# Patient Record
Sex: MALE | Race: WHITE
[De-identification: names, ages, dates, MRNs, and addresses within clinical notes are randomized per-mention and may not be internally consistent; named-entity substitution may affect disease eponyms.]

---

## 2018-02-09 ENCOUNTER — HOSPITAL ENCOUNTER (EMERGENCY)
Age: 48
Discharge: HOME | End: 2018-02-09
Payer: MEDICAID

## 2018-02-09 VITALS
RESPIRATION RATE: 16 BRPM | SYSTOLIC BLOOD PRESSURE: 158 MMHG | OXYGEN SATURATION: 98 % | DIASTOLIC BLOOD PRESSURE: 90 MMHG | HEART RATE: 56 BPM

## 2018-02-09 VITALS
SYSTOLIC BLOOD PRESSURE: 193 MMHG | HEART RATE: 64 BPM | OXYGEN SATURATION: 98 % | BODY MASS INDEX: 34.9 KG/M2 | RESPIRATION RATE: 12 BRPM | DIASTOLIC BLOOD PRESSURE: 103 MMHG | TEMPERATURE: 98.4 F

## 2018-02-09 VITALS — OXYGEN SATURATION: 96 %

## 2018-02-09 DIAGNOSIS — I25.10: ICD-10-CM

## 2018-02-09 DIAGNOSIS — E11.9: ICD-10-CM

## 2018-02-09 DIAGNOSIS — I25.2: ICD-10-CM

## 2018-02-09 DIAGNOSIS — Z79.82: ICD-10-CM

## 2018-02-09 DIAGNOSIS — M79.7: ICD-10-CM

## 2018-02-09 DIAGNOSIS — Z95.5: ICD-10-CM

## 2018-02-09 DIAGNOSIS — Z79.899: ICD-10-CM

## 2018-02-09 DIAGNOSIS — I10: Primary | ICD-10-CM

## 2018-02-09 DIAGNOSIS — R07.89: ICD-10-CM

## 2018-02-09 LAB
ANION GAP: 8 (ref 5–15)
BUN SERPL-MCNC: 16 MG/DL (ref 7–18)
BUN/CREAT RATIO: 16.5 RATIO (ref 10–20)
CALCIUM SERPL-MCNC: 9.2 MG/DL (ref 8.5–10.1)
CARBON DIOXIDE: 26 MMOL/L (ref 21–32)
CHLORIDE: 106 MMOL/L (ref 98–107)
DEPRECATED RDW RBC: 41.3 FL (ref 35.1–43.9)
DIFFERENTIAL INDICATED: (no result)
ERYTHROCYTE [DISTWIDTH] IN BLOOD: 12.9 % (ref 11.6–14.6)
EST GLOM FILT RATE - AFR AMER: 106 ML/MIN (ref 60–?)
ESTIMATED CREATININE CLEARANCE: 119.09 ML/MIN
GLUCOSE: 103 MG/DL (ref 74–106)
HCT VFR BLD AUTO: 41.4 % (ref 40–54)
HEMOGLOBIN: 14.5 G/DL (ref 13–16.5)
HGB BLD-MCNC: 14.5 G/DL (ref 13–16.5)
IMMATURE GRANULOCYTES COUNT: 0 X10^3/UL (ref 0–0)
MCV RBC: 88.8 FL (ref 80–94)
MEAN CORP HGB CONC: 35 G/GL (ref 32–36)
MEAN PLATELET VOL.: 9.4 FL (ref 6.2–12)
PLATELET # BLD: 138 K/MM3 (ref 150–450)
PLATELET COUNT: 138 K/MM3 (ref 150–450)
POSITIVE COUNT: NO
POSITIVE DIFFERENTIAL: NO
POSITIVE MORPHOLOGY: NO
POTASSIUM: 4.3 MMOL/L (ref 3.5–5.1)
RBC # BLD AUTO: 4.66 M/MM3 (ref 4.6–6.2)
RBC DISTRIBUTION WIDTH CV: 12.9 % (ref 11.6–14.6)
RBC DISTRIBUTION WIDTH SD: 41.3 FL (ref 35.1–43.9)
WBC # BLD AUTO: 4.6 K/MM3 (ref 4.4–11)
WHITE BLOOD COUNT: 4.6 K/MM3 (ref 4.4–11)

## 2018-02-09 PROCEDURE — 96374 THER/PROPH/DIAG INJ IV PUSH: CPT

## 2018-02-09 PROCEDURE — 84484 ASSAY OF TROPONIN QUANT: CPT

## 2018-02-09 PROCEDURE — 80048 BASIC METABOLIC PNL TOTAL CA: CPT

## 2018-02-09 PROCEDURE — 71045 X-RAY EXAM CHEST 1 VIEW: CPT

## 2018-02-09 PROCEDURE — 93005 ELECTROCARDIOGRAM TRACING: CPT

## 2018-02-09 PROCEDURE — 99285 EMERGENCY DEPT VISIT HI MDM: CPT

## 2018-02-09 PROCEDURE — A4216 STERILE WATER/SALINE, 10 ML: HCPCS

## 2018-02-09 PROCEDURE — 85025 COMPLETE CBC W/AUTO DIFF WBC: CPT

## 2019-07-18 ENCOUNTER — HOSPITAL ENCOUNTER (EMERGENCY)
Age: 49
Discharge: HOME | End: 2019-07-18
Payer: MEDICAID

## 2019-07-18 VITALS
HEART RATE: 59 BPM | RESPIRATION RATE: 18 BRPM | OXYGEN SATURATION: 98 % | SYSTOLIC BLOOD PRESSURE: 156 MMHG | DIASTOLIC BLOOD PRESSURE: 96 MMHG

## 2019-07-18 VITALS
OXYGEN SATURATION: 98 % | HEART RATE: 61 BPM | RESPIRATION RATE: 18 BRPM | TEMPERATURE: 98.6 F | DIASTOLIC BLOOD PRESSURE: 91 MMHG | SYSTOLIC BLOOD PRESSURE: 170 MMHG

## 2019-07-18 VITALS — BODY MASS INDEX: 34.9 KG/M2 | BODY MASS INDEX: 35.6 KG/M2

## 2019-07-18 DIAGNOSIS — R11.2: ICD-10-CM

## 2019-07-18 DIAGNOSIS — Z79.82: ICD-10-CM

## 2019-07-18 DIAGNOSIS — R10.13: Primary | ICD-10-CM

## 2019-07-18 DIAGNOSIS — Z95.5: ICD-10-CM

## 2019-07-18 DIAGNOSIS — I51.9: ICD-10-CM

## 2019-07-18 DIAGNOSIS — R07.89: ICD-10-CM

## 2019-07-18 DIAGNOSIS — Z79.899: ICD-10-CM

## 2019-07-18 LAB
ABSOLUTE NUCLEATED RBC COUNT: 0 10^3/UL (ref 0–5)
ALANINE AMINOTRANSFER ALT/SGPT: 27 U/L (ref 16–61)
ALBUMIN SERPL-MCNC: 3.7 G/DL (ref 3.2–5)
ALKALINE PHOSPHATASE: 56 U/L (ref 45–117)
ANION GAP: 3 (ref 5–15)
AST(SGOT): 18 U/L (ref 15–37)
BUN SERPL-MCNC: 16 MG/DL (ref 7–18)
BUN/CREAT RATIO: 15.2 RATIO (ref 10–20)
CALCIUM SERPL-MCNC: 9.1 MG/DL (ref 8.5–10.1)
CARBON DIOXIDE: 30 MMOL/L (ref 21–32)
CHLORIDE: 106 MMOL/L (ref 98–107)
DEPRECATED RDW RBC: 39.3 FL (ref 35.1–43.9)
ERYTHROCYTE [DISTWIDTH] IN BLOOD: 12.1 % (ref 11.6–14.6)
EST GLOM FILT RATE - AFR AMER: 96 ML/MIN (ref 60–?)
ESTIMATED CREATININE CLEARANCE: 108.81 ML/MIN
GLOBULIN: 2.9 G/DL (ref 2.2–4.2)
GLUCOSE: 135 MG/DL (ref 74–106)
HCT VFR BLD AUTO: 35.8 % (ref 40–54)
HEMOGLOBIN: 12.9 G/DL (ref 13–16.5)
HGB BLD-MCNC: 12.9 G/DL (ref 13–16.5)
IMMATURE GRANULOCYTES COUNT: 0.01 X10^3/UL (ref 0–0)
LIPASE: 111 U/L (ref 73–393)
MCV RBC: 88.4 FL (ref 80–94)
MEAN CORP HGB CONC: 36 G/DL (ref 32–36)
MEAN PLATELET VOL.: 9.2 FL (ref 6.2–12)
NRBC FLAGGED BY ANALYZER: 0 % (ref 0–5)
PLATELET # BLD: 129 K/MM3 (ref 150–450)
PLATELET COUNT: 129 K/MM3 (ref 150–450)
POTASSIUM: 4 MMOL/L (ref 3.5–5.1)
RBC # BLD AUTO: 4.05 M/MM3 (ref 4.6–6.2)
RBC DISTRIBUTION WIDTH CV: 12.1 % (ref 11.6–14.6)
RBC DISTRIBUTION WIDTH SD: 39.3 FL (ref 35.1–43.9)
WBC # BLD AUTO: 4 K/MM3 (ref 4.4–11)
WHITE BLOOD COUNT: 4 K/MM3 (ref 4.4–11)

## 2019-07-18 PROCEDURE — 80053 COMPREHEN METABOLIC PANEL: CPT

## 2019-07-18 PROCEDURE — 99283 EMERGENCY DEPT VISIT LOW MDM: CPT

## 2019-07-18 PROCEDURE — 84484 ASSAY OF TROPONIN QUANT: CPT

## 2019-07-18 PROCEDURE — 74022 RADEX COMPL AQT ABD SERIES: CPT

## 2019-07-18 PROCEDURE — 93005 ELECTROCARDIOGRAM TRACING: CPT

## 2019-07-18 PROCEDURE — 85025 COMPLETE CBC W/AUTO DIFF WBC: CPT

## 2019-07-18 PROCEDURE — 83690 ASSAY OF LIPASE: CPT

## 2020-01-04 ENCOUNTER — HOSPITAL ENCOUNTER (EMERGENCY)
Age: 50
Discharge: HOME | End: 2020-01-04
Payer: MEDICAID

## 2020-01-04 VITALS
OXYGEN SATURATION: 97 % | RESPIRATION RATE: 14 BRPM | SYSTOLIC BLOOD PRESSURE: 151 MMHG | DIASTOLIC BLOOD PRESSURE: 90 MMHG | HEART RATE: 65 BPM

## 2020-01-04 VITALS
SYSTOLIC BLOOD PRESSURE: 193 MMHG | TEMPERATURE: 98.78 F | DIASTOLIC BLOOD PRESSURE: 111 MMHG | OXYGEN SATURATION: 98 % | RESPIRATION RATE: 16 BRPM | HEART RATE: 67 BPM

## 2020-01-04 VITALS — BODY MASS INDEX: 35.6 KG/M2 | BODY MASS INDEX: 34.4 KG/M2
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
## 2020-01-04 DIAGNOSIS — S90.811A: Primary | ICD-10-CM

## 2020-01-04 DIAGNOSIS — E11.40: ICD-10-CM

## 2020-01-04 DIAGNOSIS — I25.10: ICD-10-CM

## 2020-01-04 DIAGNOSIS — K21.9: ICD-10-CM

## 2020-01-04 DIAGNOSIS — Z79.899: ICD-10-CM

## 2020-01-04 DIAGNOSIS — Z79.82: ICD-10-CM

## 2020-01-04 DIAGNOSIS — Z95.5: ICD-10-CM
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
## 2020-01-04 PROCEDURE — 73630 X-RAY EXAM OF FOOT: CPT

## 2020-01-04 PROCEDURE — 99282 EMERGENCY DEPT VISIT SF MDM: CPT

## 2020-04-19 ENCOUNTER — HOSPITAL ENCOUNTER (EMERGENCY)
Age: 50
Discharge: HOME | End: 2020-04-19
Payer: MEDICAID

## 2020-04-19 VITALS
BODY MASS INDEX: 34.4 KG/M2 | BODY MASS INDEX: 34.7 KG/M2 | OXYGEN SATURATION: 96 % | SYSTOLIC BLOOD PRESSURE: 203 MMHG | HEART RATE: 80 BPM | TEMPERATURE: 97.34 F | RESPIRATION RATE: 18 BRPM | DIASTOLIC BLOOD PRESSURE: 103 MMHG

## 2020-04-19 VITALS
SYSTOLIC BLOOD PRESSURE: 192 MMHG | HEART RATE: 68 BPM | OXYGEN SATURATION: 95 % | RESPIRATION RATE: 13 BRPM | DIASTOLIC BLOOD PRESSURE: 94 MMHG

## 2020-04-19 VITALS
HEART RATE: 61 BPM | RESPIRATION RATE: 11 BRPM | OXYGEN SATURATION: 97 % | DIASTOLIC BLOOD PRESSURE: 79 MMHG | SYSTOLIC BLOOD PRESSURE: 155 MMHG

## 2020-04-19 VITALS
SYSTOLIC BLOOD PRESSURE: 168 MMHG | HEART RATE: 65 BPM | DIASTOLIC BLOOD PRESSURE: 86 MMHG | OXYGEN SATURATION: 98 % | RESPIRATION RATE: 18 BRPM

## 2020-04-19 VITALS — SYSTOLIC BLOOD PRESSURE: 227 MMHG | HEART RATE: 75 BPM | DIASTOLIC BLOOD PRESSURE: 102 MMHG

## 2020-04-19 VITALS — SYSTOLIC BLOOD PRESSURE: 205 MMHG | DIASTOLIC BLOOD PRESSURE: 100 MMHG | HEART RATE: 78 BPM

## 2020-04-19 DIAGNOSIS — F32.9: ICD-10-CM

## 2020-04-19 DIAGNOSIS — R06.00: ICD-10-CM

## 2020-04-19 DIAGNOSIS — K21.9: ICD-10-CM

## 2020-04-19 DIAGNOSIS — R00.2: ICD-10-CM

## 2020-04-19 DIAGNOSIS — R07.89: Primary | ICD-10-CM

## 2020-04-19 DIAGNOSIS — Z79.82: ICD-10-CM

## 2020-04-19 DIAGNOSIS — F41.9: ICD-10-CM

## 2020-04-19 DIAGNOSIS — Z79.899: ICD-10-CM

## 2020-04-19 DIAGNOSIS — I25.10: ICD-10-CM

## 2020-04-19 DIAGNOSIS — M79.7: ICD-10-CM

## 2020-04-19 DIAGNOSIS — J34.89: ICD-10-CM

## 2020-04-19 DIAGNOSIS — I10: ICD-10-CM

## 2020-04-19 DIAGNOSIS — R05: ICD-10-CM

## 2020-04-19 DIAGNOSIS — R42: ICD-10-CM

## 2020-04-19 DIAGNOSIS — R61: ICD-10-CM

## 2020-04-19 DIAGNOSIS — Z95.5: ICD-10-CM

## 2020-04-19 DIAGNOSIS — R51: ICD-10-CM

## 2020-04-19 DIAGNOSIS — J02.9: ICD-10-CM

## 2020-04-19 LAB
ALANINE AMINOTRANSFER ALT/SGPT: 30 U/L (ref 16–61)
ALBUMIN SERPL-MCNC: 4.3 G/DL (ref 3.2–5)
ALKALINE PHOSPHATASE: 75 U/L (ref 45–117)
ANION GAP: 5 (ref 5–15)
AST(SGOT): 20 U/L (ref 15–37)
BUN SERPL-MCNC: 13 MG/DL (ref 7–18)
BUN/CREAT RATIO: 12.4 RATIO (ref 10–20)
CALCIUM SERPL-MCNC: 9.5 MG/DL (ref 8.5–10.1)
CARBON DIOXIDE: 30 MMOL/L (ref 21–32)
CHLORIDE: 105 MMOL/L (ref 98–107)
DEPRECATED RDW RBC: 39.7 FL (ref 35.1–43.9)
ERYTHROCYTE [DISTWIDTH] IN BLOOD: 12.7 % (ref 11.6–14.6)
EST GLOM FILT RATE - AFR AMER: 96 ML/MIN (ref 60–?)
ESTIMATED CREATININE CLEARANCE: 107.6 ML/MIN
GLOBULIN: 3 G/DL (ref 2.2–4.2)
GLUCOSE: 208 MG/DL (ref 74–106)
HCT VFR BLD AUTO: 43.6 % (ref 40–54)
HEMOGLOBIN: 15.8 G/DL (ref 13–16.5)
HGB BLD-MCNC: 15.8 G/DL (ref 13–16.5)
IMMATURE GRANULOCYTES COUNT: 0.02 X10^3/UL (ref 0–0)
LIPASE: 109 U/L (ref 73–393)
MCV RBC: 88.1 FL (ref 80–94)
MEAN CORP HGB CONC: 36.2 G/DL (ref 32–36)
MEAN PLATELET VOL.: 9.4 FL (ref 6.2–12)
NRBC FLAGGED BY ANALYZER: 0 % (ref 0–5)
PLATELET # BLD: 170 K/MM3 (ref 150–450)
PLATELET COUNT: 170 K/MM3 (ref 150–450)
POTASSIUM: 4.1 MMOL/L (ref 3.5–5.1)
RBC # BLD AUTO: 4.95 M/MM3 (ref 4.6–6.2)
RBC DISTRIBUTION WIDTH CV: 12.7 % (ref 11.6–14.6)
RBC DISTRIBUTION WIDTH SD: 39.7 FL (ref 35.1–43.9)
WBC # BLD AUTO: 5.5 K/MM3 (ref 4.4–11)
WHITE BLOOD COUNT: 5.5 K/MM3 (ref 4.4–11)

## 2020-04-19 PROCEDURE — 83690 ASSAY OF LIPASE: CPT

## 2020-04-19 PROCEDURE — 71045 X-RAY EXAM CHEST 1 VIEW: CPT

## 2020-04-19 PROCEDURE — 84484 ASSAY OF TROPONIN QUANT: CPT

## 2020-04-19 PROCEDURE — 99285 EMERGENCY DEPT VISIT HI MDM: CPT

## 2020-04-19 PROCEDURE — A4216 STERILE WATER/SALINE, 10 ML: HCPCS

## 2020-04-19 PROCEDURE — 85025 COMPLETE CBC W/AUTO DIFF WBC: CPT

## 2020-04-19 PROCEDURE — 80053 COMPREHEN METABOLIC PANEL: CPT

## 2020-04-19 PROCEDURE — 93005 ELECTROCARDIOGRAM TRACING: CPT

## 2020-08-29 ENCOUNTER — HOSPITAL ENCOUNTER (INPATIENT)
Dept: HOSPITAL 100 - ED | Age: 50
LOS: 2 days | Discharge: HOME | DRG: 199 | End: 2020-08-31
Payer: MEDICAID

## 2020-08-29 VITALS
HEART RATE: 81 BPM | DIASTOLIC BLOOD PRESSURE: 124 MMHG | SYSTOLIC BLOOD PRESSURE: 251 MMHG | OXYGEN SATURATION: 98 % | RESPIRATION RATE: 14 BRPM | TEMPERATURE: 98.96 F

## 2020-08-29 VITALS — BODY MASS INDEX: 34.4 KG/M2 | BODY MASS INDEX: 34.7 KG/M2 | BODY MASS INDEX: 35.7 KG/M2

## 2020-08-29 DIAGNOSIS — E11.9: ICD-10-CM

## 2020-08-29 DIAGNOSIS — Z79.82: ICD-10-CM

## 2020-08-29 DIAGNOSIS — M79.7: ICD-10-CM

## 2020-08-29 DIAGNOSIS — Z95.5: ICD-10-CM

## 2020-08-29 DIAGNOSIS — I10: ICD-10-CM

## 2020-08-29 DIAGNOSIS — I16.1: Primary | ICD-10-CM

## 2020-08-29 DIAGNOSIS — F32.9: ICD-10-CM

## 2020-08-29 DIAGNOSIS — E78.5: ICD-10-CM

## 2020-08-29 DIAGNOSIS — I25.2: ICD-10-CM

## 2020-08-29 DIAGNOSIS — Z79.4: ICD-10-CM

## 2020-08-29 DIAGNOSIS — Z79.899: ICD-10-CM

## 2020-08-29 DIAGNOSIS — I25.110: ICD-10-CM

## 2020-08-29 DIAGNOSIS — F41.9: ICD-10-CM

## 2020-08-29 PROCEDURE — 70450 CT HEAD/BRAIN W/O DYE: CPT

## 2020-08-29 PROCEDURE — 80061 LIPID PANEL: CPT

## 2020-08-29 PROCEDURE — 71045 X-RAY EXAM CHEST 1 VIEW: CPT

## 2020-08-29 PROCEDURE — 84484 ASSAY OF TROPONIN QUANT: CPT

## 2020-08-29 PROCEDURE — 83735 ASSAY OF MAGNESIUM: CPT

## 2020-08-29 PROCEDURE — 99285 EMERGENCY DEPT VISIT HI MDM: CPT

## 2020-08-29 PROCEDURE — 81001 URINALYSIS AUTO W/SCOPE: CPT

## 2020-08-29 PROCEDURE — 80048 BASIC METABOLIC PNL TOTAL CA: CPT

## 2020-08-29 PROCEDURE — 36415 COLL VENOUS BLD VENIPUNCTURE: CPT

## 2020-08-29 PROCEDURE — A4216 STERILE WATER/SALINE, 10 ML: HCPCS

## 2020-08-29 PROCEDURE — 93005 ELECTROCARDIOGRAM TRACING: CPT

## 2020-08-29 PROCEDURE — 80076 HEPATIC FUNCTION PANEL: CPT

## 2020-08-29 PROCEDURE — 93306 TTE W/DOPPLER COMPLETE: CPT

## 2020-08-29 PROCEDURE — 84443 ASSAY THYROID STIM HORMONE: CPT

## 2020-08-29 PROCEDURE — 83036 HEMOGLOBIN GLYCOSYLATED A1C: CPT

## 2020-08-29 PROCEDURE — 87086 URINE CULTURE/COLONY COUNT: CPT

## 2020-08-29 PROCEDURE — 82962 GLUCOSE BLOOD TEST: CPT

## 2020-08-29 PROCEDURE — 85025 COMPLETE CBC W/AUTO DIFF WBC: CPT

## 2020-08-30 VITALS
DIASTOLIC BLOOD PRESSURE: 61 MMHG | OXYGEN SATURATION: 96 % | HEART RATE: 58 BPM | TEMPERATURE: 98.6 F | SYSTOLIC BLOOD PRESSURE: 127 MMHG | RESPIRATION RATE: 16 BRPM

## 2020-08-30 VITALS
RESPIRATION RATE: 18 BRPM | OXYGEN SATURATION: 97 % | TEMPERATURE: 98.2 F | DIASTOLIC BLOOD PRESSURE: 74 MMHG | SYSTOLIC BLOOD PRESSURE: 145 MMHG | HEART RATE: 61 BPM

## 2020-08-30 VITALS
HEART RATE: 59 BPM | DIASTOLIC BLOOD PRESSURE: 77 MMHG | OXYGEN SATURATION: 94 % | RESPIRATION RATE: 15 BRPM | SYSTOLIC BLOOD PRESSURE: 132 MMHG

## 2020-08-30 VITALS — SYSTOLIC BLOOD PRESSURE: 155 MMHG | DIASTOLIC BLOOD PRESSURE: 87 MMHG

## 2020-08-30 VITALS
OXYGEN SATURATION: 98 % | DIASTOLIC BLOOD PRESSURE: 78 MMHG | SYSTOLIC BLOOD PRESSURE: 131 MMHG | HEART RATE: 65 BPM | RESPIRATION RATE: 19 BRPM

## 2020-08-30 VITALS — DIASTOLIC BLOOD PRESSURE: 109 MMHG | SYSTOLIC BLOOD PRESSURE: 185 MMHG

## 2020-08-30 VITALS — SYSTOLIC BLOOD PRESSURE: 222 MMHG | DIASTOLIC BLOOD PRESSURE: 113 MMHG

## 2020-08-30 VITALS — HEART RATE: 70 BPM | DIASTOLIC BLOOD PRESSURE: 110 MMHG | SYSTOLIC BLOOD PRESSURE: 230 MMHG

## 2020-08-30 VITALS — DIASTOLIC BLOOD PRESSURE: 77 MMHG | SYSTOLIC BLOOD PRESSURE: 136 MMHG

## 2020-08-30 VITALS
RESPIRATION RATE: 17 BRPM | DIASTOLIC BLOOD PRESSURE: 81 MMHG | HEART RATE: 66 BPM | OXYGEN SATURATION: 97 % | SYSTOLIC BLOOD PRESSURE: 126 MMHG

## 2020-08-30 VITALS — DIASTOLIC BLOOD PRESSURE: 81 MMHG | SYSTOLIC BLOOD PRESSURE: 150 MMHG

## 2020-08-30 VITALS
SYSTOLIC BLOOD PRESSURE: 141 MMHG | RESPIRATION RATE: 19 BRPM | OXYGEN SATURATION: 97 % | HEART RATE: 79 BPM | DIASTOLIC BLOOD PRESSURE: 63 MMHG

## 2020-08-30 VITALS — SYSTOLIC BLOOD PRESSURE: 214 MMHG | DIASTOLIC BLOOD PRESSURE: 107 MMHG | HEART RATE: 70 BPM

## 2020-08-30 VITALS — HEART RATE: 77 BPM | SYSTOLIC BLOOD PRESSURE: 209 MMHG | DIASTOLIC BLOOD PRESSURE: 101 MMHG

## 2020-08-30 VITALS — DIASTOLIC BLOOD PRESSURE: 60 MMHG | SYSTOLIC BLOOD PRESSURE: 136 MMHG

## 2020-08-30 VITALS
HEART RATE: 66 BPM | DIASTOLIC BLOOD PRESSURE: 80 MMHG | SYSTOLIC BLOOD PRESSURE: 145 MMHG | OXYGEN SATURATION: 99 % | RESPIRATION RATE: 12 BRPM

## 2020-08-30 VITALS
SYSTOLIC BLOOD PRESSURE: 228 MMHG | HEART RATE: 69 BPM | RESPIRATION RATE: 19 BRPM | OXYGEN SATURATION: 93 % | DIASTOLIC BLOOD PRESSURE: 110 MMHG

## 2020-08-30 VITALS — DIASTOLIC BLOOD PRESSURE: 76 MMHG | SYSTOLIC BLOOD PRESSURE: 149 MMHG

## 2020-08-30 VITALS
DIASTOLIC BLOOD PRESSURE: 71 MMHG | HEART RATE: 66 BPM | SYSTOLIC BLOOD PRESSURE: 132 MMHG | RESPIRATION RATE: 16 BRPM | TEMPERATURE: 98.42 F | OXYGEN SATURATION: 97 %

## 2020-08-30 VITALS — SYSTOLIC BLOOD PRESSURE: 152 MMHG | DIASTOLIC BLOOD PRESSURE: 82 MMHG

## 2020-08-30 VITALS — SYSTOLIC BLOOD PRESSURE: 134 MMHG | DIASTOLIC BLOOD PRESSURE: 75 MMHG

## 2020-08-30 VITALS
SYSTOLIC BLOOD PRESSURE: 193 MMHG | OXYGEN SATURATION: 99 % | HEART RATE: 81 BPM | RESPIRATION RATE: 14 BRPM | TEMPERATURE: 98.1 F | DIASTOLIC BLOOD PRESSURE: 91 MMHG

## 2020-08-30 VITALS — SYSTOLIC BLOOD PRESSURE: 129 MMHG | DIASTOLIC BLOOD PRESSURE: 59 MMHG

## 2020-08-30 VITALS — DIASTOLIC BLOOD PRESSURE: 74 MMHG | SYSTOLIC BLOOD PRESSURE: 145 MMHG | HEART RATE: 61 BPM

## 2020-08-30 VITALS — SYSTOLIC BLOOD PRESSURE: 136 MMHG | DIASTOLIC BLOOD PRESSURE: 76 MMHG

## 2020-08-30 VITALS — DIASTOLIC BLOOD PRESSURE: 63 MMHG | SYSTOLIC BLOOD PRESSURE: 135 MMHG

## 2020-08-30 VITALS — OXYGEN SATURATION: 97 %

## 2020-08-30 VITALS
RESPIRATION RATE: 21 BRPM | OXYGEN SATURATION: 96 % | TEMPERATURE: 98.2 F | DIASTOLIC BLOOD PRESSURE: 77 MMHG | HEART RATE: 71 BPM | SYSTOLIC BLOOD PRESSURE: 140 MMHG

## 2020-08-30 VITALS — DIASTOLIC BLOOD PRESSURE: 79 MMHG | SYSTOLIC BLOOD PRESSURE: 160 MMHG

## 2020-08-30 VITALS — SYSTOLIC BLOOD PRESSURE: 156 MMHG | DIASTOLIC BLOOD PRESSURE: 80 MMHG

## 2020-08-30 VITALS — DIASTOLIC BLOOD PRESSURE: 85 MMHG | SYSTOLIC BLOOD PRESSURE: 179 MMHG

## 2020-08-30 VITALS
HEART RATE: 70 BPM | OXYGEN SATURATION: 94 % | TEMPERATURE: 98.96 F | SYSTOLIC BLOOD PRESSURE: 215 MMHG | DIASTOLIC BLOOD PRESSURE: 107 MMHG | RESPIRATION RATE: 16 BRPM

## 2020-08-30 VITALS
HEART RATE: 82 BPM | RESPIRATION RATE: 29 BRPM | OXYGEN SATURATION: 96 % | SYSTOLIC BLOOD PRESSURE: 135 MMHG | DIASTOLIC BLOOD PRESSURE: 62 MMHG

## 2020-08-30 VITALS
HEART RATE: 79 BPM | RESPIRATION RATE: 14 BRPM | SYSTOLIC BLOOD PRESSURE: 179 MMHG | OXYGEN SATURATION: 98 % | DIASTOLIC BLOOD PRESSURE: 86 MMHG

## 2020-08-30 VITALS — DIASTOLIC BLOOD PRESSURE: 71 MMHG | SYSTOLIC BLOOD PRESSURE: 141 MMHG

## 2020-08-30 VITALS — DIASTOLIC BLOOD PRESSURE: 64 MMHG | SYSTOLIC BLOOD PRESSURE: 138 MMHG

## 2020-08-30 VITALS — HEART RATE: 69 BPM

## 2020-08-30 VITALS — HEART RATE: 80 BPM

## 2020-08-30 VITALS — HEART RATE: 59 BPM

## 2020-08-30 VITALS — HEART RATE: 85 BPM

## 2020-08-30 LAB
ALANINE AMINOTRANSFER ALT/SGPT: 33 U/L (ref 16–61)
ALBUMIN SERPL-MCNC: 4.4 G/DL (ref 3.2–5)
ALKALINE PHOSPHATASE: 52 U/L (ref 45–117)
ANION GAP: 6 (ref 5–15)
AST(SGOT): 20 U/L (ref 15–37)
BILIRUB DIRECT SERPL-MCNC: 0.2 MG/DL (ref 0–0.3)
BUN SERPL-MCNC: 17 MG/DL (ref 7–18)
BUN/CREAT RATIO: 15.6 RATIO (ref 10–20)
CALCIUM SERPL-MCNC: 9.2 MG/DL (ref 8.5–10.1)
CARBON DIOXIDE: 28 MMOL/L (ref 21–32)
CHLORIDE: 103 MMOL/L (ref 98–107)
CHOLEST SERPL-MCNC: 183 MG/DL
DEPRECATED RDW RBC: 38.5 FL (ref 35.1–43.9)
ERYTHROCYTE [DISTWIDTH] IN BLOOD: 12.3 % (ref 11.6–14.6)
EST GLOM FILT RATE - AFR AMER: 91 ML/MIN (ref 60–?)
ESTIMATED CREATININE CLEARANCE: 103.65 ML/MIN
GLOBULIN: 3.3 G/DL (ref 2.2–4.2)
GLUCOSE, DIPSTICK: 100 MG/DL
GLUCOSE: 207 MG/DL (ref 74–106)
HCT VFR BLD AUTO: 42.2 % (ref 40–54)
HEMOGLOBIN: 15.1 G/DL (ref 13–16.5)
HGB BLD-MCNC: 15.1 G/DL (ref 13–16.5)
IMMATURE GRANULOCYTES COUNT: 0.05 X10^3/UL (ref 0–0)
MAGNESIUM: 2 MG/DL (ref 1.6–2.6)
MCV RBC: 86.5 FL (ref 80–94)
MEAN CORP HGB CONC: 35.8 G/DL (ref 32–36)
MEAN PLATELET VOL.: 9.4 FL (ref 6.2–12)
MUCOUS THREADS URNS QL MICRO: (no result) /HPF
NRBC FLAGGED BY ANALYZER: 0 % (ref 0–5)
PLATELET # BLD: 183 K/MM3 (ref 150–450)
PLATELET COUNT: 183 K/MM3 (ref 150–450)
POTASSIUM: 4.2 MMOL/L (ref 3.5–5.1)
PROT UR QL STRIP.AUTO: 30 MG/DL
RBC # BLD AUTO: 4.88 M/MM3 (ref 4.6–6.2)
RBC DISTRIBUTION WIDTH CV: 12.3 % (ref 11.6–14.6)
RBC DISTRIBUTION WIDTH SD: 38.5 FL (ref 35.1–43.9)
RBC UR QL: (no result) /HPF (ref 0–5)
SP GR UR: 1 (ref 1–1.03)
SQUAMOUS URNS QL MICRO: (no result) /HPF (ref 0–5)
TRIGLYCERIDES: 89 MG/DL
URINE PRESERVATIVE: (no result)
VLDLC SERPL-MCNC: 18 MG/DL (ref 5–40)
WBC # BLD AUTO: 8.2 K/MM3 (ref 4.4–11)
WHITE BLOOD COUNT: 8.2 K/MM3 (ref 4.4–11)

## 2020-08-30 RX ADMIN — NITROGLYCERIN 1 INCH: 20 OINTMENT TOPICAL at 03:00

## 2020-08-30 RX ADMIN — NITROGLYCERIN 0.5 INCH: 20 OINTMENT TOPICAL at 22:56

## 2020-08-30 RX ADMIN — LABETALOL HYDROCHLORIDE 20 MG: 5 INJECTION, SOLUTION INTRAVENOUS at 01:02

## 2020-08-30 RX ADMIN — LABETALOL HYDROCHLORIDE 20 MG: 5 INJECTION, SOLUTION INTRAVENOUS at 00:16

## 2020-08-30 RX ADMIN — NITROGLYCERIN 0.5 INCH: 20 OINTMENT TOPICAL at 16:10

## 2020-08-30 RX ADMIN — SODIUM CHLORIDE 30 ML: 9 INJECTION, SOLUTION INTRAVENOUS at 03:18

## 2020-08-30 RX ADMIN — NITROGLYCERIN 0.5 INCH: 20 OINTMENT TOPICAL at 12:57

## 2020-08-30 RX ADMIN — ASPIRIN 81 MG: 81 TABLET, COATED ORAL at 22:55

## 2020-08-31 VITALS — HEART RATE: 59 BPM | SYSTOLIC BLOOD PRESSURE: 128 MMHG | DIASTOLIC BLOOD PRESSURE: 61 MMHG

## 2020-08-31 VITALS — HEART RATE: 66 BPM

## 2020-08-31 VITALS
TEMPERATURE: 98.3 F | HEART RATE: 53 BPM | DIASTOLIC BLOOD PRESSURE: 71 MMHG | SYSTOLIC BLOOD PRESSURE: 158 MMHG | OXYGEN SATURATION: 97 % | RESPIRATION RATE: 16 BRPM

## 2020-08-31 VITALS
HEART RATE: 60 BPM | RESPIRATION RATE: 16 BRPM | SYSTOLIC BLOOD PRESSURE: 156 MMHG | TEMPERATURE: 98.4 F | DIASTOLIC BLOOD PRESSURE: 86 MMHG | OXYGEN SATURATION: 96 %

## 2020-08-31 VITALS
HEART RATE: 55 BPM | TEMPERATURE: 98.4 F | RESPIRATION RATE: 18 BRPM | DIASTOLIC BLOOD PRESSURE: 70 MMHG | OXYGEN SATURATION: 98 % | SYSTOLIC BLOOD PRESSURE: 147 MMHG

## 2020-08-31 VITALS — OXYGEN SATURATION: 96 %

## 2020-08-31 VITALS — DIASTOLIC BLOOD PRESSURE: 86 MMHG | SYSTOLIC BLOOD PRESSURE: 156 MMHG | HEART RATE: 60 BPM

## 2020-08-31 VITALS — HEART RATE: 58 BPM

## 2020-08-31 VITALS — HEART RATE: 51 BPM

## 2020-08-31 LAB
ANION GAP: 3 (ref 5–15)
BUN SERPL-MCNC: 19 MG/DL (ref 7–18)
BUN/CREAT RATIO: 18.1 RATIO (ref 10–20)
CALCIUM SERPL-MCNC: 8.9 MG/DL (ref 8.5–10.1)
CARBON DIOXIDE: 32 MMOL/L (ref 21–32)
CHLORIDE: 105 MMOL/L (ref 98–107)
DEPRECATED RDW RBC: 40.2 FL (ref 35.1–43.9)
ERYTHROCYTE [DISTWIDTH] IN BLOOD: 12.6 % (ref 11.6–14.6)
EST GLOM FILT RATE - AFR AMER: 95 ML/MIN (ref 60–?)
ESTIMATED CREATININE CLEARANCE: 107.6 ML/MIN
GLUCOSE: 113 MG/DL (ref 74–106)
HCT VFR BLD AUTO: 38.4 % (ref 40–54)
HEMOGLOBIN: 13.6 G/DL (ref 13–16.5)
HGB BLD-MCNC: 13.6 G/DL (ref 13–16.5)
IMMATURE GRANULOCYTES COUNT: 0.01 X10^3/UL (ref 0–0)
MCV RBC: 89.3 FL (ref 80–94)
MEAN CORP HGB CONC: 35.4 G/DL (ref 32–36)
MEAN PLATELET VOL.: 9 FL (ref 6.2–12)
NRBC FLAGGED BY ANALYZER: 0 % (ref 0–5)
PLATELET # BLD: 126 K/MM3 (ref 150–450)
PLATELET COUNT: 126 K/MM3 (ref 150–450)
POTASSIUM: 3.8 MMOL/L (ref 3.5–5.1)
RBC # BLD AUTO: 4.3 M/MM3 (ref 4.6–6.2)
RBC DISTRIBUTION WIDTH CV: 12.6 % (ref 11.6–14.6)
RBC DISTRIBUTION WIDTH SD: 40.2 FL (ref 35.1–43.9)
WBC # BLD AUTO: 6.7 K/MM3 (ref 4.4–11)
WHITE BLOOD COUNT: 6.7 K/MM3 (ref 4.4–11)

## 2020-08-31 RX ADMIN — NITROGLYCERIN 0.5 INCH: 20 OINTMENT TOPICAL at 04:00

## 2020-08-31 RX ADMIN — NITROGLYCERIN 0.5 INCH: 20 OINTMENT TOPICAL at 10:32

## 2020-10-14 ENCOUNTER — HOSPITAL ENCOUNTER (EMERGENCY)
Age: 50
Discharge: HOME | End: 2020-10-14

## 2020-10-14 VITALS
OXYGEN SATURATION: 99 % | SYSTOLIC BLOOD PRESSURE: 137 MMHG | RESPIRATION RATE: 18 BRPM | DIASTOLIC BLOOD PRESSURE: 79 MMHG | HEART RATE: 78 BPM

## 2020-10-14 DIAGNOSIS — F32.9: ICD-10-CM

## 2020-10-14 DIAGNOSIS — Z79.899: ICD-10-CM

## 2020-10-14 DIAGNOSIS — I25.2: ICD-10-CM

## 2020-10-14 DIAGNOSIS — Z95.5: ICD-10-CM

## 2020-10-14 DIAGNOSIS — I10: ICD-10-CM

## 2020-10-14 DIAGNOSIS — E11.9: ICD-10-CM

## 2020-10-14 DIAGNOSIS — M79.7: ICD-10-CM

## 2020-10-14 DIAGNOSIS — H81.399: Primary | ICD-10-CM

## 2020-10-14 DIAGNOSIS — Z79.82: ICD-10-CM

## 2020-10-14 DIAGNOSIS — E78.5: ICD-10-CM

## 2020-10-14 DIAGNOSIS — I25.10: ICD-10-CM

## 2020-10-14 LAB
ANION GAP: 7 (ref 5–15)
BUN SERPL-MCNC: 25 MG/DL (ref 7–18)
BUN/CREAT RATIO: 22.1 RATIO (ref 10–20)
CALCIUM SERPL-MCNC: 9.5 MG/DL (ref 8.5–10.1)
CARBON DIOXIDE: 29 MMOL/L (ref 21–32)
CHLORIDE: 106 MMOL/L (ref 98–107)
DEPRECATED RDW RBC: 41.1 FL (ref 35.1–43.9)
ERYTHROCYTE [DISTWIDTH] IN BLOOD: 12.7 % (ref 11.6–14.6)
EST GLOM FILT RATE - AFR AMER: 88 ML/MIN (ref 60–?)
ESTIMATED CREATININE CLEARANCE: 98.86 ML/MIN
GLUCOSE: 147 MG/DL (ref 74–106)
HCT VFR BLD AUTO: 39.3 % (ref 40–54)
HEMOGLOBIN: 14 G/DL (ref 13–16.5)
HGB BLD-MCNC: 14 G/DL (ref 13–16.5)
IMMATURE GRANULOCYTES COUNT: 0.01 X10^3/UL (ref 0–0)
MCV RBC: 88.5 FL (ref 80–94)
MEAN CORP HGB CONC: 35.6 G/DL (ref 32–36)
MEAN PLATELET VOL.: 9.5 FL (ref 6.2–12)
NRBC FLAGGED BY ANALYZER: 0 % (ref 0–5)
PLATELET # BLD: 167 K/MM3 (ref 150–450)
PLATELET COUNT: 167 K/MM3 (ref 150–450)
POTASSIUM: 3.5 MMOL/L (ref 3.5–5.1)
RBC # BLD AUTO: 4.44 M/MM3 (ref 4.6–6.2)
RBC DISTRIBUTION WIDTH CV: 12.7 % (ref 11.6–14.6)
RBC DISTRIBUTION WIDTH SD: 41.1 FL (ref 35.1–43.9)
WBC # BLD AUTO: 4.3 K/MM3 (ref 4.4–11)
WHITE BLOOD COUNT: 4.3 K/MM3 (ref 4.4–11)

## 2020-10-14 PROCEDURE — A4216 STERILE WATER/SALINE, 10 ML: HCPCS

## 2020-10-14 PROCEDURE — 85025 COMPLETE CBC W/AUTO DIFF WBC: CPT

## 2020-10-14 PROCEDURE — 80048 BASIC METABOLIC PNL TOTAL CA: CPT

## 2020-10-14 PROCEDURE — 99285 EMERGENCY DEPT VISIT HI MDM: CPT

## 2020-10-14 PROCEDURE — 70450 CT HEAD/BRAIN W/O DYE: CPT

## 2020-10-14 PROCEDURE — 96360 HYDRATION IV INFUSION INIT: CPT

## 2020-10-14 PROCEDURE — 99281 EMR DPT VST MAYX REQ PHY/QHP: CPT

## 2020-10-14 PROCEDURE — 93005 ELECTROCARDIOGRAM TRACING: CPT

## 2021-04-23 ENCOUNTER — HOSPITAL ENCOUNTER (EMERGENCY)
Dept: HOSPITAL 100 - ED | Age: 51
Discharge: HOME | End: 2021-04-23
Payer: MEDICAID

## 2021-04-23 VITALS
DIASTOLIC BLOOD PRESSURE: 96 MMHG | OXYGEN SATURATION: 96 % | TEMPERATURE: 97.8 F | RESPIRATION RATE: 17 BRPM | HEART RATE: 64 BPM | SYSTOLIC BLOOD PRESSURE: 198 MMHG

## 2021-04-23 VITALS — BODY MASS INDEX: 33.5 KG/M2 | BODY MASS INDEX: 34.9 KG/M2

## 2021-04-23 DIAGNOSIS — E11.9: ICD-10-CM
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
## 2021-04-23 DIAGNOSIS — S60.410A: Primary | ICD-10-CM

## 2021-04-23 DIAGNOSIS — Z79.82: ICD-10-CM
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
## 2021-04-23 DIAGNOSIS — Z23: ICD-10-CM
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
## 2021-04-23 DIAGNOSIS — Z95.5: ICD-10-CM

## 2021-04-23 DIAGNOSIS — E78.5: ICD-10-CM

## 2021-04-23 DIAGNOSIS — M79.7: ICD-10-CM

## 2021-04-23 DIAGNOSIS — Z79.899: ICD-10-CM

## 2021-04-23 DIAGNOSIS — I10: ICD-10-CM

## 2021-04-23 DIAGNOSIS — F32.9: ICD-10-CM

## 2021-04-23 DIAGNOSIS — I25.10: ICD-10-CM

## 2021-04-23 PROCEDURE — 90375 RABIES IG IM/SC: CPT

## 2021-04-23 PROCEDURE — 96372 THER/PROPH/DIAG INJ SC/IM: CPT

## 2021-04-23 PROCEDURE — 90675 RABIES VACCINE IM: CPT

## 2021-04-23 PROCEDURE — 99282 EMERGENCY DEPT VISIT SF MDM: CPT

## 2021-04-23 RX ADMIN — RABIES IMMUNE GLOBULIN (HUMAN) 2740 UNIT: 300 INJECTION, SOLUTION INFILTRATION; INTRAMUSCULAR at 17:56

## 2021-04-26 ENCOUNTER — HOSPITAL ENCOUNTER (OUTPATIENT)
Dept: HOSPITAL 100 - ED | Age: 51
Discharge: HOME | End: 2021-04-26
Payer: MEDICAID

## 2021-04-26 VITALS
TEMPERATURE: 98.24 F | HEART RATE: 68 BPM | OXYGEN SATURATION: 96 % | RESPIRATION RATE: 14 BRPM | SYSTOLIC BLOOD PRESSURE: 139 MMHG | DIASTOLIC BLOOD PRESSURE: 74 MMHG

## 2021-04-26 VITALS — BODY MASS INDEX: 34.9 KG/M2 | BODY MASS INDEX: 35.2 KG/M2

## 2021-04-26 DIAGNOSIS — Z23: Primary | ICD-10-CM

## 2021-04-26 PROCEDURE — 90675 RABIES VACCINE IM: CPT

## 2021-04-26 PROCEDURE — 96372 THER/PROPH/DIAG INJ SC/IM: CPT

## 2021-04-30 ENCOUNTER — HOSPITAL ENCOUNTER (OUTPATIENT)
Age: 51
End: 2021-04-30
Payer: MEDICAID

## 2021-04-30 VITALS — BODY MASS INDEX: 35.2 KG/M2 | BODY MASS INDEX: 33.5 KG/M2

## 2021-04-30 VITALS
OXYGEN SATURATION: 94 % | TEMPERATURE: 98.3 F | HEART RATE: 64 BPM | DIASTOLIC BLOOD PRESSURE: 75 MMHG | RESPIRATION RATE: 18 BRPM | SYSTOLIC BLOOD PRESSURE: 129 MMHG

## 2021-04-30 DIAGNOSIS — Z23: Primary | ICD-10-CM

## 2021-04-30 PROCEDURE — 90675 RABIES VACCINE IM: CPT

## 2021-04-30 PROCEDURE — 96372 THER/PROPH/DIAG INJ SC/IM: CPT

## 2021-05-07 ENCOUNTER — HOSPITAL ENCOUNTER (OUTPATIENT)
Age: 51
Discharge: HOME | End: 2021-05-07
Payer: MEDICAID

## 2021-05-07 VITALS
OXYGEN SATURATION: 95 % | SYSTOLIC BLOOD PRESSURE: 117 MMHG | TEMPERATURE: 96.62 F | RESPIRATION RATE: 16 BRPM | HEART RATE: 61 BPM | DIASTOLIC BLOOD PRESSURE: 69 MMHG

## 2021-05-07 VITALS
RESPIRATION RATE: 16 BRPM | OXYGEN SATURATION: 95 % | DIASTOLIC BLOOD PRESSURE: 69 MMHG | TEMPERATURE: 96.7 F | SYSTOLIC BLOOD PRESSURE: 117 MMHG | HEART RATE: 61 BPM

## 2021-05-07 VITALS — BODY MASS INDEX: 34 KG/M2 | BODY MASS INDEX: 33.5 KG/M2

## 2021-05-07 DIAGNOSIS — Z23: Primary | ICD-10-CM

## 2021-05-07 PROCEDURE — 90675 RABIES VACCINE IM: CPT

## 2021-05-07 PROCEDURE — 96372 THER/PROPH/DIAG INJ SC/IM: CPT

## 2021-05-13 ENCOUNTER — HOSPITAL ENCOUNTER (EMERGENCY)
Age: 51
Discharge: HOME | End: 2021-05-13
Payer: MEDICAID

## 2021-05-13 VITALS
HEART RATE: 51 BPM | OXYGEN SATURATION: 98 % | SYSTOLIC BLOOD PRESSURE: 160 MMHG | RESPIRATION RATE: 16 BRPM | DIASTOLIC BLOOD PRESSURE: 81 MMHG

## 2021-05-13 VITALS — BODY MASS INDEX: 34 KG/M2

## 2021-05-13 VITALS
TEMPERATURE: 97.16 F | RESPIRATION RATE: 16 BRPM | OXYGEN SATURATION: 96 % | SYSTOLIC BLOOD PRESSURE: 142 MMHG | DIASTOLIC BLOOD PRESSURE: 74 MMHG | HEART RATE: 55 BPM

## 2021-05-13 VITALS
RESPIRATION RATE: 15 BRPM | DIASTOLIC BLOOD PRESSURE: 85 MMHG | OXYGEN SATURATION: 97 % | HEART RATE: 71 BPM | SYSTOLIC BLOOD PRESSURE: 138 MMHG

## 2021-05-13 DIAGNOSIS — E11.9: ICD-10-CM

## 2021-05-13 DIAGNOSIS — F32.9: ICD-10-CM

## 2021-05-13 DIAGNOSIS — E66.9: ICD-10-CM

## 2021-05-13 DIAGNOSIS — G47.30: ICD-10-CM

## 2021-05-13 DIAGNOSIS — G89.29: ICD-10-CM

## 2021-05-13 DIAGNOSIS — Z79.82: ICD-10-CM

## 2021-05-13 DIAGNOSIS — R04.2: ICD-10-CM

## 2021-05-13 DIAGNOSIS — I25.10: ICD-10-CM

## 2021-05-13 DIAGNOSIS — I10: ICD-10-CM

## 2021-05-13 DIAGNOSIS — D70.3: Primary | ICD-10-CM

## 2021-05-13 DIAGNOSIS — F41.9: ICD-10-CM

## 2021-05-13 DIAGNOSIS — Z20.822: ICD-10-CM

## 2021-05-13 DIAGNOSIS — Z95.5: ICD-10-CM

## 2021-05-13 DIAGNOSIS — Z79.899: ICD-10-CM

## 2021-05-13 LAB
ANION GAP: 3 (ref 5–15)
BUN SERPL-MCNC: 17 MG/DL (ref 7–18)
BUN/CREAT RATIO: 17.9 RATIO (ref 10–20)
CALCIUM SERPL-MCNC: 9.2 MG/DL (ref 8.5–10.1)
CARBON DIOXIDE: 29 MMOL/L (ref 21–32)
CHLORIDE: 108 MMOL/L (ref 98–107)
DEPRECATED RDW RBC: 41.5 FL (ref 35.1–43.9)
ERYTHROCYTE [DISTWIDTH] IN BLOOD: 12.8 % (ref 11.6–14.6)
EST GLOM FILT RATE - AFR AMER: 107 ML/MIN (ref 60–?)
ESTIMATED CREATININE CLEARANCE: 117.59 ML/MIN
GLUCOSE: 141 MG/DL (ref 74–106)
HCT VFR BLD AUTO: 38 % (ref 40–54)
HEMOGLOBIN: 13.3 G/DL (ref 13–16.5)
HGB BLD-MCNC: 13.3 G/DL (ref 13–16.5)
IMMATURE GRANULOCYTES COUNT: 0.01 X10^3/UL (ref 0–0)
MCV RBC: 89.2 FL (ref 80–94)
MEAN CORP HGB CONC: 35 G/DL (ref 32–36)
MEAN PLATELET VOL.: 9.4 FL (ref 6.2–12)
NRBC FLAGGED BY ANALYZER: 0 % (ref 0–5)
PLATELET # BLD: 141 K/MM3 (ref 150–450)
PLATELET COUNT: 141 K/MM3 (ref 150–450)
POTASSIUM: 3.6 MMOL/L (ref 3.5–5.1)
RBC # BLD AUTO: 4.26 M/MM3 (ref 4.6–6.2)
RBC DISTRIBUTION WIDTH CV: 12.8 % (ref 11.6–14.6)
RBC DISTRIBUTION WIDTH SD: 41.5 FL (ref 35.1–43.9)
WBC # BLD AUTO: 4 K/MM3 (ref 4.4–11)
WHITE BLOOD COUNT: 4 K/MM3 (ref 4.4–11)

## 2021-05-13 PROCEDURE — 99284 EMERGENCY DEPT VISIT MOD MDM: CPT

## 2021-05-13 PROCEDURE — 80048 BASIC METABOLIC PNL TOTAL CA: CPT

## 2021-05-13 PROCEDURE — 87635 SARS-COV-2 COVID-19 AMP PRB: CPT

## 2021-05-13 PROCEDURE — U0002 COVID-19 LAB TEST NON-CDC: HCPCS

## 2021-05-13 PROCEDURE — 71046 X-RAY EXAM CHEST 2 VIEWS: CPT

## 2021-05-13 PROCEDURE — 85025 COMPLETE CBC W/AUTO DIFF WBC: CPT

## 2021-05-13 PROCEDURE — 85652 RBC SED RATE AUTOMATED: CPT

## 2021-07-17 ENCOUNTER — HOSPITAL ENCOUNTER (EMERGENCY)
Age: 51
Discharge: HOME | End: 2021-07-17
Payer: MEDICAID

## 2021-07-17 VITALS
RESPIRATION RATE: 16 BRPM | OXYGEN SATURATION: 98 % | SYSTOLIC BLOOD PRESSURE: 149 MMHG | DIASTOLIC BLOOD PRESSURE: 79 MMHG | HEART RATE: 52 BPM

## 2021-07-17 VITALS
RESPIRATION RATE: 16 BRPM | DIASTOLIC BLOOD PRESSURE: 82 MMHG | OXYGEN SATURATION: 97 % | HEART RATE: 67 BPM | TEMPERATURE: 98.06 F | SYSTOLIC BLOOD PRESSURE: 162 MMHG

## 2021-07-17 VITALS — BODY MASS INDEX: 34 KG/M2 | BODY MASS INDEX: 33.5 KG/M2

## 2021-07-17 DIAGNOSIS — J06.9: Primary | ICD-10-CM

## 2021-07-17 DIAGNOSIS — I25.10: ICD-10-CM

## 2021-07-17 DIAGNOSIS — Z95.5: ICD-10-CM

## 2021-07-17 DIAGNOSIS — F32.9: ICD-10-CM

## 2021-07-17 DIAGNOSIS — I10: ICD-10-CM

## 2021-07-17 DIAGNOSIS — Z79.82: ICD-10-CM

## 2021-07-17 DIAGNOSIS — Z79.899: ICD-10-CM

## 2021-07-17 DIAGNOSIS — K29.70: ICD-10-CM

## 2021-07-17 DIAGNOSIS — G89.29: ICD-10-CM

## 2021-07-17 DIAGNOSIS — I25.2: ICD-10-CM

## 2021-07-17 DIAGNOSIS — E11.9: ICD-10-CM

## 2021-07-17 DIAGNOSIS — G47.30: ICD-10-CM

## 2021-07-17 DIAGNOSIS — F41.9: ICD-10-CM

## 2021-07-17 LAB
ALANINE AMINOTRANSFER ALT/SGPT: 30 U/L (ref 16–61)
ALBUMIN SERPL-MCNC: 3.8 G/DL (ref 3.2–5)
ALKALINE PHOSPHATASE: 44 U/L (ref 45–117)
ANION GAP: 6 (ref 5–15)
AST(SGOT): 37 U/L (ref 15–37)
BILIRUB DIRECT SERPL-MCNC: 0.13 MG/DL (ref 0–0.3)
BUN SERPL-MCNC: 17 MG/DL (ref 7–18)
BUN/CREAT RATIO: 15.7 RATIO (ref 10–20)
CALCIUM SERPL-MCNC: 9 MG/DL (ref 8.5–10.1)
CARBON DIOXIDE: 28 MMOL/L (ref 21–32)
CHLORIDE: 104 MMOL/L (ref 98–107)
DEPRECATED RDW RBC: 40.7 FL (ref 35.1–43.9)
DIFFERENTIAL COMMENT: (no result)
DIFFERENTIAL INDICATED: (no result)
ERYTHROCYTE [DISTWIDTH] IN BLOOD: 12.4 % (ref 11.6–14.6)
EST GLOM FILT RATE - AFR AMER: 92 ML/MIN (ref 60–?)
ESTIMATED CREATININE CLEARANCE: 103.44 ML/MIN
GLOBULIN: 2.7 G/DL (ref 2.2–4.2)
GLUCOSE: 143 MG/DL (ref 74–106)
HCT VFR BLD AUTO: 39.2 % (ref 40–54)
HEMOGLOBIN: 13.7 G/DL (ref 13–16.5)
HGB BLD-MCNC: 13.7 G/DL (ref 13–16.5)
IMMATURE GRANULOCYTES COUNT: 0.01 X10^3/UL (ref 0–0)
LIPASE: 29 U/L (ref 73–393)
MANUAL DIF COMMENT BLD-IMP: (no result)
MCV RBC: 89.1 FL (ref 80–94)
MEAN CORP HGB CONC: 34.9 G/DL (ref 32–36)
MEAN PLATELET VOL.: 10.3 FL (ref 6.2–12)
NRBC FLAGGED BY ANALYZER: 0 % (ref 0–5)
PLATELET # BLD: 133 K/MM3 (ref 150–450)
PLATELET COUNT: 133 K/MM3 (ref 150–450)
POSITIVE COUNT: YES
POTASSIUM: 4.1 MMOL/L (ref 3.5–5.1)
RBC # BLD AUTO: 4.4 M/MM3 (ref 4.6–6.2)
RBC DISTRIBUTION WIDTH CV: 12.4 % (ref 11.6–14.6)
RBC DISTRIBUTION WIDTH SD: 40.7 FL (ref 35.1–43.9)
SCAN SMEAR PER REVIEW CRITERIA: (no result)
TROPONIN-I HS: 11 PG/ML (ref 3–78.5)
WBC # BLD AUTO: 4.8 K/MM3 (ref 4.4–11)
WHITE BLOOD COUNT: 4.8 K/MM3 (ref 4.4–11)

## 2021-07-17 PROCEDURE — 80076 HEPATIC FUNCTION PANEL: CPT

## 2021-07-17 PROCEDURE — 99285 EMERGENCY DEPT VISIT HI MDM: CPT

## 2021-07-17 PROCEDURE — 85025 COMPLETE CBC W/AUTO DIFF WBC: CPT

## 2021-07-17 PROCEDURE — 80053 COMPREHEN METABOLIC PANEL: CPT

## 2021-07-17 PROCEDURE — 74176 CT ABD & PELVIS W/O CONTRAST: CPT

## 2021-07-17 PROCEDURE — 71045 X-RAY EXAM CHEST 1 VIEW: CPT

## 2021-07-17 PROCEDURE — 84484 ASSAY OF TROPONIN QUANT: CPT

## 2021-07-17 PROCEDURE — A4216 STERILE WATER/SALINE, 10 ML: HCPCS

## 2021-07-17 PROCEDURE — 83690 ASSAY OF LIPASE: CPT

## 2021-07-17 PROCEDURE — 87426 SARSCOV CORONAVIRUS AG IA: CPT

## 2021-07-17 PROCEDURE — 93005 ELECTROCARDIOGRAM TRACING: CPT

## 2021-07-17 PROCEDURE — 82248 BILIRUBIN DIRECT: CPT

## 2023-02-03 ENCOUNTER — HOSPITAL ENCOUNTER (OUTPATIENT)
Dept: HOSPITAL 100 - MTLAB | Age: 53
Discharge: HOME | End: 2023-02-03
Payer: MEDICAID

## 2023-02-03 DIAGNOSIS — G31.84: Primary | ICD-10-CM

## 2023-02-03 DIAGNOSIS — G62.9: ICD-10-CM

## 2023-02-03 LAB
ALANINE AMINOTRANSFER ALT/SGPT: 26 U/L (ref 16–61)
ALBUMIN SERPL-MCNC: 4.4 G/DL (ref 3.2–5)
ALKALINE PHOSPHATASE: 49 U/L (ref 45–117)
ANION GAP: 7 (ref 5–15)
AST(SGOT): 19 U/L (ref 15–37)
BUN SERPL-MCNC: 15 MG/DL (ref 7–18)
BUN/CREAT RATIO: 15.8 RATIO (ref 10–20)
CALCIUM SERPL-MCNC: 9.7 MG/DL (ref 8.5–10.1)
CARBON DIOXIDE: 30 MMOL/L (ref 21–32)
CHLORIDE: 106 MMOL/L (ref 98–107)
DEPRECATED RDW RBC: 42.7 FL (ref 35.1–43.9)
ERYTHROCYTE [DISTWIDTH] IN BLOOD: 13.2 % (ref 11.6–14.6)
EST GLOM FILT RATE - AFR AMER: 106 ML/MIN (ref 60–?)
FOLATES, (FOLIC ACID): 18.6 NG/ML (ref 3.1–55.4)
GLOBULIN: 2.8 G/DL (ref 2.2–4.2)
GLUCOSE: 117 MG/DL (ref 74–106)
HCT VFR BLD AUTO: 38.4 % (ref 40–54)
HEMOGLOBIN: 13.9 G/DL (ref 13–16.5)
HGB BLD-MCNC: 13.9 G/DL (ref 13–16.5)
MCV RBC: 89.7 FL (ref 80–94)
MEAN CORP HGB CONC: 36.2 G/DL (ref 32–36)
MEAN PLATELET VOL.: 9.7 FL (ref 6.2–12)
PLATELET # BLD: 145 K/MM3 (ref 150–450)
PLATELET COUNT: 145 K/MM3 (ref 150–450)
POTASSIUM: 4.3 MMOL/L (ref 3.5–5.1)
RBC # BLD AUTO: 4.28 M/MM3 (ref 4.6–6.2)
RBC DISTRIBUTION WIDTH CV: 13.2 % (ref 11.6–14.6)
RBC DISTRIBUTION WIDTH SD: 42.7 FL (ref 35.1–43.9)
VITAMIN B12: 716 PG/ML (ref 211–911)
WBC # BLD AUTO: 4.2 K/MM3 (ref 4.4–11)
WHITE BLOOD COUNT: 4.2 K/MM3 (ref 4.4–11)

## 2023-02-03 PROCEDURE — 36415 COLL VENOUS BLD VENIPUNCTURE: CPT

## 2023-02-03 PROCEDURE — 82746 ASSAY OF FOLIC ACID SERUM: CPT

## 2023-02-03 PROCEDURE — 82607 VITAMIN B-12: CPT

## 2023-02-03 PROCEDURE — 84425 ASSAY OF VITAMIN B-1: CPT

## 2023-02-03 PROCEDURE — 80053 COMPREHEN METABOLIC PANEL: CPT

## 2023-02-03 PROCEDURE — 84443 ASSAY THYROID STIM HORMONE: CPT

## 2023-02-03 PROCEDURE — 85027 COMPLETE CBC AUTOMATED: CPT

## 2023-02-03 PROCEDURE — 83883 ASSAY NEPHELOMETRY NOT SPEC: CPT

## 2023-02-08 LAB — VITAMIN B1, THIAMINE: 127.7 NMOL/L (ref 66.5–200)

## 2023-02-13 ENCOUNTER — HOSPITAL ENCOUNTER (OUTPATIENT)
Age: 53
Discharge: HOME | End: 2023-02-13
Payer: MEDICAID

## 2023-02-13 DIAGNOSIS — M79.601: ICD-10-CM

## 2023-02-13 DIAGNOSIS — M79.602: ICD-10-CM

## 2023-02-13 DIAGNOSIS — M54.2: Primary | ICD-10-CM

## 2023-02-13 PROCEDURE — 95886 MUSC TEST DONE W/N TEST COMP: CPT

## 2023-02-13 PROCEDURE — 95913 NRV CNDJ TEST 13/> STUDIES: CPT

## 2023-02-13 PROCEDURE — 95885 MUSC TST DONE W/NERV TST LIM: CPT

## 2023-03-03 ENCOUNTER — HOSPITAL ENCOUNTER (OUTPATIENT)
Age: 53
Discharge: HOME | End: 2023-03-03
Payer: MEDICAID

## 2023-03-03 DIAGNOSIS — M54.2: ICD-10-CM

## 2023-03-03 DIAGNOSIS — M54.12: ICD-10-CM

## 2023-03-03 DIAGNOSIS — G31.84: Primary | ICD-10-CM

## 2023-03-03 PROCEDURE — A9575 INJ GADOTERATE MEGLUMI 0.1ML: HCPCS

## 2023-03-03 PROCEDURE — 70553 MRI BRAIN STEM W/O & W/DYE: CPT

## 2023-03-29 ENCOUNTER — HOSPITAL ENCOUNTER (OUTPATIENT)
Dept: HOSPITAL 100 - MRI | Age: 53
Discharge: HOME | End: 2023-03-29
Payer: MEDICAID

## 2023-03-29 DIAGNOSIS — M54.12: Primary | ICD-10-CM

## 2023-03-29 DIAGNOSIS — M54.2: ICD-10-CM

## 2023-03-29 PROCEDURE — 72141 MRI NECK SPINE W/O DYE: CPT

## 2023-04-20 ENCOUNTER — OFFICE VISIT (OUTPATIENT)
Dept: PRIMARY CARE | Facility: CLINIC | Age: 53
End: 2023-04-20
Payer: COMMERCIAL

## 2023-04-20 VITALS
SYSTOLIC BLOOD PRESSURE: 139 MMHG | BODY MASS INDEX: 31.82 KG/M2 | DIASTOLIC BLOOD PRESSURE: 80 MMHG | HEIGHT: 78 IN | WEIGHT: 275 LBS | HEART RATE: 69 BPM

## 2023-04-20 DIAGNOSIS — Z09 HOSPITAL DISCHARGE FOLLOW-UP: Primary | ICD-10-CM

## 2023-04-20 DIAGNOSIS — I25.119 CORONARY ARTERY DISEASE INVOLVING NATIVE CORONARY ARTERY OF NATIVE HEART WITH ANGINA PECTORIS (CMS-HCC): ICD-10-CM

## 2023-04-20 DIAGNOSIS — J01.01 ACUTE RECURRENT MAXILLARY SINUSITIS: ICD-10-CM

## 2023-04-20 DIAGNOSIS — E78.2 MIXED HYPERLIPIDEMIA: ICD-10-CM

## 2023-04-20 DIAGNOSIS — I10 PRIMARY HYPERTENSION: ICD-10-CM

## 2023-04-20 DIAGNOSIS — E11.65 TYPE 2 DIABETES MELLITUS WITH HYPERGLYCEMIA, WITHOUT LONG-TERM CURRENT USE OF INSULIN (MULTI): ICD-10-CM

## 2023-04-20 DIAGNOSIS — D69.6 THROMBOCYTOPENIA (CMS-HCC): ICD-10-CM

## 2023-04-20 PROBLEM — K21.9 GASTROESOPHAGEAL REFLUX DISEASE WITHOUT ESOPHAGITIS: Status: ACTIVE | Noted: 2023-04-20

## 2023-04-20 PROCEDURE — 3044F HG A1C LEVEL LT 7.0%: CPT | Performed by: INTERNAL MEDICINE

## 2023-04-20 PROCEDURE — 3075F SYST BP GE 130 - 139MM HG: CPT | Performed by: INTERNAL MEDICINE

## 2023-04-20 PROCEDURE — 3079F DIAST BP 80-89 MM HG: CPT | Performed by: INTERNAL MEDICINE

## 2023-04-20 PROCEDURE — 4010F ACE/ARB THERAPY RXD/TAKEN: CPT | Performed by: INTERNAL MEDICINE

## 2023-04-20 PROCEDURE — 1036F TOBACCO NON-USER: CPT | Performed by: INTERNAL MEDICINE

## 2023-04-20 PROCEDURE — 99214 OFFICE O/P EST MOD 30 MIN: CPT | Performed by: INTERNAL MEDICINE

## 2023-04-20 RX ORDER — ROSUVASTATIN CALCIUM 5 MG/1
1 TABLET, COATED ORAL DAILY
COMMUNITY
Start: 2022-08-18 | End: 2024-05-23 | Stop reason: SDUPTHER

## 2023-04-20 RX ORDER — LOSARTAN POTASSIUM 50 MG/1
1 TABLET ORAL DAILY
COMMUNITY
End: 2023-12-21 | Stop reason: SDUPTHER

## 2023-04-20 RX ORDER — CYCLOBENZAPRINE HCL 10 MG
1 TABLET ORAL 3 TIMES DAILY PRN
COMMUNITY
Start: 2022-11-22 | End: 2023-12-19 | Stop reason: ALTCHOICE

## 2023-04-20 RX ORDER — HYDROCHLOROTHIAZIDE 12.5 MG/1
2 CAPSULE ORAL DAILY
COMMUNITY
Start: 2021-09-10 | End: 2023-12-19

## 2023-04-20 RX ORDER — ISOSORBIDE MONONITRATE 30 MG/1
TABLET, EXTENDED RELEASE ORAL EVERY 12 HOURS
COMMUNITY
Start: 2022-03-08 | End: 2024-01-26 | Stop reason: SDUPTHER

## 2023-04-20 RX ORDER — ASPIRIN 81 MG/1
1 TABLET ORAL DAILY
COMMUNITY
Start: 2018-02-27

## 2023-04-20 RX ORDER — UBIDECARENONE 75 MG
1 CAPSULE ORAL EVERY OTHER DAY
COMMUNITY

## 2023-04-20 RX ORDER — BACLOFEN 10 MG/1
TABLET ORAL
COMMUNITY
Start: 2023-01-27 | End: 2023-04-20 | Stop reason: ALTCHOICE

## 2023-04-20 RX ORDER — CARVEDILOL 25 MG/1
1 TABLET ORAL
COMMUNITY
Start: 2022-03-08 | End: 2023-12-21 | Stop reason: SDUPTHER

## 2023-04-20 RX ORDER — OMEPRAZOLE 40 MG/1
40 CAPSULE, DELAYED RELEASE ORAL
COMMUNITY
Start: 2021-07-02 | End: 2023-11-15 | Stop reason: WASHOUT

## 2023-04-20 RX ORDER — BLOOD SUGAR DIAGNOSTIC
STRIP MISCELLANEOUS
COMMUNITY
Start: 2023-03-25 | End: 2024-03-20 | Stop reason: ALTCHOICE

## 2023-04-20 RX ORDER — POTASSIUM CHLORIDE 1500 MG/1
1 TABLET, EXTENDED RELEASE ORAL DAILY
COMMUNITY
Start: 2021-04-01 | End: 2024-01-26 | Stop reason: SDUPTHER

## 2023-04-20 RX ORDER — ALBUTEROL SULFATE 90 UG/1
AEROSOL, METERED RESPIRATORY (INHALATION) EVERY 6 HOURS PRN
COMMUNITY
Start: 2022-11-08 | End: 2023-04-20 | Stop reason: ALTCHOICE

## 2023-04-20 RX ORDER — MINOXIDIL 2.5 MG/1
5 TABLET ORAL 2 TIMES DAILY
COMMUNITY
End: 2023-04-20 | Stop reason: ALTCHOICE

## 2023-04-20 RX ORDER — DOXYCYCLINE 100 MG/1
100 CAPSULE ORAL 2 TIMES DAILY
Qty: 14 CAPSULE | Refills: 0 | Status: SHIPPED | OUTPATIENT
Start: 2023-04-20 | End: 2023-04-27

## 2023-04-20 RX ORDER — FLUTICASONE PROPIONATE 50 MCG
SPRAY, SUSPENSION (ML) NASAL
COMMUNITY
End: 2023-06-23 | Stop reason: ALTCHOICE

## 2023-04-20 RX ORDER — MULTIVIT WITH MINERALS/HERBS
1 TABLET ORAL DAILY
COMMUNITY
End: 2023-06-23 | Stop reason: ALTCHOICE

## 2023-04-20 RX ORDER — NIFEDIPINE 30 MG/1
1 TABLET, EXTENDED RELEASE ORAL DAILY
COMMUNITY
Start: 2022-04-25 | End: 2023-04-20 | Stop reason: ALTCHOICE

## 2023-04-20 RX ORDER — TICAGRELOR 90 MG/1
1 TABLET ORAL 2 TIMES DAILY
COMMUNITY
End: 2023-12-21 | Stop reason: SDUPTHER

## 2023-04-20 RX ORDER — FLASH GLUCOSE SENSOR
KIT MISCELLANEOUS
Qty: 1 EACH | Refills: 0 | Status: SHIPPED | OUTPATIENT
Start: 2023-04-20 | End: 2023-04-27 | Stop reason: SDUPTHER

## 2023-04-20 ASSESSMENT — ENCOUNTER SYMPTOMS
CHILLS: 0
COUGH: 0
ACTIVITY CHANGE: 0
SINUS PRESSURE: 0
APPETITE CHANGE: 0
WHEEZING: 0
VOMITING: 0
DIARRHEA: 0
SHORTNESS OF BREATH: 0
FATIGUE: 0
SORE THROAT: 0
WEAKNESS: 0
NUMBNESS: 0
BACK PAIN: 0
ABDOMINAL DISTENTION: 0
NAUSEA: 0
SINUS PAIN: 0

## 2023-04-20 ASSESSMENT — PATIENT HEALTH QUESTIONNAIRE - PHQ9
2. FEELING DOWN, DEPRESSED OR HOPELESS: SEVERAL DAYS
SUM OF ALL RESPONSES TO PHQ9 QUESTIONS 1 AND 2: 2
1. LITTLE INTEREST OR PLEASURE IN DOING THINGS: SEVERAL DAYS

## 2023-04-20 NOTE — PROGRESS NOTES
"Subjective   Patient ID: Jhony Myles is a 52 y.o. male who presents for Hospital Follow-up (Pt states the hospital thinks he had a TIA/Pt was admitted to [facility] /Pt states he lost vision in his LT eye which prompted him to get to the hospital/His father  from TIA).  HPI  Patient is here today for hospital follow up   Patient reports that he had lost complete vision inhis left eye for about a min then it returned and it was blurry, like he was looking through vaseline, then within 6 hours it was returned to normal. Dad passed away from a stroke so he went to the hospital right.     Patient was kept overnight due to not having an MRI available until Monday.     He had a CT scan of his brain and eventually an MRI/MRA.   HE did follow up with Dr Maddox who ordered a carotid duplex.   Pt was told to discontinued the minoxidil.     Pt did see opthomology and was told everything was ok.     Pts a1c was 5.5% he is doing more of a whole food diet, down from 7. IS having spikes up intothe 300s at times, and runningoutof his test strips, With extreme flucuantions will order Freestyle Nancy.     Review of Systems   Constitutional:  Negative for activity change, appetite change, chills and fatigue.   HENT:  Positive for congestion. Negative for postnasal drip, sinus pressure, sinus pain and sore throat.    Respiratory:  Negative for cough, shortness of breath and wheezing.    Cardiovascular:  Negative for chest pain and leg swelling.   Gastrointestinal:  Negative for abdominal distention, diarrhea, nausea and vomiting.   Musculoskeletal:  Negative for back pain.   Neurological:  Negative for weakness and numbness.       Objective   /80 (BP Location: Right arm, Patient Position: Sitting, BP Cuff Size: Adult)   Pulse 69   Ht 1.981 m (6' 6\")   Wt 125 kg (275 lb)   BMI 31.78 kg/m²     Physical Exam  Constitutional:       General: He is not in acute distress.     Appearance: Normal appearance. He is " not toxic-appearing.   HENT:      Head: Normocephalic and atraumatic.      Nose:      Comments: Right nasal passage erythematous and swollen with some purulent drainage      Mouth/Throat:      Mouth: Mucous membranes are moist.      Pharynx: Oropharynx is clear.   Eyes:      Extraocular Movements: Extraocular movements intact.      Conjunctiva/sclera: Conjunctivae normal.      Pupils: Pupils are equal, round, and reactive to light.   Cardiovascular:      Rate and Rhythm: Normal rate and regular rhythm.      Heart sounds: No murmur heard.     No friction rub. No gallop.   Pulmonary:      Effort: Pulmonary effort is normal.      Breath sounds: Normal breath sounds.   Abdominal:      General: Bowel sounds are normal. There is no distension.      Palpations: Abdomen is soft. There is no mass.      Tenderness: There is no abdominal tenderness. There is no guarding.   Musculoskeletal:         General: No swelling. Normal range of motion.      Cervical back: Normal range of motion.   Skin:     General: Skin is warm and dry.   Neurological:      General: No focal deficit present.      Mental Status: He is alert and oriented to person, place, and time.   Psychiatric:         Mood and Affect: Mood normal.         Thought Content: Thought content normal.         Judgment: [place] [address]  Phone [phone], Fax [phone]        Vascular Lab Report     Carotid Artery Duplex Ultrasound     Patient Name:     KAYE Vanegas Physician:   [id number] Itzel Connelly MD  Study Date:       4/17/2023        Referring Physician: WILEY JOSE  MRN/PID:          [id number]         PCP:  Accession/Order#: [id number]        CC Report to:  YOB: 1970       Technologist:        Blanca Becerra RVT  Gender:           M                Technologist 2:  Admission Status: Outpatient       Location Performed:  [facility]         Diagnosis/ICD: G45.9-Transient cerebral ischemic attack, unspecified  Procedure/CPT: 76725 Cerebrovascular Carotid Duplex scan complete-[id number]        CONCLUSIONS:  Right Carotid: Findings are consistent with less than 50% stenosis of the right proximal ICA. Laminar flow seen by color Doppler. Right external carotid artery appears patent with no evidence of stenosis. The right vertebral artery is patent with antegrade flow. No evidence of hemodynamically significant stenosis in the right subclavian artery.  Left Carotid: Findings are consistent with less than 50% stenosis of the left proximal ICA. Laminar flow seen by color Doppler. Left external carotid artery appears patent with no evidence of stenosis. The left vertebral artery is patent with antegrade flow. No evidence of hemodynamically significant stenosis in the left subclavian artery.     Imaging & Doppler Findings:  Right Plaque Morph: The right carotid bulb demonstrates heterogenous plaque.  Left Plaque Morph: The distal left common carotid artery demonstrates intimal thickening plaque. The left carotid bulb demonstrates heterogenous plaque.     Right                       Left  PSV      EDV               PSV      EDV  114 cm/s 20 cm/s   CCA P   102 cm/s 24 cm/s  106 cm/s 25 cm/s   CCA D   98 cm/s  26 cm/s  99 cm/s  24 cm/s   ICA P   86 cm/s  24 cm/s  82 cm/s  25 cm/s   ICA D   95 cm/s  34 cm/s  117 cm/s 14 cm/s    ECA    106 cm/s 15 cm/s  61 cm/s  11 cm/s Vertebral 65 cm/s  23 cm/s     Right                                   Left  PSV    Waveform                        PSV    Waveform  128 cm/s Triphasic Subclavian Proximal 126 cm/s Triphasic     Right Left  ICA/CCA Ratio  0.9  0.9           [id number] Itzel Connelly MD  Electronically signed by [id number] Itzel Connelly MD on 4/18/2023 at 9:59:01 PM         Interpreted By:  KELLEY SARGENT MD  MRN: [id number]  Patient Name: KAYE THOMAS     STUDY:  MRI BRAIN WO; MRA NECK WO.C; MRA HEAD W/O  C;  4/3/2023 8:15 am     INDICATION:  vision changes .     COMPARISON:  CT of the head dated 04/01/2023     ACCESSION NUMBER(S):  [id number]; [id number]; [id number]     ORDERING CLINICIAN:  PAM DEWITT     TECHNIQUE:  Axial diffusion, axial T2, axial gradient echo T2, axial FLAIR,  coronal T1, and sagittal T1 weighted MRI images of the brain were  obtained without intravenous contrast administration. In addition,  time-of-flight MRA images of the neck and intracranial vessels were  obtained.  The source MRA images were reformatted in multiple planes.     FINDINGS:  The diffusion weighted images fail to demonstrate evidence of  abnormal diffusion restriction to suggest acute infarction.     There is mild-to-moderate brain parenchymal volume loss.     There are small scattered nonspecific white matter changes within the  cerebral hemispheres bilaterally which while nonspecific, given the  patient's age, likely represent sequelae of more remote small vessel  ischemic change.     There is mild mucosal thickening within the paranasal sinuses.     The mastoid air cells are clear.     The MRA of the neck demonstrates a short segmental area of diminished  MRA signal suggestive of slab artifact along the distal right common  carotid artery and origin of the left internal carotid artery. There  is mild non hemodynamically significant narrowing noted along the  proximal internal carotid arteries bilaterally. There is diminished  MRA signal noted along the horizontal segments of the distal cervical  vertebral arteries bilaterally felt to likely be  technical/artifactual in origin. Otherwise, no significant focal  stenosis noted along the cervical segments of the vertebral arteries.  There is a left dominant vertebral artery.     The intracranial MRA demonstrates a small caliber distal right  vertebral artery which terminates in a right posterior inferior  cerebellar artery. No significant stenosis noted along the distal  left  dominant vertebral artery, basilar artery, or distal internal  carotid arteries. Otherwise, no other significant intracranial  stenosis or intracranial aneurysm is noted.     IMPRESSION:  There is no MRI evidence of acute infarction on the diffusion  weighted images.     There is mild-to-moderate brain parenchymal volume loss.     There are small scattered nonspecific white matter changes within the  cerebral hemispheres bilaterally which while nonspecific, given the  patient's age, likely represent sequelae of more remote small vessel  ischemic change.     The MRA of the neck demonstrates a short segmental area of diminished  MRA signal suggestive of slab artifact along the distal right common  carotid artery and origin of the left internal carotid artery. There  is mild non hemodynamically significant narrowing noted along the  proximal internal carotid arteries bilaterally. There is diminished  MRA signal noted along the horizontal segments of the distal cervical  vertebral arteries bilaterally felt to likely be  technical/artifactual in origin. Otherwise, no significant focal  stenosis noted along the cervical segments of the vertebral arteries.  There is a left dominant vertebral artery.     The intracranial MRA demonstrates a small caliber distal right  vertebral artery which terminates in a right posterior inferior  cerebellar artery. No significant stenosis noted along the distal  left dominant vertebral artery, basilar artery, or distal internal  carotid arteries. Otherwise, no other significant intracranial  stenosis or intracranial aneurysm is noted.      Interpreted By:  KELLEY SARGENT MD  MRN: [id number]  Patient Name: KAYE THOMAS     STUDY:  MRI BRAIN WO; MRA NECK WO.C; MRA HEAD W/O C;  4/3/2023 8:15 am     INDICATION:  vision changes .     COMPARISON:  CT of the head dated 04/01/2023     ACCESSION NUMBER(S):  [id number]; [id number]; [id number]     ORDERING CLINICIAN:  PAM DEWITT      TECHNIQUE:  Axial diffusion, axial T2, axial gradient echo T2, axial FLAIR,  coronal T1, and sagittal T1 weighted MRI images of the brain were  obtained without intravenous contrast administration. In addition,  time-of-flight MRA images of the neck and intracranial vessels were  obtained.  The source MRA images were reformatted in multiple planes.     FINDINGS:  The diffusion weighted images fail to demonstrate evidence of  abnormal diffusion restriction to suggest acute infarction.     There is mild-to-moderate brain parenchymal volume loss.     There are small scattered nonspecific white matter changes within the  cerebral hemispheres bilaterally which while nonspecific, given the  patient's age, likely represent sequelae of more remote small vessel  ischemic change.     There is mild mucosal thickening within the paranasal sinuses.     The mastoid air cells are clear.     The MRA of the neck demonstrates a short segmental area of diminished  MRA signal suggestive of slab artifact along the distal right common  carotid artery and origin of the left internal carotid artery. There  is mild non hemodynamically significant narrowing noted along the  proximal internal carotid arteries bilaterally. There is diminished  MRA signal noted along the horizontal segments of the distal cervical  vertebral arteries bilaterally felt to likely be  technical/artifactual in origin. Otherwise, no significant focal  stenosis noted along the cervical segments of the vertebral arteries.  There is a left dominant vertebral artery.     The intracranial MRA demonstrates a small caliber distal right  vertebral artery which terminates in a right posterior inferior  cerebellar artery. No significant stenosis noted along the distal  left dominant vertebral artery, basilar artery, or distal internal  carotid arteries. Otherwise, no other significant intracranial  stenosis or intracranial aneurysm is noted.     IMPRESSION:  There is no MRI  evidence of acute infarction on the diffusion  weighted images.     There is mild-to-moderate brain parenchymal volume loss.     There are small scattered nonspecific white matter changes within the  cerebral hemispheres bilaterally which while nonspecific, given the  patient's age, likely represent sequelae of more remote small vessel  ischemic change.     The MRA of the neck demonstrates a short segmental area of diminished  MRA signal suggestive of slab artifact along the distal right common  carotid artery and origin of the left internal carotid artery. There  is mild non hemodynamically significant narrowing noted along the  proximal internal carotid arteries bilaterally. There is diminished  MRA signal noted along the horizontal segments of the distal cervical  vertebral arteries bilaterally felt to likely be  technical/artifactual in origin. Otherwise, no significant focal  stenosis noted along the cervical segments of the vertebral arteries.  There is a left dominant vertebral artery.     The intracranial MRA demonstrates a small caliber distal right  vertebral artery which terminates in a right posterior inferior  cerebellar artery. No significant stenosis noted along the distal  left dominant vertebral artery, basilar artery, or distal internal  carotid arteries. Otherwise, no other significant intracranial  stenosis or intracranial aneurysm is noted.     Assessment/Plan   Problem List Items Addressed This Visit          Circulatory    Primary hypertension    Coronary artery disease involving native coronary artery of native heart with angina pectoris (CMS/HCC)    Relevant Medications    Brilinta 90 mg tablet    carvedilol (Coreg) 25 mg tablet    isosorbide mononitrate ER (Imdur) 30 mg 24 hr tablet       Endocrine/Metabolic    Type 2 diabetes mellitus with hyperglycemia, without long-term current use of insulin (CMS/[facility]) - Primary    Relevant Orders    Referral to Nutrition Services       Hematologic     Thrombocytopenia (CMS/HCC)    Relevant Medications    Brilinta 90 mg tablet    aspirin 81 mg EC tablet       Other    Hospital discharge follow-up     Hospital follow up for TIA   - pt had transient vision loss and vision change for 6 hours   - reviewed hospital records including labwork and imaging, CT of the brain, MRI/MRA of the brain  - pt has followed up with cardiology   - has echo ordered   - saw Opthomology, everything in eye ok     CAD, HTN, HLD   - did see cardiology in follow up   - has echo scheduled   - stop minoxidil  - carotid duplex pending   - continue crestor 5mg po daily (has been on for 6 mo now so can repeat lipid studies)   - continue cozaar 50mg po daily   - continue imdur 30mg po dialy   - continue hydrochlorothiazide 12.5mg po daily   - continue coreg 25mg po daily     Thrombocytopenia  - platelets have been chronically low for several years, pt reports that people started mentioning if after he was on Brilinta  - has CT abd and pelvis scheduled to look at Spleen as he does complaint of LUQ pain   - was referrred to hem by cardiology     DMII   - a1c improved to 5.5%  - not currently on meds   - pt is having hyperglycemia episodes into the 300s, would benefit from more consistent and frequent monitoring will order freestyle charlene     GERD   - continue ppi    Sinusitis   - sent 7 days of doxy, if still does not improve recommend calling ENT that he is established with     Final diagnoses:   [E11.65] Type 2 diabetes mellitus with hyperglycemia, without long-term current use of insulin (CMS/HCC)   [I25.119] Coronary artery disease involving native coronary artery of native heart with angina pectoris (CMS/HCC)   [I10] Primary hypertension   [D69.6] Thrombocytopenia (CMS/HCC)   [Z09] Hospital discharge follow-up

## 2023-04-27 DIAGNOSIS — E11.65 TYPE 2 DIABETES MELLITUS WITH HYPERGLYCEMIA, WITHOUT LONG-TERM CURRENT USE OF INSULIN (MULTI): ICD-10-CM

## 2023-04-27 RX ORDER — FLASH GLUCOSE SENSOR
KIT MISCELLANEOUS
Qty: 2 EACH | Refills: 11 | Status: SHIPPED | OUTPATIENT
Start: 2023-04-27 | End: 2024-04-24

## 2023-04-27 RX ORDER — FLASH GLUCOSE SCANNING READER
EACH MISCELLANEOUS
Qty: 1 EACH | Refills: 11 | Status: SHIPPED | OUTPATIENT
Start: 2023-04-27

## 2023-05-19 LAB
ANION GAP IN SER/PLAS: 12 MMOL/L (ref 10–20)
CALCIUM (MG/DL) IN SER/PLAS: 9.8 MG/DL (ref 8.6–10.3)
CARBON DIOXIDE, TOTAL (MMOL/L) IN SER/PLAS: 28 MMOL/L (ref 21–32)
CHLORIDE (MMOL/L) IN SER/PLAS: 106 MMOL/L (ref 98–107)
CREATININE (MG/DL) IN SER/PLAS: 0.97 MG/DL (ref 0.5–1.3)
ERYTHROCYTE DISTRIBUTION WIDTH (RATIO) BY AUTOMATED COUNT: 12.9 % (ref 11.5–14.5)
ERYTHROCYTE MEAN CORPUSCULAR HEMOGLOBIN CONCENTRATION (G/DL) BY AUTOMATED: 35.4 G/DL (ref 32–36)
ERYTHROCYTE MEAN CORPUSCULAR VOLUME (FL) BY AUTOMATED COUNT: 90 FL (ref 80–100)
ERYTHROCYTES (10*6/UL) IN BLOOD BY AUTOMATED COUNT: 4.28 X10E12/L (ref 4.5–5.9)
GFR MALE: >90 ML/MIN/1.73M2
GLUCOSE (MG/DL) IN SER/PLAS: 158 MG/DL (ref 74–99)
HEMATOCRIT (%) IN BLOOD BY AUTOMATED COUNT: 38.4 % (ref 41–52)
HEMOGLOBIN (G/DL) IN BLOOD: 13.6 G/DL (ref 13.5–17.5)
LEUKOCYTES (10*3/UL) IN BLOOD BY AUTOMATED COUNT: 4.6 X10E9/L (ref 4.4–11.3)
PLATELETS (10*3/UL) IN BLOOD AUTOMATED COUNT: 146 X10E9/L (ref 150–450)
POTASSIUM (MMOL/L) IN SER/PLAS: 3.8 MMOL/L (ref 3.5–5.3)
SODIUM (MMOL/L) IN SER/PLAS: 142 MMOL/L (ref 136–145)
UREA NITROGEN (MG/DL) IN SER/PLAS: 19 MG/DL (ref 6–23)

## 2023-05-23 ENCOUNTER — HOSPITAL ENCOUNTER (OUTPATIENT)
Dept: DATA CONVERSION | Facility: HOSPITAL | Age: 53
End: 2023-05-23
Attending: INTERNAL MEDICINE | Admitting: INTERNAL MEDICINE
Payer: COMMERCIAL

## 2023-05-23 DIAGNOSIS — Z88.0 ALLERGY STATUS TO PENICILLIN: ICD-10-CM

## 2023-05-23 DIAGNOSIS — Z86.73 PERSONAL HISTORY OF TRANSIENT ISCHEMIC ATTACK (TIA), AND CEREBRAL INFARCTION WITHOUT RESIDUAL DEFICITS: ICD-10-CM

## 2023-05-23 DIAGNOSIS — Z88.5 ALLERGY STATUS TO NARCOTIC AGENT: ICD-10-CM

## 2023-05-23 DIAGNOSIS — K21.9 GASTRO-ESOPHAGEAL REFLUX DISEASE WITHOUT ESOPHAGITIS: ICD-10-CM

## 2023-05-23 DIAGNOSIS — Z79.82 LONG TERM (CURRENT) USE OF ASPIRIN: ICD-10-CM

## 2023-05-23 DIAGNOSIS — I25.110 ATHEROSCLEROTIC HEART DISEASE OF NATIVE CORONARY ARTERY WITH UNSTABLE ANGINA PECTORIS (MULTI): ICD-10-CM

## 2023-05-23 DIAGNOSIS — E78.5 HYPERLIPIDEMIA, UNSPECIFIED: ICD-10-CM

## 2023-05-23 DIAGNOSIS — Z95.5 PRESENCE OF CORONARY ANGIOPLASTY IMPLANT AND GRAFT: ICD-10-CM

## 2023-05-23 DIAGNOSIS — Z91.041 RADIOGRAPHIC DYE ALLERGY STATUS: ICD-10-CM

## 2023-05-23 DIAGNOSIS — E11.9 TYPE 2 DIABETES MELLITUS WITHOUT COMPLICATIONS (MULTI): ICD-10-CM

## 2023-05-23 DIAGNOSIS — Z82.49 FAMILY HISTORY OF ISCHEMIC HEART DISEASE AND OTHER DISEASES OF THE CIRCULATORY SYSTEM: ICD-10-CM

## 2023-05-23 DIAGNOSIS — R07.9 CHEST PAIN, UNSPECIFIED: ICD-10-CM

## 2023-05-23 DIAGNOSIS — I10 ESSENTIAL (PRIMARY) HYPERTENSION: ICD-10-CM

## 2023-06-21 RX ORDER — MINOXIDIL 2.5 MG/1
TABLET ORAL 2 TIMES DAILY
COMMUNITY

## 2023-06-23 ENCOUNTER — OFFICE VISIT (OUTPATIENT)
Dept: PRIMARY CARE | Facility: CLINIC | Age: 53
End: 2023-06-23
Payer: COMMERCIAL

## 2023-06-23 VITALS
WEIGHT: 280 LBS | HEART RATE: 68 BPM | DIASTOLIC BLOOD PRESSURE: 69 MMHG | SYSTOLIC BLOOD PRESSURE: 130 MMHG | BODY MASS INDEX: 32.4 KG/M2 | HEIGHT: 78 IN

## 2023-06-23 DIAGNOSIS — K21.9 GASTROESOPHAGEAL REFLUX DISEASE WITHOUT ESOPHAGITIS: ICD-10-CM

## 2023-06-23 DIAGNOSIS — E11.65 TYPE 2 DIABETES MELLITUS WITH HYPERGLYCEMIA, WITHOUT LONG-TERM CURRENT USE OF INSULIN (MULTI): ICD-10-CM

## 2023-06-23 DIAGNOSIS — D69.6 THROMBOCYTOPENIA (CMS-HCC): ICD-10-CM

## 2023-06-23 DIAGNOSIS — I10 PRIMARY HYPERTENSION: ICD-10-CM

## 2023-06-23 DIAGNOSIS — R10.12 LUQ ABDOMINAL PAIN: Primary | ICD-10-CM

## 2023-06-23 DIAGNOSIS — I25.119 CORONARY ARTERY DISEASE INVOLVING NATIVE CORONARY ARTERY OF NATIVE HEART WITH ANGINA PECTORIS (CMS-HCC): ICD-10-CM

## 2023-06-23 PROCEDURE — 3044F HG A1C LEVEL LT 7.0%: CPT | Performed by: INTERNAL MEDICINE

## 2023-06-23 PROCEDURE — 99214 OFFICE O/P EST MOD 30 MIN: CPT | Performed by: INTERNAL MEDICINE

## 2023-06-23 PROCEDURE — 4010F ACE/ARB THERAPY RXD/TAKEN: CPT | Performed by: INTERNAL MEDICINE

## 2023-06-23 PROCEDURE — 1036F TOBACCO NON-USER: CPT | Performed by: INTERNAL MEDICINE

## 2023-06-23 PROCEDURE — 3078F DIAST BP <80 MM HG: CPT | Performed by: INTERNAL MEDICINE

## 2023-06-23 PROCEDURE — 3075F SYST BP GE 130 - 139MM HG: CPT | Performed by: INTERNAL MEDICINE

## 2023-06-23 RX ORDER — NITROGLYCERIN 0.4 MG/1
0.4 TABLET SUBLINGUAL
COMMUNITY
Start: 2019-12-03 | End: 2024-04-25 | Stop reason: ALTCHOICE

## 2023-06-23 ASSESSMENT — ENCOUNTER SYMPTOMS
CHILLS: 0
FLANK PAIN: 1
DIAPHORESIS: 0
ACTIVITY CHANGE: 0
DIARRHEA: 0
ABDOMINAL PAIN: 1
VOMITING: 0
NAUSEA: 1
SHORTNESS OF BREATH: 0
COUGH: 0

## 2023-06-23 NOTE — PROGRESS NOTES
"Subjective   Patient ID: Jhony Myles is a 52 y.o. male who presents for Follow-up (6 month) and Flank Pain (LT side/Present for quite awhile/Cardiologist tried getting him in for a CT but [employer] kept denying /They are worried about his pancreas ).  Flank Pain  Associated symptoms include abdominal pain. Pertinent negatives include no chest pain.     Patient is here today for 6 mo follow up   Patient reports that he has continued to have left side abd pain.   Pt states that cardiologist tried to get him to get a CT scan but the insruance would not approve.   Pt has seen Gi, last colonoscopy in 2021 and egd.  Patient has been having LUQ abd pain, no flank, normal blodowork. No hx of kidney stones, no hematuria in recent Ua.   Has not noticed anything that triggers it.     Pt also states that his cardiologist referred him to Hematology for thrombocytopenia and TIAS, but he has never heard anything back about scheduling an appt. Will place new referral.     Dmii - A1C 5.2% IN 4.23  Review of Systems   Constitutional:  Negative for activity change, chills and diaphoresis.   HENT:  Negative for congestion and ear discharge.    Respiratory:  Negative for cough and shortness of breath.    Cardiovascular:  Negative for chest pain and leg swelling.   Gastrointestinal:  Positive for abdominal pain and nausea. Negative for diarrhea and vomiting.   Genitourinary:  Positive for flank pain.       Objective   /69 (BP Location: Right arm, Patient Position: Sitting, BP Cuff Size: Adult)   Pulse 68   Ht 1.981 m (6' 6\")   Wt 127 kg (280 lb)   BMI 32.36 kg/m²     Physical Exam  Constitutional:       General: He is not in acute distress.     Appearance: Normal appearance.   HENT:      Head: Normocephalic.      Nose: Nose normal.      Mouth/Throat:      Mouth: Mucous membranes are dry.      Pharynx: No oropharyngeal exudate.   Eyes:      General:         Right eye: No discharge.         Left eye: No discharge.      Extraocular " Movements: Extraocular movements intact.      Pupils: Pupils are equal, round, and reactive to light.   Cardiovascular:      Rate and Rhythm: Normal rate and regular rhythm.      Heart sounds: No murmur heard.     No gallop.   Pulmonary:      Effort: Pulmonary effort is normal. No respiratory distress.      Breath sounds: Normal breath sounds. No wheezing.   Abdominal:      General: Bowel sounds are normal. There is no distension.      Palpations: Abdomen is soft. There is no mass.      Tenderness: There is abdominal tenderness (+tenderness over LUQ, not able to palpate any splenomegally but difficult due to body habitus).   Musculoskeletal:         General: No swelling. Normal range of motion.   Skin:     General: Skin is warm and dry.      Coloration: Skin is not jaundiced.   Neurological:      General: No focal deficit present.      Mental Status: He is alert and oriented to person, place, and time.      Cranial Nerves: No cranial nerve deficit.   Psychiatric:         Mood and Affect: Mood normal.         Behavior: Behavior normal.           Assessment/Plan   Problem List Items Addressed This Visit       Type 2 diabetes mellitus with hyperglycemia, without long-term current use of insulin (CMS/[facility])    Relevant Orders    Hemoglobin A1C    Primary hypertension    Coronary artery disease involving native coronary artery of native heart with angina pectoris (CMS/[facility])    Relevant Medications    nitroglycerin (Nitrostat) 0.4 mg SL tablet    Thrombocytopenia (CMS/[facility])    Relevant Orders    Referral to Hematology    Gastroesophageal reflux disease without esophagitis     Other Visit Diagnoses       LUQ abdominal pain    -  Primary    Relevant Orders    CT abdomen pelvis w IV contrast          LUQ abd pain in the setting of thrombocytopenia, previous evals with EGD and colonoscopy ok, normal bloodwork other than low platelets   - will order ct scan of abd and pelvis w contrast (will need pre-medicated as has contrast  allergy) to look at spleen    2. Thrombocytopenia   - was referred to hem/onc by cardiology but pt never heard back, will place new referral     3. GERD   - continue prilosec     4. CAD with  RCA s/p multiple PCI with most recent being balloon angioplasty of several ostial stenosis in Dg1 07/2022  - following with Cardiology   - on crestor 5mg po daily   - continue losartan 50mg po daily   - continue imdur 30mg po daily   - continue hctzs 12.5mg 2 tabs daily   - on brilinta     5. DMII, controlled   -A1c 5.2% in 4.23,will order updated A1c prior to next appt   - continue current meds     Final diagnoses:   [R10.12] LUQ abdominal pain   [E11.65] Type 2 diabetes mellitus with hyperglycemia, without long-term current use of insulin (CMS/[facility])   [D69.6] Thrombocytopenia (CMS/HCC)   [I10] Primary hypertension   [I25.119] Coronary artery disease involving native coronary artery of native heart with angina pectoris (CMS/HCC)   [K21.9] Gastroesophageal reflux disease without esophagitis

## 2023-06-26 ENCOUNTER — TELEPHONE (OUTPATIENT)
Dept: PRIMARY CARE | Facility: CLINIC | Age: 53
End: 2023-06-26
Payer: COMMERCIAL

## 2023-06-26 DIAGNOSIS — R60.0 LEG EDEMA: Primary | ICD-10-CM

## 2023-06-26 RX ORDER — FUROSEMIDE 20 MG/1
20 TABLET ORAL DAILY
Qty: 30 TABLET | Refills: 0 | Status: SHIPPED | OUTPATIENT
Start: 2023-06-26 | End: 2023-11-20 | Stop reason: WASHOUT

## 2023-06-26 NOTE — TELEPHONE ENCOUNTER
B/L leg swelling since Friday night around 6 PM  Reports they are really tight. They were not swelling at his appointment on Friday  Pt currently taking hydrochlorothiazide 25 and its not helping  Still tight and swollen

## 2023-09-20 ENCOUNTER — OFFICE VISIT (OUTPATIENT)
Dept: PRIMARY CARE | Facility: CLINIC | Age: 53
End: 2023-09-20
Payer: COMMERCIAL

## 2023-09-20 VITALS
SYSTOLIC BLOOD PRESSURE: 142 MMHG | WEIGHT: 280 LBS | BODY MASS INDEX: 32.4 KG/M2 | DIASTOLIC BLOOD PRESSURE: 74 MMHG | HEIGHT: 78 IN | HEART RATE: 68 BPM

## 2023-09-20 DIAGNOSIS — R10.12 LUQ ABDOMINAL PAIN: ICD-10-CM

## 2023-09-20 DIAGNOSIS — R10.9 FLANK PAIN: ICD-10-CM

## 2023-09-20 DIAGNOSIS — R16.1 SPLENOMEGALY: Primary | ICD-10-CM

## 2023-09-20 DIAGNOSIS — R00.2 PALPITATIONS: ICD-10-CM

## 2023-09-20 DIAGNOSIS — N20.0 KIDNEY STONE: ICD-10-CM

## 2023-09-20 PROCEDURE — 3078F DIAST BP <80 MM HG: CPT | Performed by: INTERNAL MEDICINE

## 2023-09-20 PROCEDURE — 3077F SYST BP >= 140 MM HG: CPT | Performed by: INTERNAL MEDICINE

## 2023-09-20 PROCEDURE — 99214 OFFICE O/P EST MOD 30 MIN: CPT | Performed by: INTERNAL MEDICINE

## 2023-09-20 PROCEDURE — 3044F HG A1C LEVEL LT 7.0%: CPT | Performed by: INTERNAL MEDICINE

## 2023-09-20 PROCEDURE — 1036F TOBACCO NON-USER: CPT | Performed by: INTERNAL MEDICINE

## 2023-09-20 PROCEDURE — 4010F ACE/ARB THERAPY RXD/TAKEN: CPT | Performed by: INTERNAL MEDICINE

## 2023-09-20 ASSESSMENT — ENCOUNTER SYMPTOMS
BACK PAIN: 0
WHEEZING: 0
WEAKNESS: 0
FATIGUE: 0
SINUS PAIN: 0
CHILLS: 0
ACTIVITY CHANGE: 0
SHORTNESS OF BREATH: 0
ABDOMINAL DISTENTION: 0
PALPITATIONS: 1
SINUS PRESSURE: 0
NUMBNESS: 0
APPETITE CHANGE: 0
SORE THROAT: 0
NAUSEA: 0
VOMITING: 0
DIARRHEA: 0
COUGH: 0

## 2023-09-20 NOTE — PROGRESS NOTES
"Subjective   Patient ID: Chester Myles is a 52 y.o. male who presents for ER Follow-up (LT sided chest/rib/flank pain) and Palpitations (At the ER for a second time/BP was elevated ).  ER Follow-up  Pertinent negatives include no chest pain, chills, congestion, coughing, fatigue, nausea, numbness, sore throat, vomiting or weakness.   Palpitations   Pertinent negatives include no chest pain, coughing, nausea, numbness, shortness of breath, vomiting or weakness.     Patient is here today for ED follow up   Patient reports that last week he had started a 72 hr water fast, made it through fine, Friday was getting mom ready to go to the Dr, felt a pinch in his arm then it started radiating into his chest.   Went to the ED, bloodwork, EKG was ok, told him that he likely had a muscle spasm.  Yesterday was working on a mower deck, went up and showered and felt ok but then he felt like he stratred feeling poorly. Felt like he did in the past when he had PVCs. Went to the ED again and his blood pressure was significnatly elevated.     Review of Systems   Constitutional:  Negative for activity change, appetite change, chills and fatigue.   HENT:  Negative for congestion, postnasal drip, sinus pressure, sinus pain and sore throat.    Respiratory:  Negative for cough, shortness of breath and wheezing.    Cardiovascular:  Positive for palpitations. Negative for chest pain and leg swelling.   Gastrointestinal:  Negative for abdominal distention, diarrhea, nausea and vomiting.   Musculoskeletal:  Negative for back pain.   Neurological:  Negative for weakness and numbness.       Objective   /74 (BP Location: Right arm, Patient Position: Sitting, BP Cuff Size: Adult)   Pulse 68   Ht 1.981 m (6' 6\")   Wt 127 kg (280 lb)   BMI 32.36 kg/m²     Physical Exam  Constitutional:       General: He is not in acute distress.     Appearance: Normal appearance.   HENT:      Head: Normocephalic.      Nose: Nose normal.      " Mouth/Throat:      Pharynx: No oropharyngeal exudate.   Eyes:      General:         Right eye: No discharge.         Left eye: No discharge.      Extraocular Movements: Extraocular movements intact.      Pupils: Pupils are equal, round, and reactive to light.   Cardiovascular:      Rate and Rhythm: Normal rate and regular rhythm.      Heart sounds: No murmur heard.     No gallop.   Pulmonary:      Effort: Pulmonary effort is normal. No respiratory distress.      Breath sounds: Normal breath sounds. No wheezing.   Abdominal:      General: There is no distension.      Palpations: Abdomen is soft. There is no mass.      Tenderness: There is abdominal tenderness (tenderness over LLQ).   Musculoskeletal:         General: No swelling. Normal range of motion.      Comments: Tendernes over left flank    Skin:     General: Skin is warm and dry.      Coloration: Skin is not jaundiced.   Neurological:      General: No focal deficit present.      Mental Status: He is alert and oriented to person, place, and time.      Cranial Nerves: No cranial nerve deficit.   Psychiatric:         Mood and Affect: Mood normal.         Behavior: Behavior normal.           Assessment/Plan   Problem List Items Addressed This Visit       LUQ abdominal pain - Primary    Relevant Orders    CT abdomen pelvis w IV contrast     Other Visit Diagnoses       Flank pain        Relevant Orders    CT abdomen pelvis w IV contrast    Palpitations        Relevant Orders    Holter or Event Cardiac Monitor        LUQ abd pain in the setting of thrombocytopenia, splenomegally,  previous evals with EGD and colonoscopy ok, normal bloodwork other than low platelets   - will reorder CT scan abd and pelvis w VI contast with noted splenomegally on US and continued LLQ pain with normal egd/colonoscopy, new left side flank pain     2. Thrombocytopenia   - did see hematology      3. GERD   - continue prilosec      4. CAD with  RCA s/p multiple PCI with most recent being  balloon angioplasty of several ostial stenosis in Dg1 07/2022  - following with Cardiology   - on crestor 5mg po daily   - continue losartan 50mg po daily   - continue imdur 30mg po daily   - continue hctzs 12.5mg 2 tabs daily   - on brilinta    - ordered holter monitor x 7 days     5. DMII, controlled   -A1c 5.2% in 4.23,will order updated A1c prior to next appt   - continue current meds     Final diagnoses:   [R10.12] LUQ abdominal pain   [R10.9] Flank pain   [R00.2] Palpitations

## 2023-09-30 NOTE — H&P
History & Physical Reviewed:   I have reviewed the History and Physical dated:  08-May-2023   History and Physical reviewed and relevant findings noted. Patient examined to review pertinent physical  findings.: No significant changes   Home Medications Reviewed: no changes noted   Allergies Reviewed: no changes noted       Airway/Sedation Assessment:  ·  Emotional Status calm   ·  Neurologic alert & oriented x 3   ·  Respiratory clear to auscultation   ·  Cardiovascular rhythm & rate regular   ·  GI/ soft, nontender     · Pulses present: Pedal Left, Pedal Right, Radial Left, Radial Right     ·  Mouth Opening OK yes   ·  Neck Flexibility OK yes   ·  Loose Teeth no   ·  Oropharyngeal Classification Class II   ·  Sedation Plan no sedation       ERAS (Enhanced Recovery After Surgery):  ·  ERAS Patient: no     Consent:   COVID-19 Consent:  ·  COVID-19 Risk Consent Surgeon has reviewed key risks related to the risk of leatha COVID-19 and if they contract COVID-19 what the risks are.     Coronavirus Attestation of Need for Surgery:  ·  COVID-19 Surgery / Procedure Attestation: Select all criteria that apply Presence of severe symptoms causing an inability to perform activities of daily living       Electronic Signatures:  Juan Ortiz)  (Signed 23-May-2023 07:47)   Authored: History & Physical Reviewed, Airway/Sedation,  ERAS, Consent, Note Completion      Last Updated: 23-May-2023 07:47 by Juan Ortiz)

## 2023-10-09 ENCOUNTER — HOSPITAL ENCOUNTER (OUTPATIENT)
Dept: RADIOLOGY | Facility: HOSPITAL | Age: 53
Discharge: HOME | End: 2023-10-09
Payer: COMMERCIAL

## 2023-10-09 DIAGNOSIS — R16.1 SPLENOMEGALY: ICD-10-CM

## 2023-10-09 PROCEDURE — 76705 ECHO EXAM OF ABDOMEN: CPT

## 2023-10-09 PROCEDURE — 76705 ECHO EXAM OF ABDOMEN: CPT | Performed by: RADIOLOGY

## 2023-10-10 ENCOUNTER — HOSPITAL ENCOUNTER (OUTPATIENT)
Dept: RADIOLOGY | Facility: HOSPITAL | Age: 53
Discharge: HOME | End: 2023-10-10
Payer: COMMERCIAL

## 2023-10-10 DIAGNOSIS — R10.9 FLANK PAIN: ICD-10-CM

## 2023-10-10 PROCEDURE — 76770 US EXAM ABDO BACK WALL COMP: CPT

## 2023-10-10 PROCEDURE — 76770 US EXAM ABDO BACK WALL COMP: CPT | Performed by: RADIOLOGY

## 2023-10-23 ENCOUNTER — HOSPITAL ENCOUNTER (OUTPATIENT)
Dept: RADIOLOGY | Facility: HOSPITAL | Age: 53
Discharge: HOME | End: 2023-10-23
Payer: COMMERCIAL

## 2023-10-23 DIAGNOSIS — N20.0 KIDNEY STONE: ICD-10-CM

## 2023-10-23 PROCEDURE — 74176 CT ABD & PELVIS W/O CONTRAST: CPT | Performed by: STUDENT IN AN ORGANIZED HEALTH CARE EDUCATION/TRAINING PROGRAM

## 2023-10-23 PROCEDURE — 74176 CT ABD & PELVIS W/O CONTRAST: CPT

## 2023-11-15 RX ORDER — OLOPATADINE HYDROCHLORIDE 1 MG/ML
SOLUTION/ DROPS OPHTHALMIC
COMMUNITY
Start: 2023-10-21 | End: 2024-04-25 | Stop reason: ALTCHOICE

## 2023-11-15 RX ORDER — ALPRAZOLAM 1 MG/1
1 TABLET ORAL 2 TIMES DAILY PRN
COMMUNITY
Start: 2023-09-29

## 2023-11-15 RX ORDER — OMEPRAZOLE 20 MG/1
CAPSULE, DELAYED RELEASE ORAL
COMMUNITY
Start: 2023-10-21

## 2023-11-15 RX ORDER — CETIRIZINE HYDROCHLORIDE 10 MG/1
10 TABLET ORAL
COMMUNITY
Start: 2023-10-21

## 2023-11-19 NOTE — PROGRESS NOTES
"Counseling:  The patient was counseled regarding diagnostic results, instructions for management, risk factor reductions, prognosis, patient and family education, impressions, risks and benefits of treatment options and importance of compliance with treatment.      Chief Complaint:   The patient presents today for 4-month followup of CAD and HTN.      History Of Present Illness:    Jhony Myles is a 53 year old male patient who presents today for 4-month followup of CAD and HTN. His PMH is significant for CAD with  RCA s/p multiple PCI with most recent being balloon angioplasty of several ostial stenosis in Dg1 07/2022, HTN, anxiety, agoraphobia, PVC's, fibromyalgia, GERD and hyperlipidemia. Since last being seen, the patient states that he has done well from a cardiac standpoint. He denies any CP, chest discomfort or SOB. The patient states that he will be undergoing a hydrocele surgery where his Brilinta will need to be held, and inquired if this is safe to do. He is compliant with his prescribed medications.     Last Recorded Vitals:  Vitals:    11/20/23 1217   BP: (!) 152/92   BP Location: Left arm   Pulse: 59   Weight: 127 kg (279 lb)   Height: 1.981 m (6' 6\")       Past Surgical History:  He has a past surgical history that includes Coronary angioplasty (02/27/2018); CT angio neck (12/2/2022); CT angio head w and wo IV contrast (12/2/2022); MR angio head wo IV contrast (4/3/2023); and MR angio neck wo IV contrast (4/3/2023).      Social History:  He reports that he has never smoked. He has never used smokeless tobacco. He reports that he does not currently use alcohol. He reports that he does not use drugs.    Family History:  No family history on file.     Allergies:  Amlodipine, Calcium channel blocking agent diltiazem analogues, Iodinated contrast media, Lisinopril, Nitroglycerin, Other, Penicillins, Statins-hmg-coa reductase inhibitors, Tramadol, Meperidine, Propoxyphene-acetaminophen, and " Temazepam    Outpatient Medications:  Current Outpatient Medications   Medication Instructions    ALPRAZolam (XANAX) 1 mg, oral, 2 times daily PRN    aspirin 81 mg EC tablet 1 tablet, oral, Daily    Brilinta 90 mg tablet 1 tablet, oral, 2 times daily    carvedilol (Coreg) 25 mg tablet 1 tablet, oral, 2 times daily with meals    cetirizine (ZYRTEC) 10 mg    cyanocobalamin (Vitamin B-12) 500 mcg tablet 1 tablet, oral, Every other day    cyclobenzaprine (Flexeril) 10 mg tablet 1 tablet, oral, 3 times daily PRN    FreeStyle Nancy reader (FreeStyle Nancy 2 [place]) misc Use as instructed    FreeStyle Nancy sensor system (FreeStyle Nancy 2 Sensor) kit Use as instructed    furosemide (LASIX) 20 mg, oral, Daily    hydroCHLOROthiazide (Microzide) 12.5 mg capsule 2 capsules, oral, Daily    isosorbide mononitrate ER (Imdur) 30 mg 24 hr tablet oral, Every 12 hours    Klor-Con M20 20 mEq ER tablet 1 tablet, oral, Daily    losartan (Cozaar) 50 mg tablet 1 tablet, oral, Daily    minoxidil (Loniten) 2.5 mg tablet oral, 2 times daily    nitroglycerin (NITROSTAT) 0.4 mg, sublingual    olopatadine (Patanol) 0.1 % ophthalmic solution     omeprazole (PriLOSEC) 20 mg DR capsule     OneTouch Ultra Test strip use to test ONCE DAILY AS DIRECTED    rosuvastatin (Crestor) 5 mg tablet 1 tablet, oral, Daily     Review of Systems   All other systems reviewed and are negative.     Physical Exam:  Constitutional:       Appearance: Healthy appearance. Not in distress.   Neck:      Vascular: No JVR. JVD normal.   Pulmonary:      Effort: Pulmonary effort is normal.      Breath sounds: Normal breath sounds. No wheezing. No rhonchi. No rales.   Chest:      Chest wall: Not tender to palpatation.   Cardiovascular:      PMI at left midclavicular line. Normal rate. Regular rhythm. Normal S1. Normal S2.       Murmurs: There is no murmur.      No gallop.  No click. No rub.   Pulses:     Intact distal pulses.   Edema:     Peripheral edema absent.   Abdominal:       General: Bowel sounds are normal.      Palpations: Abdomen is soft.      Tenderness: There is no abdominal tenderness.   Musculoskeletal: Normal range of motion.         General: No tenderness. Skin:     General: Skin is warm and dry.   Neurological:      General: No focal deficit present.      Mental Status: Alert and oriented to person, place and time.          Last Labs:  CBC -  Lab Results   Component Value Date    WBC 4.8 09/19/2023    HGB 13.8 09/19/2023    HCT 38.5 (L) 09/19/2023    MCV 88 09/19/2023     (L) 09/19/2023       CMP -  Lab Results   Component Value Date    CALCIUM 9.7 09/19/2023    PROT 6.6 09/15/2023    ALBUMIN 4.5 09/15/2023    AST 18 09/15/2023    ALT 20 09/15/2023    ALKPHOS 35 09/15/2023    BILITOT 1.0 09/15/2023       LIPID PANEL -   Lab Results   Component Value Date    CHOL 110 04/01/2023    TRIG 86 04/01/2023    HDL 22.0 (A) 04/01/2023    CHHDL 5.0 04/01/2023    LDLF 71 04/01/2023    VLDL 17 04/01/2023       RENAL FUNCTION PANEL -   Lab Results   Component Value Date    GLUCOSE 130 (H) 09/19/2023     09/19/2023    K 3.7 09/19/2023     09/19/2023    CO2 28 09/19/2023    ANIONGAP 12 09/19/2023    BUN 30 (H) 09/19/2023    CREATININE 1.18 09/19/2023    GFRMALE 73 09/19/2023    CALCIUM 9.7 09/19/2023    ALBUMIN 4.5 09/15/2023        Lab Results   Component Value Date    BNP 26 09/19/2023    HGBA1C 5.2 04/01/2023    HGBA1C 6.1 (H) 06/16/2022       Last Cardiology Tests:  05/23/2023 - Cardiac Catheterization (LH)  1. Single vessel coronary artery disease without proximal left anterior descending involvement.  2. Left Ventricular end-diastolic pressure = 9.  3. Normal LV filling pressures.     04/24/2023 - TTE  Left ventricular systolic function is normal with a 60-65% estimated ejection fraction.     04/17/2023 - Vascular Lab Carotid Artery Duplex U/S   1. Right Carotid: Findings are consistent with less than 50% stenosis of the right proximal ICA. Laminar flow seen by  color Doppler. Right external carotid artery appears patent with no evidence of stenosis. The right vertebral artery is patent with antegrade flow. No evidence of hemodynamically significant stenosis in the right subclavian artery.  2. Left Carotid: Findings are consistent with less than 50% stenosis of the left proximal ICA. Laminar flow seen by color Doppler. Left external carotid artery appears patent with no evidence of stenosis. The left vertebral artery is patent with antegrade flow. No evidence of hemodynamically significant stenosis in the left subclavian artery.     04/03/2023 - MRI Brain/Head/Neck  1. There is no MRI evidence of acute infarction on the diffusion weighted images.  2. There is mild-to-moderate brain parenchymal volume loss.  3. There are small scattered nonspecific white matter changes within the cerebral hemispheres bilaterally which while nonspecific, given the patient's age, likely represent sequelae of more remote small vessel ischemic change.  4. The MRA of the neck demonstrates a short segmental area of diminished MRA signal suggestive of slab artifact along the distal right common carotid artery and origin of the left internal carotid artery. There is mild non hemodynamically significant narrowing noted along the proximal internal carotid arteries bilaterally. There is diminished MRA signal noted along the horizontal segments of the distal cervical vertebral arteries bilaterally felt to likely be technical/artifactual in origin. Otherwise, no significant focal stenosis noted along the cervical segments of the vertebral arteries. There is a left dominant vertebral artery.  5. The intracranial MRA demonstrates a small caliber distal right vertebral artery which terminates in a right posterior inferior cerebellar artery. No significant stenosis noted along the distal left dominant vertebral artery, basilar artery, or distal internal carotid arteries. Otherwise, no other significant  intracranial stenosis or intracranial aneurysm is noted.     07/29/2022 - Cardiac Catheterization (LH)  1. Severe ostial stenosis in the Diagonal-1 vessel, status post balloon angioplasty.  2. Patent stent in the LAD and OM system.  3. LVEDP of 20 mmHg.     06/24/2022 - TTE  The left ventricular systolic function is normal with a 70-75% estimated ejection fraction.     06/23/2022 - Cardiac Catheterization (LH)  1. Double vessel coronary artery disease without proximal left anterior descending involvement.  2. Culprit vessel(s): left anterior descending.  3. Successful PCI of LAD.     11/18/2021 - Cardiac Catheterization (LH)  Single vessel coronary artery disease without proximal left anterior descending involvement.     10/11/2021 - NM Cardiac Stress Test  1. Normal myocardial perfusion study without evidence of ischemia or prior infarction. The left ventricle is normal in size. Normal LV wall motion with an LV EF estimated at approximately 55%.  2. Baseline EKG shows normal sinus rhythm with no significant ST-T segment changes. With regadenoson infusion no ST-T segment changes of ischemia, or sustained ventricular arrhythmias are seen. Regadenoson stress EKG is negative for ischemia.      09/17/2021 - Cardiac Catheterization   1. The 1st obtuse marginal branch showed mild tortuosity, two previous stents and mild in-stent restenosis.  2. 2-vessel severe coronary artery disease.  3. Successful GILDA PCI to proximal D2 with 3.0 x 18 mm Resolut Daniel post-dilated with 3.0 x 15 mm NC balloon.  4. Previous LAD stent has mild malapposition in a short segment within the mid stent and in the ostial stent edge due to an aneurysmal segment.     09/08/2021- NM Cardiac Stress Test  1. There is a small to moderate sized fixed perfusion defect in the anteroseptal to apical wall consistent with prior infarct. There is a low probability of ischemia. Calculated ejection fraction of 52% with mild hypokinesis of the septal wall.  2. No  electrocardiographic evidence for ischemia at maximal infusion. Normal Stress Test.      07/23/2021 - Cardiac Catheterization (LH)  1. Double vessel coronary artery disease without proximal left anterior descending involvement.  2. Successful PCI of LAD.     03/19/2021 - TTE  The left ventricular systolic function is normal with a 55-60% estimated ejection fraction.     03/18/2021 - Cardiac Catheterization (LH)  1. Single vessel coronary artery disease with proximal left anterior descending involvement.  2. Successful PCI of LAD.     02/13/2019 - Cardiac Catheterization ()  1. Patent stents in the circumflex territory.  2. Chronic total occlusion of the right coronary artery with collaterals from left system.  3. Mild LV dysfunction, EF 50%.  4. Medical therapy advised.     04/10/2018 - Vascular Lab Renal Artery U/S  1. Renal Artery Duplex: Bilateral renal arteries demonstrate no evidence of hemodynamically significant stenosis. The bilateral renal veins are widely patent.  2. Right Renal Artery: Cystlike structure noted in the kidney measuring approximately 3cm 2.7cm. Right kidney size not imaged due to tech error.     03/15/2018 - Vascular Lab Arterial Duplex U/S  Right Upper Arterial: History of cardiac catheterization via RUE approach, c/o pain near axilla. No evidence of pseudoaneurysm, stenosis or occlusion of the subclavian, axillary , radial and ulnar arteries.     02/15/2018 - NM Cardiac Stress Test  1. SPECT perfusion study: Normal.   2. Fair functional capacity for age and gender.  3. There is no scintigraphic evidence for inducible ischemia.   4. No evidence of scarred myocardium.  5. Left ventricle is mildly dilated. The left ventricle systolic function is normal.   6. Right ventricle is normal in size. THe right ventricle systolic function is normal.  7. This is a low risk scan.   8. EKG Portion: The test was terminated due to general fatigue. Adequate heart rate response of 89% predicted maximal  heart rate, normal heart rate recovery @ 1 minute post exercise, normal chronotropic response index. Normal blood pressure response to stress, resting hypertension. Normal ST segment response to stress, T wave changes due to stress. Abnormal Duke treadmill score (<5 but >/= -10), intermediate risk. Typical anginal discomfort during stress, nausea during stress. Multifocal PVCs during the test, PACs during the test. Nondiagnostic due to abnormal resting ECG.      02/11/2018 - Echocardiogram   1. Technically difficult exam due to suboptimal positioning.  2. Exam indication: Chest pain.  3. The left ventricle is normal in size. There is mild concentric left ventricular hypertrophy. Left ventricular systolic function is normal. EF = 60 +/- 5% (2D biplane).  4. The right ventricle is normal in size. Right ventricular systolic function is normal.   5. There are no significant valvular abnormalities.     Lab review: I have personally reviewed the laboratory result(s).    Assessment/Plan   1) Blurred Vision/?TIA  Admitted to hospital 03/31/2023 to 04/03/2023 with blurred vision left eye   CTA negative for acute cortical infarct or intracranial hemorrhage  MRI of brain/neck/head with mild narrowing of the carotid arteries  Scheduled to see opthalmology next week  TTE 04/24/2023 with LVEF 60-65%      2) Intermittent Sharp Abdominal Pain   Check CT abdomen/pelvis - declined by insurance      3) Scattered Petechiae/Thrombocytopenia   CBC drawn prior to discharge with low platelet count of 114  Referred to hematology     4) HTN  Three weeks prior to hospitalization 03/31/2023, patient reported low BP readings at 70s/30s, especially with changes in position.   No RENETTA per renal artery duplex in 2018  Intolerant of amlodipine in the past d/t BLE edema  On carvedilol 25 mg twice daily, HCTZ 25 mg daily, Imdur 30 mg BID (60 mg daily caused headaches), minoxidil 2.5 mg BID and losartan 50 mg daily  Trialed d/c minoxidil with increase  in BP above 150 systolic   Patient restarted minoxidil with stabilization of BP in the 130-140/70-80 range      5) CAD s/p multiple PCI - repeat PCI of LAD July 2021 and June 2022 and 2nd diagonal Sept 2021, PTA D1 July 2022  Known  RCA with collaterals  [facility] Nov 2021 with patent stent LAD, known  RCA  DAPT with aspirin 81 mg daily and Brilinta 90 mg BID  Tolerating rosuvastatin 5 mg daily - admits to some reflux afterwards, but is tolerable  Continue beta blocker - carvedilol 25 mg BID  On Imdur 30 mg BID - 60 mg daily dose caused headaches  Lipid panel 04/01/2023 with LDL of 71  TTE 04/24/2023 with LVEF 60-65%   [facility] 05/23/2023 for evaluation of chest tightness when bending over showed single vessel CAD  Doing well - denies CP, chest discomfort or SOB  Can hold Brilinta for upcoming hydrocele surgery so long as he can remain on ASA     6) Carotid bruit  MRI of brain/neck/head 04/03/2033 with mild narrowing of the carotid arteries  Carotid duplex 04/17/2023 with <50% stenosis of bilateral proximal ICAs     7) GERD  On omeprazole 40 mg BID.   Management per PCP. Seen by GI in the past          Scribe Attestation  By signing my name below, I, Enrico Carr   attest that this documentation has been prepared under the direction and in the presence of Juan Ortiz MD.

## 2023-11-20 ENCOUNTER — OFFICE VISIT (OUTPATIENT)
Dept: CARDIOLOGY | Facility: CLINIC | Age: 53
End: 2023-11-20
Payer: COMMERCIAL

## 2023-11-20 VITALS
HEIGHT: 78 IN | BODY MASS INDEX: 32.28 KG/M2 | DIASTOLIC BLOOD PRESSURE: 92 MMHG | HEART RATE: 59 BPM | WEIGHT: 279 LBS | SYSTOLIC BLOOD PRESSURE: 152 MMHG

## 2023-11-20 DIAGNOSIS — I25.119 CORONARY ARTERY DISEASE INVOLVING NATIVE CORONARY ARTERY OF NATIVE HEART WITH ANGINA PECTORIS (CMS-HCC): Primary | ICD-10-CM

## 2023-11-20 PROCEDURE — 3080F DIAST BP >= 90 MM HG: CPT | Performed by: INTERNAL MEDICINE

## 2023-11-20 PROCEDURE — 3077F SYST BP >= 140 MM HG: CPT | Performed by: INTERNAL MEDICINE

## 2023-11-20 PROCEDURE — 4010F ACE/ARB THERAPY RXD/TAKEN: CPT | Performed by: INTERNAL MEDICINE

## 2023-11-20 PROCEDURE — 99212 OFFICE O/P EST SF 10 MIN: CPT | Performed by: INTERNAL MEDICINE

## 2023-11-20 PROCEDURE — 1036F TOBACCO NON-USER: CPT | Performed by: INTERNAL MEDICINE

## 2023-11-20 PROCEDURE — 3044F HG A1C LEVEL LT 7.0%: CPT | Performed by: INTERNAL MEDICINE

## 2023-11-20 ASSESSMENT — ENCOUNTER SYMPTOMS
LOSS OF SENSATION IN FEET: 1
OCCASIONAL FEELINGS OF UNSTEADINESS: 1
DEPRESSION: 1

## 2023-11-20 NOTE — PATIENT INSTRUCTIONS
Continue all current medications as prescribed. If you can remain on your aspirin at the time of your surgery, it is okay to hold the Brilinta.  Followup with Dr. Ortiz in 6 months.    If you have any questions or cardiac concerns, please call our office at [phone].

## 2023-11-20 NOTE — LETTER
"November 20, 2023     Amanda Maki DO  [address] [facility] Physician [facility] [address]    Patient: Chester Myles   YOB: 1970   Date of Visit: 11/20/2023       Dear Dr. Amanda Maki DO:    Thank you for referring Chester Myles to me for evaluation. Below are my notes for this consultation.  If you have questions, please do not hesitate to call me. I look forward to following your patient along with you.       Sincerely,     Juan Ortiz MD      CC: No Recipients  ______________________________________________________________________________________    Counseling:  The patient was counseled regarding diagnostic results, instructions for management, risk factor reductions, prognosis, patient and family education, impressions, risks and benefits of treatment options and importance of compliance with treatment.      Chief Complaint:   The patient presents today for 4-month followup of CAD and HTN.      History Of Present Illness:    Jhony Myles is a 53 year old male patient who presents today for 4-month followup of CAD and HTN. His PMH is significant for CAD with  RCA s/p multiple PCI with most recent being balloon angioplasty of several ostial stenosis in Dg1 07/2022, HTN, anxiety, agoraphobia, PVC's, fibromyalgia, GERD and hyperlipidemia. Since last being seen, the patient states that he has done well from a cardiac standpoint. He denies any CP, chest discomfort or SOB. The patient states that he will be undergoing a hydrocele surgery where his Brilinta will need to be held, and inquired if this is safe to do. He is compliant with his prescribed medications.     Last Recorded Vitals:  Vitals:    11/20/23 1217   BP: (!) 152/92   BP Location: Left arm   Pulse: 59   Weight: 127 kg (279 lb)   Height: 1.981 m (6' 6\")       Past Surgical History:  He has a past surgical history that includes Coronary angioplasty (02/27/2018); CT angio neck (12/2/2022); CT angio head w and " wo IV contrast (12/2/2022); MR angio head wo IV contrast (4/3/2023); and MR angio neck wo IV contrast (4/3/2023).      Social History:  He reports that he has never smoked. He has never used smokeless tobacco. He reports that he does not currently use alcohol. He reports that he does not use drugs.    Family History:  No family history on file.     Allergies:  Amlodipine, Calcium channel blocking agent diltiazem analogues, Iodinated contrast media, Lisinopril, Nitroglycerin, Other, Penicillins, Statins-hmg-coa reductase inhibitors, Tramadol, Meperidine, Propoxyphene-acetaminophen, and Temazepam    Outpatient Medications:  Current Outpatient Medications   Medication Instructions   • ALPRAZolam (XANAX) 1 mg, oral, 2 times daily PRN   • aspirin 81 mg EC tablet 1 tablet, oral, Daily   • Brilinta 90 mg tablet 1 tablet, oral, 2 times daily   • carvedilol (Coreg) 25 mg tablet 1 tablet, oral, 2 times daily with meals   • cetirizine (ZYRTEC) 10 mg   • cyanocobalamin (Vitamin B-12) 500 mcg tablet 1 tablet, oral, Every other day   • cyclobenzaprine (Flexeril) 10 mg tablet 1 tablet, oral, 3 times daily PRN   • FreeStyle Nancy reader (FreeStyle Nancy 2 [place]ax) misc Use as instructed   • FreeStyle Nancy sensor system (FreeStyle Nancy 2 Sensor) kit Use as instructed   • furosemide (LASIX) 20 mg, oral, Daily   • hydroCHLOROthiazide (Microzide) 12.5 mg capsule 2 capsules, oral, Daily   • isosorbide mononitrate ER (Imdur) 30 mg 24 hr tablet oral, Every 12 hours   • Klor-Con M20 20 mEq ER tablet 1 tablet, oral, Daily   • losartan (Cozaar) 50 mg tablet 1 tablet, oral, Daily   • minoxidil (Loniten) 2.5 mg tablet oral, 2 times daily   • nitroglycerin (NITROSTAT) 0.4 mg, sublingual   • olopatadine (Patanol) 0.1 % ophthalmic solution    • omeprazole (PriLOSEC) 20 mg DR capsule    • OneTouch Ultra Test strip use to test ONCE DAILY AS DIRECTED   • rosuvastatin (Crestor) 5 mg tablet 1 tablet, oral, Daily     Review of Systems   All other  systems reviewed and are negative.     Physical Exam:  Constitutional:       Appearance: Healthy appearance. Not in distress.   Neck:      Vascular: No JVR. JVD normal.   Pulmonary:      Effort: Pulmonary effort is normal.      Breath sounds: Normal breath sounds. No wheezing. No rhonchi. No rales.   Chest:      Chest wall: Not tender to palpatation.   Cardiovascular:      PMI at left midclavicular line. Normal rate. Regular rhythm. Normal S1. Normal S2.       Murmurs: There is no murmur.      No gallop.  No click. No rub.   Pulses:     Intact distal pulses.   Edema:     Peripheral edema absent.   Abdominal:      General: Bowel sounds are normal.      Palpations: Abdomen is soft.      Tenderness: There is no abdominal tenderness.   Musculoskeletal: Normal range of motion.         General: No tenderness. Skin:     General: Skin is warm and dry.   Neurological:      General: No focal deficit present.      Mental Status: Alert and oriented to person, place and time.          Last Labs:  CBC -  Lab Results   Component Value Date    WBC 4.8 09/19/2023    HGB 13.8 09/19/2023    HCT 38.5 (L) 09/19/2023    MCV 88 09/19/2023     (L) 09/19/2023       CMP -  Lab Results   Component Value Date    CALCIUM 9.7 09/19/2023    PROT 6.6 09/15/2023    ALBUMIN 4.5 09/15/2023    AST 18 09/15/2023    ALT 20 09/15/2023    ALKPHOS 35 09/15/2023    BILITOT 1.0 09/15/2023       LIPID PANEL -   Lab Results   Component Value Date    CHOL 110 04/01/2023    TRIG 86 04/01/2023    HDL 22.0 (A) 04/01/2023    CHHDL 5.0 04/01/2023    LDLF 71 04/01/2023    VLDL 17 04/01/2023       RENAL FUNCTION PANEL -   Lab Results   Component Value Date    GLUCOSE 130 (H) 09/19/2023     09/19/2023    K 3.7 09/19/2023     09/19/2023    CO2 28 09/19/2023    ANIONGAP 12 09/19/2023    BUN 30 (H) 09/19/2023    CREATININE 1.18 09/19/2023    GFRMALE 73 09/19/2023    CALCIUM 9.7 09/19/2023    ALBUMIN 4.5 09/15/2023        Lab Results   Component Value  Date    BNP 26 09/19/2023    HGBA1C 5.2 04/01/2023    HGBA1C 6.1 (H) 06/16/2022       Last Cardiology Tests:  05/23/2023 - Cardiac Catheterization (LH)  1. Single vessel coronary artery disease without proximal left anterior descending involvement.  2. Left Ventricular end-diastolic pressure = 9.  3. Normal LV filling pressures.     04/24/2023 - TTE  Left ventricular systolic function is normal with a 60-65% estimated ejection fraction.     04/17/2023 - Vascular Lab Carotid Artery Duplex U/S   1. Right Carotid: Findings are consistent with less than 50% stenosis of the right proximal ICA. Laminar flow seen by color Doppler. Right external carotid artery appears patent with no evidence of stenosis. The right vertebral artery is patent with antegrade flow. No evidence of hemodynamically significant stenosis in the right subclavian artery.  2. Left Carotid: Findings are consistent with less than 50% stenosis of the left proximal ICA. Laminar flow seen by color Doppler. Left external carotid artery appears patent with no evidence of stenosis. The left vertebral artery is patent with antegrade flow. No evidence of hemodynamically significant stenosis in the left subclavian artery.     04/03/2023 - MRI Brain/Head/Neck  1. There is no MRI evidence of acute infarction on the diffusion weighted images.  2. There is mild-to-moderate brain parenchymal volume loss.  3. There are small scattered nonspecific white matter changes within the cerebral hemispheres bilaterally which while nonspecific, given the patient's age, likely represent sequelae of more remote small vessel ischemic change.  4. The MRA of the neck demonstrates a short segmental area of diminished MRA signal suggestive of slab artifact along the distal right common carotid artery and origin of the left internal carotid artery. There is mild non hemodynamically significant narrowing noted along the proximal internal carotid arteries bilaterally. There is diminished  MRA signal noted along the horizontal segments of the distal cervical vertebral arteries bilaterally felt to likely be technical/artifactual in origin. Otherwise, no significant focal stenosis noted along the cervical segments of the vertebral arteries. There is a left dominant vertebral artery.  5. The intracranial MRA demonstrates a small caliber distal right vertebral artery which terminates in a right posterior inferior cerebellar artery. No significant stenosis noted along the distal left dominant vertebral artery, basilar artery, or distal internal carotid arteries. Otherwise, no other significant intracranial stenosis or intracranial aneurysm is noted.     07/29/2022 - Cardiac Catheterization (LH)  1. Severe ostial stenosis in the Diagonal-1 vessel, status post balloon angioplasty.  2. Patent stent in the LAD and OM system.  3. LVEDP of 20 mmHg.     06/24/2022 - TTE  The left ventricular systolic function is normal with a 70-75% estimated ejection fraction.     06/23/2022 - Cardiac Catheterization (LH)  1. Double vessel coronary artery disease without proximal left anterior descending involvement.  2. Culprit vessel(s): left anterior descending.  3. Successful PCI of LAD.     11/18/2021 - Cardiac Catheterization (LH)  Single vessel coronary artery disease without proximal left anterior descending involvement.     10/11/2021 - NM Cardiac Stress Test  1. Normal myocardial perfusion study without evidence of ischemia or prior infarction. The left ventricle is normal in size. Normal LV wall motion with an LV EF estimated at approximately 55%.  2. Baseline EKG shows normal sinus rhythm with no significant ST-T segment changes. With regadenoson infusion no ST-T segment changes of ischemia, or sustained ventricular arrhythmias are seen. Regadenoson stress EKG is negative for ischemia.      09/17/2021 - Cardiac Catheterization   1. The 1st obtuse marginal branch showed mild tortuosity, two previous stents and mild  in-stent restenosis.  2. 2-vessel severe coronary artery disease.  3. Successful GILDA PCI to proximal D2 with 3.0 x 18 mm Resolut [place] post-dilated with 3.0 x 15 mm NC balloon.  4. Previous LAD stent has mild malapposition in a short segment within the mid stent and in the ostial stent edge due to an aneurysmal segment.     09/08/2021- NM Cardiac Stress Test  1. There is a small to moderate sized fixed perfusion defect in the anteroseptal to apical wall consistent with prior infarct. There is a low probability of ischemia. Calculated ejection fraction of 52% with mild hypokinesis of the septal wall.  2. No electrocardiographic evidence for ischemia at maximal infusion. Normal Stress Test.      07/23/2021 - Cardiac Catheterization (LH)  1. Double vessel coronary artery disease without proximal left anterior descending involvement.  2. Successful PCI of LAD.     03/19/2021 - TTE  The left ventricular systolic function is normal with a 55-60% estimated ejection fraction.     03/18/2021 - Cardiac Catheterization (LH)  1. Single vessel coronary artery disease with proximal left anterior descending involvement.  2. Successful PCI of LAD.     02/13/2019 - Cardiac Catheterization (LH)  1. Patent stents in the circumflex territory.  2. Chronic total occlusion of the right coronary artery with collaterals from left system.  3. Mild LV dysfunction, EF 50%.  4. Medical therapy advised.     04/10/2018 - Vascular Lab Renal Artery U/S  1. Renal Artery Duplex: Bilateral renal arteries demonstrate no evidence of hemodynamically significant stenosis. The bilateral renal veins are widely patent.  2. Right Renal Artery: Cystlike structure noted in the kidney measuring approximately 3cm 2.7cm. Right kidney size not imaged due to tech error.     03/15/2018 - Vascular Lab Arterial Duplex U/S  Right Upper Arterial: History of cardiac catheterization via RUE approach, c/o pain near axilla. No evidence of pseudoaneurysm, stenosis or occlusion  of the subclavian, axillary , radial and ulnar arteries.     02/15/2018 - NM Cardiac Stress Test  1. SPECT perfusion study: Normal.   2. Fair functional capacity for age and gender.  3. There is no scintigraphic evidence for inducible ischemia.   4. No evidence of scarred myocardium.  5. Left ventricle is mildly dilated. The left ventricle systolic function is normal.   6. Right ventricle is normal in size. THe right ventricle systolic function is normal.  7. This is a low risk scan.   8. EKG Portion: The test was terminated due to general fatigue. Adequate heart rate response of 89% predicted maximal heart rate, normal heart rate recovery @ 1 minute post exercise, normal chronotropic response index. Normal blood pressure response to stress, resting hypertension. Normal ST segment response to stress, T wave changes due to stress. Abnormal Duke treadmill score (<5 but >/= -10), intermediate risk. Typical anginal discomfort during stress, nausea during stress. Multifocal PVCs during the test, PACs during the test. Nondiagnostic due to abnormal resting ECG.      02/11/2018 - Echocardiogram   1. Technically difficult exam due to suboptimal positioning.  2. Exam indication: Chest pain.  3. The left ventricle is normal in size. There is mild concentric left ventricular hypertrophy. Left ventricular systolic function is normal. EF = 60 +/- 5% (2D biplane).  4. The right ventricle is normal in size. Right ventricular systolic function is normal.   5. There are no significant valvular abnormalities.     Lab review: I have personally reviewed the laboratory result(s).    Assessment/Plan  1) Blurred Vision/?TIA  Admitted to hospital 03/31/2023 to 04/03/2023 with blurred vision left eye   CTA negative for acute cortical infarct or intracranial hemorrhage  MRI of brain/neck/head with mild narrowing of the carotid arteries  Scheduled to see opthalmology next week  TTE 04/24/2023 with LVEF 60-65%      2) Intermittent Sharp  Abdominal Pain   Check CT abdomen/pelvis - declined by insurance      3) Scattered Petechiae/Thrombocytopenia   CBC drawn prior to discharge with low platelet count of 114  Referred to hematology     4) HTN  Three weeks prior to hospitalization 03/31/2023, patient reported low BP readings at 70s/30s, especially with changes in position.   No RENETTA per renal artery duplex in 2018  Intolerant of amlodipine in the past d/t BLE edema  On carvedilol 25 mg twice daily, HCTZ 25 mg daily, Imdur 30 mg BID (60 mg daily caused headaches), minoxidil 2.5 mg BID and losartan 50 mg daily  Trialed d/c minoxidil with increase in BP above 150 systolic   Patient restarted minoxidil with stabilization of BP in the 130-140/70-80 range      5) CAD s/p multiple PCI - repeat PCI of LAD July 2021 and June 2022 and 2nd diagonal Sept 2021, PTA D1 July 2022  Known  RCA with collaterals  [facility] Nov 2021 with patent stent LAD, known  RCA  DAPT with aspirin 81 mg daily and Brilinta 90 mg BID  Tolerating rosuvastatin 5 mg daily - admits to some reflux afterwards, but is tolerable  Continue beta blocker - carvedilol 25 mg BID  On Imdur 30 mg BID - 60 mg daily dose caused headaches  Lipid panel 04/01/2023 with LDL of 71  TTE 04/24/2023 with LVEF 60-65%   [facility] 05/23/2023 for evaluation of chest tightness when bending over showed single vessel CAD  Doing well - denies CP, chest discomfort or SOB  Can hold Brilinta for upcoming hydrocele surgery so long as he can remain on ASA     6) Carotid bruit  MRI of brain/neck/head 04/03/2033 with mild narrowing of the carotid arteries  Carotid duplex 04/17/2023 with <50% stenosis of bilateral proximal ICAs     7) GERD  On omeprazole 40 mg BID.   Management per PCP. Seen by GI in the past          Scribe Attestation  By signing my name below, I, Enrico Carr   attest that this documentation has been prepared under the direction and in the presence of Juan Ortiz MD.

## 2023-11-21 ENCOUNTER — OFFICE VISIT (OUTPATIENT)
Dept: PRIMARY CARE | Facility: CLINIC | Age: 53
End: 2023-11-21
Payer: COMMERCIAL

## 2023-11-21 ENCOUNTER — HOSPITAL ENCOUNTER (OUTPATIENT)
Dept: RADIOLOGY | Facility: HOSPITAL | Age: 53
Discharge: HOME | End: 2023-11-21
Payer: COMMERCIAL

## 2023-11-21 VITALS
WEIGHT: 267 LBS | HEART RATE: 62 BPM | HEIGHT: 78 IN | DIASTOLIC BLOOD PRESSURE: 75 MMHG | SYSTOLIC BLOOD PRESSURE: 137 MMHG | BODY MASS INDEX: 30.89 KG/M2

## 2023-11-21 DIAGNOSIS — R06.81 APNEA: ICD-10-CM

## 2023-11-21 DIAGNOSIS — D69.6 THROMBOCYTOPENIA (CMS-HCC): ICD-10-CM

## 2023-11-21 DIAGNOSIS — M79.644 PAIN OF RIGHT THUMB: ICD-10-CM

## 2023-11-21 DIAGNOSIS — I25.119 CORONARY ARTERY DISEASE INVOLVING NATIVE CORONARY ARTERY OF NATIVE HEART WITH ANGINA PECTORIS (CMS-HCC): ICD-10-CM

## 2023-11-21 DIAGNOSIS — I10 PRIMARY HYPERTENSION: ICD-10-CM

## 2023-11-21 DIAGNOSIS — M25.561 ACUTE PAIN OF RIGHT KNEE: ICD-10-CM

## 2023-11-21 DIAGNOSIS — M25.561 ACUTE PAIN OF RIGHT KNEE: Primary | ICD-10-CM

## 2023-11-21 DIAGNOSIS — E11.65 TYPE 2 DIABETES MELLITUS WITH HYPERGLYCEMIA, WITHOUT LONG-TERM CURRENT USE OF INSULIN (MULTI): ICD-10-CM

## 2023-11-21 DIAGNOSIS — K21.9 GASTROESOPHAGEAL REFLUX DISEASE WITHOUT ESOPHAGITIS: ICD-10-CM

## 2023-11-21 PROCEDURE — 73562 X-RAY EXAM OF KNEE 3: CPT | Mod: RIGHT SIDE | Performed by: RADIOLOGY

## 2023-11-21 PROCEDURE — 73120 X-RAY EXAM OF HAND: CPT | Mod: RT

## 2023-11-21 PROCEDURE — 1036F TOBACCO NON-USER: CPT | Performed by: INTERNAL MEDICINE

## 2023-11-21 PROCEDURE — 73562 X-RAY EXAM OF KNEE 3: CPT | Mod: RT

## 2023-11-21 PROCEDURE — 99214 OFFICE O/P EST MOD 30 MIN: CPT | Performed by: INTERNAL MEDICINE

## 2023-11-21 PROCEDURE — 3078F DIAST BP <80 MM HG: CPT | Performed by: INTERNAL MEDICINE

## 2023-11-21 PROCEDURE — 73502 X-RAY EXAM HIP UNI 2-3 VIEWS: CPT | Mod: RT

## 2023-11-21 PROCEDURE — 4010F ACE/ARB THERAPY RXD/TAKEN: CPT | Performed by: INTERNAL MEDICINE

## 2023-11-21 PROCEDURE — 3044F HG A1C LEVEL LT 7.0%: CPT | Performed by: INTERNAL MEDICINE

## 2023-11-21 PROCEDURE — 73502 X-RAY EXAM HIP UNI 2-3 VIEWS: CPT | Mod: RIGHT SIDE | Performed by: RADIOLOGY

## 2023-11-21 PROCEDURE — 73120 X-RAY EXAM OF HAND: CPT | Mod: RIGHT SIDE | Performed by: RADIOLOGY

## 2023-11-21 PROCEDURE — 3075F SYST BP GE 130 - 139MM HG: CPT | Performed by: INTERNAL MEDICINE

## 2023-11-21 ASSESSMENT — ENCOUNTER SYMPTOMS
APPETITE CHANGE: 0
CHILLS: 0
SINUS PAIN: 0
NAUSEA: 0
ABDOMINAL DISTENTION: 0
FATIGUE: 0
DIARRHEA: 0
SINUS PRESSURE: 0
NUMBNESS: 0
SHORTNESS OF BREATH: 0
LEG PAIN: 1
BACK PAIN: 0
ARTHRALGIAS: 1
VOMITING: 0
ACTIVITY CHANGE: 0
WEAKNESS: 0
SORE THROAT: 0
COUGH: 0
WHEEZING: 0

## 2023-11-21 NOTE — PROGRESS NOTES
"Subjective   Patient ID: Jhony Myles \"Chester\" is a 53 y.o. male who presents for Follow-up (2 month), Leg Pain (RT leg pain x2.5 weeks/Denies injury ), and Hand Pain (RT thumb pain x2.5 weeks).  Leg Pain   Pertinent negatives include no numbness.   Hand Pain   Pertinent negatives include no chest pain or numbness.     Patient is here today for 2 mo follow up .  Pt states that his right leg and thumb have been bothering him for the last several weeks, Denies any specific injury.   Patient had torn ligaments in knee many years ago.   Thumb joint is real stiff and feels sharp stabbing pain.   Patient denies numbness or tingling.  Had RA and esr, crp, chek in 7/23 that was negative.     Review of Systems   Constitutional:  Negative for activity change, appetite change, chills and fatigue.   HENT:  Negative for congestion, postnasal drip, sinus pressure, sinus pain and sore throat.    Respiratory:  Negative for cough, shortness of breath and wheezing.    Cardiovascular:  Negative for chest pain and leg swelling.   Gastrointestinal:  Negative for abdominal distention, diarrhea, nausea and vomiting.   Musculoskeletal:  Positive for arthralgias. Negative for back pain.   Neurological:  Negative for weakness and numbness.       Objective   /75   Pulse 62   Ht 1.981 m (6' 6\")   Wt 121 kg (267 lb)   BMI 30.85 kg/m²     Physical Exam  Constitutional:       General: He is not in acute distress.     Appearance: Normal appearance.   HENT:      Head: Normocephalic.      Nose: Nose normal.      Mouth/Throat:      Mouth: Mucous membranes are dry.      Pharynx: No oropharyngeal exudate.   Eyes:      General:         Right eye: No discharge.         Left eye: No discharge.      Extraocular Movements: Extraocular movements intact.      Pupils: Pupils are equal, round, and reactive to light.   Cardiovascular:      Rate and Rhythm: Normal rate and regular rhythm.      Heart sounds: No murmur heard.     No gallop.   Pulmonary: "      Effort: Pulmonary effort is normal. No respiratory distress.      Breath sounds: Normal breath sounds. No wheezing.   Musculoskeletal:         General: Tenderness present. No swelling. Normal range of motion.      Comments: Tenderness at base of the thumb, pain with extension and flexion of right knee    Skin:     General: Skin is warm and dry.      Coloration: Skin is not jaundiced.   Neurological:      General: No focal deficit present.      Mental Status: He is alert and oriented to person, place, and time.      Cranial Nerves: No cranial nerve deficit.   Psychiatric:         Mood and Affect: Mood normal.         Behavior: Behavior normal.           Assessment/Plan   Problem List Items Addressed This Visit       Type 2 diabetes mellitus with hyperglycemia, without long-term current use of insulin (CMS/[facility])    Primary hypertension    Coronary artery disease involving native coronary artery of native heart with angina pectoris (CMS/[facility])    Thrombocytopenia (CMS/[facility])    Gastroesophageal reflux disease without esophagitis     Other Visit Diagnoses       Acute pain of right knee    -  Primary    Relevant Orders    XR hip right 2 or 3 views    XR knee right 3 views    Pain of right thumb        Relevant Orders    XR hand right 1-2 views    Apnea        Relevant Orders    In-Center Sleep Study (Non-Sleep Provider Only)          LUQ abd pain in the setting of thrombocytopenia, splenomegally,  previous evals with EGD and colonoscopy ok, normal bloodwork other than low platelets   - cT SCAN WAS finally approved, only showed nonobstructing left upper pole calculus, and known hydrocele, since CT ok Dr Dow wanted to pursue colonoscopy     2. Thrombocytopenia   - following with hematology     3. GERD   - continue prilosec      4. CAD with  RCA s/p multiple PCI with most recent being balloon angioplasty of several ostial stenosis in Dg1 07/2022  - following with Cardiology   - on crestor 5mg po daily   - continue  losartan 50mg po daily   - continue imdur 30mg po daily   - continue hctzs 12.5mg 2 tabs daily   - on brilinta    - ordered holter monitor x 7 days      5. DMII, controlled   -A1c 5.2% in 4.23,will order updated A1c prior to next appt       6. Right knee hip and thumb pain  - ordered xrays   - recommend topical volaren   - call ortho to schedule     7. Hx of SHRAVAN, no machine currently and has been years, has apnea and snoring, fatigue   - will repeat in lab sleep study with titration   Final diagnoses:   [M25.561] Acute pain of right knee   [M79.644] Pain of right thumb   [R06.81] Apnea   [I10] Primary hypertension   [I25.119] Coronary artery disease involving native coronary artery of native heart with angina pectoris (CMS/[facility])   [D69.6] Thrombocytopenia (CMS/[facility])   [E11.65] Type 2 diabetes mellitus with hyperglycemia, without long-term current use of insulin (CMS/[facility])   [K21.9] Gastroesophageal reflux disease without esophagitis

## 2023-11-27 ENCOUNTER — TELEPHONE (OUTPATIENT)
Dept: ORTHOPEDIC SURGERY | Facility: CLINIC | Age: 53
End: 2023-11-27
Payer: COMMERCIAL

## 2023-11-27 NOTE — TELEPHONE ENCOUNTER
CALLED PATIENT TO SCHEDULE APPOINTMENT. LM AT 1:50PM ON 11/27/23 FOR PATIENT TO CALL BACK TO GET THIS SCHEDULED.

## 2023-11-29 ENCOUNTER — OFFICE VISIT (OUTPATIENT)
Dept: ORTHOPEDIC SURGERY | Facility: CLINIC | Age: 53
End: 2023-11-29
Payer: COMMERCIAL

## 2023-11-29 DIAGNOSIS — M70.61 GREATER TROCHANTERIC BURSITIS OF RIGHT HIP: ICD-10-CM

## 2023-11-29 DIAGNOSIS — M17.11 PRIMARY OSTEOARTHRITIS OF RIGHT KNEE: ICD-10-CM

## 2023-11-29 DIAGNOSIS — M76.31 ILIOTIBIAL BAND SYNDROME OF RIGHT SIDE: Primary | ICD-10-CM

## 2023-11-29 PROCEDURE — 4010F ACE/ARB THERAPY RXD/TAKEN: CPT | Performed by: NURSE PRACTITIONER

## 2023-11-29 PROCEDURE — 99214 OFFICE O/P EST MOD 30 MIN: CPT | Performed by: NURSE PRACTITIONER

## 2023-11-29 PROCEDURE — 3044F HG A1C LEVEL LT 7.0%: CPT | Performed by: NURSE PRACTITIONER

## 2023-11-29 PROCEDURE — 1036F TOBACCO NON-USER: CPT | Performed by: NURSE PRACTITIONER

## 2023-11-29 ASSESSMENT — PAIN - FUNCTIONAL ASSESSMENT: PAIN_FUNCTIONAL_ASSESSMENT: 0-10

## 2023-11-29 ASSESSMENT — PAIN DESCRIPTION - DESCRIPTORS: DESCRIPTORS: BURNING

## 2023-11-29 ASSESSMENT — PAIN SCALES - GENERAL: PAINLEVEL_OUTOF10: 6

## 2023-11-29 ASSESSMENT — ENCOUNTER SYMPTOMS
BACK PAIN: 1
ARTHRALGIAS: 1

## 2023-11-29 NOTE — PROGRESS NOTES
"Subjective    Patient ID: Chester Myles is a 53 y.o. male.    Chief Complaint: Pain of the Right Hip (Patient states he has had pain in his hip for about 3 weeks.  ) and Pain of the Right Knee (Patient states he has had pain in his knee for about 3 weeks. Pt. States he felt a \"pop\" in his knee on 11/21/223 and ever since then the knee has been less painful.)     Had R lateral meniscus injury 30 years ago. No surgery.     HPI:   Chester Myles is a 53 year old male who presents for evaluation of R knee and hip pain. He has a past medical history of neuropathy from diabetes mellitus type 2 and chronic back pain from osteoarthritis. He says that he has been dealing with this knee and hip pain for the last three weeks. Says that this pain is on the lateral aspect of his R leg from his hip to knee and on the anterior aspect of his hip. He denies any injury or trauma to the area. Felt like when the pain initially started he felt that there was a fluid filled sac in his hip that burst. In this time he has tried topical voltaren, Biofreeze, and other joint pain relief. He has not tried anything orally as he has a history of GI issues and cannot tolerate NSAIDs or acetaminophen. He has tried a massage gun on the right lateral leg with some relief.     Review of Systems   Musculoskeletal:  Positive for arthralgias, back pain and gait problem.   All other systems reviewed and are negative.       Objective   Right Hip Exam     Tests   RUBIA: negative        Hip Musculoskeletal Exam  Gait    Antalgic: right    Limp: right    Assistive device: cane (used for years because of neuopathy in the lower extremities)    Inspection    Right      Erythema: none        Ecchymosis: none        Edema: mild        Deformity: none      Palpation    Right      Tenderness: none (no tenderness to palpation)    Range of Motion    Right      Active ROM: pain.      Range of motion additional comments: Pain with hip abduction, external rotation, and " flexion.    Neurovascular    Right        Right hip neurovascular exam is normal.    Neurovascular additional comments: No color or temperature changes. Denies numbness or tingling.     Special Tests    Right      RUBIA test (right): negative     Knee Musculoskeletal Exam  Gait    Antalgic: right    Limp: right    Assistive device: cane (used for years because of neuopathy in the lower extremities)    Inspection    Right      Erythema: none        Effusion: none        Edema: mild        Ecchymosis: none        Alignment: normal        Previous incision: no previous incision    Palpation    Right      Tenderness: none      Palpation additional comments: Patients IT band appears to be mildly inflamed on the right side as compared to the left.     Range of Motion    Right      Active extension: 0      Active flexion: 80     Instability    Right      Instability signs: none - stable    Instability additional comments: Valgus: negative, Varus: negative     Neurovascular    Right      Right knee neurovascular exam is normal.      Neurovascular additional comments: No color or temperature changes. Denies numbness or tingling. Patient with chronic numbness and tingling through bilateral lower extremities for several years from diabetic neuropathy. Denies any new or changing issues.       Image Results:  XR knee right 3 views  Narrative: Interpreted By:  Alejandro Phan,   STUDY:  XR KNEE RIGHT 3 VIEWS; ;  11/21/2023 11:31 am      INDICATION:  Signs/Symptoms:knee pain.      COMPARISON:  None.      ACCESSION NUMBER(S):  [id number]      ORDERING CLINICIAN:  DIANNA PLUMMER      FINDINGS:  Right knee, three views      There is a small quadriceps enthesophyte. There is mild osteophytosis  and sclerosis in all 3 compartments. There is no joint space  narrowing. There is moderate chondrocalcinosis. A small effusions  present.      Impression: Mild degenerative changes.  Moderate chondrocalcinosis which can be seen with CPPD  arthropathy.          MACRO:  None      Signed by: Alejandro Phan 11/22/2023 6:15 PM  Dictation workstation:   [id number]  XR hip right 2 or 3 views  Narrative: Interpreted By:  Alejandro Phan,   STUDY:  XR HIP RIGHT 2 OR 3 VIEWS; ;  11/21/2023 11:32 am      INDICATION:  Signs/Symptoms:pain.      COMPARISON:  None.      ACCESSION NUMBER(S):  [id number]      ORDERING CLINICIAN:  DIANNA PLUMMER      FINDINGS:  Right hip, four views      There is osteophytosis and sclerosis with joint space narrowing in  the right hip. Similar findings in the left hip. There is no fracture  pr there is no dislocation.      Impression: Mild-moderate right hip osteoarthritis          MACRO:  None      Signed by: Alejandro Phan 11/22/2023 6:15 PM  Dictation workstation:   [id number]  XR hand right 1-2 views  Narrative: Interpreted By:  Alejandro Phan,   STUDY:  XR HAND RIGHT 1-2 VIEWS; ;  11/21/2023 11:32 am      INDICATION:  Signs/Symptoms:thumb pain.      COMPARISON:  None.      ACCESSION NUMBER(S):  [id number]      ORDERING CLINICIAN:  DIANNA PLUMMER      FINDINGS:  Right hand, two views      There is no acute fracture. Tiny ossific fragment at the dorsal  aspect of the 2nd middle phalangeal base suggestive of a small  avulsion injury. There is no dislocation. There is no erosions or  chondrocalcinosis. There is mild osteophytosis in the 1st MCP and 1st  IP joint      Impression: Mild degenerative change the 1st ray. No acute abnormality seen.  Remote appearing avulsion injury at the dorsal aspect of the 2nd  middle phalangeal base.      MACRO:  None      Signed by: Alejandro Phan 11/22/2023 6:10 PM  Dictation workstation:   [id number]    Agree with interpretations of R knee and R hip findings, consistent with exam.   R hand not assessed at today's visit.     Assessment/Plan     Iliotibial band syndrome   -Continue to use a massage gun to help decrease tightness.   -Referral to physical therapy to improve ROM and  strength.   -BioMed topical is indicated in this patient d/t his failed attempt at Voltaren gel and his inability to take oral NSAIDs or acetaminophen which causes GI upset. He is not the ideal candidate for corticosteroid injections as he has diabetes mellitis with his sugars spiking after past corticosteroid injections and he has tried corticosteroid injections in the past in his back from arthritis with no benefit. Apply this topical agent over the affected areas (knee and hip). This is a topical medication meaning that very little if any should be absorbed systemically.     2. Greater trochanteric bursitis of right hip   -Recommendations same as above.     3. Osteoarthritis of right knee.   -Recommendations same as above.     Follow up in 4-6 weeks.     Encounter Diagnoses:  Iliotibial band syndrome of right side    Greater trochanteric bursitis of right hip    Primary osteoarthritis of right knee    Orders Placed This Encounter    Referral to Physical Therapy    Follow Up In Orthopaedic Surgery     Problem List Items Addressed This Visit             ICD-10-CM       Musculoskeletal and Injuries    Iliotibial band syndrome of right side - Primary M76.31    Relevant Orders    Follow Up In Orthopaedic Surgery    Referral to Physical Therapy    Greater trochanteric bursitis of right hip M70.61    Relevant Orders    Follow Up In Orthopaedic Surgery    Referral to Physical Therapy    Primary osteoarthritis of right knee M17.11    Relevant Orders    Follow Up In Orthopaedic Surgery    Referral to Physical Therapy      Yin Santos   PA-S2 [facility]     11/30/2023 F/U: [facility] Rx accidentally sent with pt yesterday at check out. Second rx completed and to be faxed per staff and pt aware. Formula #3D ordered, personally reviewed every medication in formula, pt has taken all orally with no adverse effects with exception of Lidocaine/Prilocaine. No adverse reactions to Novocaine or cortisone inj in past.

## 2023-11-29 NOTE — PROGRESS NOTES
"Subjective    Patient ID: Chester Myles is a 53 y.o. male.    Chief Complaint: Pain of the Right Hip (Patient states he has had pain in his hip for about 3 weeks.  ) and Pain of the Right Knee (Patient states he has had pain in his knee for about 3 weeks. Pt. States he felt a \"pop\" in his knee on 11/21/223 and ever since then the knee has been less painful.)       Hx lat meniscus tear 30 yrs     HPI  Lateral knee pop, pain   OTC topicasl - several attempted without relief  No OTC meds due to GI discomfort with use.   Objective   Ortho Exam    Image Results:  XR knee right 3 views  Narrative: Interpreted By:  Alejandro Phan,   STUDY:  XR KNEE RIGHT 3 VIEWS; ;  11/21/2023 11:31 am      INDICATION:  Signs/Symptoms:knee pain.      COMPARISON:  None.      ACCESSION NUMBER(S):  [id number]      ORDERING CLINICIAN:  DIANNA PLUMMER      FINDINGS:  Right knee, three views      There is a small quadriceps enthesophyte. There is mild osteophytosis  and sclerosis in all 3 compartments. There is no joint space  narrowing. There is moderate chondrocalcinosis. A small effusions  present.      Impression: Mild degenerative changes.  Moderate chondrocalcinosis which can be seen with CPPD arthropathy.          MACRO:  None      Signed by: Alejandro Phan 11/22/2023 6:15 PM  Dictation workstation:   [id number]  XR hip right 2 or 3 views  Narrative: Interpreted By:  Alejandro Phan,   STUDY:  XR HIP RIGHT 2 OR 3 VIEWS; ;  11/21/2023 11:32 am      INDICATION:  Signs/Symptoms:pain.      COMPARISON:  None.      ACCESSION NUMBER(S):  [id number]      ORDERING CLINICIAN:  DIANNA PLUMMER      FINDINGS:  Right hip, four views      There is osteophytosis and sclerosis with joint space narrowing in  the right hip. Similar findings in the left hip. There is no fracture  pr there is no dislocation.      Impression: Mild-moderate right hip osteoarthritis          MACRO:  None      Signed by: Alejandro Phan 11/22/2023 6:15 " PM  Dictation workstation:   [id number]  XR hand right 1-2 views  Narrative: Interpreted By:  Alejandro Phan,   STUDY:  XR HAND RIGHT 1-2 VIEWS; ;  11/21/2023 11:32 am      INDICATION:  Signs/Symptoms:thumb pain.      COMPARISON:  None.      ACCESSION NUMBER(S):  [id number]      ORDERING CLINICIAN:  DIANNA PLUMMER      FINDINGS:  Right hand, two views      There is no acute fracture. Tiny ossific fragment at the dorsal  aspect of the 2nd middle phalangeal base suggestive of a small  avulsion injury. There is no dislocation. There is no erosions or  chondrocalcinosis. There is mild osteophytosis in the 1st MCP and 1st  IP joint      Impression: Mild degenerative change the 1st ray. No acute abnormality seen.  Remote appearing avulsion injury at the dorsal aspect of the 2nd  middle phalangeal base.      MACRO:  None      Signed by: Alejandro Phan 11/22/2023 6:10 PM  Dictation workstation:   [id number]      Assessment/Plan   Encounter Diagnoses:  No diagnosis found.    No orders of the defined types were placed in this encounter.    No follow-ups on file.

## 2023-12-08 ENCOUNTER — CLINICAL SUPPORT (OUTPATIENT)
Dept: SLEEP MEDICINE | Facility: CLINIC | Age: 53
End: 2023-12-08
Payer: COMMERCIAL

## 2023-12-08 DIAGNOSIS — G47.9 SLEEP DISORDER, UNSPECIFIED: ICD-10-CM

## 2023-12-08 DIAGNOSIS — G47.61 PERIODIC LIMB MOVEMENT DISORDER: ICD-10-CM

## 2023-12-08 DIAGNOSIS — F51.9 SLEEP DISORDER NOT DUE TO A SUBSTANCE OR KNOWN PHYSIOLOGICAL CONDITION, UNSPECIFIED: ICD-10-CM

## 2023-12-08 DIAGNOSIS — R06.81 APNEA: ICD-10-CM

## 2023-12-08 PROCEDURE — 95810 POLYSOM 6/> YRS 4/> PARAM: CPT | Performed by: INTERNAL MEDICINE

## 2023-12-08 ASSESSMENT — SLEEP AND FATIGUE QUESTIONNAIRES
HOW LIKELY ARE YOU TO NOD OFF OR FALL ASLEEP WHILE SITTING AND TALKING TO SOMEONE: WOULD NEVER DOZE
HOW LIKELY ARE YOU TO NOD OFF OR FALL ASLEEP WHILE WATCHING TV: SLIGHT CHANCE OF DOZING
HOW LIKELY ARE YOU TO NOD OFF OR FALL ASLEEP WHEN YOU ARE A PASSENGER IN A CAR FOR AN HOUR WITHOUT A BREAK: WOULD NEVER DOZE
HOW LIKELY ARE YOU TO NOD OFF OR FALL ASLEEP WHILE SITTING QUIETLY AFTER LUNCH WITHOUT ALCOHOL: WOULD NEVER DOZE
HOW LIKELY ARE YOU TO NOD OFF OR FALL ASLEEP IN A CAR, WHILE STOPPED FOR A FEW MINUTES IN TRAFFIC: WOULD NEVER DOZE
HOW LIKELY ARE YOU TO NOD OFF OR FALL ASLEEP WHILE SITTING AND READING: SLIGHT CHANCE OF DOZING
HOW LIKELY ARE YOU TO NOD OFF OR FALL ASLEEP WHILE LYING DOWN TO REST IN THE AFTERNOON WHEN CIRCUMSTANCES PERMIT: WOULD NEVER DOZE
ESS-CHAD TOTAL SCORE: 2
SITING INACTIVE IN A PUBLIC PLACE LIKE A CLASS ROOM OR A MOVIE THEATER: WOULD NEVER DOZE

## 2023-12-09 VITALS
DIASTOLIC BLOOD PRESSURE: 98 MMHG | TEMPERATURE: 98.2 F | HEIGHT: 78 IN | SYSTOLIC BLOOD PRESSURE: 178 MMHG | WEIGHT: 266.76 LBS | BODY MASS INDEX: 30.86 KG/M2

## 2023-12-09 ASSESSMENT — SLEEP AND FATIGUE QUESTIONNAIRES: ESS TOTAL SCORE: 3

## 2023-12-09 NOTE — PROGRESS NOTES
[facility] TECH NOTE:     Patient: Jhony Myles   MRN//AGE: [id number]  1970  53 y.o.   Technologist: SAUD Ferris   Room: 4   Service Date: 2023        Sleep Testing Location: [place] [address]     [place]: 3    TECHNOLOGIST SLEEP STUDY PROCEDURE NOTE:   This sleep study is being conducted according to the policies and procedures outlined by the AAS accreditation standards.  The sleep study procedure and processes involved during this appointment was explained to the patient and all questions were answered. The patient  verbalized understanding.      The patient is a 53 y.o. year old male scheduled for a Diagnostic PSG Split night with montage of:  PSG MASTER/ PAP MASTER.    The study that was ultimately completed was a Diagnostic PSG with montage of:  PSG MASTER.    The full study Was completed.  Patient questionnaires completed?: yes     Consents signed? yes    Initial Fall Risk Screening:     Jhony has fallen in the last 6 months. his did result in injury. Jhony does have a fear of falling. He does not need assistance with sitting, standing, or walking. he does not need assistance walking in his home. he does not need assistance in an unfamiliar setting. The patient is notusing an assistive device.     Brief Study observations: Mr. Myles arrived as scheduled for his SPLIT study.  He was escorted to room 4. All questions were answered and consent was signed. Mr. Myles experienced a very long sleep onset and very frequent, prologned awakenings. The SPLIT study criteria was not met and this study was completed as a diagnostic PSG.  Very little sleep was documented. REM sleep was not observed. Respiratory events were not observed. Leg movements were noted but many were less than .5 second in length.     Deviation to order/protocol and reason: Split study was not completed. Criteria was not met.        After the procedure, the patient was informed to ensure followup with ordering clinician for  testing results and to contact his primary care provider to discuss his elevated blood pressure readings.      Technologist: Mary Lai, LVGT

## 2023-12-12 ENCOUNTER — APPOINTMENT (OUTPATIENT)
Dept: RADIOLOGY | Facility: HOSPITAL | Age: 53
End: 2023-12-12
Payer: COMMERCIAL

## 2023-12-12 ENCOUNTER — HOSPITAL ENCOUNTER (EMERGENCY)
Facility: HOSPITAL | Age: 53
Discharge: HOME | End: 2023-12-12
Attending: EMERGENCY MEDICINE
Payer: COMMERCIAL

## 2023-12-12 VITALS
BODY MASS INDEX: 31.82 KG/M2 | SYSTOLIC BLOOD PRESSURE: 146 MMHG | HEIGHT: 78 IN | HEART RATE: 63 BPM | DIASTOLIC BLOOD PRESSURE: 70 MMHG | OXYGEN SATURATION: 96 % | WEIGHT: 275 LBS | TEMPERATURE: 98.4 F | RESPIRATION RATE: 18 BRPM

## 2023-12-12 DIAGNOSIS — R07.89 ATYPICAL CHEST PAIN: Primary | ICD-10-CM

## 2023-12-12 LAB
ALBUMIN SERPL BCP-MCNC: 4.9 G/DL (ref 3.4–5)
ALP SERPL-CCNC: 40 U/L (ref 33–120)
ALT SERPL W P-5'-P-CCNC: 19 U/L (ref 10–52)
ANION GAP SERPL CALC-SCNC: 11 MMOL/L (ref 10–20)
APTT PPP: 32 SECONDS (ref 27–38)
AST SERPL W P-5'-P-CCNC: 18 U/L (ref 9–39)
BASOPHILS # BLD AUTO: 0.03 X10*3/UL (ref 0–0.1)
BASOPHILS NFR BLD AUTO: 0.6 %
BILIRUB SERPL-MCNC: 1 MG/DL (ref 0–1.2)
BUN SERPL-MCNC: 26 MG/DL (ref 6–23)
CALCIUM SERPL-MCNC: 10.1 MG/DL (ref 8.6–10.3)
CARDIAC TROPONIN I PNL SERPL HS: 8 NG/L (ref 0–20)
CARDIAC TROPONIN I PNL SERPL HS: 8 NG/L (ref 0–20)
CHLORIDE SERPL-SCNC: 103 MMOL/L (ref 98–107)
CO2 SERPL-SCNC: 30 MMOL/L (ref 21–32)
CREAT SERPL-MCNC: 1.12 MG/DL (ref 0.5–1.3)
D DIMER PPP FEU-MCNC: <215 NG/ML FEU
EOSINOPHIL # BLD AUTO: 0.16 X10*3/UL (ref 0–0.7)
EOSINOPHIL NFR BLD AUTO: 3.3 %
ERYTHROCYTE [DISTWIDTH] IN BLOOD BY AUTOMATED COUNT: 12.4 % (ref 11.5–14.5)
FLUAV RNA RESP QL NAA+PROBE: NOT DETECTED
FLUBV RNA RESP QL NAA+PROBE: NOT DETECTED
GFR SERPL CREATININE-BSD FRML MDRD: 78 ML/MIN/1.73M*2
GLUCOSE SERPL-MCNC: 139 MG/DL (ref 74–99)
HCT VFR BLD AUTO: 41.1 % (ref 41–52)
HGB BLD-MCNC: 15 G/DL (ref 13.5–17.5)
IMM GRANULOCYTES # BLD AUTO: 0.01 X10*3/UL (ref 0–0.7)
IMM GRANULOCYTES NFR BLD AUTO: 0.2 % (ref 0–0.9)
INR PPP: 1.2 (ref 0.9–1.1)
LYMPHOCYTES # BLD AUTO: 1.02 X10*3/UL (ref 1.2–4.8)
LYMPHOCYTES NFR BLD AUTO: 21.3 %
MAGNESIUM SERPL-MCNC: 1.89 MG/DL (ref 1.6–2.4)
MCH RBC QN AUTO: 31.8 PG (ref 26–34)
MCHC RBC AUTO-ENTMCNC: 36.5 G/DL (ref 32–36)
MCV RBC AUTO: 87 FL (ref 80–100)
MONOCYTES # BLD AUTO: 0.48 X10*3/UL (ref 0.1–1)
MONOCYTES NFR BLD AUTO: 10 %
NEUTROPHILS # BLD AUTO: 3.09 X10*3/UL (ref 1.2–7.7)
NEUTROPHILS NFR BLD AUTO: 64.6 %
NRBC BLD-RTO: 0 /100 WBCS (ref 0–0)
PLATELET # BLD AUTO: 153 X10*3/UL (ref 150–450)
POTASSIUM SERPL-SCNC: 3.8 MMOL/L (ref 3.5–5.3)
PROT SERPL-MCNC: 7.2 G/DL (ref 6.4–8.2)
PROTHROMBIN TIME: 13.4 SECONDS (ref 9.8–12.8)
RBC # BLD AUTO: 4.71 X10*6/UL (ref 4.5–5.9)
SARS-COV-2 RNA RESP QL NAA+PROBE: NOT DETECTED
SODIUM SERPL-SCNC: 140 MMOL/L (ref 136–145)
WBC # BLD AUTO: 4.8 X10*3/UL (ref 4.4–11.3)

## 2023-12-12 PROCEDURE — 36415 COLL VENOUS BLD VENIPUNCTURE: CPT | Performed by: PHYSICIAN ASSISTANT

## 2023-12-12 PROCEDURE — 2500000001 HC RX 250 WO HCPCS SELF ADMINISTERED DRUGS (ALT 637 FOR MEDICARE OP): Performed by: PHYSICIAN ASSISTANT

## 2023-12-12 PROCEDURE — 84484 ASSAY OF TROPONIN QUANT: CPT | Performed by: PHYSICIAN ASSISTANT

## 2023-12-12 PROCEDURE — 85610 PROTHROMBIN TIME: CPT | Performed by: PHYSICIAN ASSISTANT

## 2023-12-12 PROCEDURE — 71045 X-RAY EXAM CHEST 1 VIEW: CPT

## 2023-12-12 PROCEDURE — 99283 EMERGENCY DEPT VISIT LOW MDM: CPT | Mod: 25

## 2023-12-12 PROCEDURE — 83735 ASSAY OF MAGNESIUM: CPT | Performed by: PHYSICIAN ASSISTANT

## 2023-12-12 PROCEDURE — 93005 ELECTROCARDIOGRAM TRACING: CPT

## 2023-12-12 PROCEDURE — 85730 THROMBOPLASTIN TIME PARTIAL: CPT | Performed by: PHYSICIAN ASSISTANT

## 2023-12-12 PROCEDURE — 85025 COMPLETE CBC W/AUTO DIFF WBC: CPT | Performed by: PHYSICIAN ASSISTANT

## 2023-12-12 PROCEDURE — 85379 FIBRIN DEGRADATION QUANT: CPT | Performed by: PHYSICIAN ASSISTANT

## 2023-12-12 PROCEDURE — 80053 COMPREHEN METABOLIC PANEL: CPT | Performed by: PHYSICIAN ASSISTANT

## 2023-12-12 PROCEDURE — 99284 EMERGENCY DEPT VISIT MOD MDM: CPT | Performed by: EMERGENCY MEDICINE

## 2023-12-12 PROCEDURE — 87636 SARSCOV2 & INF A&B AMP PRB: CPT | Performed by: PHYSICIAN ASSISTANT

## 2023-12-12 PROCEDURE — 71045 X-RAY EXAM CHEST 1 VIEW: CPT | Mod: FOREIGN READ | Performed by: RADIOLOGY

## 2023-12-12 RX ORDER — ASPIRIN 325 MG
325 TABLET ORAL ONCE
Status: COMPLETED | OUTPATIENT
Start: 2023-12-12 | End: 2023-12-12

## 2023-12-12 RX ADMIN — ASPIRIN 325 MG: 325 TABLET ORAL at 19:07

## 2023-12-12 ASSESSMENT — ENCOUNTER SYMPTOMS
PALPITATIONS: 0
SEIZURES: 0
BACK PAIN: 0
COLOR CHANGE: 0
FEVER: 0
ABDOMINAL PAIN: 0
DYSURIA: 0
HEMATURIA: 0
ARTHRALGIAS: 0
EYE PAIN: 0
COUGH: 0
CHILLS: 0
SORE THROAT: 0
VOMITING: 0
SHORTNESS OF BREATH: 0

## 2023-12-12 ASSESSMENT — PAIN - FUNCTIONAL ASSESSMENT: PAIN_FUNCTIONAL_ASSESSMENT: 0-10

## 2023-12-12 ASSESSMENT — PAIN DESCRIPTION - LOCATION: LOCATION: CHEST

## 2023-12-12 ASSESSMENT — COLUMBIA-SUICIDE SEVERITY RATING SCALE - C-SSRS
1. IN THE PAST MONTH, HAVE YOU WISHED YOU WERE DEAD OR WISHED YOU COULD GO TO SLEEP AND NOT WAKE UP?: NO
2. HAVE YOU ACTUALLY HAD ANY THOUGHTS OF KILLING YOURSELF?: NO
6. HAVE YOU EVER DONE ANYTHING, STARTED TO DO ANYTHING, OR PREPARED TO DO ANYTHING TO END YOUR LIFE?: NO

## 2023-12-12 ASSESSMENT — PAIN SCALES - GENERAL
PAINLEVEL_OUTOF10: 3
PAINLEVEL_OUTOF10: 3

## 2023-12-12 NOTE — ED PROVIDER NOTES
Patient is a 53-year-old male who presents to the emergency department with a chief complaint of midsternal chest pain with onset 3 days ago.  He states that the pain is intermittent.  No alleviating or exacerbating factors.  He states that the pain at times does radiate to his back.  He states that he has had similar symptoms with acid reflux in the past but also has a significant cardiac history in which she has had 6 cardiac stents.  He recently had a heart cath in May in which there was no blockages identified.  He states that he has associated mild shortness of breath, nausea, runny nose and also productive cough.  Denies any fever or chills.  He has a history of hypertension and diabetes.           Review of Systems   Constitutional:  Negative for chills and fever.   HENT:  Negative for ear pain and sore throat.    Eyes:  Negative for pain and visual disturbance.   Respiratory:  Negative for cough and shortness of breath.    Cardiovascular:  Positive for chest pain. Negative for palpitations.   Gastrointestinal:  Negative for abdominal pain and vomiting.   Genitourinary:  Negative for dysuria and hematuria.   Musculoskeletal:  Negative for arthralgias and back pain.   Skin:  Negative for color change and rash.   Neurological:  Negative for seizures and syncope.   All other systems reviewed and are negative.       Physical Exam  Vitals and nursing note reviewed.   Constitutional:       General: He is not in acute distress.     Appearance: He is well-developed.   HENT:      Head: Normocephalic and atraumatic.   Eyes:      Extraocular Movements: Extraocular movements intact.      Conjunctiva/sclera: Conjunctivae normal.   Cardiovascular:      Rate and Rhythm: Normal rate and regular rhythm.      Heart sounds: No murmur heard.  Pulmonary:      Effort: Pulmonary effort is normal. No respiratory distress.      Breath sounds: Normal breath sounds.   Abdominal:      Palpations: Abdomen is soft.      Tenderness: There  is no abdominal tenderness.   Musculoskeletal:         General: No swelling. Normal range of motion.      Cervical back: Normal range of motion and neck supple.   Skin:     General: Skin is warm and dry.      Capillary Refill: Capillary refill takes less than 2 seconds.   Neurological:      General: No focal deficit present.      Mental Status: He is alert and oriented to person, place, and time.   Psychiatric:         Mood and Affect: Mood normal.          Labs Reviewed   CBC WITH AUTO DIFFERENTIAL - Abnormal       Result Value    WBC 4.8      nRBC 0.0      RBC 4.71      Hemoglobin 15.0      Hematocrit 41.1      MCV 87      MCH 31.8      MCHC 36.5 (*)     RDW 12.4      Platelets 153      Neutrophils % 64.6      Immature Granulocytes %, Automated 0.2      Lymphocytes % 21.3      Monocytes % 10.0      Eosinophils % 3.3      Basophils % 0.6      Neutrophils Absolute 3.09      Immature Granulocytes Absolute, Automated 0.01      Lymphocytes Absolute 1.02 (*)     Monocytes Absolute 0.48      Eosinophils Absolute 0.16      Basophils Absolute 0.03     COMPREHENSIVE METABOLIC PANEL - Abnormal    Glucose 139 (*)     Sodium 140      Potassium 3.8      Chloride 103      Bicarbonate 30      Anion Gap 11      Urea Nitrogen 26 (*)     Creatinine 1.12      eGFR 78      Calcium 10.1      Albumin 4.9      Alkaline Phosphatase 40      Total Protein 7.2      AST 18      Bilirubin, Total 1.0      ALT 19     PROTIME-INR - Abnormal    Protime 13.4 (*)     INR 1.2 (*)    MAGNESIUM - Normal    Magnesium 1.89     APTT - Normal    aPTT 32      Narrative:     The APTT is no longer used for monitoring Unfractionated Heparin Therapy. For monitoring Heparin Therapy, use the Heparin Assay.   SARS-COV-2 AND INFLUENZA A/B PCR - Normal    Flu A Result Not Detected      Flu B Result Not Detected      Coronavirus 2019, PCR Not Detected      Narrative:     This assay has received FDA Emergency Use Authorization (EUA) and  is only authorized for the  duration of time that circumstances exist to justify the authorization of the emergency use of in vitro diagnostic tests for the detection of SARS-CoV-2 virus and/or diagnosis of COVID-19 infection under section 564(b)(1) of the Act, 21 U.S.C. 360bbb-3(b)(1). Testing for SARS-CoV-2 is only recommended for patients who meet current clinical and/or epidemiological criteria as defined by federal, state, or local public health directives. This assay is an in vitro diagnostic nucleic acid amplification test for the qualitative detection of SARS-CoV-2, Influenza A, and Influenza B from nasopharyngeal specimens and has been validated for use at [facility]. Negative results do not preclude COVID-19 infections or Influenza A/B infections, and should not be used as the sole basis for diagnosis, treatment, or other management decisions. If Influenza A/B and RSV PCR results are negative, testing for Parainfluenza virus, Adenovirus and Metapneumovirus is routinely performed for [facility] pediatric oncology and intensive care inpatients, and is available on other patients by placing an add-on request.    D-DIMER, VTE EXCLUSION - Normal    D-Dimer, Quantitative VTE Exclusion <215      Narrative:     The VTE Exclusion D-Dimer assay is reported in ng/mL Fibrinogen Equivalent Units (FEU).    Per 's instructions for use, a value of less than 500 ng/mL (FEU) may help to exclude DVT or PE in outpatients when the assay is used with a clinical pretest probability assessment.([facility] must utilize and document eCalc 'Wells Score Deep Vein Thrombosis Risk' for DVT exclusion only. Emergency Department should utilize  Guidelines for Emergency Department Use of the VTE Exclusion D-Dimer and Clinical Pretest probability assessment model for DVT or PE exclusion.)   SERIAL TROPONIN-INITIAL - Normal    Troponin I, High Sensitivity 8      Narrative:     Less than 99th percentile of normal range cutoff-  Female and  children under 18 years old <14 ng/L; Male <21 ng/L: Negative  Repeat testing should be performed if clinically indicated.     Female and children under 18 years old 14-50 ng/L; Male 21-50 ng/L:  Consistent with possible cardiac damage and possible increased clinical   risk. Serial measurements may help to assess extent of myocardial damage.     >50 ng/L: Consistent with cardiac damage, increased clinical risk and  myocardial infarction. Serial measurements may help assess extent of   myocardial damage.      NOTE: Children less than 1 year old may have higher baseline troponin   levels and results should be interpreted in conjunction with the overall   clinical context.     NOTE: Troponin I testing is performed using a different   testing methodology at [facility] than at other   [facility]. Direct result comparisons should only   be made within the same method.   SERIAL TROPONIN, 1 HOUR - Normal    Troponin I, High Sensitivity 8      Narrative:     Less than 99th percentile of normal range cutoff-  Female and children under 18 years old <14 ng/L; Male <21 ng/L: Negative  Repeat testing should be performed if clinically indicated.     Female and children under 18 years old 14-50 ng/L; Male 21-50 ng/L:  Consistent with possible cardiac damage and possible increased clinical   risk. Serial measurements may help to assess extent of myocardial damage.     >50 ng/L: Consistent with cardiac damage, increased clinical risk and  myocardial infarction. Serial measurements may help assess extent of   myocardial damage.      NOTE: Children less than 1 year old may have higher baseline troponin   levels and results should be interpreted in conjunction with the overall   clinical context.     NOTE: Troponin I testing is performed using a different   testing methodology at [facility] than at other   [facility]. Direct result comparisons should only   be made within the same method.    TROPONIN SERIES- (INITIAL, 1 HR)    Narrative:     The following orders were created for panel order Troponin I Series, High Sensitivity (0, 1 HR).  Procedure                               Abnormality         Status                     ---------                               -----------         ------                     Troponin I, High Sensiti...[[id number]]  Normal              Final result               Troponin, High Sensitivi...[[id number]]  Normal              Final result                 Please view results for these tests on the individual orders.        XR chest 1 view   Final Result   No acute cardiopulmonary disease.   Signed by Jhony Núñez,            Procedures     Medical Decision Making  Patient is a 53-year-old male who presents to the emergency department with a chief complaint of midsternal chest pain that been present for approximately 3 days.  He does report that he has some associated belching with this.  Patient does have a history of coronary artery disease with prior 6 stents.  He did receive a heart cath recently, in May and no appreciated blockages.  Patient's EKG and workup are unremarkable.  Patient will be discharged home with recommended follow-up with PCP and cardiologist and return for any new or worsening symptoms.  Patient was seen and evaluated by myself along with attending physician, Dr. Lovelace.  The patient was seen by the PA-C.  I have personally performed a substantive portion of the encounter.  I have seen and examined the patient; agree with the workup, evaluation, MDM, management and diagnosis.  The care plan has been discussed with the PA-C.  I have reviewed the PA-C's note and agree with the documented findings.  Jacques Lovelace,        Amount and/or Complexity of Data Reviewed  Labs: ordered. Decision-making details documented in ED Course.  Radiology: ordered and independent interpretation performed. Decision-making details documented in ED Course.      Details: chest x-ray shows NAD.  ECG/medicine tests: ordered and independent interpretation performed. Decision-making details documented in ED Course.     Details: EKG shows normal sinus rhythm with a rate of 62 bpm.  No ST elevation.  MT interval is 186 ms and QRS duration is 98 ms.  EKG interpretation per myself, Paige Cadena    Risk  OTC drugs.         Diagnoses as of 12/13/23 0339   Atypical chest pain                    Paige Cadena PA-C  12/12/23 2055       Jacques Lovelace DO  12/13/23 0339

## 2023-12-19 ENCOUNTER — OFFICE VISIT (OUTPATIENT)
Dept: PRIMARY CARE | Facility: CLINIC | Age: 53
End: 2023-12-19
Payer: COMMERCIAL

## 2023-12-19 VITALS
SYSTOLIC BLOOD PRESSURE: 143 MMHG | HEART RATE: 74 BPM | DIASTOLIC BLOOD PRESSURE: 80 MMHG | HEIGHT: 78 IN | BODY MASS INDEX: 32.97 KG/M2 | WEIGHT: 285 LBS

## 2023-12-19 DIAGNOSIS — E11.65 TYPE 2 DIABETES MELLITUS WITH HYPERGLYCEMIA, WITHOUT LONG-TERM CURRENT USE OF INSULIN (MULTI): ICD-10-CM

## 2023-12-19 DIAGNOSIS — J01.10 ACUTE NON-RECURRENT FRONTAL SINUSITIS: Primary | ICD-10-CM

## 2023-12-19 DIAGNOSIS — I25.119 CORONARY ARTERY DISEASE INVOLVING NATIVE CORONARY ARTERY OF NATIVE HEART WITH ANGINA PECTORIS (CMS-HCC): ICD-10-CM

## 2023-12-19 DIAGNOSIS — I10 PRIMARY HYPERTENSION: Primary | ICD-10-CM

## 2023-12-19 DIAGNOSIS — Z09 HOSPITAL DISCHARGE FOLLOW-UP: ICD-10-CM

## 2023-12-19 PROCEDURE — 3077F SYST BP >= 140 MM HG: CPT | Performed by: INTERNAL MEDICINE

## 2023-12-19 PROCEDURE — 99214 OFFICE O/P EST MOD 30 MIN: CPT | Performed by: INTERNAL MEDICINE

## 2023-12-19 PROCEDURE — 3079F DIAST BP 80-89 MM HG: CPT | Performed by: INTERNAL MEDICINE

## 2023-12-19 PROCEDURE — 3044F HG A1C LEVEL LT 7.0%: CPT | Performed by: INTERNAL MEDICINE

## 2023-12-19 PROCEDURE — 1036F TOBACCO NON-USER: CPT | Performed by: INTERNAL MEDICINE

## 2023-12-19 PROCEDURE — 4010F ACE/ARB THERAPY RXD/TAKEN: CPT | Performed by: INTERNAL MEDICINE

## 2023-12-19 RX ORDER — AZITHROMYCIN 500 MG/1
500 TABLET, FILM COATED ORAL DAILY
Qty: 5 TABLET | Refills: 0 | Status: SHIPPED | OUTPATIENT
Start: 2023-12-19 | End: 2023-12-24

## 2023-12-19 RX ORDER — HYDROCHLOROTHIAZIDE 12.5 MG/1
25 CAPSULE ORAL DAILY
Qty: 180 CAPSULE | Refills: 2 | Status: CANCELLED | OUTPATIENT
Start: 2023-12-19 | End: 2024-09-14

## 2023-12-19 RX ORDER — HYDROCHLOROTHIAZIDE 25 MG/1
25 TABLET ORAL DAILY
Qty: 90 TABLET | Refills: 2 | Status: SHIPPED | OUTPATIENT
Start: 2023-12-19 | End: 2024-05-23 | Stop reason: SDUPTHER

## 2023-12-19 ASSESSMENT — ENCOUNTER SYMPTOMS
SINUS PAIN: 0
COUGH: 0
WHEEZING: 0
WEAKNESS: 0
NUMBNESS: 0
SHORTNESS OF BREATH: 0
SORE THROAT: 0
APPETITE CHANGE: 0
VOMITING: 0
FATIGUE: 0
BACK PAIN: 0
CHILLS: 0
DIARRHEA: 0
ACTIVITY CHANGE: 0
SINUS PRESSURE: 0
NAUSEA: 0
ABDOMINAL DISTENTION: 0

## 2023-12-19 NOTE — PROGRESS NOTES
"Subjective   Patient ID: Jhony Myles \"Chester\" is a 53 y.o. male who presents for ER Follow-up (Chest pain 12/12/23).  ER Follow-up  Associated symptoms include chest pain. Pertinent negatives include no chills, congestion, coughing, fatigue, nausea, numbness, sore throat, vomiting or weakness.     Patient is here today for Ed follow up for chest pain.   Pt reports that he had been having chest pain for about 3 days prior to the ED visit but it was progressively getting worse, did not seem to be worse with activity. +nausea. His EKG was unremarkable and serial troponins was negative.   His cardiologist is Dr Ortiz with , last appt was end of November.   No changes in Medication.   Pt reports that the chest pain will still and go but it is not as severe as it was.   He feels like it is a muscle tightness.     Review of Systems   Constitutional:  Negative for activity change, appetite change, chills and fatigue.   HENT:  Negative for congestion, postnasal drip, sinus pressure, sinus pain and sore throat.    Respiratory:  Negative for cough, shortness of breath and wheezing.    Cardiovascular:  Positive for chest pain. Negative for leg swelling.   Gastrointestinal:  Negative for abdominal distention, diarrhea, nausea and vomiting.   Musculoskeletal:  Negative for back pain.   Neurological:  Negative for weakness and numbness.       Objective   /80   Pulse 74   Ht 1.981 m (6' 6\")   Wt 129 kg (285 lb)   BMI 32.94 kg/m²     Physical Exam  Constitutional:       General: He is not in acute distress.     Appearance: Normal appearance.   HENT:      Head: Normocephalic.      Nose: Nose normal.      Comments: Left nasal passage appears mild erythematous and dry, no purulent drainage      Mouth/Throat:      Pharynx: No oropharyngeal exudate.   Eyes:      General:         Right eye: No discharge.         Left eye: No discharge.      Extraocular Movements: Extraocular movements intact.      Pupils: Pupils are equal, " round, and reactive to light.   Cardiovascular:      Rate and Rhythm: Normal rate and regular rhythm.      Heart sounds: No murmur heard.     No gallop.   Pulmonary:      Effort: Pulmonary effort is normal. No respiratory distress.      Breath sounds: Normal breath sounds. No wheezing.   Musculoskeletal:         General: No swelling. Normal range of motion.      Comments: Tenderness over left side of chest   Skin:     General: Skin is warm and dry.      Coloration: Skin is not jaundiced.   Neurological:      General: No focal deficit present.      Mental Status: He is alert and oriented to person, place, and time.      Cranial Nerves: No cranial nerve deficit.   Psychiatric:         Mood and Affect: Mood normal.         Behavior: Behavior normal.           Assessment/Plan   Problem List Items Addressed This Visit       Type 2 diabetes mellitus with hyperglycemia, without long-term current use of insulin (CMS/[facility])    Relevant Orders    Referral to Nutrition Services    Coronary artery disease involving native coronary artery of native heart with angina pectoris (CMS/[facility])    Hospital discharge follow-up     Other Visit Diagnoses       Acute non-recurrent frontal sinusitis    -  Primary    Relevant Medications    azithromycin (Zithromax) 500 mg tablet          ED follow up for chest pain   - pt has an extensive cardiac history of CAD s.p 6 stents, serial troponins and EKG were unremarkable   - has appt with cardiology scheduled in 5/24, had appt at the end of Nov 23   - not triggered with activity   - denies heartburn symptom, takes Prilosec 20mg po daily   - he has tried flexeril and gabapentin in the past as well as lyrica  - could try lidocaine topical and see If that helps since I do think it is musculoskeletal pain   - follow up as scheduled in Jan with A1c     2. Left sinus pressure, dry , recommend nasal saline, sent antibiotic in case feels like developing sinus infection over the holidays   Final diagnoses:    [J01.10] Acute non-recurrent frontal sinusitis   [E11.65] Type 2 diabetes mellitus with hyperglycemia, without long-term current use of insulin (CMS/[facility])   [I25.119] Coronary artery disease involving native coronary artery of native heart with angina pectoris (CMS/[facility])   [Z09] Hospital discharge follow-up

## 2023-12-19 NOTE — TELEPHONE ENCOUNTER
Left voicemail for patient letting him know his refill of Hydrochlorothiazide will be a 25mg tablet so he will just need to take 1 tablet daily rather than (2) 12.5 mg tabs daily.

## 2023-12-19 NOTE — TELEPHONE ENCOUNTER
Received refill request via fax from [facility] [place] for Hydrochorothiazide 12.5 mg 2 capsules daily    Last appointment: 11/20/23 with Dr. Ortiz  Next appointment: 5/23/24 with Dr. Ortiz  Labs labs: 12/12/23 CMP  12/7/22 last refilled 90 days 3 refills  1/2023 saw Mady Tarango

## 2023-12-20 ENCOUNTER — APPOINTMENT (OUTPATIENT)
Dept: SLEEP MEDICINE | Facility: CLINIC | Age: 53
End: 2023-12-20
Payer: COMMERCIAL

## 2023-12-21 DIAGNOSIS — I25.119 CORONARY ARTERY DISEASE INVOLVING NATIVE CORONARY ARTERY OF NATIVE HEART WITH ANGINA PECTORIS (CMS-HCC): ICD-10-CM

## 2023-12-21 DIAGNOSIS — I10 PRIMARY HYPERTENSION: Primary | ICD-10-CM

## 2023-12-21 RX ORDER — LOSARTAN POTASSIUM 50 MG/1
50 TABLET ORAL DAILY
Qty: 90 TABLET | Refills: 3 | Status: SHIPPED | OUTPATIENT
Start: 2023-12-21 | End: 2024-12-20

## 2023-12-21 RX ORDER — CARVEDILOL 25 MG/1
25 TABLET ORAL
Qty: 180 TABLET | Refills: 3 | Status: SHIPPED | OUTPATIENT
Start: 2023-12-21 | End: 2024-12-20

## 2023-12-21 NOTE — TELEPHONE ENCOUNTER
Received refill request via fax from [facility] for Brilinta,Carvedilol 25 mg, and Losartan 50 mg     Last appointment: 11/20/23 with Dr. Ortiz  Next appointment: 5/23/24 with Dr. Ortiz  Labs labs: 12/12/23 CMP/CBC  1/2023 saw Mady Tarango

## 2023-12-22 ENCOUNTER — EVALUATION (OUTPATIENT)
Dept: PHYSICAL THERAPY | Facility: CLINIC | Age: 53
End: 2023-12-22
Payer: COMMERCIAL

## 2023-12-22 DIAGNOSIS — M17.11 PRIMARY OSTEOARTHRITIS OF RIGHT KNEE: ICD-10-CM

## 2023-12-22 DIAGNOSIS — M76.31 ILIOTIBIAL BAND SYNDROME OF RIGHT SIDE: ICD-10-CM

## 2023-12-22 DIAGNOSIS — M70.61 GREATER TROCHANTERIC BURSITIS OF RIGHT HIP: ICD-10-CM

## 2023-12-22 PROCEDURE — 97161 PT EVAL LOW COMPLEX 20 MIN: CPT | Mod: GP

## 2023-12-22 ASSESSMENT — PATIENT HEALTH QUESTIONNAIRE - PHQ9
10. IF YOU CHECKED OFF ANY PROBLEMS, HOW DIFFICULT HAVE THESE PROBLEMS MADE IT FOR YOU TO DO YOUR WORK, TAKE CARE OF THINGS AT HOME, OR GET ALONG WITH OTHER PEOPLE: SOMEWHAT DIFFICULT
2. FEELING DOWN, DEPRESSED OR HOPELESS: MORE THAN HALF THE DAYS
SUM OF ALL RESPONSES TO PHQ9 QUESTIONS 1 AND 2: 4
8. MOVING OR SPEAKING SO SLOWLY THAT OTHER PEOPLE COULD HAVE NOTICED. OR THE OPPOSITE, BEING SO FIGETY OR RESTLESS THAT YOU HAVE BEEN MOVING AROUND A LOT MORE THAN USUAL: NOT AT ALL
1. LITTLE INTEREST OR PLEASURE IN DOING THINGS: MORE THAN HALF THE DAYS
4. FEELING TIRED OR HAVING LITTLE ENERGY: NEARLY EVERY DAY
5. POOR APPETITE OR OVEREATING: NOT AT ALL
SUM OF ALL RESPONSES TO PHQ QUESTIONS 1-9: 15
7. TROUBLE CONCENTRATING ON THINGS, SUCH AS READING THE NEWSPAPER OR WATCHING TELEVISION: MORE THAN HALF THE DAYS
9. THOUGHTS THAT YOU WOULD BE BETTER OFF DEAD, OR OF HURTING YOURSELF: SEVERAL DAYS
6. FEELING BAD ABOUT YOURSELF - OR THAT YOU ARE A FAILURE OR HAVE LET YOURSELF OR YOUR FAMILY DOWN: MORE THAN HALF THE DAYS
3. TROUBLE FALLING OR STAYING ASLEEP OR SLEEPING TOO MUCH: NEARLY EVERY DAY

## 2023-12-22 ASSESSMENT — PAIN - FUNCTIONAL ASSESSMENT: PAIN_FUNCTIONAL_ASSESSMENT: 0-10

## 2023-12-22 ASSESSMENT — ACTIVITIES OF DAILY LIVING (ADL): EFFECT OF PAIN ON DAILY ACTIVITIES: SEE GOALS

## 2023-12-22 ASSESSMENT — PAIN SCALES - GENERAL: PAINLEVEL_OUTOF10: 7

## 2023-12-22 ASSESSMENT — ENCOUNTER SYMPTOMS
OCCASIONAL FEELINGS OF UNSTEADINESS: 0
DEPRESSION: 0
LOSS OF SENSATION IN FEET: 1

## 2023-12-22 NOTE — PROGRESS NOTES
"Physical Therapy Evaluation and Treatment      Patient Name: Jhony Myles \"Chester\"  MRN: [id number]  Today's Date: 12/22/2023  Time Calculation  Start Time: 1354  Stop Time: 1435  Time Calculation (min): 41 min      Assessment:  Rehab Prognosis: Good  6wk HX of R buttock through the lateral thigh to knee sx.  Films show mild to moderate R hip OA and mild R knee degen changes.  Physical fidings include limited R hip AROM and strength.   Continue with open to closed chain RLE ROM and PRE.  Also trial R iliopsoas/buttock STW/release and gait/balance work as betsy.  Plan:  Treatment/Interventions: Aquatic therapy, Cryotherapy, Education/ Instruction, Hot pack, Manual therapy, Self care/ home management, Therapeutic exercises (IASTM/cupping)  PT Plan: Skilled PT  PT Frequency: 2 times per week  Duration: 4wks  Onset Date: 11/15/23  Certification Period Start Date: 12/22/23  Certification Period End Date: 03/21/24  Rehab Potential: Good  Plan of Care Agreement: Patient    Current Problem:   1. Iliotibial band syndrome of right side  Referral to Physical Therapy    Follow Up In Physical Therapy      2. Greater trochanteric bursitis of right hip  Referral to Physical Therapy    Follow Up In Physical Therapy      3. Primary osteoarthritis of right knee  Referral to Physical Therapy    Follow Up In Physical Therapy          Subjective    General:  General  Reason for Referral: R ITB syndrome, troch bursitis, OA knee  Referred By: Gurpreet  Precautions:  Precautions  STEADI Fall Risk Score (The score of 4 or more indicates an increased risk of falling): 3 (mod fall risk (x8 falls within the last yr))  Precautions Comment: DM; CAD (MI 2005); OA; multiple TIA HX; mod fall risk    Pain:  Pain Assessment  Pain Assessment: 0-10  Pain Score: 7  Pain Location: Back  Pain Orientation: Right  Pain Radiating Towards: R buttock, lateral thigh, knee  Effect of Pain on Daily Activities: see goals    Prior Level of Function:  Prior Function Per " "Pt/Caregiver Report  Vocational: On disability    Objective     Outcome Measures:  Other Measures  Other Outcome Measures: 19/80 LEFS     Treatments:eval only-time   Continue with open to closed chain RLE ROM and PRE.  Also trial R iliopsoas release and gait/balance work as betsy.  OP EDUCATION:  Outpatient Education  Individual(s) Educated: Patient  Patient/Caregiver Demonstrated Understanding: yes  Plan of Care Discussed and Agreed Upon: yes    Goals:  Active       PT Problem       PT Goal 1       Start:  12/22/23    Expected End:  03/21/24       1. Independent HEP to allow for 50% reduction in max ADL C/C sx ( 7/10) 2-3wks  2. 0/10 night time sx to allow for uninterrupted sleep x 1wk ( 7/7) 2-3wks  3. Survey score improvement from 19/80 to 40/80 (LEFS) 3-4wks  4. ROM increase to allow for ADL car transfers (from 70 to 90 active hip flexion in stdg) 3-4wks  5. Strength increase to allow for improved ADL STS (from 4/5 to 5/5 R hip strength) 3-4wks         PT Goal 2       Start:  12/22/23    Expected End:  03/21/24       1. \"I want to figure out some stretches\"             HIP         Lower Extremity Strength: WFL unless documented  MMT 5/5 max  RIGHT LEFT   Hip Flexion 4/5 5/5   Hip Extension 4/5 5/5   Hip Abduction 4/5 5/5   Hip Adduction     Knee Extension 5/5 5/5   Knee Flexion 5/5 5/5   Ankle DF     Ankle PF     Ankle INV     Ankle EV     Hip AROM WFL unless documented below  R hip flexion: (125°): 70  L hip flexion: (125°): 90   Knee AROM-grossly B WFL and symmetrical  Neg BLE myotome findings  "

## 2023-12-27 ENCOUNTER — APPOINTMENT (OUTPATIENT)
Dept: UROLOGY | Facility: CLINIC | Age: 53
End: 2023-12-27
Payer: COMMERCIAL

## 2024-01-02 ENCOUNTER — OFFICE VISIT (OUTPATIENT)
Dept: PRIMARY CARE | Facility: CLINIC | Age: 54
End: 2024-01-02
Payer: COMMERCIAL

## 2024-01-02 VITALS
SYSTOLIC BLOOD PRESSURE: 144 MMHG | HEART RATE: 72 BPM | WEIGHT: 277 LBS | HEIGHT: 78 IN | BODY MASS INDEX: 32.05 KG/M2 | DIASTOLIC BLOOD PRESSURE: 79 MMHG

## 2024-01-02 DIAGNOSIS — M05.9 RHEUMATOID ARTHRITIS WITH POSITIVE RHEUMATOID FACTOR, INVOLVING UNSPECIFIED SITE (MULTI): ICD-10-CM

## 2024-01-02 DIAGNOSIS — I25.119 CORONARY ARTERY DISEASE INVOLVING NATIVE CORONARY ARTERY OF NATIVE HEART WITH ANGINA PECTORIS (CMS-HCC): ICD-10-CM

## 2024-01-02 DIAGNOSIS — D69.6 THROMBOCYTOPENIA (CMS-HCC): ICD-10-CM

## 2024-01-02 DIAGNOSIS — I10 PRIMARY HYPERTENSION: ICD-10-CM

## 2024-01-02 DIAGNOSIS — R59.9 SWOLLEN LYMPH NODES: ICD-10-CM

## 2024-01-02 DIAGNOSIS — R35.0 URINARY FREQUENCY: ICD-10-CM

## 2024-01-02 DIAGNOSIS — E11.40 TYPE 2 DIABETES MELLITUS WITH DIABETIC NEUROPATHY, WITHOUT LONG-TERM CURRENT USE OF INSULIN (MULTI): ICD-10-CM

## 2024-01-02 DIAGNOSIS — E11.9 TYPE 2 DIABETES MELLITUS WITHOUT COMPLICATION, WITHOUT LONG-TERM CURRENT USE OF INSULIN (MULTI): Primary | ICD-10-CM

## 2024-01-02 DIAGNOSIS — K21.9 GASTROESOPHAGEAL REFLUX DISEASE WITHOUT ESOPHAGITIS: ICD-10-CM

## 2024-01-02 PROCEDURE — 4010F ACE/ARB THERAPY RXD/TAKEN: CPT | Performed by: INTERNAL MEDICINE

## 2024-01-02 PROCEDURE — 1036F TOBACCO NON-USER: CPT | Performed by: INTERNAL MEDICINE

## 2024-01-02 PROCEDURE — 3077F SYST BP >= 140 MM HG: CPT | Performed by: INTERNAL MEDICINE

## 2024-01-02 PROCEDURE — 3078F DIAST BP <80 MM HG: CPT | Performed by: INTERNAL MEDICINE

## 2024-01-02 PROCEDURE — 99214 OFFICE O/P EST MOD 30 MIN: CPT | Performed by: INTERNAL MEDICINE

## 2024-01-02 ASSESSMENT — ENCOUNTER SYMPTOMS
CHILLS: 0
BACK PAIN: 0
ABDOMINAL DISTENTION: 0
SINUS PRESSURE: 0
DIARRHEA: 0
WHEEZING: 0
COUGH: 0
SHORTNESS OF BREATH: 0
WEAKNESS: 0
APPETITE CHANGE: 0
FATIGUE: 0
ACTIVITY CHANGE: 0
VOMITING: 0
NAUSEA: 0
SINUS PAIN: 0
NUMBNESS: 0
SORE THROAT: 1

## 2024-01-02 NOTE — PROGRESS NOTES
"Subjective   Patient ID: Jhony Myles \"Migel" is a 53 y.o. male who presents for Follow-up (6 month).  HPI  Patient is here today for 6 mo follow up   Pt reports that he feels like there is something poking into the left side of his throat, feels like sometimes tongue hurts but no issues with swallowing.   HE mostly feels like it happens when he lays down.     Review of Systems   Constitutional:  Negative for activity change, appetite change, chills and fatigue.   HENT:  Positive for sore throat. Negative for congestion, postnasal drip, sinus pressure and sinus pain.    Respiratory:  Negative for cough, shortness of breath and wheezing.    Cardiovascular:  Negative for chest pain and leg swelling.   Gastrointestinal:  Negative for abdominal distention, diarrhea, nausea and vomiting.   Musculoskeletal:  Negative for back pain.   Neurological:  Negative for weakness and numbness.       Objective   /79   Pulse 72   Ht 1.981 m (6' 6\")   Wt 126 kg (277 lb)   BMI 32.01 kg/m²     Physical Exam  Constitutional:       General: He is not in acute distress.     Appearance: Normal appearance.   HENT:      Head: Normocephalic.      Right Ear: Tympanic membrane, ear canal and external ear normal.      Left Ear: Tympanic membrane, ear canal and external ear normal.      Nose: Nose normal.      Mouth/Throat:      Mouth: Mucous membranes are dry.      Pharynx: No oropharyngeal exudate.   Eyes:      General:         Right eye: No discharge.         Left eye: No discharge.      Extraocular Movements: Extraocular movements intact.      Pupils: Pupils are equal, round, and reactive to light.   Cardiovascular:      Rate and Rhythm: Normal rate and regular rhythm.      Heart sounds: No murmur heard.     No gallop.   Pulmonary:      Effort: Pulmonary effort is normal. No respiratory distress.      Breath sounds: Normal breath sounds. No wheezing.   Musculoskeletal:         General: No swelling. Normal range of motion. "   Skin:     General: Skin is warm and dry.      Coloration: Skin is not jaundiced.   Neurological:      General: No focal deficit present.      Mental Status: He is alert and oriented to person, place, and time.      Cranial Nerves: No cranial nerve deficit.   Psychiatric:         Mood and Affect: Mood normal.         Behavior: Behavior normal.           Assessment/Plan   Problem List Items Addressed This Visit       Type 2 diabetes mellitus with diabetic neuropathy, without long-term current use of insulin (CMS/[facility])    Primary hypertension    Coronary artery disease involving native coronary artery of native heart with angina pectoris (CMS/[facility])    Thrombocytopenia (CMS/[facility])    Gastroesophageal reflux disease without esophagitis    Rheumatoid arthritis with positive rheumatoid factor, involving unspecified site (CMS/[facility])     Other Visit Diagnoses       Type 2 diabetes mellitus without complication, without long-term current use of insulin (CMS/[facility])    -  Primary    Relevant Orders    Hemoglobin A1C    Comprehensive Metabolic Panel    Urinary frequency        Relevant Orders    Urinalysis with Reflex Microscopic    Swollen lymph nodes        Relevant Orders    TSH with reflex to Free T4 if abnormal    US thyroid          LUQ abd pain in the setting of thrombocytopenia, splenomegally,  previous evals with EGD and colonoscopy ok, normal bloodwork other than low platelets   - cT SCAN only showed nonobstructing left upper pole calculus, and known hydrocele, he has an appt with Dr Dow in 2 weeks to discuss colonoscopy      2. Thrombocytopenia   - following with hematology     3. GERD   - continue prilosec      4. CAD with  RCA s/p multiple PCI with most recent being balloon angioplasty of several ostial stenosis in Dg1 07/2022  - following with Cardiology, appt in March 24 next   - on crestor 5mg po daily   - continue losartan 50mg po daily   - continue imdur 30mg po daily   - continue hctzs 12.5mg 2 tabs daily   -  on brilinta       5. DMII, controlled   -A1c 5.2% in 4.23,will order updated A1c      6. Right knee hip and thumb pain  - following with ortho      7. Hx of SHRAVAN, no machine currently and has been years, has apnea and snoring, fatigue   - in lab study showed mild shravan     8. Kidney stone   - discuss with Dr stoddard   Final diagnoses:   [E11.9] Type 2 diabetes mellitus without complication, without long-term current use of insulin (CMS/[facility])   [R35.0] Urinary frequency   [R59.9] Swollen lymph nodes   [M05.9] Rheumatoid arthritis with positive rheumatoid factor, involving unspecified site (CMS/[facility])   [E11.40] Type 2 diabetes mellitus with diabetic neuropathy, without long-term current use of insulin (CMS/[facility])   [I10] Primary hypertension   [I25.119] Coronary artery disease involving native coronary artery of native heart with angina pectoris (CMS/[facility])   [D69.6] Thrombocytopenia (CMS/[facility])   [K21.9] Gastroesophageal reflux disease without esophagitis

## 2024-01-03 ENCOUNTER — HOSPITAL ENCOUNTER (OUTPATIENT)
Dept: CARDIOLOGY | Facility: HOSPITAL | Age: 54
Discharge: HOME | End: 2024-01-03
Payer: COMMERCIAL

## 2024-01-03 ENCOUNTER — HOSPITAL ENCOUNTER (EMERGENCY)
Facility: HOSPITAL | Age: 54
Discharge: HOME | End: 2024-01-03
Attending: EMERGENCY MEDICINE
Payer: COMMERCIAL

## 2024-01-03 ENCOUNTER — APPOINTMENT (OUTPATIENT)
Dept: RADIOLOGY | Facility: HOSPITAL | Age: 54
End: 2024-01-03
Payer: COMMERCIAL

## 2024-01-03 ENCOUNTER — HOSPITAL ENCOUNTER (OUTPATIENT)
Dept: RADIOLOGY | Facility: HOSPITAL | Age: 54
Discharge: HOME | End: 2024-01-03
Payer: COMMERCIAL

## 2024-01-03 ENCOUNTER — LAB (OUTPATIENT)
Dept: LAB | Facility: LAB | Age: 54
End: 2024-01-03
Payer: COMMERCIAL

## 2024-01-03 VITALS
RESPIRATION RATE: 21 BRPM | TEMPERATURE: 97 F | SYSTOLIC BLOOD PRESSURE: 185 MMHG | DIASTOLIC BLOOD PRESSURE: 95 MMHG | HEART RATE: 65 BPM | WEIGHT: 277 LBS | OXYGEN SATURATION: 96 % | HEIGHT: 78 IN | BODY MASS INDEX: 32.05 KG/M2

## 2024-01-03 DIAGNOSIS — R59.9 SWOLLEN LYMPH NODES: ICD-10-CM

## 2024-01-03 DIAGNOSIS — E78.2 MIXED HYPERLIPIDEMIA: ICD-10-CM

## 2024-01-03 DIAGNOSIS — R00.2 PALPITATIONS: Primary | ICD-10-CM

## 2024-01-03 DIAGNOSIS — R35.0 URINARY FREQUENCY: ICD-10-CM

## 2024-01-03 DIAGNOSIS — E11.9 TYPE 2 DIABETES MELLITUS WITHOUT COMPLICATION, WITHOUT LONG-TERM CURRENT USE OF INSULIN (MULTI): ICD-10-CM

## 2024-01-03 DIAGNOSIS — E11.65 TYPE 2 DIABETES MELLITUS WITH HYPERGLYCEMIA, WITHOUT LONG-TERM CURRENT USE OF INSULIN (MULTI): ICD-10-CM

## 2024-01-03 LAB
ALBUMIN SERPL BCP-MCNC: 4.8 G/DL (ref 3.4–5)
ALP SERPL-CCNC: 39 U/L (ref 33–120)
ALT SERPL W P-5'-P-CCNC: 21 U/L (ref 10–52)
ANION GAP SERPL CALC-SCNC: 11 MMOL/L (ref 10–20)
ANION GAP SERPL CALC-SCNC: 14 MMOL/L (ref 10–20)
APPEARANCE UR: CLEAR
AST SERPL W P-5'-P-CCNC: 20 U/L (ref 9–39)
ATRIAL RATE: 72 BPM
BASOPHILS # BLD AUTO: 0.05 X10*3/UL (ref 0–0.1)
BASOPHILS NFR BLD AUTO: 1.1 %
BILIRUB SERPL-MCNC: 1 MG/DL (ref 0–1.2)
BILIRUB UR STRIP.AUTO-MCNC: NEGATIVE MG/DL
BUN SERPL-MCNC: 21 MG/DL (ref 6–23)
BUN SERPL-MCNC: 23 MG/DL (ref 6–23)
CALCIUM SERPL-MCNC: 10.2 MG/DL (ref 8.6–10.3)
CALCIUM SERPL-MCNC: 9.7 MG/DL (ref 8.6–10.3)
CARDIAC TROPONIN I PNL SERPL HS: 10 NG/L (ref 0–20)
CARDIAC TROPONIN I PNL SERPL HS: 11 NG/L (ref 0–20)
CHLORIDE SERPL-SCNC: 103 MMOL/L (ref 98–107)
CHLORIDE SERPL-SCNC: 105 MMOL/L (ref 98–107)
CO2 SERPL-SCNC: 26 MMOL/L (ref 21–32)
CO2 SERPL-SCNC: 29 MMOL/L (ref 21–32)
COLOR UR: YELLOW
CREAT SERPL-MCNC: 1.13 MG/DL (ref 0.5–1.3)
CREAT SERPL-MCNC: 1.23 MG/DL (ref 0.5–1.3)
EOSINOPHIL # BLD AUTO: 0.24 X10*3/UL (ref 0–0.7)
EOSINOPHIL NFR BLD AUTO: 5.1 %
ERYTHROCYTE [DISTWIDTH] IN BLOOD BY AUTOMATED COUNT: 12.3 % (ref 11.5–14.5)
EST. AVERAGE GLUCOSE BLD GHB EST-MCNC: 114 MG/DL
GFR SERPL CREATININE-BSD FRML MDRD: 69 ML/MIN/1.73M*2
GFR SERPL CREATININE-BSD FRML MDRD: 77 ML/MIN/1.73M*2
GLUCOSE SERPL-MCNC: 136 MG/DL (ref 74–99)
GLUCOSE SERPL-MCNC: 144 MG/DL (ref 74–99)
GLUCOSE UR STRIP.AUTO-MCNC: NEGATIVE MG/DL
HBA1C MFR BLD: 5.6 %
HCT VFR BLD AUTO: 37 % (ref 41–52)
HGB BLD-MCNC: 13.7 G/DL (ref 13.5–17.5)
IMM GRANULOCYTES # BLD AUTO: 0.01 X10*3/UL (ref 0–0.7)
IMM GRANULOCYTES NFR BLD AUTO: 0.2 % (ref 0–0.9)
KETONES UR STRIP.AUTO-MCNC: NEGATIVE MG/DL
LEUKOCYTE ESTERASE UR QL STRIP.AUTO: NEGATIVE
LYMPHOCYTES # BLD AUTO: 1.31 X10*3/UL (ref 1.2–4.8)
LYMPHOCYTES NFR BLD AUTO: 27.9 %
MCH RBC QN AUTO: 32.4 PG (ref 26–34)
MCHC RBC AUTO-ENTMCNC: 37 G/DL (ref 32–36)
MCV RBC AUTO: 88 FL (ref 80–100)
MONOCYTES # BLD AUTO: 0.66 X10*3/UL (ref 0.1–1)
MONOCYTES NFR BLD AUTO: 14 %
MUCOUS THREADS #/AREA URNS AUTO: NORMAL /LPF
NEUTROPHILS # BLD AUTO: 2.43 X10*3/UL (ref 1.2–7.7)
NEUTROPHILS NFR BLD AUTO: 51.7 %
NITRITE UR QL STRIP.AUTO: NEGATIVE
NRBC BLD-RTO: 0 /100 WBCS (ref 0–0)
P AXIS: 55 DEGREES
P OFFSET: 176 MS
P ONSET: 114 MS
PH UR STRIP.AUTO: 7 [PH]
PLATELET # BLD AUTO: 129 X10*3/UL (ref 150–450)
POTASSIUM SERPL-SCNC: 3.8 MMOL/L (ref 3.5–5.3)
POTASSIUM SERPL-SCNC: 4.3 MMOL/L (ref 3.5–5.3)
PR INTERVAL: 200 MS
PROT SERPL-MCNC: 6.8 G/DL (ref 6.4–8.2)
PROT UR STRIP.AUTO-MCNC: ABNORMAL MG/DL
Q ONSET: 214 MS
QRS COUNT: 12 BEATS
QRS DURATION: 96 MS
QT INTERVAL: 402 MS
QTC CALCULATION(BAZETT): 440 MS
QTC FREDERICIA: 427 MS
R AXIS: 56 DEGREES
RBC # BLD AUTO: 4.23 X10*6/UL (ref 4.5–5.9)
RBC # UR STRIP.AUTO: NEGATIVE /UL
RBC #/AREA URNS AUTO: NORMAL /HPF
SODIUM SERPL-SCNC: 139 MMOL/L (ref 136–145)
SODIUM SERPL-SCNC: 141 MMOL/L (ref 136–145)
SP GR UR STRIP.AUTO: 1.02
T AXIS: 33 DEGREES
T OFFSET: 415 MS
TSH SERPL-ACNC: 1.91 MIU/L (ref 0.44–3.98)
UROBILINOGEN UR STRIP.AUTO-MCNC: <2 MG/DL
VENTRICULAR RATE: 72 BPM
WBC # BLD AUTO: 4.7 X10*3/UL (ref 4.4–11.3)
WBC #/AREA URNS AUTO: NORMAL /HPF

## 2024-01-03 PROCEDURE — 85025 COMPLETE CBC W/AUTO DIFF WBC: CPT | Performed by: EMERGENCY MEDICINE

## 2024-01-03 PROCEDURE — 71045 X-RAY EXAM CHEST 1 VIEW: CPT

## 2024-01-03 PROCEDURE — 99283 EMERGENCY DEPT VISIT LOW MDM: CPT | Mod: 25

## 2024-01-03 PROCEDURE — 71045 X-RAY EXAM CHEST 1 VIEW: CPT | Mod: FOREIGN READ | Performed by: RADIOLOGY

## 2024-01-03 PROCEDURE — 84484 ASSAY OF TROPONIN QUANT: CPT | Performed by: EMERGENCY MEDICINE

## 2024-01-03 PROCEDURE — 76536 US EXAM OF HEAD AND NECK: CPT | Performed by: RADIOLOGY

## 2024-01-03 PROCEDURE — 36415 COLL VENOUS BLD VENIPUNCTURE: CPT | Performed by: EMERGENCY MEDICINE

## 2024-01-03 PROCEDURE — 84443 ASSAY THYROID STIM HORMONE: CPT

## 2024-01-03 PROCEDURE — 83704 LIPOPROTEIN BLD QUAN PART: CPT

## 2024-01-03 PROCEDURE — 99284 EMERGENCY DEPT VISIT MOD MDM: CPT | Performed by: EMERGENCY MEDICINE

## 2024-01-03 PROCEDURE — 81001 URINALYSIS AUTO W/SCOPE: CPT

## 2024-01-03 PROCEDURE — 80048 BASIC METABOLIC PNL TOTAL CA: CPT

## 2024-01-03 PROCEDURE — 93005 ELECTROCARDIOGRAM TRACING: CPT

## 2024-01-03 PROCEDURE — 83036 HEMOGLOBIN GLYCOSYLATED A1C: CPT

## 2024-01-03 PROCEDURE — 76536 US EXAM OF HEAD AND NECK: CPT

## 2024-01-03 PROCEDURE — 80048 BASIC METABOLIC PNL TOTAL CA: CPT | Performed by: EMERGENCY MEDICINE

## 2024-01-03 ASSESSMENT — PAIN SCALES - GENERAL
PAINLEVEL_OUTOF10: 2
PAINLEVEL_OUTOF10: 5 - MODERATE PAIN

## 2024-01-03 ASSESSMENT — PAIN DESCRIPTION - DESCRIPTORS: DESCRIPTORS: SORE

## 2024-01-03 ASSESSMENT — PAIN - FUNCTIONAL ASSESSMENT: PAIN_FUNCTIONAL_ASSESSMENT: 0-10

## 2024-01-03 ASSESSMENT — COLUMBIA-SUICIDE SEVERITY RATING SCALE - C-SSRS
2. HAVE YOU ACTUALLY HAD ANY THOUGHTS OF KILLING YOURSELF?: NO
1. IN THE PAST MONTH, HAVE YOU WISHED YOU WERE DEAD OR WISHED YOU COULD GO TO SLEEP AND NOT WAKE UP?: NO
6. HAVE YOU EVER DONE ANYTHING, STARTED TO DO ANYTHING, OR PREPARED TO DO ANYTHING TO END YOUR LIFE?: NO

## 2024-01-03 NOTE — ED PROVIDER NOTES
The patient is a 53-year-old male with a significant heart history including multiple stents presents with a chief complaint of heart palpitations/pounding.  Stated it started about 630 which is about 6 hours ago now.  Denies any difficulty breathing.  No radiating symptoms to the extremities neck or jaw.  No nausea or vomiting.         Review of Systems     Physical Exam  Vitals and nursing note reviewed.   Constitutional:       General: He is not in acute distress.     Appearance: He is well-developed.   HENT:      Head: Normocephalic and atraumatic.   Eyes:      Conjunctiva/sclera: Conjunctivae normal.   Cardiovascular:      Rate and Rhythm: Normal rate and regular rhythm.      Heart sounds: No murmur heard.  Pulmonary:      Effort: Pulmonary effort is normal. No respiratory distress.      Breath sounds: Normal breath sounds.   Abdominal:      Palpations: Abdomen is soft.      Tenderness: There is no abdominal tenderness.   Musculoskeletal:         General: No swelling.      Cervical back: Neck supple.   Skin:     General: Skin is warm and dry.      Capillary Refill: Capillary refill takes less than 2 seconds.   Neurological:      Mental Status: He is alert.   Psychiatric:         Mood and Affect: Mood normal.          Labs Reviewed   CBC WITH AUTO DIFFERENTIAL - Abnormal       Result Value    WBC 4.7      nRBC 0.0      RBC 4.23 (*)     Hemoglobin 13.7      Hematocrit 37.0 (*)     MCV 88      MCH 32.4      MCHC 37.0 (*)     RDW 12.3      Platelets 129 (*)     Neutrophils % 51.7      Immature Granulocytes %, Automated 0.2      Lymphocytes % 27.9      Monocytes % 14.0      Eosinophils % 5.1      Basophils % 1.1      Neutrophils Absolute 2.43      Immature Granulocytes Absolute, Automated 0.01      Lymphocytes Absolute 1.31      Monocytes Absolute 0.66      Eosinophils Absolute 0.24      Basophils Absolute 0.05     BASIC METABOLIC PANEL - Abnormal    Glucose 144 (*)     Sodium 139      Potassium 3.8      Chloride  103      Bicarbonate 29      Anion Gap 11      Urea Nitrogen 23      Creatinine 1.23      eGFR 69      Calcium 9.7     TROPONIN I, HIGH SENSITIVITY - Normal    Troponin I, High Sensitivity 10      Narrative:     Less than 99th percentile of normal range cutoff-  Female and children under 18 years old <14 ng/L; Male <21 ng/L: Negative  Repeat testing should be performed if clinically indicated.     Female and children under 18 years old 14-50 ng/L; Male 21-50 ng/L:  Consistent with possible cardiac damage and possible increased clinical   risk. Serial measurements may help to assess extent of myocardial damage.     >50 ng/L: Consistent with cardiac damage, increased clinical risk and  myocardial infarction. Serial measurements may help assess extent of   myocardial damage.      NOTE: Children less than 1 year old may have higher baseline troponin   levels and results should be interpreted in conjunction with the overall   clinical context.     NOTE: Troponin I testing is performed using a different   testing methodology at [facility] than at other   [facility]. Direct result comparisons should only   be made within the same method.   TROPONIN I, HIGH SENSITIVITY - Normal    Troponin I, High Sensitivity 11      Narrative:     Less than 99th percentile of normal range cutoff-  Female and children under 18 years old <14 ng/L; Male <21 ng/L: Negative  Repeat testing should be performed if clinically indicated.     Female and children under 18 years old 14-50 ng/L; Male 21-50 ng/L:  Consistent with possible cardiac damage and possible increased clinical   risk. Serial measurements may help to assess extent of myocardial damage.     >50 ng/L: Consistent with cardiac damage, increased clinical risk and  myocardial infarction. Serial measurements may help assess extent of   myocardial damage.      NOTE: Children less than 1 year old may have higher baseline troponin   levels and results should be  interpreted in conjunction with the overall   clinical context.     NOTE: Troponin I testing is performed using a different   testing methodology at [facility] than at other   [facility] hospitals. Direct result comparisons should only   be made within the same method.        XR chest 1 view   Final Result   Normal AP chest.   Signed by Bharath Yo MD           Procedures     Medical Decision Making  Patient presents with chief complaint of palpitations.  Initial and repeat troponin were negative for ACS.  EKG was negative for dysrhythmia or ST elevation.  Chest x-ray was negative for pneumonia or pneumothorax.  Patient can be discharged home.    Amount and/or Complexity of Data Reviewed  ECG/medicine tests: independent interpretation performed.     Details: Sinus rhythm rate of 72, narrow complex, normal axis, no ST elevation or depression, no ectopy.         Diagnoses as of 01/03/24 0244   Palpitations                    Prateek Givens MD  01/03/24 0245

## 2024-01-05 LAB
CHOLEST SERPL-MCNC: 145 MG/DL (ref 100–199)
HDL SERPL-SCNC: 17.5 UMOL/L
HDLC SERPL-MCNC: 22 MG/DL
LDL SERPL QN: 19.8 NM
LDL SERPL-SCNC: 1382 NMOL/L
LDL SMALL SERPL-SCNC: 1026 NMOL/L
LDLC SERPL CALC-MCNC: 107 MG/DL (ref 0–99)
LP-IR SCORE SERPL: 61
TRIGL SERPL-MCNC: 79 MG/DL (ref 0–149)

## 2024-01-08 ENCOUNTER — TELEMEDICINE (OUTPATIENT)
Dept: PRIMARY CARE | Facility: CLINIC | Age: 54
End: 2024-01-08
Payer: COMMERCIAL

## 2024-01-08 DIAGNOSIS — I25.119 CORONARY ARTERY DISEASE INVOLVING NATIVE CORONARY ARTERY OF NATIVE HEART WITH ANGINA PECTORIS (CMS-HCC): ICD-10-CM

## 2024-01-08 DIAGNOSIS — E11.40 TYPE 2 DIABETES MELLITUS WITH DIABETIC NEUROPATHY, WITHOUT LONG-TERM CURRENT USE OF INSULIN (MULTI): ICD-10-CM

## 2024-01-08 DIAGNOSIS — I10 PRIMARY HYPERTENSION: Primary | ICD-10-CM

## 2024-01-08 DIAGNOSIS — Z09 HOSPITAL DISCHARGE FOLLOW-UP: ICD-10-CM

## 2024-01-08 PROCEDURE — 99213 OFFICE O/P EST LOW 20 MIN: CPT | Performed by: INTERNAL MEDICINE

## 2024-01-08 ASSESSMENT — ENCOUNTER SYMPTOMS
ALLERGIC/IMMUNOLOGIC NEGATIVE: 1
COUGH: 0
CHILLS: 0
EYES NEGATIVE: 1
SHORTNESS OF BREATH: 0
ENDOCRINE NEGATIVE: 1
COUGH: 0
DIFFICULTY URINATING: 0
FEVER: 0
NAUSEA: 0
ARTHRALGIAS: 1
SHORTNESS OF BREATH: 0
PSYCHIATRIC NEGATIVE: 1

## 2024-01-08 NOTE — PROGRESS NOTES
Subjective   Patient ID: Chester Myles is a 53 y.o. male.    HPI  Patient is here to establish for hydrocele and kidney stones. . He has seen Dr. Umaña for the hydrocele in the past. He was supposed to be surgery to repair this but cardiologist would not allow at that time.  Scrotal US (10/03/2022) Results showed bilateral normal testes.Large right hydrocele. Small left hydrocele.  Never had Aspiration of Hydrocele.  Recent CT on 10/23 showed nononbstructing punctate left renal stones without hydro. Chronic LUTS sx are minimal. No urgency or frequency. weak stream at times. some hesitancy. No dysuria. Denies hematuria. Nocturia 1x. caffeine does worsen sx. Patient limits caffeine intake. Chronic Hx of Prostatitis. Recent PSA was 1.2 on 10/22.     Review of Systems   Constitutional:  Negative for chills and fever.   HENT: Negative.     Eyes: Negative.    Respiratory:  Negative for cough and shortness of breath.    Cardiovascular:  Negative for chest pain and leg swelling.   Gastrointestinal:  Negative for nausea.   Endocrine: Negative.    Genitourinary:  Negative for difficulty urinating.        Negative except for documented in HPI   Allergic/Immunologic: Negative.    Neurological:         Alert & oriented X 3   Hematological:         Denies blood thinners   Psychiatric/Behavioral: Negative.         Objective   Physical Exam  No PE done given the virtual nature of visit.   Assessment/Plan       Diagnoses and all orders for this visit:  Kidney stones  Benign prostatic hyperplasia without lower urinary tract symptoms  Nocturia  Hydrocele, unspecified hydrocele type      Past U/S reviewed  Pros/cons of Hydrocele repair discussed  All available PSA values reviewed, Options discussed. Questions answered.   Diet changes for prostate health discussed and educational information given. Pros/Cons of prostate health supplements discussed.   Treatment options for LUTS reviewed  Discussed timed voiding. Discussed fluid and  caffeine intake  Treatment options for ED reviewed.  Lifestyle change to help prevent UTIs discussed. Encouraged fluid intake.  CT reviewed Will observe     F/U with PSA and In office for PE to discuss Hydrocele repair

## 2024-01-08 NOTE — PROGRESS NOTES
"Subjective   Patient ID: Jhony Myles \"Chester\" is a 53 y.o. male who presents for Follow-up.  HPI  Pt is here today for telephone follow up   Pt and physician are at two separate locations.  Pt was verbally consented for the encounter.     Patient is here today for ED follow up   Patient reports that last week he had palpitations, elevated blood pressure, slowed down when he got to the hospital.  Everything was ok, normal EKG and troponin's. Has not had any more palpitations.   Has had more nausea, decreased appetite, appt with urology end of the month. He has an appt scheduled with gi to discuss scheduling colonoscopy.     Review of Systems   Respiratory:  Negative for cough and shortness of breath.    Cardiovascular:  Negative for chest pain and leg swelling.   Musculoskeletal:  Positive for arthralgias.       Objective   There were no vitals taken for this visit.    Physical Exam  No physical exam was completed due to being telephone follow up       Assessment/Plan   Problem List Items Addressed This Visit       Type 2 diabetes mellitus with diabetic neuropathy, without long-term current use of insulin (CMS/[facility])    Primary hypertension - Primary    Coronary artery disease involving native coronary artery of native heart with angina pectoris (CMS/[facility])    Hospital discharge follow-up     ED follow up for palpitations   - reviewed ED records including EKG, bloodwork, all wnl   - advised if palpitations persist then will order holter monitor     2. Scheduled for urology for kidney stone follow up tomorrow     3. Scheduled for GI TO DISCUSS COLONOSCOPY   Final diagnoses:   [I10] Primary hypertension   [I25.119] Coronary artery disease involving native coronary artery of native heart with angina pectoris (CMS/[facility])   [E11.40] Type 2 diabetes mellitus with diabetic neuropathy, without long-term current use of insulin (CMS/[facility])   [Z09] Hospital discharge follow-up     "

## 2024-01-09 ENCOUNTER — TELEMEDICINE (OUTPATIENT)
Dept: UROLOGY | Facility: CLINIC | Age: 54
End: 2024-01-09
Payer: COMMERCIAL

## 2024-01-09 DIAGNOSIS — R35.1 NOCTURIA: ICD-10-CM

## 2024-01-09 DIAGNOSIS — N40.0 BENIGN PROSTATIC HYPERPLASIA WITHOUT LOWER URINARY TRACT SYMPTOMS: ICD-10-CM

## 2024-01-09 DIAGNOSIS — N20.0 KIDNEY STONES: ICD-10-CM

## 2024-01-09 DIAGNOSIS — N43.3 HYDROCELE, UNSPECIFIED HYDROCELE TYPE: ICD-10-CM

## 2024-01-09 PROCEDURE — 99214 OFFICE O/P EST MOD 30 MIN: CPT | Performed by: UROLOGY

## 2024-01-10 ENCOUNTER — OFFICE VISIT (OUTPATIENT)
Dept: ORTHOPEDIC SURGERY | Facility: CLINIC | Age: 54
End: 2024-01-10
Payer: COMMERCIAL

## 2024-01-10 DIAGNOSIS — M17.11 PRIMARY OSTEOARTHRITIS OF RIGHT KNEE: Primary | ICD-10-CM

## 2024-01-10 DIAGNOSIS — M76.31 ILIOTIBIAL BAND SYNDROME OF RIGHT SIDE: ICD-10-CM

## 2024-01-10 PROCEDURE — 3044F HG A1C LEVEL LT 7.0%: CPT | Performed by: NURSE PRACTITIONER

## 2024-01-10 PROCEDURE — 1036F TOBACCO NON-USER: CPT | Performed by: NURSE PRACTITIONER

## 2024-01-10 PROCEDURE — 4010F ACE/ARB THERAPY RXD/TAKEN: CPT | Performed by: NURSE PRACTITIONER

## 2024-01-10 PROCEDURE — 99214 OFFICE O/P EST MOD 30 MIN: CPT | Performed by: NURSE PRACTITIONER

## 2024-01-10 ASSESSMENT — ENCOUNTER SYMPTOMS
PSYCHIATRIC NEGATIVE: 1
HEMATOLOGIC/LYMPHATIC NEGATIVE: 1
ARTHRALGIAS: 1
ENDOCRINE NEGATIVE: 1
CARDIOVASCULAR NEGATIVE: 1
CONSTITUTIONAL NEGATIVE: 1
RESPIRATORY NEGATIVE: 1
KNEE SWELLING: 1
NEUROLOGICAL NEGATIVE: 1

## 2024-01-10 ASSESSMENT — PAIN SCALES - GENERAL: PAINLEVEL_OUTOF10: 4

## 2024-01-10 ASSESSMENT — PAIN DESCRIPTION - DESCRIPTORS: DESCRIPTORS: ACHING

## 2024-01-10 ASSESSMENT — PAIN - FUNCTIONAL ASSESSMENT: PAIN_FUNCTIONAL_ASSESSMENT: 0-10

## 2024-01-10 NOTE — ASSESSMENT & PLAN NOTE
We reviewed conservative measures for knee OA symptom control.  Patient had slight improvement with topical OTC diclofenac gel.  I am sending diclofenac gel 2% to use 2 pumps twice daily to pharmacy to see if this helps improve some of the symptoms of both the knee pain and IT band pain and tightness.  Patient to keep PT as scheduled.  We did discuss risk and benefits of cortisone injection and offered at today's visit.  Patient declined.  Patient wishes to pursue advanced imaging.  Advised to continue home exercises until initiation of formal therapy.  I will request MRI of the knee with plan to follow-up with Dr. Gonzalez for results review and treatment plan.  Patient in agreement with plan of care  This note was generated using Dragon software.  It may contain errors in wording, punctuation or spelling.

## 2024-01-10 NOTE — PROGRESS NOTES
"Subjective    Patient ID: Chester Myles is a 53 y.o. male.    Chief Complaint: Pain of the Right Knee (FUV RT KNEE PAIN/PAIN RATE 4/X-RAY 11-21-23/USING HEAT //)       Hx lat meniscus tear 30 yrs     Right Knee     Chester is a pleasant 53-year-old gentleman here for follow-up of right knee and hip pain.    OTC topicasl - several attempted without relief  No OTC meds (NSAIDs or acetaminophen) due to GI discomfort with use.   \"KNEE IS TERRIBLE\" not improved from last visit, slightly worse  Right hip symptoms with significant improvement after initiation of IT band exercises, PT starts tomorrow  Symptoms have slightly aggravated over the last couple weeks with increased knee pain  Compounded topical attempted with minimal improvement   No new injuries      Review of Systems   Constitutional: Negative.    HENT: Negative.     Respiratory: Negative.     Cardiovascular: Negative.    Endocrine: Negative.    Musculoskeletal:  Positive for arthralgias.   Skin: Negative.    Neurological: Negative.    Hematological: Negative.    Psychiatric/Behavioral: Negative.         Objective   Right Knee Exam     Tenderness   The patient is experiencing tenderness in the lateral joint line.    Range of Motion   Extension:  10   Flexion:  100     Tests   Leigh:  Medial - negative Lateral - positive  Varus: negative Valgus: negative  Lachman:  Anterior - negative      Drawer:  Anterior - negative    Posterior - negative    Other   Erythema: absent  Sensation: normal  Pulse: present  Swelling: mild    Comments:  Positive crepitus heard and palpated during exam  Full ROM of distal joints with no sx aggravation, distal motor and sensory intact, cap refill at 2 seconds.      Right Hip Exam     Tenderness   The patient is experiencing tenderness in the greater trochanter (IT band proximal, mid and distal).    Tests   RUBIA: negative    Other   Erythema: absent  Sensation: normal  Pulse: present            Image " Results:    11/21/2023  FINDINGS:  Right knee, three views      There is a small quadriceps enthesophyte. There is mild osteophytosis  and sclerosis in all 3 compartments. There is no joint space  narrowing. There is moderate chondrocalcinosis. A small effusions  present.      IMPRESSION:  Mild degenerative changes.  Moderate chondrocalcinosis which can be seen with CPPD arthropathy.    Assessment/Plan   Encounter Diagnoses:  Problem List Items Addressed This Visit             ICD-10-CM    Iliotibial band syndrome of right side M76.31    Primary osteoarthritis of right knee - Primary M17.11     We reviewed conservative measures for knee OA symptom control.  Patient had slight improvement with topical OTC diclofenac gel.  I am sending diclofenac gel 2% to use 2 pumps twice daily to pharmacy to see if this helps improve some of the symptoms of both the knee pain and IT band pain and tightness.  Patient to keep PT as scheduled.  We did discuss risk and benefits of cortisone injection and offered at today's visit.  Patient declined.  Patient wishes to pursue advanced imaging.  Advised to continue home exercises until initiation of formal therapy.  I will request MRI of the knee with plan to follow-up with Dr. Gonzalez for results review and treatment plan.  Patient in agreement with plan of care  This note was generated using Dragon software.  It may contain errors in wording, punctuation or spelling.         Relevant Medications    diclofenac sodium 2 % solution in packet    Other Relevant Orders    MR knee right wo IV contrast    Follow Up In Orthopaedic Surgery

## 2024-01-11 ENCOUNTER — TREATMENT (OUTPATIENT)
Dept: PHYSICAL THERAPY | Facility: CLINIC | Age: 54
End: 2024-01-11
Payer: COMMERCIAL

## 2024-01-11 DIAGNOSIS — M76.31 ILIOTIBIAL BAND SYNDROME OF RIGHT SIDE: ICD-10-CM

## 2024-01-11 DIAGNOSIS — M70.61 GREATER TROCHANTERIC BURSITIS OF RIGHT HIP: ICD-10-CM

## 2024-01-11 DIAGNOSIS — M17.11 PRIMARY OSTEOARTHRITIS OF RIGHT KNEE: ICD-10-CM

## 2024-01-11 PROCEDURE — 97110 THERAPEUTIC EXERCISES: CPT | Mod: GP,CQ

## 2024-01-11 PROCEDURE — 97140 MANUAL THERAPY 1/> REGIONS: CPT | Mod: GP,CQ

## 2024-01-11 ASSESSMENT — PAIN - FUNCTIONAL ASSESSMENT: PAIN_FUNCTIONAL_ASSESSMENT: 0-10

## 2024-01-11 ASSESSMENT — PAIN SCALES - GENERAL: PAINLEVEL_OUTOF10: 5 - MODERATE PAIN

## 2024-01-11 ASSESSMENT — PAIN DESCRIPTION - DESCRIPTORS: DESCRIPTORS: ACHING

## 2024-01-11 NOTE — PROGRESS NOTES
"Physical Therapy  Treatment      Patient Name: Jhony Myles \"Chester\"  MRN: [id number]  Today's Date: 1/11/2024  Time Calculation  Start Time: 1442  Stop Time: 1530  Time Calculation (min): 48 min      Assessment:Patient identified by name and birth date. Noted hypomobility of R patella and restriction in IT band. IASTM/STM  and cupping completed to address. Instructed and given HEP for stretching and LE PREs.  Bands and sheet given. He voiced good understanding .        Plan:Continue with manual therapy to reduce soft tissue restriction / pain and ROM  to allow  increased mobility of R knee  for ease with functional transfers and community ambulation.-CG.      Treatment/Interventions: Aquatic therapy, Cryotherapy, Education/ Instruction, Hot pack, Manual therapy, Self care/ home management, Therapeutic exercises (IASTM/cupping)  PT Plan: Skilled PT  PT Frequency: 2 times per week  Duration: 4wks  Onset Date: 11/15/23  Certification Period Start Date: 12/22/23  Certification Period End Date: 03/21/24  Rehab Potential: Good  Plan of Care Agreement: Patient    Current Problem:   1. Iliotibial band syndrome of right side  Follow Up In Physical Therapy      2. Greater trochanteric bursitis of right hip  Follow Up In Physical Therapy      3. Primary osteoarthritis of right knee  Follow Up In Physical Therapy            Subjective  He reports compliance with stretches and now notes pain in R knee only. Rates his pain 5/10. Pain is constant at rest and with prolonged standing/walking this will increase.        Precautions:  Precautions  Precautions Comment: DM; CAD (MI 2005); OA; multiple TIA HX; mod fall risk    Pain:  Pain Assessment  Pain Assessment: 0-10  Pain Score: 5 - Moderate pain  Pain Location: Knee  Pain Orientation: Right  Pain Descriptors: Aching  Pain Frequency: Constant/continuous    Treatment  Therapeutic Exercises:(N)  Sci Fit L2 5 mins   ITBand stretch with strap 5x5\" holds R  Hams 3 way stretch with strap " "5x5\" holds eacg R  Gastroc Stretch with strap 5x5\" holds R   HSC grn band 10x each   LAQ grn band 10x each       MANUAL:      IASTM/STM to R IT band hams gastroc quads abductors                         HG9 brush J hook frame Bevel Up 0-30* (N)    Cupping for mobilization to R IT bnd hams (N)     EDUCATION:    Access Code: [email or web address]  URL: [email or web address]  Date: 01/11/2024  Prepared by: Melissa Mohan    Exercises  - Supine ITB Stretch with Strap  - 1 x daily - 7 x weekly - 1 sets - 10 reps - 5 hold  - Supine Hamstring Stretch with Strap  - 1 x daily - 7 x weekly - 1 sets - 10 reps - 5 hold  - Seated Gastroc Stretch with Strap  - 1 x daily - 7 x weekly - 1 sets - 10 reps - 5 hold  - Seated Hamstring Curls with Resistance  - 1 x daily - 7 x weekly - 2 sets - 10 reps  - Seated Knee Extension with Resistance  - 1 x daily - 7 x weekly - 2 sets - 10 reps  Goals:  Active       PT Problem       PT Goal 1       Start:  12/22/23    Expected End:  03/21/24       1. Independent HEP to allow for 50% reduction in max ADL C/C sx ( 7/10) 2-3wks  2. 0/10 night time sx to allow for uninterrupted sleep x 1wk ( 7/7) 2-3wks  3. Survey score improvement from 19/80 to 40/80 (LEFS) 3-4wks  4. ROM increase to allow for ADL car transfers (from 70 to 90 active hip flexion in stdg) 3-4wks  5. Strength increase to allow for improved ADL STS (from 4/5 to 5/5 R hip strength) 3-4wks         PT Goal 2       Start:  12/22/23    Expected End:  03/21/24       1. \"I want to figure out some stretches\"               "

## 2024-01-15 ENCOUNTER — OFFICE VISIT (OUTPATIENT)
Dept: GASTROENTEROLOGY | Facility: CLINIC | Age: 54
End: 2024-01-15
Payer: COMMERCIAL

## 2024-01-15 ENCOUNTER — LAB (OUTPATIENT)
Dept: LAB | Facility: LAB | Age: 54
End: 2024-01-15
Payer: COMMERCIAL

## 2024-01-15 VITALS — BODY MASS INDEX: 31.35 KG/M2 | HEIGHT: 78 IN | WEIGHT: 270.95 LBS

## 2024-01-15 DIAGNOSIS — R35.1 NOCTURIA: ICD-10-CM

## 2024-01-15 DIAGNOSIS — K21.9 GASTROESOPHAGEAL REFLUX DISEASE WITHOUT ESOPHAGITIS: ICD-10-CM

## 2024-01-15 DIAGNOSIS — E11.40 TYPE 2 DIABETES MELLITUS WITH DIABETIC NEUROPATHY, WITHOUT LONG-TERM CURRENT USE OF INSULIN (MULTI): Primary | ICD-10-CM

## 2024-01-15 DIAGNOSIS — R14.0 ABDOMINAL BLOATING: ICD-10-CM

## 2024-01-15 LAB — PSA SERPL-MCNC: 1.43 NG/ML

## 2024-01-15 PROCEDURE — 84153 ASSAY OF PSA TOTAL: CPT

## 2024-01-15 PROCEDURE — 4010F ACE/ARB THERAPY RXD/TAKEN: CPT | Performed by: INTERNAL MEDICINE

## 2024-01-15 PROCEDURE — 99214 OFFICE O/P EST MOD 30 MIN: CPT | Performed by: INTERNAL MEDICINE

## 2024-01-15 PROCEDURE — 36415 COLL VENOUS BLD VENIPUNCTURE: CPT

## 2024-01-15 PROCEDURE — 1036F TOBACCO NON-USER: CPT | Performed by: INTERNAL MEDICINE

## 2024-01-15 PROCEDURE — 3044F HG A1C LEVEL LT 7.0%: CPT | Performed by: INTERNAL MEDICINE

## 2024-01-15 ASSESSMENT — ENCOUNTER SYMPTOMS
EYES NEGATIVE: 1
CONSTITUTIONAL NEGATIVE: 1
ENDOCRINE NEGATIVE: 1
CARDIOVASCULAR NEGATIVE: 1
NAUSEA: 1
HEMATOLOGIC/LYMPHATIC NEGATIVE: 1
RESPIRATORY NEGATIVE: 1
ABDOMINAL DISTENTION: 1
PSYCHIATRIC NEGATIVE: 1
NEUROLOGICAL NEGATIVE: 1
MUSCULOSKELETAL NEGATIVE: 1

## 2024-01-15 NOTE — PROGRESS NOTES
"Subjective   Patient ID: Jhony Myles \"Chester\" is a 53 y.o. male who presents for Nausea (X 2 months), LLQ pain (Since last visit), Gas (Belching x 2 months), and Weight Loss.  MORRIS Booker is a pleasant 53-year-old male with known significant coronary artery disease.  He presents with complaints of bloating some increased discomfort in epigastric region which she feels is worse when he bends over after eating denies any waterbrash or reflux.  He is currently on omeprazole.  Also has some left lower quadrant pain which seems to occur with his epigastric discomfort.  Is having no melanic stools denies any constipation.  Does not identify any foods make him feel particular more bloated.    He underwent recent EGD and colonoscopy in 2021 and then again in 23 when he was admitted for upper GI bleeding upper endoscopy revealed gastritis without obvious source of bleeding.  Biopsies were negative for H. pylori.  Colonoscopy formed in 2021 revealed a small hyperplastic polyp in his transverse colon.    Review of Systems   Constitutional: Negative.    HENT: Negative.     Eyes: Negative.    Respiratory: Negative.     Cardiovascular: Negative.    Gastrointestinal:  Positive for abdominal distention and nausea.   Endocrine: Negative.    Genitourinary: Negative.    Musculoskeletal: Negative.    Neurological: Negative.    Hematological: Negative.    Psychiatric/Behavioral: Negative.         Objective   Physical Exam  Vitals and nursing note reviewed.   Constitutional:       Appearance: Normal appearance.   HENT:      Head: Normocephalic.      Mouth/Throat:      Mouth: Mucous membranes are moist.      Pharynx: Oropharynx is clear.   Eyes:      Pupils: Pupils are equal, round, and reactive to light.   Cardiovascular:      Rate and Rhythm: Normal rate and regular rhythm.   Pulmonary:      Effort: Pulmonary effort is normal.      Breath sounds: Normal breath sounds.   Abdominal:      General: Abdomen is flat. Bowel sounds are normal. "      Palpations: Abdomen is soft.   Musculoskeletal:         General: Normal range of motion.      Cervical back: Normal range of motion and neck supple.   Skin:     General: Skin is warm and dry.   Neurological:      General: No focal deficit present.      Mental Status: He is alert and oriented to person, place, and time.   Psychiatric:         Mood and Affect: Mood normal.         Behavior: Behavior normal.         Assessment/Plan   Diagnoses and all orders for this visit:  Type 2 diabetes mellitus with diabetic neuropathy, without long-term current use of insulin (CMS/[facility])  -     NM gastric emptying solid; Future  Gastroesophageal reflux disease without esophagitis  -     NM gastric emptying solid; Future  Abdominal bloating  -     Celiac Panel; Future    I suspect he may have gastroparesis will arrange gastric emptying study and celiac panel follow-up after above continue omeprazole for now and diet therapy.       Umesh Dow DO 01/15/24 8:54 AM    full range of motion in all extremities

## 2024-01-18 ENCOUNTER — TREATMENT (OUTPATIENT)
Dept: PHYSICAL THERAPY | Facility: CLINIC | Age: 54
End: 2024-01-18
Payer: COMMERCIAL

## 2024-01-18 DIAGNOSIS — M76.31 ILIOTIBIAL BAND SYNDROME OF RIGHT SIDE: ICD-10-CM

## 2024-01-18 DIAGNOSIS — M17.11 PRIMARY OSTEOARTHRITIS OF RIGHT KNEE: ICD-10-CM

## 2024-01-18 DIAGNOSIS — M70.61 GREATER TROCHANTERIC BURSITIS OF RIGHT HIP: ICD-10-CM

## 2024-01-18 PROCEDURE — 97140 MANUAL THERAPY 1/> REGIONS: CPT | Mod: GP,CQ

## 2024-01-18 PROCEDURE — 97110 THERAPEUTIC EXERCISES: CPT | Mod: GP,CQ

## 2024-01-18 ASSESSMENT — PAIN DESCRIPTION - DESCRIPTORS: DESCRIPTORS: ACHING

## 2024-01-18 ASSESSMENT — PAIN - FUNCTIONAL ASSESSMENT: PAIN_FUNCTIONAL_ASSESSMENT: 0-10

## 2024-01-18 ASSESSMENT — PAIN SCALES - GENERAL: PAINLEVEL_OUTOF10: 7

## 2024-01-18 NOTE — PROGRESS NOTES
"Physical Therapy  Treatment      Patient Name: Jhony Myles \"Chester\"  MRN: [id number]  Today's Date: 1/18/2024  Time Calculation  Start Time: 1445  Stop Time: 1529  Time Calculation (min): 44 min   Visit 3/9 POC end date 1/26/24       Assessment:Patient identified by name and birth date.  Sxs localized in lateral aspect of R calf. Noted +R anterior  pelvic rotation and this corrected after manual therapy.     Plan:Continue with manual therapy to reduce soft tissue restriction / pain and ROM  to   improve sleeping and allow  increased endurance with standing for ease with ADLs.-CG.    Treatment/Interventions: Aquatic therapy, Cryotherapy, Education/ Instruction, Hot pack, Manual therapy, Self care/ home management, Therapeutic exercises (IASTM/cupping)  PT Plan: Skilled PT  PT Frequency: 2 times per week  Duration: 4wks  Onset Date: 11/15/23  Certification Period Start Date: 12/22/23  Certification Period End Date: 03/21/24  Rehab Potential: Good  Plan of Care Agreement: Patient    Current Problem:   1. Iliotibial band syndrome of right side  Follow Up In Physical Therapy      2. Greater trochanteric bursitis of right hip  Follow Up In Physical Therapy      3. Primary osteoarthritis of right knee  Follow Up In Physical Therapy            Subjective  He reports localized pain at lateral aspect of R calf that radiates into his foot and increased knee pain. Noted Sxs increase at night with sleeping and with prolonged standing.         General  General Comment: 3/9 Visit end date 1/26/24  Precautions:  Precautions  Precautions Comment: DM; CAD (MI 2005); OA; multiple TIA HX; mod fall risk    Pain:  Pain Assessment  Pain Assessment: 0-10  Pain Score: 7  Pain Location: Knee  Pain Orientation: Right  Pain Descriptors: Aching  Pain Frequency: Constant/continuous    Treatment  Therapeutic Exercises:  Sci Fit L2 5 mins   ITBand stretch with strap 5x5\" holds R  Hams 3 way stretch with strap 5x5\" holds eacg R  Gastroc Stretch " "with strap 5x5\" holds R   HSC grn band 10x each   LAQ grn band 10x each       MANUAL:      IASTM/STM to R IT band hams gastroc quads abductors                         HG9 brush J hook frame Bevel Up 0-30*     Cupping for mobilization to R IT bnd hams      EDUCATION:    Access Code: [email or web address]  URL: [email or web address]  Date: 01/11/2024  Prepared by: Melissa oMhan    Exercises  - Supine ITB Stretch with Strap  - 1 x daily - 7 x weekly - 1 sets - 10 reps - 5 hold  - Supine Hamstring Stretch with Strap  - 1 x daily - 7 x weekly - 1 sets - 10 reps - 5 hold  - Seated Gastroc Stretch with Strap  - 1 x daily - 7 x weekly - 1 sets - 10 reps - 5 hold  - Seated Hamstring Curls with Resistance  - 1 x daily - 7 x weekly - 2 sets - 10 reps  - Seated Knee Extension with Resistance  - 1 x daily - 7 x weekly - 2 sets - 10 reps  Goals:  Active       PT Problem       PT Goal 1       Start:  12/22/23    Expected End:  03/21/24       1. Independent HEP to allow for 50% reduction in max ADL C/C sx ( 7/10) 2-3wks  2. 0/10 night time sx to allow for uninterrupted sleep x 1wk ( 7/7) 2-3wks  3. Survey score improvement from 19/80 to 40/80 (LEFS) 3-4wks  4. ROM increase to allow for ADL car transfers (from 70 to 90 active hip flexion in stdg) 3-4wks  5. Strength increase to allow for improved ADL STS (from 4/5 to 5/5 R hip strength) 3-4wks         PT Goal 2       Start:  12/22/23    Expected End:  03/21/24       1. \"I want to figure out some stretches\"             "

## 2024-01-22 ENCOUNTER — OFFICE VISIT (OUTPATIENT)
Dept: UROLOGY | Facility: CLINIC | Age: 54
End: 2024-01-22
Payer: COMMERCIAL

## 2024-01-22 VITALS — RESPIRATION RATE: 16 BRPM | BODY MASS INDEX: 31.82 KG/M2 | WEIGHT: 275 LBS | HEIGHT: 78 IN

## 2024-01-22 DIAGNOSIS — N40.0 BENIGN PROSTATIC HYPERPLASIA WITHOUT LOWER URINARY TRACT SYMPTOMS: ICD-10-CM

## 2024-01-22 DIAGNOSIS — N20.0 KIDNEY STONES: ICD-10-CM

## 2024-01-22 DIAGNOSIS — R35.1 NOCTURIA: ICD-10-CM

## 2024-01-22 DIAGNOSIS — N43.3 HYDROCELE, UNSPECIFIED HYDROCELE TYPE: ICD-10-CM

## 2024-01-22 PROCEDURE — 3044F HG A1C LEVEL LT 7.0%: CPT | Performed by: UROLOGY

## 2024-01-22 PROCEDURE — 99214 OFFICE O/P EST MOD 30 MIN: CPT | Performed by: UROLOGY

## 2024-01-22 PROCEDURE — 4010F ACE/ARB THERAPY RXD/TAKEN: CPT | Performed by: UROLOGY

## 2024-01-22 PROCEDURE — 1036F TOBACCO NON-USER: CPT | Performed by: UROLOGY

## 2024-01-22 ASSESSMENT — ENCOUNTER SYMPTOMS
ALLERGIC/IMMUNOLOGIC NEGATIVE: 1
COUGH: 0
SHORTNESS OF BREATH: 0
PSYCHIATRIC NEGATIVE: 1
DIFFICULTY URINATING: 0
CHILLS: 0
ENDOCRINE NEGATIVE: 1
NAUSEA: 0
EYES NEGATIVE: 1
FEVER: 0

## 2024-01-22 NOTE — PROGRESS NOTES
Subjective   Patient ID: Chester Myles is a 53 y.o. male.    HPI Hx of hydrocele and kidney stones. . He has seen Dr. Umaña for the hydrocele in the past. He was supposed to be surgery to repair this but cardiologist would not allow at that time, he is cleared now.  Scrotal US (10/03/2022) Results showed bilateral normal testes.Large right hydrocele. Small left hydrocele.  Never had Aspiration of Hydrocele.  Recent CT on 10/23 showed nononbstructing punctate left renal stones without hydro. Chronic LUTS sx are minimal. No urgency or frequency. weak stream at times. some hesitancy. No dysuria. Denies hematuria. Nocturia 1x. caffeine does worsen sx. Patient limits caffeine intake. Chronic Hx of Prostatitis. Recent PSA was 1.4 (1/24 ) Previous PSA was 1.2 on 10/22.        Review of Systems   Constitutional:  Negative for chills and fever.   HENT: Negative.     Eyes: Negative.    Respiratory:  Negative for cough and shortness of breath.    Cardiovascular:  Negative for chest pain and leg swelling.   Gastrointestinal:  Negative for nausea.   Endocrine: Negative.    Genitourinary:  Negative for difficulty urinating.        Negative except for documented in HPI   Allergic/Immunologic: Negative.    Neurological:         Alert & oriented X 3   Hematological:         Denies blood thinners   Psychiatric/Behavioral: Negative.         Objective   Physical Exam  Vitals and nursing note reviewed.   Constitutional:       General: He is not in acute distress.     Appearance: Normal appearance.   Pulmonary:      Effort: Pulmonary effort is normal.   Abdominal:      Tenderness: There is no abdominal tenderness.   Genitourinary:     Comments: Kidneys non palpable bilaterally  Bladder non palpable or tender  Scrotum no mass, RIGHT hydrocele  Epididymis- No spermatocele. Non Tender.  Testicles: No mass  Urethra: No discharge  Penis within normal limits... No lesions. Burried  Prostate - deferred  Neurological:      Mental Status: He  is alert.         Assessment/Plan   Diagnoses and all orders for this visit:  Hydrocele, unspecified hydrocele type  Nocturia  Benign prostatic hyperplasia without lower urinary tract symptoms  Kidney stones    All available PSA values reviewed, Options discussed. Questions answered.   Diet changes for prostate health discussed and educational information given. Pros/Cons of prostate health supplements discussed.   Treatment options for LUTS reviewed  Discussed timed voiding. Discussed fluid and caffeine intake  Treatment options for ED reviewed.  Lifestyle change to help prevent UTIs discussed. Encouraged fluid intake.  Past CT and U/S reviewed  Stone prevention discussed. Diet reviewed. Discussed fluid intake  Scrotal U/S reviewed-Large right hydrocele    F/U  RIGHT HYDROCELE Repair    Patient was identified as a fall risk. Risk prevention instructions provided.

## 2024-01-26 ENCOUNTER — TREATMENT (OUTPATIENT)
Dept: PHYSICAL THERAPY | Facility: CLINIC | Age: 54
End: 2024-01-26
Payer: COMMERCIAL

## 2024-01-26 ENCOUNTER — APPOINTMENT (OUTPATIENT)
Dept: RADIOLOGY | Facility: HOSPITAL | Age: 54
End: 2024-01-26
Payer: COMMERCIAL

## 2024-01-26 DIAGNOSIS — E87.6 HYPOKALEMIA: ICD-10-CM

## 2024-01-26 DIAGNOSIS — M70.61 GREATER TROCHANTERIC BURSITIS OF RIGHT HIP: ICD-10-CM

## 2024-01-26 DIAGNOSIS — I25.119 CORONARY ARTERY DISEASE INVOLVING NATIVE CORONARY ARTERY OF NATIVE HEART WITH ANGINA PECTORIS (CMS-HCC): Primary | ICD-10-CM

## 2024-01-26 DIAGNOSIS — M76.31 ILIOTIBIAL BAND SYNDROME OF RIGHT SIDE: ICD-10-CM

## 2024-01-26 DIAGNOSIS — M17.11 PRIMARY OSTEOARTHRITIS OF RIGHT KNEE: ICD-10-CM

## 2024-01-26 PROCEDURE — 97110 THERAPEUTIC EXERCISES: CPT | Mod: GP

## 2024-01-26 PROCEDURE — 97140 MANUAL THERAPY 1/> REGIONS: CPT | Mod: GP

## 2024-01-26 RX ORDER — ISOSORBIDE MONONITRATE 30 MG/1
30 TABLET, EXTENDED RELEASE ORAL EVERY 12 HOURS
Qty: 180 TABLET | Refills: 2 | Status: SHIPPED | OUTPATIENT
Start: 2024-01-26 | End: 2024-10-22

## 2024-01-26 RX ORDER — POTASSIUM CHLORIDE 20 MEQ/1
20 TABLET, EXTENDED RELEASE ORAL DAILY
Qty: 90 TABLET | Refills: 2 | Status: SHIPPED | OUTPATIENT
Start: 2024-01-26 | End: 2024-10-22

## 2024-01-26 ASSESSMENT — PAIN - FUNCTIONAL ASSESSMENT: PAIN_FUNCTIONAL_ASSESSMENT: 0-10

## 2024-01-26 ASSESSMENT — PAIN SCALES - GENERAL: PAINLEVEL_OUTOF10: 2

## 2024-01-26 NOTE — TELEPHONE ENCOUNTER
----- Message from Verenice Brink sent at 1/26/2024 12:47 PM EST -----  Regarding: Med  refill  Need [facility] of potassium and [facility] [place]

## 2024-01-26 NOTE — TELEPHONE ENCOUNTER
Last Appointment: 11/20/23 with   Next Appointment: 5/23/24 with Dr. Ortiz  Last Labs: CMP 1/3/24 k+ 4.3  Last Refilled: Both 1/4/23 90 days 3 refills  Saw Mady Tarango 1/2023

## 2024-01-26 NOTE — PROGRESS NOTES
"Physical Therapy Treatment    Patient Name: Jhony Myles  MRN: [id number]  Today's Date: 2024  Time Calculation  Start Time: 1432  Stop Time: 1515  Time Calculation (min): 43 min  General:  General  General Comment:  end 24    Current Problem  Problem List Items Addressed This Visit             ICD-10-CM    Iliotibial band syndrome of right side M76.31    Greater trochanteric bursitis of right hip M70.61    Primary osteoarthritis of right knee M17.11       Assessment:Patient identity confirmed today with name/.  This is only the 4th visit including the eval on 23;  reviewed local area anatomy related to C/C sx;  added SLS today    Plan:  Treatment/Interventions: Aquatic therapy, Cryotherapy, Education/ Instruction, Hot pack, Manual therapy, Self care/ home management, Therapeutic exercises (IASTM/cupping)  PT Plan: Skilled PT  PT Frequency: 2 times per week  Duration: 4wks  Onset Date: 11/15/23  Certification Period Start Date: 23  Certification Period End Date: 24  Rehab Potential: Good  Plan of Care Agreement: Patient  Continue with open to closed chain RLE ROM/PRE as betsy  Subjective: 2/10 pain right now at my knee.  The knee felt worse yesterday but my knee popped and it felt better.   This was the second time that this happened.  I postponed an MRI so that I get it when my knee is \"out of place\" when it is taken.    Precautions  Precautions  Precautions Comment: DM; CAD (MI ); OA; multiple TIA HX; mod fall risk    Pain  Pain Assessment: 0-10  Pain Score: 2  Pain Location: Knee  Pain Orientation: Right    Treatments:  Sci Fit L2 5 mins   ITBand stretch with strap 5x5\" holds R  Hams 3 way stretch with strap 5x5\" holds ea R  Gastroc Stretch with strap 5x5\" holds R   HSC grn band 2x10 (manual today)  LAQ 2x10 3# weight    SLS 2x20\" N      MANUAL:      IASTM/STM to R IT band hams gastroc quads abductors                         HG9 brush J hook frame Bevel Up 0-30*     Cupping " "for mobilization to R IT bnd hams 8'         Goals:  Active       PT Problem       PT Goal 1       Start:  12/22/23    Expected End:  03/21/24       1. Independent HEP to allow for 50% reduction in max ADL C/C sx ( 7/10) 2-3wks  2. 0/10 night time sx to allow for uninterrupted sleep x 1wk ( 7/7) 2-3wks  3. Survey score improvement from 19/80 to 40/80 (LEFS) 3-4wks  4. ROM increase to allow for ADL car transfers (from 70 to 90 active hip flexion in stdg) 3-4wks  5. Strength increase to allow for improved ADL STS (from 4/5 to 5/5 R hip strength) 3-4wks         PT Goal 2       Start:  12/22/23    Expected End:  03/21/24       1. \"I want to figure out some stretches\"            "

## 2024-01-29 ENCOUNTER — PREP FOR PROCEDURE (OUTPATIENT)
Dept: UROLOGY | Facility: CLINIC | Age: 54
End: 2024-01-29
Payer: COMMERCIAL

## 2024-01-29 DIAGNOSIS — N43.3 HYDROCELE, UNSPECIFIED HYDROCELE TYPE: ICD-10-CM

## 2024-01-31 ENCOUNTER — APPOINTMENT (OUTPATIENT)
Dept: ORTHOPEDIC SURGERY | Facility: CLINIC | Age: 54
End: 2024-01-31
Payer: COMMERCIAL

## 2024-02-14 ENCOUNTER — OFFICE VISIT (OUTPATIENT)
Dept: DERMATOLOGY | Facility: CLINIC | Age: 54
End: 2024-02-14
Payer: COMMERCIAL

## 2024-02-14 DIAGNOSIS — L81.4 LENTIGO: ICD-10-CM

## 2024-02-14 DIAGNOSIS — D18.01 HEMANGIOMA OF SKIN: ICD-10-CM

## 2024-02-14 DIAGNOSIS — Z12.83 ENCOUNTER FOR SCREENING FOR MALIGNANT NEOPLASM OF SKIN: ICD-10-CM

## 2024-02-14 DIAGNOSIS — L91.8 SKIN TAG: ICD-10-CM

## 2024-02-14 DIAGNOSIS — L21.9 SEBORRHEIC DERMATITIS: Primary | ICD-10-CM

## 2024-02-14 DIAGNOSIS — L82.1 SEBORRHEIC KERATOSIS: ICD-10-CM

## 2024-02-14 PROCEDURE — 1036F TOBACCO NON-USER: CPT | Performed by: NURSE PRACTITIONER

## 2024-02-14 PROCEDURE — 3044F HG A1C LEVEL LT 7.0%: CPT | Performed by: NURSE PRACTITIONER

## 2024-02-14 PROCEDURE — 99204 OFFICE O/P NEW MOD 45 MIN: CPT | Performed by: NURSE PRACTITIONER

## 2024-02-14 PROCEDURE — 4010F ACE/ARB THERAPY RXD/TAKEN: CPT | Performed by: NURSE PRACTITIONER

## 2024-02-14 RX ORDER — KETOCONAZOLE 20 MG/G
CREAM TOPICAL
Qty: 30 G | Refills: 11 | Status: SHIPPED | OUTPATIENT
Start: 2024-02-14 | End: 2024-06-07 | Stop reason: ALTCHOICE

## 2024-02-14 NOTE — PROGRESS NOTES
"Subjective     Chester Myles is a 53 y.o. male who presents for the following: Skin Check (Pt presents for waist up skin check. Pt expresses several areas of concern to scalp, face, chest, and back. Pt denies personal history of skin cancer. Pt states his paternal uncle had skin cancer. ).     Review of Systems:  No other skin or systemic complaints other than what is documented elsewhere in the note.    The following portions of the chart were reviewed this encounter and updated as appropriate:         Skin Cancer History  No skin cancer on file.      Specialty Problems    None       Objective   Well appearing patient in no apparent distress; mood and affect are within normal limits.    A full examination was performed including scalp, head, eyes, ears, nose, lips, neck, chest, axillae, abdomen, back, buttocks, bilateral upper extremities, bilateral lower extremities, hands, feet, fingers, toes, fingernails, and toenails. All findings within normal limits unless otherwise noted below.    Assessment/Plan   1. Seborrheic dermatitis  Head - Anterior (Face), Left Eye  Erythema with overlying greasy scale.    -Discussed the nature of the diagnosis  -There is no \"cure\" for this condition. Treatments will need to be continued long term to treat the condition  -Recommend: start ketoconazole cream, use as directed.     2. Encounter for screening for malignant neoplasm of skin  Scattered benign lesions    - Protective measures, such as avoiding skin exposure to sunlight during peak sun hours (10 AM to 3 PM), wearing protective clothing, and applying high-SPF sunscreen, are essential for reducing exposure to harmful ultraviolet (UV) light.  - Monthly self-examination of the skin is helpful to detect new lesions or changes in existing lesions.  - Discussed signs and symptoms of sun-related skin cancers.   - Make sure your moles are not signs of skin cancer (melanoma). Remember the ABCDEs of melanoma lesions:  A - Asymmetry: " One half of the lesion does not mirror the other half.  B - Border: The borders are irregular or vague (indistinct).  C - Color: More than one color may be noted within the mole.  D - Diameter: Size greater than 6 mm (roughly the size of a pencil eraser) may be concerning.  E - Evolving: Notable changes in the lesion over time are suspicious signs for skin cancer.    3. Hemangioma of skin  Violaceous/red papule with maroon lagoons     - A cherry hemangioma is a small macule (small, flat, smooth area) or papule (small, solid bump) formed from an overgrowth of tiny blood vessels in the skin. Cherry hemangiomas are characteristically red or purplish in color. They often first appear in middle adulthood and usually increase in number with age. Cherry hemangiomas are noncancerous (benign) and are common in adults.  - Lesions are benign, reassured patient.     4. Skin tag  Fleshy, skin-colored sessile and pedunculated papules.     Acrochordon (skin tag)  - The benign nature of the lesion was discussed with patient.   - A skin tag (acrochordon) is a common, possibly inherited condition that manifests as small, flesh-colored growths on a thin stalk. Skin tags are benign lesions that can sometimes become irritated or traumatized.  - Skin tags are very common, and their incidence increases with age. Seen more often in people with growth hormone excess (acromegaly).   - Skin tags are most commonly found on the eyelids, neck, armpits, and groin area. They are flesh-colored growths on a thin stalk, ranging in size from small to large.      5. Seborrheic keratosis  Stuck on verrucous, tan-brown papules and plaques.      Although Seborrheic Keratoses can be troublesome and unsightly, they are entirely benign.  Removal of Seborrheic Keratoses is considered a cosmetic procedure. Removal is typically performed using liquid nitrogen cryotherapy.  Treatment of current lesions does not prevent the development of new Seborrheic Keratoses  in the future.    6. Lentigo  Scattered tan macules in sun-exposed areas.    A solar lentigo (plural, solar lentigines), sometimes called an age spot or liver spot, is a brown macule (small, flat, smooth area of skin) caused by chronic sun or artificial ultraviolet (UV) light exposure. There may be just one lentigo or there may be multiple. This type of lentigo is different from lentigo simplex (discussed separately) because it is caused by exposure to UV light. Solar lentigines are benign, but they do indicate excessive sun exposure, a risk factor for the development of skin cancer.  Lesions are benign, no treatment needed.

## 2024-02-19 ENCOUNTER — HOSPITAL ENCOUNTER (OUTPATIENT)
Dept: RADIOLOGY | Facility: HOSPITAL | Age: 54
Discharge: HOME | End: 2024-02-19
Payer: COMMERCIAL

## 2024-02-19 DIAGNOSIS — M17.11 PRIMARY OSTEOARTHRITIS OF RIGHT KNEE: ICD-10-CM

## 2024-02-19 PROCEDURE — 73721 MRI JNT OF LWR EXTRE W/O DYE: CPT | Mod: RT

## 2024-02-19 PROCEDURE — 73721 MRI JNT OF LWR EXTRE W/O DYE: CPT | Mod: RIGHT SIDE | Performed by: RADIOLOGY

## 2024-02-20 ENCOUNTER — HOSPITAL ENCOUNTER (OUTPATIENT)
Facility: HOSPITAL | Age: 54
Setting detail: OUTPATIENT SURGERY
Discharge: HOME | End: 2024-02-20
Attending: UROLOGY | Admitting: UROLOGY
Payer: COMMERCIAL

## 2024-02-20 ENCOUNTER — ANESTHESIA (OUTPATIENT)
Dept: OPERATING ROOM | Facility: HOSPITAL | Age: 54
End: 2024-02-20
Payer: COMMERCIAL

## 2024-02-20 ENCOUNTER — ANESTHESIA EVENT (OUTPATIENT)
Dept: OPERATING ROOM | Facility: HOSPITAL | Age: 54
End: 2024-02-20
Payer: COMMERCIAL

## 2024-02-20 VITALS
OXYGEN SATURATION: 98 % | DIASTOLIC BLOOD PRESSURE: 94 MMHG | WEIGHT: 271.17 LBS | BODY MASS INDEX: 31.37 KG/M2 | RESPIRATION RATE: 18 BRPM | SYSTOLIC BLOOD PRESSURE: 143 MMHG | TEMPERATURE: 97.1 F | HEIGHT: 78 IN | HEART RATE: 61 BPM

## 2024-02-20 DIAGNOSIS — N43.3 HYDROCELE, UNSPECIFIED HYDROCELE TYPE: Primary | ICD-10-CM

## 2024-02-20 LAB — GLUCOSE BLD MANUAL STRIP-MCNC: 140 MG/DL (ref 74–99)

## 2024-02-20 PROCEDURE — 2500000004 HC RX 250 GENERAL PHARMACY W/ HCPCS (ALT 636 FOR OP/ED): Performed by: ANESTHESIOLOGY

## 2024-02-20 PROCEDURE — 2500000005 HC RX 250 GENERAL PHARMACY W/O HCPCS: Performed by: ANESTHESIOLOGY

## 2024-02-20 PROCEDURE — 7100000002 HC RECOVERY ROOM TIME - EACH INCREMENTAL 1 MINUTE: Performed by: UROLOGY

## 2024-02-20 PROCEDURE — 7100000009 HC PHASE TWO TIME - INITIAL BASE CHARGE: Performed by: UROLOGY

## 2024-02-20 PROCEDURE — 3700000001 HC GENERAL ANESTHESIA TIME - INITIAL BASE CHARGE: Performed by: UROLOGY

## 2024-02-20 PROCEDURE — 2500000001 HC RX 250 WO HCPCS SELF ADMINISTERED DRUGS (ALT 637 FOR MEDICARE OP): Performed by: ANESTHESIOLOGY

## 2024-02-20 PROCEDURE — 2720000007 HC OR 272 NO HCPCS: Performed by: UROLOGY

## 2024-02-20 PROCEDURE — 3600000002 HC OR TIME - INITIAL BASE CHARGE - PROCEDURE LEVEL TWO: Performed by: UROLOGY

## 2024-02-20 PROCEDURE — 0751T DGTZ GLS MCRSCP SLD LEVEL II: CPT | Mod: TC,SUR,SAMLAB | Performed by: UROLOGY

## 2024-02-20 PROCEDURE — 2500000005 HC RX 250 GENERAL PHARMACY W/O HCPCS: Performed by: UROLOGY

## 2024-02-20 PROCEDURE — 3700000002 HC GENERAL ANESTHESIA TIME - EACH INCREMENTAL 1 MINUTE: Performed by: UROLOGY

## 2024-02-20 PROCEDURE — 55060 REPAIR OF HYDROCELE: CPT | Performed by: UROLOGY

## 2024-02-20 PROCEDURE — 7100000001 HC RECOVERY ROOM TIME - INITIAL BASE CHARGE: Performed by: UROLOGY

## 2024-02-20 PROCEDURE — 88302 TISSUE EXAM BY PATHOLOGIST: CPT | Performed by: PATHOLOGY

## 2024-02-20 PROCEDURE — 7100000010 HC PHASE TWO TIME - EACH INCREMENTAL 1 MINUTE: Performed by: UROLOGY

## 2024-02-20 PROCEDURE — 3600000007 HC OR TIME - EACH INCREMENTAL 1 MINUTE - PROCEDURE LEVEL TWO: Performed by: UROLOGY

## 2024-02-20 PROCEDURE — 82947 ASSAY GLUCOSE BLOOD QUANT: CPT

## 2024-02-20 RX ORDER — FENTANYL CITRATE 50 UG/ML
INJECTION, SOLUTION INTRAMUSCULAR; INTRAVENOUS AS NEEDED
Status: DISCONTINUED | OUTPATIENT
Start: 2024-02-20 | End: 2024-02-20

## 2024-02-20 RX ORDER — OXYCODONE HYDROCHLORIDE 5 MG/1
5 TABLET ORAL EVERY 4 HOURS PRN
Status: DISCONTINUED | OUTPATIENT
Start: 2024-02-20 | End: 2024-02-20 | Stop reason: HOSPADM

## 2024-02-20 RX ORDER — BUPIVACAINE HYDROCHLORIDE 5 MG/ML
INJECTION, SOLUTION PERINEURAL AS NEEDED
Status: DISCONTINUED | OUTPATIENT
Start: 2024-02-20 | End: 2024-02-20 | Stop reason: HOSPADM

## 2024-02-20 RX ORDER — ATROPINE SULFATE 0.4 MG/ML
0.4 INJECTION, SOLUTION ENDOTRACHEAL; INTRAMEDULLARY; INTRAMUSCULAR; INTRAVENOUS; SUBCUTANEOUS ONCE
Status: COMPLETED | OUTPATIENT
Start: 2024-02-20 | End: 2024-02-20

## 2024-02-20 RX ORDER — LIDOCAINE HYDROCHLORIDE 10 MG/ML
INJECTION, SOLUTION EPIDURAL; INFILTRATION; INTRACAUDAL; PERINEURAL AS NEEDED
Status: DISCONTINUED | OUTPATIENT
Start: 2024-02-20 | End: 2024-02-20

## 2024-02-20 RX ORDER — PROPOFOL 10 MG/ML
INJECTION, EMULSION INTRAVENOUS AS NEEDED
Status: DISCONTINUED | OUTPATIENT
Start: 2024-02-20 | End: 2024-02-20

## 2024-02-20 RX ORDER — ONDANSETRON HYDROCHLORIDE 2 MG/ML
4 INJECTION, SOLUTION INTRAVENOUS ONCE
Status: COMPLETED | OUTPATIENT
Start: 2024-02-20 | End: 2024-02-20

## 2024-02-20 RX ORDER — HYDROCODONE BITARTRATE AND ACETAMINOPHEN 5; 325 MG/1; MG/1
1 TABLET ORAL EVERY 6 HOURS PRN
Qty: 20 TABLET | Refills: 0 | Status: SHIPPED | OUTPATIENT
Start: 2024-02-20

## 2024-02-20 RX ORDER — SODIUM CHLORIDE, SODIUM LACTATE, POTASSIUM CHLORIDE, CALCIUM CHLORIDE 600; 310; 30; 20 MG/100ML; MG/100ML; MG/100ML; MG/100ML
100 INJECTION, SOLUTION INTRAVENOUS CONTINUOUS
Status: DISCONTINUED | OUTPATIENT
Start: 2024-02-20 | End: 2024-02-20 | Stop reason: HOSPADM

## 2024-02-20 RX ORDER — MIDAZOLAM HYDROCHLORIDE 1 MG/ML
2 INJECTION INTRAMUSCULAR; INTRAVENOUS ONCE AS NEEDED
Status: DISCONTINUED | OUTPATIENT
Start: 2024-02-20 | End: 2024-02-20 | Stop reason: HOSPADM

## 2024-02-20 RX ORDER — DEXAMETHASONE SODIUM PHOSPHATE 100 MG/10ML
5 INJECTION INTRAMUSCULAR; INTRAVENOUS ONCE
Status: COMPLETED | OUTPATIENT
Start: 2024-02-20 | End: 2024-02-20

## 2024-02-20 RX ORDER — SULFAMETHOXAZOLE AND TRIMETHOPRIM 800; 160 MG/1; MG/1
1 TABLET ORAL 2 TIMES DAILY
Qty: 10 TABLET | Refills: 0 | Status: SHIPPED | OUTPATIENT
Start: 2024-02-20 | End: 2024-02-25

## 2024-02-20 RX ADMIN — SODIUM CHLORIDE, POTASSIUM CHLORIDE, SODIUM LACTATE AND CALCIUM CHLORIDE 100 ML/HR: 600; 310; 30; 20 INJECTION, SOLUTION INTRAVENOUS at 15:42

## 2024-02-20 RX ADMIN — LIDOCAINE HYDROCHLORIDE 5 ML: 10 INJECTION, SOLUTION EPIDURAL; INFILTRATION; INTRACAUDAL; PERINEURAL at 14:08

## 2024-02-20 RX ADMIN — ONDANSETRON 4 MG: 2 INJECTION INTRAMUSCULAR; INTRAVENOUS at 12:15

## 2024-02-20 RX ADMIN — Medication 10 MG: at 14:08

## 2024-02-20 RX ADMIN — OXYCODONE HYDROCHLORIDE 5 MG: 5 TABLET ORAL at 16:19

## 2024-02-20 RX ADMIN — FENTANYL CITRATE 100 MCG: 50 INJECTION, SOLUTION INTRAMUSCULAR; INTRAVENOUS at 14:08

## 2024-02-20 RX ADMIN — SODIUM CHLORIDE, POTASSIUM CHLORIDE, SODIUM LACTATE AND CALCIUM CHLORIDE 100 ML/HR: 600; 310; 30; 20 INJECTION, SOLUTION INTRAVENOUS at 12:16

## 2024-02-20 RX ADMIN — DEXAMETHASONE SODIUM PHOSPHATE 5 MG: 10 INJECTION INTRAMUSCULAR; INTRAVENOUS at 12:15

## 2024-02-20 RX ADMIN — ATROPINE SULFATE 0.4 MG: 0.4 INJECTION, SOLUTION INTRAVENOUS at 14:58

## 2024-02-20 RX ADMIN — PROPOFOL 120 MG: 10 INJECTION, EMULSION INTRAVENOUS at 14:08

## 2024-02-20 ASSESSMENT — PAIN SCALES - GENERAL
PAINLEVEL_OUTOF10: 0 - NO PAIN
PAIN_LEVEL: 3
PAINLEVEL_OUTOF10: 0 - NO PAIN
PAINLEVEL_OUTOF10: 3
PAINLEVEL_OUTOF10: 5 - MODERATE PAIN
PAINLEVEL_OUTOF10: 0 - NO PAIN

## 2024-02-20 ASSESSMENT — COLUMBIA-SUICIDE SEVERITY RATING SCALE - C-SSRS
1. IN THE PAST MONTH, HAVE YOU WISHED YOU WERE DEAD OR WISHED YOU COULD GO TO SLEEP AND NOT WAKE UP?: NO
6. HAVE YOU EVER DONE ANYTHING, STARTED TO DO ANYTHING, OR PREPARED TO DO ANYTHING TO END YOUR LIFE?: NO
2. HAVE YOU ACTUALLY HAD ANY THOUGHTS OF KILLING YOURSELF?: NO

## 2024-02-20 ASSESSMENT — PAIN - FUNCTIONAL ASSESSMENT
PAIN_FUNCTIONAL_ASSESSMENT: 0-10

## 2024-02-20 ASSESSMENT — PAIN DESCRIPTION - LOCATION: LOCATION: SCROTUM

## 2024-02-20 NOTE — ANESTHESIA POSTPROCEDURE EVALUATION
"Patient: Jhony Myles \"Chester\"    Procedure Summary       Date: 02/20/24 Room / Location: [facility] OR 01 / Virtual [facility] OR    Anesthesia Start: 1401 Anesthesia Stop: 1449    Procedure: Hydrocelectomy (Right: Scrotum) Diagnosis:       Hydrocele, unspecified hydrocele type      (Hydrocele, unspecified hydrocele type [N43.3])    Surgeons: Blaine Edward MD Responsible Provider: Orville Feldman MD    Anesthesia Type: general ASA Status: 3            Anesthesia Type: general    Vitals Value Taken Time   /73 02/20/24 1448   Temp 98.2 02/20/24 1450   Pulse 46 02/20/24 1449   Resp 17 02/20/24 1449   SpO2 96 % 02/20/24 1449   Vitals shown include unvalidated device data.    Anesthesia Post Evaluation    Patient location during evaluation: PACU  Patient participation: complete - patient participated  Level of consciousness: awake and alert  Pain score: 3  Pain management: adequate  Multimodal analgesia pain management approach  Airway patency: patent  Cardiovascular status: acceptable  Respiratory status: acceptable  Hydration status: acceptable  Postoperative Nausea and Vomiting: none        No notable events documented.    "

## 2024-02-20 NOTE — ANESTHESIA PREPROCEDURE EVALUATION
"Patient: Jhony Myles \"Chester\"    Procedure Information       Date/Time: 02/20/24 1215    Procedure: Hydrocelectomy (Right)    Location: [facility] OR 01 / [facility] OR    Surgeons: Blaine Edward MD            Relevant Problems   Anesthesia   (-) PONV (postoperative nausea and vomiting)      Cardiovascular   (+) Coronary artery disease involving native coronary artery of native heart with angina pectoris (CMS/HCC)   (+) Primary hypertension      Endocrine   (+) Type 2 diabetes mellitus with diabetic neuropathy, without long-term current use of insulin (CMS/HCC)      GI   (+) Gastroesophageal reflux disease without esophagitis      /Renal   (+) Kidney stones      Hematology   (+) Thrombocytopenia (CMS/HCC)      Musculoskeletal   (+) Primary osteoarthritis of right knee   (+) Rheumatoid arthritis with positive rheumatoid factor, involving unspecified site (CMS/HCC)      Other   (+) Rheumatoid arthritis with positive rheumatoid factor, involving unspecified site (CMS/[facility])       Clinical information reviewed:   Tobacco  Allergies  Meds   Med Hx  Surg Hx   Fam Hx  Soc Hx        NPO/Void Status  Carbohydrate Drink Given Prior to Surgery? : N  Date of Last Liquid: 02/20/24  Time of Last Liquid: 0800  Date of Last Solid: 02/19/24  Time of Last Solid: 1800  Last Intake Type: Clear fluids  Time of Last Void: 1100         Physical Exam    Airway  Mallampati: III  TM distance: >3 FB     Cardiovascular   Rhythm: regular  Rate: normal     Dental    Pulmonary    Abdominal          Anesthesia Plan    History of general anesthesia?: yes  History of complications of general anesthesia?: no    ASA 3     general     intravenous induction   Anesthetic plan and risks discussed with patient.       Anesthesia Evaluation      Airway   Mallampati: III  TM distance: >3 FB  Dental      Pulmonary - negative ROS   Cardiovascular   (+) hypertension, past MI, CAD, CABG/stent, angina    Rhythm: regular  Rate: normal    Neuro/Psych    (+) TIA, " CVA    GI/Hepatic/Renal    (+) GERD, chronic renal disease    Endo/Other    (+) diabetes mellitus  Abdominal

## 2024-02-20 NOTE — OP NOTE
"Hydrocelectomy (R) Operative Note     Date: 2024  OR Location: [facility] OR    Name: Jhony Myles \"Migel", : 1970, Age: 53 y.o., MRN: [id number], Sex: male    Diagnosis  Pre-op Diagnosis     * Hydrocele, unspecified hydrocele type [N43.3] Post-op Diagnosis     * Hydrocele, unspecified hydrocele type [N43.3]     Procedures  Hydrocelectomy  55533 - DC RPR TUNICA VAGINALIS HYDROCELE BOTTLE TYPE      Surgeons      * Blaine Edward - Primary    Resident/Fellow/Other Assistant:  Surgeon(s) and Role:    Procedure Summary  Anesthesia: General  ASA: III  Anesthesia Staff: Anesthesiologist: Orville Feldman MD  Estimated Blood Loss: 0mL  Intra-op Medications:   Administrations occurring from 1215 to 1245 on 24:   Medication Name Total Dose   lactated Ringer's infusion 48.33 mL              Anesthesia Record               Intraprocedure I/O Totals          Intake    lactated Ringer's infusion 800.00 mL    Total Intake 800 mL          Specimen:   ID Type Source Tests Collected by Time   1 : right hydrocele sac Tissue HYDROCELE SAC RIGHT SURGICAL PATHOLOGY EXAM Blaine Edward MD 2024 1422        Staff:   Circulator: Santi Ng RN; Ella Maya RN  Scrub Person: Vero Holguin; Ean Vázquez RN         Drains and/or Catheters:   Open Drain Right Other (Comment) (Active)         Indications: Chester Myles is an 53 y.o. male who is having surgery for Hydrocele, unspecified hydrocele type [N43.3].     The patient was seen in the preoperative area. The risks, benefits, complications, treatment options, non-operative alternatives, expected recovery and outcomes were discussed with the patient. The possibilities of reaction to medication, pulmonary aspiration, injury to surrounding structures, bleeding, recurrent infection, the need for additional procedures, failure to diagnose a condition, and creating a complication requiring transfusion or operation were discussed with the patient. The patient " concurred with the proposed plan, giving informed consent.  The site of surgery was properly noted/marked if necessary per policy. The patient has been actively warmed in preoperative area.    Code: XIANG   Description: Cheyanne's hydrocele  Anesthetic:  General.    Pre-Op Diagnosis:  Right hydrocele.     Post-Op Diagnosis:  Right hydrocele.     Operation:  Right hydrocelectomy.     Estimated blood loss:  Minimal.     Complications:  None.     Indications and consent:  The patient has a known hydrocele and now presents for surgical intervention.  After the risks, benefits, alternatives and indications of procedure were explained, he consented.     DESCRIPTION OF PROCEDURE:   The patient was brought to the operating room and placed on the table in supine position.  After adequate anesthesia was obtained, the patient was prepped and draped in the standard surgical fashion.  An incision was made on the right hemiscrotum.  This incision was carried down through the dartos muscular layer.  The hydrocele sac was revealed.  The hydrocele sac was dissected free of its  attachments.  The hydrocele sac was then delivered through the incision intact.  We then opened the hydrocele sac and evacuated the fluid. The excess hydrocele sac was excised, using electrocautery.  We then repaired the hydrocele in the standard bottleneck fashion, using chromic suture.  Care was taken so as not to twist the testicle upon its cord.  Electrocautery was used to obtain hemostasis.  The testicle was carefully placed back in the scrotum, again taking care so as not to twist it.  We then irrigated the wound again.     A Penrose drain was placed. The incision was then closed in three layers.  The patient tolerated the procedure well.  There were no complications.   Attending Attestation: I was present and scrubbed for the entire procedure.    Blaine Edward  Phone Number: [phone]

## 2024-02-20 NOTE — PERIOPERATIVE NURSING NOTE
Pt. C/o nausea, pt. Head raised and pt. Became pale. Heart rate decreased to 25 dr. Feldman notified. Pt. Medicated for bradycardia.

## 2024-02-20 NOTE — ANESTHESIA PROCEDURE NOTES
Airway  Date/Time: 2/20/2024 2:09 PM  Urgency: elective    Airway not difficult    Staffing  Performed: attending   Authorized by: Orville Feldman MD    Performed by: Orville Feldman MD  Patient location during procedure: OR    Indications and Patient Condition  Indications for airway management: anesthesia  Spontaneous ventilation: present  Sedation level: deep  Preoxygenated: yes  Patient position: sniffing  Mask difficulty assessment: 1 - vent by mask    Final Airway Details  Final airway type: supraglottic airway      Successful airway: Supreme  Size 5     Number of attempts at approach: 1  Number of other approaches attempted: 0

## 2024-02-21 ENCOUNTER — APPOINTMENT (OUTPATIENT)
Dept: HEMATOLOGY/ONCOLOGY | Facility: CLINIC | Age: 54
End: 2024-02-21
Payer: COMMERCIAL

## 2024-02-23 ENCOUNTER — CLINICAL SUPPORT (OUTPATIENT)
Dept: UROLOGY | Facility: CLINIC | Age: 54
End: 2024-02-23
Payer: COMMERCIAL

## 2024-02-23 DIAGNOSIS — N43.3 HYDROCELE, UNSPECIFIED HYDROCELE TYPE: ICD-10-CM

## 2024-02-23 PROCEDURE — 99024 POSTOP FOLLOW-UP VISIT: CPT | Performed by: UROLOGY

## 2024-02-27 LAB
LABORATORY COMMENT REPORT: NORMAL
PATH REPORT.FINAL DX SPEC: NORMAL
PATH REPORT.GROSS SPEC: NORMAL
PATH REPORT.RELEVANT HX SPEC: NORMAL
PATH REPORT.TOTAL CANCER: NORMAL

## 2024-02-29 ENCOUNTER — DOCUMENTATION (OUTPATIENT)
Dept: PHYSICAL THERAPY | Facility: CLINIC | Age: 54
End: 2024-02-29
Payer: COMMERCIAL

## 2024-02-29 DIAGNOSIS — J01.90 ACUTE NON-RECURRENT SINUSITIS, UNSPECIFIED LOCATION: ICD-10-CM

## 2024-02-29 DIAGNOSIS — J02.9 PHARYNGITIS, UNSPECIFIED ETIOLOGY: Primary | ICD-10-CM

## 2024-02-29 RX ORDER — AZITHROMYCIN 500 MG/1
500 TABLET, FILM COATED ORAL DAILY
Qty: 5 TABLET | Refills: 0 | Status: SHIPPED | OUTPATIENT
Start: 2024-02-29 | End: 2024-03-05

## 2024-02-29 RX ORDER — PREDNISONE 20 MG/1
TABLET ORAL
Qty: 18 TABLET | Refills: 0 | Status: SHIPPED | OUTPATIENT
Start: 2024-02-29 | End: 2024-03-20 | Stop reason: ALTCHOICE

## 2024-02-29 RX ORDER — AMOXICILLIN 875 MG/1
875 TABLET, FILM COATED ORAL 2 TIMES DAILY
Qty: 20 TABLET | Refills: 0 | Status: SHIPPED | OUTPATIENT
Start: 2024-02-29 | End: 2024-03-01 | Stop reason: ALTCHOICE

## 2024-02-29 NOTE — PROGRESS NOTES
"Physical Therapy    Discharge Summary    Name: Jhony Myles \"Chester\"  MRN: [id number]  : 1970  Date: 24    Discharge Summary: PT        Has been seen in clinical physical therapy beginning on  23 for 3 visit (s); there has been no further visits attended. DC from PT   "

## 2024-03-01 ENCOUNTER — APPOINTMENT (OUTPATIENT)
Dept: RADIOLOGY | Facility: HOSPITAL | Age: 54
End: 2024-03-01
Payer: COMMERCIAL

## 2024-03-01 ENCOUNTER — HOSPITAL ENCOUNTER (OUTPATIENT)
Dept: CARDIOLOGY | Facility: HOSPITAL | Age: 54
Discharge: HOME | End: 2024-03-01
Payer: COMMERCIAL

## 2024-03-01 ENCOUNTER — HOSPITAL ENCOUNTER (EMERGENCY)
Facility: HOSPITAL | Age: 54
Discharge: HOME | End: 2024-03-01
Attending: EMERGENCY MEDICINE
Payer: COMMERCIAL

## 2024-03-01 ENCOUNTER — OFFICE VISIT (OUTPATIENT)
Dept: HEMATOLOGY/ONCOLOGY | Facility: CLINIC | Age: 54
End: 2024-03-01
Payer: COMMERCIAL

## 2024-03-01 VITALS
OXYGEN SATURATION: 97 % | WEIGHT: 272 LBS | TEMPERATURE: 96.6 F | HEIGHT: 78 IN | RESPIRATION RATE: 16 BRPM | SYSTOLIC BLOOD PRESSURE: 142 MMHG | HEART RATE: 63 BPM | DIASTOLIC BLOOD PRESSURE: 80 MMHG | BODY MASS INDEX: 31.47 KG/M2

## 2024-03-01 VITALS
SYSTOLIC BLOOD PRESSURE: 170 MMHG | WEIGHT: 272 LBS | DIASTOLIC BLOOD PRESSURE: 83 MMHG | OXYGEN SATURATION: 96 % | TEMPERATURE: 97.5 F | HEART RATE: 75 BPM | RESPIRATION RATE: 16 BRPM | HEIGHT: 78 IN | BODY MASS INDEX: 31.47 KG/M2

## 2024-03-01 DIAGNOSIS — D69.6 THROMBOCYTOPENIA (CMS-HCC): Primary | ICD-10-CM

## 2024-03-01 DIAGNOSIS — R06.00 DYSPNEA, UNSPECIFIED TYPE: ICD-10-CM

## 2024-03-01 DIAGNOSIS — R07.9 CHEST PAIN, UNSPECIFIED TYPE: ICD-10-CM

## 2024-03-01 DIAGNOSIS — R42 LIGHTHEADEDNESS: Primary | ICD-10-CM

## 2024-03-01 LAB
ALBUMIN SERPL BCP-MCNC: 4.2 G/DL (ref 3.4–5)
ALP SERPL-CCNC: 44 U/L (ref 33–120)
ALT SERPL W P-5'-P-CCNC: 17 U/L (ref 10–52)
ANION GAP SERPL CALC-SCNC: 10 MMOL/L (ref 10–20)
AST SERPL W P-5'-P-CCNC: 16 U/L (ref 9–39)
BASOPHILS # BLD AUTO: 0.07 X10*3/UL (ref 0–0.1)
BASOPHILS NFR BLD AUTO: 1.3 %
BILIRUB SERPL-MCNC: 0.5 MG/DL (ref 0–1.2)
BUN SERPL-MCNC: 25 MG/DL (ref 6–23)
CALCIUM SERPL-MCNC: 9.7 MG/DL (ref 8.6–10.3)
CARDIAC TROPONIN I PNL SERPL HS: 4 NG/L (ref 0–20)
CARDIAC TROPONIN I PNL SERPL HS: 4 NG/L (ref 0–20)
CHLORIDE SERPL-SCNC: 104 MMOL/L (ref 98–107)
CO2 SERPL-SCNC: 28 MMOL/L (ref 21–32)
CREAT SERPL-MCNC: 1.07 MG/DL (ref 0.5–1.3)
CRP SERPL-MCNC: 0.12 MG/DL
D DIMER PPP FEU-MCNC: 223 NG/ML FEU
EGFRCR SERPLBLD CKD-EPI 2021: 82 ML/MIN/1.73M*2
EOSINOPHIL # BLD AUTO: 0.36 X10*3/UL (ref 0–0.7)
EOSINOPHIL NFR BLD AUTO: 6.8 %
ERYTHROCYTE [DISTWIDTH] IN BLOOD BY AUTOMATED COUNT: 12.5 % (ref 11.5–14.5)
ERYTHROCYTE [SEDIMENTATION RATE] IN BLOOD BY WESTERGREN METHOD: 7 MM/H (ref 0–20)
GLUCOSE SERPL-MCNC: 159 MG/DL (ref 74–99)
HCT VFR BLD AUTO: 37.6 % (ref 41–52)
HGB BLD-MCNC: 13.3 G/DL (ref 13.5–17.5)
IMM GRANULOCYTES # BLD AUTO: 0.02 X10*3/UL (ref 0–0.7)
IMM GRANULOCYTES NFR BLD AUTO: 0.4 % (ref 0–0.9)
LACTATE SERPL-SCNC: 0.8 MMOL/L (ref 0.4–2)
LYMPHOCYTES # BLD AUTO: 1.01 X10*3/UL (ref 1.2–4.8)
LYMPHOCYTES NFR BLD AUTO: 19.2 %
MAGNESIUM SERPL-MCNC: 2.02 MG/DL (ref 1.6–2.4)
MCH RBC QN AUTO: 31.5 PG (ref 26–34)
MCHC RBC AUTO-ENTMCNC: 35.4 G/DL (ref 32–36)
MCV RBC AUTO: 89 FL (ref 80–100)
MONOCYTES # BLD AUTO: 0.56 X10*3/UL (ref 0.1–1)
MONOCYTES NFR BLD AUTO: 10.6 %
NEUTROPHILS # BLD AUTO: 3.25 X10*3/UL (ref 1.2–7.7)
NEUTROPHILS NFR BLD AUTO: 61.7 %
NRBC BLD-RTO: 0 /100 WBCS (ref 0–0)
PLATELET # BLD AUTO: 152 X10*3/UL (ref 150–450)
POTASSIUM SERPL-SCNC: 3.9 MMOL/L (ref 3.5–5.3)
PROT SERPL-MCNC: 6.6 G/DL (ref 6.4–8.2)
RBC # BLD AUTO: 4.22 X10*6/UL (ref 4.5–5.9)
SODIUM SERPL-SCNC: 138 MMOL/L (ref 136–145)
WBC # BLD AUTO: 5.3 X10*3/UL (ref 4.4–11.3)

## 2024-03-01 PROCEDURE — 4010F ACE/ARB THERAPY RXD/TAKEN: CPT | Performed by: INTERNAL MEDICINE

## 2024-03-01 PROCEDURE — 85652 RBC SED RATE AUTOMATED: CPT | Performed by: EMERGENCY MEDICINE

## 2024-03-01 PROCEDURE — 80053 COMPREHEN METABOLIC PANEL: CPT | Performed by: EMERGENCY MEDICINE

## 2024-03-01 PROCEDURE — 36415 COLL VENOUS BLD VENIPUNCTURE: CPT | Performed by: EMERGENCY MEDICINE

## 2024-03-01 PROCEDURE — 71045 X-RAY EXAM CHEST 1 VIEW: CPT

## 2024-03-01 PROCEDURE — 84484 ASSAY OF TROPONIN QUANT: CPT | Performed by: EMERGENCY MEDICINE

## 2024-03-01 PROCEDURE — 83605 ASSAY OF LACTIC ACID: CPT | Performed by: EMERGENCY MEDICINE

## 2024-03-01 PROCEDURE — 96360 HYDRATION IV INFUSION INIT: CPT

## 2024-03-01 PROCEDURE — 86140 C-REACTIVE PROTEIN: CPT | Performed by: EMERGENCY MEDICINE

## 2024-03-01 PROCEDURE — 93005 ELECTROCARDIOGRAM TRACING: CPT

## 2024-03-01 PROCEDURE — 99215 OFFICE O/P EST HI 40 MIN: CPT | Performed by: INTERNAL MEDICINE

## 2024-03-01 PROCEDURE — 99284 EMERGENCY DEPT VISIT MOD MDM: CPT | Mod: 25

## 2024-03-01 PROCEDURE — 3044F HG A1C LEVEL LT 7.0%: CPT | Performed by: INTERNAL MEDICINE

## 2024-03-01 PROCEDURE — 71045 X-RAY EXAM CHEST 1 VIEW: CPT | Performed by: RADIOLOGY

## 2024-03-01 PROCEDURE — 3077F SYST BP >= 140 MM HG: CPT | Performed by: INTERNAL MEDICINE

## 2024-03-01 PROCEDURE — 85379 FIBRIN DEGRADATION QUANT: CPT | Performed by: EMERGENCY MEDICINE

## 2024-03-01 PROCEDURE — 2500000004 HC RX 250 GENERAL PHARMACY W/ HCPCS (ALT 636 FOR OP/ED): Performed by: EMERGENCY MEDICINE

## 2024-03-01 PROCEDURE — 85025 COMPLETE CBC W/AUTO DIFF WBC: CPT | Performed by: EMERGENCY MEDICINE

## 2024-03-01 PROCEDURE — 1036F TOBACCO NON-USER: CPT | Performed by: INTERNAL MEDICINE

## 2024-03-01 PROCEDURE — 3079F DIAST BP 80-89 MM HG: CPT | Performed by: INTERNAL MEDICINE

## 2024-03-01 PROCEDURE — 83735 ASSAY OF MAGNESIUM: CPT | Performed by: EMERGENCY MEDICINE

## 2024-03-01 RX ADMIN — SODIUM CHLORIDE 500 ML: 9 INJECTION, SOLUTION INTRAVENOUS at 18:04

## 2024-03-01 ASSESSMENT — PAIN SCALES - GENERAL
PAINLEVEL_OUTOF10: 5 - MODERATE PAIN
PAINLEVEL: 5
PAINLEVEL_OUTOF10: 3

## 2024-03-01 ASSESSMENT — PAIN DESCRIPTION - ORIENTATION: ORIENTATION: RIGHT

## 2024-03-01 ASSESSMENT — COLUMBIA-SUICIDE SEVERITY RATING SCALE - C-SSRS
2. HAVE YOU ACTUALLY HAD ANY THOUGHTS OF KILLING YOURSELF?: NO
6. HAVE YOU EVER DONE ANYTHING, STARTED TO DO ANYTHING, OR PREPARED TO DO ANYTHING TO END YOUR LIFE?: NO
1. IN THE PAST MONTH, HAVE YOU WISHED YOU WERE DEAD OR WISHED YOU COULD GO TO SLEEP AND NOT WAKE UP?: NO
2. HAVE YOU ACTUALLY HAD ANY THOUGHTS OF KILLING YOURSELF?: NO
1. IN THE PAST MONTH, HAVE YOU WISHED YOU WERE DEAD OR WISHED YOU COULD GO TO SLEEP AND NOT WAKE UP?: NO
6. HAVE YOU EVER DONE ANYTHING, STARTED TO DO ANYTHING, OR PREPARED TO DO ANYTHING TO END YOUR LIFE?: NO

## 2024-03-01 ASSESSMENT — PAIN DESCRIPTION - FREQUENCY: FREQUENCY: CONSTANT/CONTINUOUS

## 2024-03-01 ASSESSMENT — PATIENT HEALTH QUESTIONNAIRE - PHQ9
1. LITTLE INTEREST OR PLEASURE IN DOING THINGS: NEARLY EVERY DAY
4. FEELING TIRED OR HAVING LITTLE ENERGY: NEARLY EVERY DAY
9. THOUGHTS THAT YOU WOULD BE BETTER OFF DEAD, OR OF HURTING YOURSELF: NOT AT ALL
2. FEELING DOWN, DEPRESSED OR HOPELESS: NEARLY EVERY DAY
6. FEELING BAD ABOUT YOURSELF - OR THAT YOU ARE A FAILURE OR HAVE LET YOURSELF OR YOUR FAMILY DOWN: NOT AT ALL
10. IF YOU CHECKED OFF ANY PROBLEMS, HOW DIFFICULT HAVE THESE PROBLEMS MADE IT FOR YOU TO DO YOUR WORK, TAKE CARE OF THINGS AT HOME, OR GET ALONG WITH OTHER PEOPLE: EXTREMELY DIFFICULT
SUM OF ALL RESPONSES TO PHQ9 QUESTIONS 1 AND 2: 6
3. TROUBLE FALLING OR STAYING ASLEEP OR SLEEPING TOO MUCH: NEARLY EVERY DAY
7. TROUBLE CONCENTRATING ON THINGS, SUCH AS READING THE NEWSPAPER OR WATCHING TELEVISION: MORE THAN HALF THE DAYS
8. MOVING OR SPEAKING SO SLOWLY THAT OTHER PEOPLE COULD HAVE NOTICED. OR THE OPPOSITE, BEING SO FIGETY OR RESTLESS THAT YOU HAVE BEEN MOVING AROUND A LOT MORE THAN USUAL: NOT AT ALL
5. POOR APPETITE OR OVEREATING: MORE THAN HALF THE DAYS
SUM OF ALL RESPONSES TO PHQ QUESTIONS 1-9: 16

## 2024-03-01 ASSESSMENT — PAIN DESCRIPTION - DESCRIPTORS: DESCRIPTORS: ACHING

## 2024-03-01 ASSESSMENT — HEART SCORE
AGE: 45-64
HEART SCORE: 2
HISTORY: SLIGHTLY SUSPICIOUS
ECG: NORMAL
TROPONIN: LESS THAN OR EQUAL TO NORMAL LIMIT
RISK FACTORS: 1-2 RISK FACTORS

## 2024-03-01 ASSESSMENT — PAIN DESCRIPTION - PAIN TYPE: TYPE: SURGICAL PAIN

## 2024-03-01 ASSESSMENT — PAIN DESCRIPTION - LOCATION: LOCATION: SCROTUM

## 2024-03-01 ASSESSMENT — VISUAL ACUITY: OU: 1

## 2024-03-01 ASSESSMENT — PAIN - FUNCTIONAL ASSESSMENT: PAIN_FUNCTIONAL_ASSESSMENT: 0-10

## 2024-03-01 NOTE — PATIENT INSTRUCTIONS
Reviewed labs and recent medical history.  Discussed his more recent CBC from January that shows his abnormal platelet count.  He continues on his medications.  Discussed his blood pressure and that he has been low. He will continue to monitor and record. Advised him for tonight to hold his Coreg just for tonight. Also asked him to cut his minoxidil 1/2 tablet for tonight as well. He can call Dr. Medina if he has any questions. [phone].  Asked him to call Dr. Ortiz, his cardiologist on Monday about his blood pressures and allow him to advise on dosing for his blood pressure medications.  Lab orders placed for follow up CBC. Advised him to get these checked in the next few months.  Discussed his flank pain. He is status post surgery for a hydrocele. Advised him to go to the ED for this pain.  Return to see MD in 3 months.

## 2024-03-01 NOTE — PROGRESS NOTES
Pt had recent surgery- presents with pain at post op site, BP low, clammy. Dr Medina sent pt to ED for eval- family drove him over  I will call pt with follow up next week    I spoke with pt today- instructed him to get a CBC the week of 6/3  Rtc for MD visit 6/10 at 1pm  Reviewed AVS with patient over the phone- patient verbalizes understanding

## 2024-03-01 NOTE — ED PROVIDER NOTES
Multiple complaints.  This 53-year-old white male states that last week on Wednesday he had a right hydrocele removed by Dr. Edward.  He states that at the time of surgery he did have some difficulty coming out of anesthesia.  He states that he had some left-sided back pain and some right medial thigh pain he states that the surgical site looks good.  He states for the past week or so he has had some low blood pressures with systolic in the 90s and diastolic 60-55.  He states that he was experiencing orthostatic hypotension and every time he stood up he felt like he might pass out.  He states that 3 to 4 days ago he began to experience mild chest discomfort states his symptoms got worse with low blood pressure states that his shortness of breath is experiencing is normally noted at nighttime.  Patient does admit to previous history of coronary artery disease and has had at least 6 cardiac stents his last 1 was in 2021.      History provided by:  Patient   used: No         Physical Exam  Vitals and nursing note reviewed.   Constitutional:       General: He is awake.      Appearance: Normal appearance. He is obese.   HENT:      Head: Normocephalic and atraumatic.      Right Ear: Hearing and external ear normal.      Left Ear: Hearing and external ear normal.      Nose: Nose normal. No congestion or rhinorrhea.      Mouth/Throat:      Lips: Pink.      Mouth: Mucous membranes are moist.      Pharynx: Oropharynx is clear. No oropharyngeal exudate or posterior oropharyngeal erythema.   Eyes:      General: Lids are normal. Vision grossly intact.         Right eye: No discharge.         Left eye: No discharge.      Extraocular Movements: Extraocular movements intact.      Conjunctiva/sclera: Conjunctivae normal.      Pupils: Pupils are equal, round, and reactive to light.   Cardiovascular:      Rate and Rhythm: Normal rate and regular rhythm.      Pulses: Normal pulses.      Heart sounds: Normal heart  sounds. No murmur heard.     No friction rub. No gallop.   Pulmonary:      Effort: Pulmonary effort is normal. No respiratory distress.      Breath sounds: Normal breath sounds. No stridor. No wheezing, rhonchi or rales.   Chest:      Chest wall: No tenderness.   Abdominal:      General: Abdomen is flat. Bowel sounds are normal. There is no distension.      Palpations: Abdomen is soft. There is no mass.      Tenderness: There is no abdominal tenderness. There is no guarding or rebound.      Hernia: No hernia is present.      Comments: Patient has benign abdominal exam.   Musculoskeletal:         General: No swelling, tenderness, deformity or signs of injury. Normal range of motion.      Cervical back: Full passive range of motion without pain, normal range of motion and neck supple.      Right lower leg: Normal. No edema.      Left lower leg: Normal. No edema.   Skin:     General: Skin is warm and dry.      Capillary Refill: Capillary refill takes less than 2 seconds.      Coloration: Skin is not jaundiced or pale.      Findings: No bruising, erythema, lesion or rash.   Neurological:      General: No focal deficit present.      Mental Status: He is alert and oriented to person, place, and time.      GCS: GCS eye subscore is 4. GCS verbal subscore is 5. GCS motor subscore is 6.      Cranial Nerves: Cranial nerves 2-12 are intact. No cranial nerve deficit.      Sensory: Sensation is intact. No sensory deficit.      Motor: Motor function is intact. No weakness.      Coordination: Coordination is intact. Coordination normal.      Deep Tendon Reflexes: Reflexes normal.   Psychiatric:         Attention and Perception: Attention and perception normal.         Mood and Affect: Mood and affect normal.         Speech: Speech normal.         Behavior: Behavior normal. Behavior is cooperative.         Thought Content: Thought content normal.         Cognition and Memory: Cognition and memory normal.         Judgment: Judgment  normal.          Labs Reviewed   CBC WITH AUTO DIFFERENTIAL - Abnormal       Result Value    WBC 5.3      nRBC 0.0      RBC 4.22 (*)     Hemoglobin 13.3 (*)     Hematocrit 37.6 (*)     MCV 89      MCH 31.5      MCHC 35.4      RDW 12.5      Platelets 152      Neutrophils % 61.7      Immature Granulocytes %, Automated 0.4      Lymphocytes % 19.2      Monocytes % 10.6      Eosinophils % 6.8      Basophils % 1.3      Neutrophils Absolute 3.25      Immature Granulocytes Absolute, Automated 0.02      Lymphocytes Absolute 1.01 (*)     Monocytes Absolute 0.56      Eosinophils Absolute 0.36      Basophils Absolute 0.07     COMPREHENSIVE METABOLIC PANEL - Abnormal    Glucose 159 (*)     Sodium 138      Potassium 3.9      Chloride 104      Bicarbonate 28      Anion Gap 10      Urea Nitrogen 25 (*)     Creatinine 1.07      eGFR 82      Calcium 9.7      Albumin 4.2      Alkaline Phosphatase 44      Total Protein 6.6      AST 16      Bilirubin, Total 0.5      ALT 17     MAGNESIUM - Normal    Magnesium 2.02     D-DIMER, VTE EXCLUSION - Normal    D-Dimer, Quantitative VTE Exclusion 223      Narrative:     The VTE Exclusion D-Dimer assay is reported in ng/mL Fibrinogen Equivalent Units (FEU).    Per 's instructions for use, a value of less than 500 ng/mL (FEU) may help to exclude DVT or PE in outpatients when the assay is used with a clinical pretest probability assessment.(AE must utilize and document eCalc 'Wells Score Deep Vein Thrombosis Risk' for DVT exclusion only. Emergency Department should utilize  Guidelines for Emergency Department Use of the VTE Exclusion D-Dimer and Clinical Pretest probability assessment model for DVT or PE exclusion.)   LACTATE - Normal    Lactate 0.8      Narrative:     Venipuncture immediately after or during the administration of Metamizole may lead to falsely low results. Testing should be performed immediately  prior to Metamizole dosing.   SERIAL TROPONIN-INITIAL - Normal     Troponin I, High Sensitivity 4      Narrative:     Less than 99th percentile of normal range cutoff-  Female and children under 18 years old <14 ng/L; Male <21 ng/L: Negative  Repeat testing should be performed if clinically indicated.     Female and children under 18 years old 14-50 ng/L; Male 21-50 ng/L:  Consistent with possible cardiac damage and possible increased clinical   risk. Serial measurements may help to assess extent of myocardial damage.     >50 ng/L: Consistent with cardiac damage, increased clinical risk and  myocardial infarction. Serial measurements may help assess extent of   myocardial damage.      NOTE: Children less than 1 year old may have higher baseline troponin   levels and results should be interpreted in conjunction with the overall   clinical context.     NOTE: Troponin I testing is performed using a different   testing methodology at [facility] [place] than at other   [facility]. Direct result comparisons should only   be made within the same method.   SEDIMENTATION RATE, AUTOMATED - Normal    Sedimentation Rate 7     C-REACTIVE PROTEIN - Normal    C-Reactive Protein 0.12     SERIAL TROPONIN, 1 HOUR - Normal    Troponin I, High Sensitivity 4      Narrative:     Less than 99th percentile of normal range cutoff-  Female and children under 18 years old <14 ng/L; Male <21 ng/L: Negative  Repeat testing should be performed if clinically indicated.     Female and children under 18 years old 14-50 ng/L; Male 21-50 ng/L:  Consistent with possible cardiac damage and possible increased clinical   risk. Serial measurements may help to assess extent of myocardial damage.     >50 ng/L: Consistent with cardiac damage, increased clinical risk and  myocardial infarction. Serial measurements may help assess extent of   myocardial damage.      NOTE: Children less than 1 year old may have higher baseline troponin   levels and results should be interpreted in conjunction with the overall    clinical context.     NOTE: Troponin I testing is performed using a different   testing methodology at [facility] than at other   [facility]. Direct result comparisons should only   be made within the same method.   TROPONIN SERIES- (INITIAL, 1 HR)    Narrative:     The following orders were created for panel order Troponin I Series, High Sensitivity (0, 1 HR).  Procedure                               Abnormality         Status                     ---------                               -----------         ------                     Troponin I, High Sensiti...[[id number]]  Normal              Final result               Troponin, High Sensitivi...[[id number]]  Normal              Final result                 Please view results for these tests on the individual orders.        XR chest 1 view   Final Result   1.  No active cardiopulmonary process.             Signed by: Yo Lewis 3/1/2024 5:47 PM   Dictation workstation:   [id number]           Procedures     Medical Decision Making  Patient was seen and evaluated due to history of low blood pressure with recent surgery on his right testicle.  He states that he was also experiencing some discomfort and dyspnea.  He was seen by his oncologist and referred to the ED for evaluation.  On arrival to the ED patient had normal blood pressure, normal oxygenation and he was afebrile.  The patient had extensive cardiac workup performed and was tested for sepsis and for inflammation/infection.  The white cell count was normal at 5.4.  His hemoglobin was 13.3.  His CMP revealed a glucose of 159 and BUN was slightly elevated 25.  Magnesium was normal at 2.02.  D-dimer is normal at 223.  Lactic acid was normal 0.8.  Initial troponin was 4 and repeat troponin was 4.  Sed rate was normal at 7.  C-reactive protein was normal.  Chest x-ray revealed no acute disease process.  His heart score was 2.  Patient was treated with a small fluid bolus because of his  elevated BUN and I did have a detailed discussion with the patient concerning his lab results and x-ray findings prior to discharging the patient home with plan follow-up with his primary care doctor.  He was encouraged to return to the ED if he developed any new or worsening symptoms.    Amount and/or Complexity of Data Reviewed  ECG/medicine tests: independent interpretation performed.     Details: EKG was interpreted by myself at 4:55 PM reveals normal sinus rhythm with no acute ST or T wave changes noted.  Heart rate is 74 bpm.  The WV interval is 192 ms.  The QRS duration is 98 ms.  The QTc is 452 ms.  Axis is 62 degrees.         Diagnoses as of 03/02/24 0820   Lightheadedness   Chest pain, unspecified type   Dyspnea, unspecified type                    Jacques Lovelace,   03/02/24 0820

## 2024-03-01 NOTE — PROGRESS NOTES
Patient ID:Jhony Myles is a 53 y.o. year old male patient with history of thrombocytopenia    Referring Physician: Amanda Maki DO  [address] [facility] [address]  Primary Care Provider: Amanda Maki DO    Chief Complaint  Chief Complaint   Patient presents with    Alloimmunization     Thrombocytopenia            History of the Present Illness    10/30/2021.  CBC showed white count was 4.17, hemoglobin 14.9 hematocrit 42.4 and a platelet count was 169,000.  White blood cell differential count showed 61% polys, 21 lymphs, 12 monos 4 eosinophils.  Review laboratory data from 2/7/2020 through 6/16/2022 showed that his platelet count ranged from 130 779,000 with normal differentials.    5/8/2023.  Seen by cardiology Dr. Juan Ortiz in follow-up of his extensive cardiac history since at least 2018.  He was found to be thrombocytopenic with a platelet count of 114,000 and was referred to hematology.  He was on hydrochlorothiazide.  He underwent left heart cath on 5/23/2023 and had single-vessel coronary artery disease.    6/23/2023.  Followed up with Dr. Maki who noted that an appointment had not been made.    June 2023.  Seen in consultation by Dr. Moreno.  He noted that the patient had stable mild thrombocytopenia with minimally enlarged spleen.  He felt otherwise there were no other cause for his mild stable thrombocytopenia.  Multiple tests were ordered.    7/19/2023.  Follow-up with Dr. Moreno.  Laboratory testing showed that he was negative for hepatitis B and C, HIV with normal ESR at 3, ENRIQUE, negative rheumatoid factor less than 10 liver function test.    7/31/2023.  Follow-up with Dr. Dow for chronic left lower quadrant abdominal pain radiating into his left lower quadrant who recommended CT scan of the abdomen and pelvis because of ultrasound which showed splenomegaly (13.4 cm) unchanged from 7/27/2023.    10/23/2023.  CAT scan of the abdomen and pelvis showed  kidney cysts, atherosclerosis, right hydrocele, degenerative joint disease and no hepato-splenomegaly.  Surgery on the hydrocele was contemplated.  Brilinta needed to be held.    11/21/2023.  Follow-up with Dr. Maki with hip pain, knee pain, thumb pain, for which she was referred to orthopedics and subsequently delay to physical therapy.  There is a history of a lateral meniscus tear in the distant past..    12/12/2023.  ED visit for chest pain.  CBC at that time showed platelet count of 153,000.  White count was 4.8 hemoglobin 15 hematocrit 41 and white blood cell differential was normal.  He was able to be discharged.    1/3/2024.  Emergency department visit for palpitations and chest discomfort with pounding.  CBC at that time showed platelet count of 129,000 with normal white count and hemoglobin.  Hematocrit was 37 white blood cell differential was normal.  He was discharged after a negative chest x-ray, troponins and EKG.    1/15/2024.  Follow-up with Dr. Dow who ordered a celiac panel and nuclear medicine gastric emptying study.    2/20/2024.  Hydrocelectomy by Dr. Edward.    Interval Note  3/1/2024.  He is here today in follow-up.  This is our first meeting.  Review of his chart shows that since 2019 the lowest platelet count that he has ever had was 114 in April 2023.  The majority of his platelet counts have been below 150,000.  Occasionally he will have a white count less than normal of 4.4.  Most of the time his white count is normal.  His hemoglobin is usually normal but occasionally will drop down to 12.2.  MCV's have been normal.  White blood cell differential's have shown decreased absolute lymphocyte count on a regular basis.  Lower limit of normal is 1.2 and he is usually below that.  Multiple tests for autoimmune diseases have been negative.    The patient says that he has has significant pain in his right flank and he has been monitoring his blood pressure at home which has been about  90/50.  He comes in now with a blood pressure of 170/83 and he feels clammy, sweaty, dizzy and weak.  He says that his appetite has been poor since surgery.  He has had a minor nosebleed which he attributes to Brilinta.  He has had no gum bleeding.  He has had no blood in his urine or stool..  He did have a sinus infection recently for which she was treated with a Z-Oswaldo and prednisone.  He continues on HydroDIURIL.    Because of his current symptoms and physical examination showing that he is clammy I advised him to go to the emergency room for further evaluation.    He will have a follow-up CBC in about 3 months.  He will contact his cardiologist about fluctuations in his blood pressure.    Comorbidities  Coronary artery disease with at least 5 PCI interventions, ejection fraction 60 to 65%  Hypertension  Anxiety  Agoraphobia  PVCs  Fibromyalgia  GERD  Hyperlipidemia  Peptic ulcer disease with bleeding  Diabetes  Rheumatoid arthritis   Obstructive sleep apnea, mild  Kidney stones  Seborrheic dermatitis    Monitoring Parameters  Physical examinations, CBCs, imaging studies as dictated by his symptomatology.    Review of Systems - Oncology   Review of Systems   Constitutional:  Positive for fatigue. Negative for appetite change and unexpected weight change.   HENT:  Negative for dental problem, mouth sores, rhinorrhea and trouble swallowing.    Eyes:  Negative for visual disturbance.   Respiratory:  Positive for shortness of breath (at times with increased activity.). Negative for cough.    Cardiovascular:  Positive for leg swelling (occasional).   Gastrointestinal:  Positive for nausea. Negative for abdominal pain, constipation, diarrhea and vomiting.   Genitourinary:  Positive for dysuria, flank pain and scrotal swelling.   Musculoskeletal:  Positive for arthralgias (generalized). Negative for myalgias.   Skin:  Positive for pallor. Negative for rash.   Neurological:  Negative for weakness, light-headedness,  numbness and headaches.   Hematological:  Bruises/bleeds easily.   Psychiatric/Behavioral:  Negative for self-injury, sleep disturbance and suicidal ideas. The patient is not nervous/anxious.         Past Medical History  Past Medical History:   Diagnosis Date    Anxiety     Depression     Diabetes mellitus (CMS/[facility])     GERD (gastroesophageal reflux disease)     Heart disease     Hyperlipidemia     Hypertension 1988    Myocardial infarction (CMS/[facility])     Prostatitis 1994    Stroke (CMS/[facility])         Surgical History  Past Surgical History:   Procedure Laterality Date    CORONARY ANGIOPLASTY  2018    PTCA    CT ANGIO NECK  2022    CT NECK ANGIO W AND WO IV CONTRAST 2022 DOCTOR OFFICE LEGACY    CT HEAD ANGIO W AND WO IV CONTRAST  2022    CT HEAD ANGIO W AND WO IV CONTRAST 2022 DOCTOR OFFICE LEGACY    MR HEAD ANGIO WO IV CONTRAST  4/3/2023    MR HEAD ANGIO WO IV CONTRAST [facility]    MR NECK ANGIO WO IV CONTRAST  4/3/2023    MR NECK ANGIO WO IV CONTRAST [facility] MRI       Social History  Social History     Tobacco Use    Smoking status: Never    Smokeless tobacco: Never   Substance Use Topics    Alcohol use: Never    Drug use: Never   He is disabled and is single    Family History  family history includes Heart disease in his father and mother; Hypertension in his father and mother.  Diabetes in his parents.  Father  of a stroke at age 67.  Sister had cancer of the thyroglossal duct, asthma, hypertension, lupus.  Maternal uncle had lung cancer.       Current Medications  Current Outpatient Medications   Medication Instructions    ALPRAZolam (XANAX) 1 mg, oral, 2 times daily PRN    aspirin 81 mg EC tablet 1 tablet, oral, Daily    azithromycin (ZITHROMAX) 500 mg, oral, Daily    carvedilol (COREG) 25 mg, oral, 2 times daily with meals    cetirizine (ZYRTEC) 10 mg    cyanocobalamin (Vitamin B-12) 500 mcg tablet 1 tablet, oral, Every other day    FreeStyle Nancy reader  "(FreeStyle Nancy 2 [place]) misc Use as instructed    FreeStyle Nancy sensor system (FreeStyle Nancy 2 Sensor) kit Use as instructed    hydroCHLOROthiazide (HYDRODIURIL) 25 mg, oral, Daily    HYDROcodone-acetaminophen (Norco) 5-325 mg tablet 1 tablet, oral, Every 6 hours PRN    isosorbide mononitrate ER (IMDUR) 30 mg, oral, Every 12 hours    ketoconazole (NIZOral) 2 % cream Daily to affected areas until controlled, then 1-2 times weekly    losartan (COZAAR) 50 mg, oral, Daily    minoxidil (Loniten) 2.5 mg tablet oral, 2 times daily    nitroglycerin (NITROSTAT) 0.4 mg, sublingual    olopatadine (Patanol) 0.1 % ophthalmic solution     omeprazole (PriLOSEC) 20 mg DR capsule     OneTouch Ultra Test strip use to test ONCE DAILY AS DIRECTED    potassium chloride CR (Klor-Con M20) 20 mEq ER tablet 20 mEq, oral, Daily    predniSONE (Deltasone) 20 mg tablet Take 3 tabs (60mg) daily for 3 days, then take 2 tabs (40mg) daily for 3 days, then take 1 tab (20mg) daily for 3 days.    rosuvastatin (Crestor) 5 mg tablet 1 tablet, oral, Daily    ticagrelor (BRILINTA) 90 mg, oral, 2 times daily        OARRS Review  I have personally reviewed the OARRS report for Jhony Myles. I have considered the risks of abuse, dependence, addiction and diversion he has been on long-term alprazolam prescriptions and recently had [place] after his recent hydrocelectomy.    Vital Signs  /83 (BP Location: Right arm, Patient Position: Sitting, BP Cuff Size: Adult)   Pulse 75   Temp 36.4 °C (97.5 °F) (Skin)   Resp 16   Ht 1.981 m (6' 5.99\")   Wt 123 kg (272 lb)   SpO2 96%   BMI 31.44 kg/m²      Physical Exam  Vitals and nursing note reviewed. Exam conducted with a chaperone present.   Constitutional:       General: He is not in acute distress.     Appearance: He is ill-appearing.      Comments: Well-nourished overweight somewhat pale  male who is clammy and sweaty.  He says that he has been like this for a day or so.  He says that " his blood pressure at home is around 90/55 with flank pain since his surgery.   HENT:      Head: Normocephalic and atraumatic.      Nose: Nose normal.      Mouth/Throat:      Mouth: Mucous membranes are moist.      Pharynx: Oropharynx is clear. No oropharyngeal exudate or posterior oropharyngeal erythema.   Eyes:      General: No scleral icterus.     Extraocular Movements: Extraocular movements intact.      Conjunctiva/sclera: Conjunctivae normal.      Pupils: Pupils are equal, round, and reactive to light.   Neck:      Comments: There is no adenopathy about the head neck region, supraclavicular, axillary or inguinal regions bilaterally.  Cardiovascular:      Rate and Rhythm: Normal rate and regular rhythm.      Heart sounds: No murmur heard.  Pulmonary:      Effort: Pulmonary effort is normal. No respiratory distress.      Breath sounds: No wheezing, rhonchi or rales.   Abdominal:      General: There is no distension.      Palpations: Abdomen is soft. There is no mass.      Tenderness: There is no abdominal tenderness. There is no guarding or rebound.   Genitourinary:     Comments: I did not examine his testicles  Musculoskeletal:         General: Normal range of motion.      Cervical back: Normal range of motion and neck supple. No rigidity or tenderness.      Right lower leg: No edema.      Left lower leg: No edema.   Lymphadenopathy:      Cervical: No cervical adenopathy.   Skin:     General: Skin is warm.      Coloration: Skin is pale. Skin is not jaundiced.      Findings: No bruising or rash.      Comments: Clammy   Neurological:      General: No focal deficit present.      Mental Status: He is alert and oriented to person, place, and time. Mental status is at baseline.      Cranial Nerves: No cranial nerve deficit.      Motor: No weakness.      Gait: Gait normal.   Psychiatric:         Mood and Affect: Mood normal.         Behavior: Behavior normal.         Thought Content: Thought content normal.          Judgment: Judgment normal.          Current Laboratory Data  Laboratory data from subsequent ED visit showed that his sugar was 159, 1 was 25 and creatinine was 1.07.  The rest of his chemistries were normal.  CBC showed white count 5.3 with hemoglobin 13.3 hematocrit 37.6 and a platelet count of 152,000.  White blood cell differential count was normal once again showing a lymphopenia.    Recent Imaging  MR knee right wo IV contrast    Result Date: 2/19/2024  Interpreted By:  Alejandro Phan, STUDY: MRI of the  right knee without IV contrast;  2/19/2024 2:56 pm   INDICATION: Signs/Symptoms:pain, swelling, locking, hx meniscaltear.   COMPARISON: None.   ACCESSION NUMBER(S): [id number]   ORDERING CLINICIAN: RACHEL LANCE   TECHNIQUE: MR imaging of the right knee was obtained  without IV contrast.   FINDINGS: LIGAMENTS AND TENDONS: The anterior cruciate ligament and the posterior cruciate ligaments are intact. The medial collateral ligament is intact. The lateral collateral ligament, the biceps femoris tendon, the popliteus tendon and the iliotibial band are intact. The quadriceps tendon and the patellar tendon are intact. There is a small quadriceps enthesophyte.   MENISCI: There is a complex tear of the lateral meniscus involving the body and posterior horn with a radial and horizontal component. The anterior horn remains intact. The lateral meniscus is intact and without evidence of tear.   JOINTS: There is mild cartilage loss with osteophytosis in the lateral compartment. There is minimal cartilage loss the medial compartment as well. There is moderate cartilage loss in the lateral patellar facet and the lateral trochlear facet. There is a small effusion. There is a moderate-sized Baker's cyst.   OSSEOUS STRUCTURES: No focal marrow replacing lesions are identified. There is no fracture.   SOFT TISSUES: There is no muscle atrophy or tear. The common peroneal nerve is intact.       1. Complex tear of the lateral  meniscus involving the body and posterior horn with a radial and horizontal component. 2. Moderate cartilage loss in the patellofemoral compartment. Mild lateral and medial compartment cartilage loss. 3. Small Baker's cyst. Small joint effusion     I personally reviewed the images/study and I agree with the findings as stated. This study was interpreted at [place], [place].   MACRO: None   Signed by: Alejandro Phan 2/19/2024 3:08 PM Dictation workstation:   [id number]        Pathology  Mild chronic thrombocytopenia  Persistent mild lymphopenia      Performance Status:  Symptomatic; fully ambulatory    Assessment/Plan    1.  Mild chronic thrombocytopenia.  No etiology has been discerned.  He fluctuates between low normal and mildly decreased platelet counts.  In combination with his persistent chronic lymphopenia we will check a flow cytometry.  Causes of chronic lymphopenia include autoimmune disorders for which she has been evaluated, lymphoma, sarcoid, kidney failure and other problems.  He does not have kidney failure and has had no evidence of sarcoid or lymphoma.    2.  Flank pain, dizziness, bouts of hypotension and clamminess.  We sent him to the emergency department to make sure that he had no systemic infection.  He will follow-up with Dr. Edward.    3.  Multiple comorbidities.  These include but are not limited to:Coronary artery disease with at least 5 PCI interventions, ejection fraction 60 to 65%  Hypertension  Anxiety  Agoraphobia  PVCs  Fibromyalgia  GERD  Hyperlipidemia  Peptic ulcer disease with bleeding  Diabetes  Rheumatoid arthritis   Obstructive sleep apnea, mild  Kidney stones  Seborrheic dermatitis     We have sent him to the emergency department to rule out systemic infection.  We will see him again in 3 months.  We will ask for flow cytometry at that time.  He knows that he should call if he has any change in his status, questions or concerns.   He has to follow-up with cardiology and primary care concerning his widely fluctuating blood pressure readings.    Milagros Medina MD

## 2024-03-03 ASSESSMENT — ENCOUNTER SYMPTOMS
UNEXPECTED WEIGHT CHANGE: 0
ARTHRALGIAS: 1
LIGHT-HEADEDNESS: 0
DYSURIA: 1
MYALGIAS: 0
HEADACHES: 0
FLANK PAIN: 1
NAUSEA: 1
VOMITING: 0
APPETITE CHANGE: 0
NERVOUS/ANXIOUS: 0
SLEEP DISTURBANCE: 0
WEAKNESS: 0
RHINORRHEA: 0
BRUISES/BLEEDS EASILY: 1
ABDOMINAL PAIN: 0
SHORTNESS OF BREATH: 1
CONSTIPATION: 0
NUMBNESS: 0
TROUBLE SWALLOWING: 0
DIARRHEA: 0
FATIGUE: 1
COUGH: 0

## 2024-03-08 ENCOUNTER — OFFICE VISIT (OUTPATIENT)
Dept: ORTHOPEDIC SURGERY | Facility: CLINIC | Age: 54
End: 2024-03-08
Payer: COMMERCIAL

## 2024-03-08 ENCOUNTER — APPOINTMENT (OUTPATIENT)
Dept: RADIOLOGY | Facility: HOSPITAL | Age: 54
End: 2024-03-08
Payer: COMMERCIAL

## 2024-03-08 DIAGNOSIS — M17.11 OSTEOARTHRITIS OF RIGHT PATELLOFEMORAL JOINT: ICD-10-CM

## 2024-03-08 DIAGNOSIS — S83.281A TEAR OF LATERAL MENISCUS OF RIGHT KNEE, CURRENT, UNSPECIFIED TEAR TYPE, INITIAL ENCOUNTER: Primary | ICD-10-CM

## 2024-03-08 PROCEDURE — 4010F ACE/ARB THERAPY RXD/TAKEN: CPT | Performed by: ORTHOPAEDIC SURGERY

## 2024-03-08 PROCEDURE — 3044F HG A1C LEVEL LT 7.0%: CPT | Performed by: ORTHOPAEDIC SURGERY

## 2024-03-08 PROCEDURE — 99213 OFFICE O/P EST LOW 20 MIN: CPT | Performed by: ORTHOPAEDIC SURGERY

## 2024-03-08 PROCEDURE — 1036F TOBACCO NON-USER: CPT | Performed by: ORTHOPAEDIC SURGERY

## 2024-03-08 ASSESSMENT — PAIN - FUNCTIONAL ASSESSMENT: PAIN_FUNCTIONAL_ASSESSMENT: 0-10

## 2024-03-08 ASSESSMENT — PAIN DESCRIPTION - DESCRIPTORS: DESCRIPTORS: ACHING

## 2024-03-08 ASSESSMENT — PAIN SCALES - GENERAL: PAINLEVEL_OUTOF10: 2

## 2024-03-08 NOTE — PROGRESS NOTES
History of Present Illness   Chief Complaint   Patient presents with    Right Knee - Follow-up     Patient had MRI on 2024       Patient returns today to review side: right Knee MRI.  He notes no pain today and notes he previously saw a chiropractor who did some kind of fibular manipulation on his lower extremity which relieved his pain.  Past Medical History:   Diagnosis Date    Anxiety 2009    Depression     Diabetes mellitus (CMS/[facility])     GERD (gastroesophageal reflux disease)     Heart disease     Hyperlipidemia 2015    Hypertension 1988    Myocardial infarction (CMS/[facility])     Prostatitis     Stroke (CMS/[facility])        Medication Documentation Review Audit       Reviewed by Merry Hua LPN (Licensed Nurse) on 24 at 1109      Medication Order Taking? Sig Documenting Provider Last Dose Status   ALPRAZolam (Xanax) 1 mg tablet [id number] Yes Take 1 tablet (1 mg) by mouth 2 times a day as needed. Historical Provider, MD Taking Active   aspirin 81 mg EC tablet [id number] Yes Take 1 tablet (81 mg) by mouth once daily. Historical Provider, MD Taking Active   azithromycin (Zithromax) 500 mg tablet [id number]  Take 1 tablet (500 mg) by mouth once daily for 5 days. Amanda Maki DO   24 1709   carvedilol (Coreg) 25 mg tablet [id number] Yes Take 1 tablet (25 mg) by mouth 2 times a day with meals. Mady Tarango, APRN-CNP Taking Active   cetirizine (ZyrTEC) 10 mg tablet [id number] Yes 1 tablet (10 mg). Historical Provider, MD Taking Active   cyanocobalamin (Vitamin B-12) 500 mcg tablet [id number] Yes Take 1 tablet (500 mcg) by mouth every other day. Historical Provider, MD Taking Active   FreeStyle Nancy reader (FreeStyle Nancy 2 [place]) misc [id number] Yes Use as instructed Amanda Maki DO Taking Active   FreeStyle Nancy sensor system (FreeStyle Nancy 2 Sensor) kit [id number] Yes Use as instructed Amanda Maki DO Taking Active   hydroCHLOROthiazide  (HYDRODiuril) 25 mg tablet [id number] Yes Take 1 tablet (25 mg) by mouth once daily. CANDELARIO Thomas-CNP Taking Active   HYDROcodone-acetaminophen (Norco) 5-325 mg tablet [id number] Yes Take 1 tablet by mouth every 6 hours if needed for severe pain (7 - 10). Blaine Edward MD Taking Active   isosorbide mononitrate ER (Imdur) 30 mg 24 hr tablet [id number] Yes Take 1 tablet (30 mg) by mouth every 12 hours. CANDELARIO Thomas-CNP Taking Active   ketoconazole (NIZOral) 2 % cream [id number] Yes Daily to affected areas until controlled, then 1-2 times weekly Susan L Mayne, APRN-CNP Taking Active   losartan (Cozaar) 50 mg tablet [id number] Yes Take 1 tablet (50 mg) by mouth once daily. CANDELARIO Thomas-CNP Taking Active   minoxidil (Loniten) 2.5 mg tablet [id number] Yes Take by mouth twice a day. Historical Provider, MD Taking Active   nitroglycerin (Nitrostat) 0.4 mg SL tablet [id number] Yes Place 1 tablet (0.4 mg) under the tongue. Historical Provider, MD Taking Active   olopatadine (Patanol) 0.1 % ophthalmic solution [id number] Yes  Historical Provider, MD Taking Active   omeprazole (PriLOSEC) 20 mg DR capsule [id number] Yes  Historical Provider, MD Taking Active   OneTouch Ultra Test strip [id number] Yes use to test ONCE DAILY AS DIRECTED Historical Provider, MD Taking Active   potassium chloride CR (Klor-Con M20) 20 mEq ER tablet [id number] Yes Take 1 tablet (20 mEq) by mouth once daily. Mady Tarango APRN-CNP Taking Active   predniSONE (Deltasone) 20 mg tablet [id number] Yes Take 3 tabs (60mg) daily for 3 days, then take 2 tabs (40mg) daily for 3 days, then take 1 tab (20mg) daily for 3 days. Amanda Maki DO Taking Active   rosuvastatin (Crestor) 5 mg tablet [id number] Yes Take 1 tablet (5 mg) by mouth once daily. Historical Provider, MD Taking Active   ticagrelor (Brilinta) 90 mg tablet [id number] Yes Take 1 tablet (90 mg) by mouth 2 times a day. Mady Tarango, APRN-CNP Taking Active               "      Allergies   Allergen Reactions    Amlodipine Swelling     Bilateral leg swelling    Calcium Channel Blocking Agent Diltiazem Analogues Unknown    Ciprofloxacin Other    Iodinated Contrast Media Unknown    Lisinopril Swelling    Nitroglycerin Unknown    Other Unknown    Penicillins Unknown and Angioedema    Statins-Hmg-Coa Reductase Inhibitors Unknown    Tramadol Swelling    Meperidine Rash and Unknown     \"knocked me out\"    Propoxyphene-Acetaminophen Unknown     Knocked me out.    Temazepam Unknown     memory loss       Social History     Socioeconomic History    Marital status: Single     Spouse name: Not on file    Number of children: Not on file    Years of education: Not on file    Highest education level: Not on file   Occupational History    Not on file   Tobacco Use    Smoking status: Never    Smokeless tobacco: Never   Substance and Sexual Activity    Alcohol use: Never    Drug use: Never    Sexual activity: Never   Other Topics Concern    Not on file   Social History Narrative    Not on file     Social Determinants of Health     Financial Resource Strain: Not on file   Food Insecurity: Not on file   Transportation Needs: Not on file   Physical Activity: Not on file   Stress: Not on file   Social Connections: Not on file   Intimate Partner Violence: Not on file   Housing Stability: Not on file       Past Surgical History:   Procedure Laterality Date    CORONARY ANGIOPLASTY  02/27/2018    PTCA    CT ANGIO NECK  12/2/2022    CT NECK ANGIO W AND WO IV CONTRAST 12/2/2022 [facility]    CT HEAD ANGIO W AND WO IV CONTRAST  12/2/2022    CT HEAD ANGIO W AND WO IV CONTRAST 12/2/2022 [facility]    MR HEAD ANGIO WO IV CONTRAST  4/3/2023    MR HEAD ANGIO WO IV CONTRAST [facility]    MR NECK ANGIO WO IV CONTRAST  4/3/2023    MR NECK ANGIO WO IV CONTRAST [facility] MRI         Smoking  No    BMI  31     Review of Systems   GENERAL: Negative for malaise, significant weight loss, fever  MUSCULOSKELETAL: See " HPI  NEURO:  Negative for numbness / tingling      Physical Exam  side: right Knee:  Skin healthy and intact  No gross varus or valgus alignment   Range of motion: no major deficits   Tenderness to palpation over joint line: lateral  No laxity to valgus stress  No laxity to varus stress  Negative Lachman´s test  Negative anterior drawer test  Negative posterior drawer test  Positive Leigh´s test  Neurovascular exam normal distally     Imaging  MR knee right wo IV contrast  Status: Final result     PACS Images - [id number]     Show images for MR knee right wo IV contrast  Signed by    Signed Time Phone Pager   Alejandro Phan MD 2/19/2024 15:08 [phone]     Exam Information    Status Exam Begun Exam Ended   Final 2/19/2024 14:15 2/19/2024 14:56     Study Result    Narrative & Impression   Interpreted By:  Alejandro Phan,   STUDY:  MRI of the  right knee without IV contrast;  2/19/2024 2:56 pm      INDICATION:  Signs/Symptoms:pain, swelling, locking, hx meniscaltear.      COMPARISON:  None.      ACCESSION NUMBER(S):  [id number]      ORDERING CLINICIAN:  RACHEL LANCE      TECHNIQUE:  MR imaging of the right knee was obtained  without IV contrast.      FINDINGS:  LIGAMENTS AND TENDONS:  The anterior cruciate ligament and the posterior cruciate ligaments  are intact. The medial collateral ligament is intact. The lateral  collateral ligament, the biceps femoris tendon, the popliteus tendon  and the iliotibial band are intact. The quadriceps tendon and the  patellar tendon are intact. There is a small quadriceps enthesophyte.      MENISCI:  There is a complex tear of the lateral meniscus involving the body  and posterior horn with a radial and horizontal component. The  anterior horn remains intact. The lateral meniscus is intact and  without evidence of tear.      JOINTS:  There is mild cartilage loss with osteophytosis in the lateral  compartment. There is minimal cartilage loss the medial compartment  as  well. There is moderate cartilage loss in the lateral patellar  facet and the lateral trochlear facet. There is a small effusion.  There is a moderate-sized Baker's cyst.      OSSEOUS STRUCTURES:  No focal marrow replacing lesions are identified. There is no  fracture.      SOFT TISSUES:  There is no muscle atrophy or tear.  The common peroneal nerve is intact.      IMPRESSION:  1. Complex tear of the lateral meniscus involving the body and  posterior horn with a radial and horizontal component.  2. Moderate cartilage loss in the patellofemoral compartment. Mild  lateral and medial compartment cartilage loss.  3. Small Baker's cyst. Small joint effusion          I personally reviewed the images/study and I agree with the findings  as stated. This study was interpreted at [place], [place].      MACRO:  None      Signed by: Alejandro Phan 2/19/2024 3:08 PM  Dictation workstation:   [id number]            Assessment:    Patient with a side: right knee lateral meniscal tear.  Right knee moderate patellofemoral osteoarthritis     Plan:  Given the fact that he is not having any pain currently he wishes to leave it be.  I advised he can take over-the-counter anti-inflammatory medications.  If this gives him problems in the future he will call our office for follow-up appointment.  All questions answered.

## 2024-03-12 ENCOUNTER — HOSPITAL ENCOUNTER (OUTPATIENT)
Dept: RADIOLOGY | Facility: HOSPITAL | Age: 54
Discharge: HOME | End: 2024-03-12
Payer: COMMERCIAL

## 2024-03-12 DIAGNOSIS — E11.40 TYPE 2 DIABETES MELLITUS WITH DIABETIC NEUROPATHY, WITHOUT LONG-TERM CURRENT USE OF INSULIN (MULTI): ICD-10-CM

## 2024-03-12 DIAGNOSIS — K21.9 GASTROESOPHAGEAL REFLUX DISEASE WITHOUT ESOPHAGITIS: ICD-10-CM

## 2024-03-12 PROBLEM — B34.9 NONSPECIFIC SYNDROME SUGGESTIVE OF VIRAL ILLNESS: Status: ACTIVE | Noted: 2024-03-12

## 2024-03-12 PROBLEM — M79.603 PAIN OF UPPER EXTREMITY: Status: ACTIVE | Noted: 2023-03-10

## 2024-03-12 PROBLEM — R09.89 CAROTID BRUIT: Status: ACTIVE | Noted: 2024-03-12
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
## 2024-03-12 PROBLEM — H66.009 ACUTE SUPPURATIVE OTITIS MEDIA: Status: ACTIVE | Noted: 2018-07-04

## 2024-03-12 PROBLEM — H90.42 SENSORINEURAL HEARING LOSS (SNHL) OF LEFT EAR WITH UNRESTRICTED HEARING OF RIGHT EAR: Status: ACTIVE | Noted: 2018-07-10

## 2024-03-12 PROBLEM — Z20.822 SUSPECTED SEVERE ACUTE RESPIRATORY SYNDROME CORONAVIRUS 2 (SARS-COV-2) INFECTION: Status: ACTIVE | Noted: 2024-03-12

## 2024-03-12 PROBLEM — I25.110: Status: ACTIVE | Noted: 2018-02-16

## 2024-03-12 PROBLEM — R09.81 CONGESTION OF PARANASAL SINUS: Status: ACTIVE | Noted: 2024-03-12

## 2024-03-12 PROBLEM — G45.9 TIA (TRANSIENT ISCHEMIC ATTACK): Status: ACTIVE | Noted: 2024-03-12

## 2024-03-12 PROBLEM — E87.6 HYPOKALEMIA: Status: ACTIVE | Noted: 2024-03-12

## 2024-03-12 PROBLEM — I16.1 HYPERTENSIVE EMERGENCY: Status: ACTIVE | Noted: 2024-03-12

## 2024-03-12 PROBLEM — G89.29 CHRONIC LLQ PAIN: Status: ACTIVE | Noted: 2024-03-12

## 2024-03-12 PROBLEM — S46.919A STRAIN OF SHOULDER: Status: ACTIVE | Noted: 2023-09-15

## 2024-03-12 PROBLEM — I69.391 DYSPHAGIA FOLLOWING CEREBROVASCULAR ACCIDENT: Status: ACTIVE | Noted: 2018-10-01

## 2024-03-12 PROBLEM — R10.11 RIGHT UPPER QUADRANT PAIN: Status: ACTIVE | Noted: 2018-06-08

## 2024-03-12 PROBLEM — Z20.822 CONTACT WITH AND (SUSPECTED) EXPOSURE TO COVID-19: Status: ACTIVE | Noted: 2022-07-30

## 2024-03-12 PROBLEM — R10.12 LUQ DISCOMFORT: Status: ACTIVE | Noted: 2019-09-25

## 2024-03-12 PROBLEM — S23.41XA COSTOCHONDRAL JOINT SPRAIN: Status: ACTIVE | Noted: 2024-03-12

## 2024-03-12 PROBLEM — K92.0 HEMATEMESIS: Status: ACTIVE | Noted: 2024-03-12

## 2024-03-12 PROBLEM — M54.50 LOW BACK PAIN, UNSPECIFIED: Status: ACTIVE | Noted: 2022-11-22

## 2024-03-12 PROBLEM — R07.9 CHEST PAIN: Status: ACTIVE | Noted: 2018-09-11

## 2024-03-12 PROBLEM — H16.143 PUNCTATE KERATITIS, BILATERAL: Status: ACTIVE | Noted: 2018-07-31

## 2024-03-12 PROBLEM — R35.0 URINARY FREQUENCY: Status: ACTIVE | Noted: 2024-03-12

## 2024-03-12 PROBLEM — M19.90 INFLAMMATORY ARTHRITIS: Status: ACTIVE | Noted: 2019-02-27

## 2024-03-12 PROBLEM — N50.9 DISORDER OF MALE GENITAL ORGANS: Status: ACTIVE | Noted: 2024-01-22

## 2024-03-12 PROBLEM — S83.8X2A INJURY OF MENISCUS OF LEFT KNEE: Status: ACTIVE | Noted: 2019-03-27

## 2024-03-12 PROBLEM — J06.9 UPPER RESPIRATORY TRACT INFECTION: Status: ACTIVE | Noted: 2024-03-12

## 2024-03-12 PROBLEM — M79.7 FIBROMYALGIA: Status: ACTIVE | Noted: 2024-03-12

## 2024-03-12 PROBLEM — M05.9 RHEUMATOID ARTHRITIS WITH POSITIVE RHEUMATOID FACTOR (MULTI): Status: ACTIVE | Noted: 2024-01-02

## 2024-03-12 PROBLEM — R39.15 URINARY URGENCY: Status: ACTIVE | Noted: 2018-09-12

## 2024-03-12 PROBLEM — H81.399 PERIPHERAL VERTIGO: Status: ACTIVE | Noted: 2022-12-02

## 2024-03-12 PROBLEM — I21.9 MYOCARDIAL INFARCTION (MULTI): Status: ACTIVE | Noted: 2024-03-12

## 2024-03-12 PROBLEM — E78.5 HYPERLIPIDEMIA: Status: ACTIVE | Noted: 2018-02-16

## 2024-03-12 PROBLEM — M67.442 GANGLION CYST OF FINGER OF LEFT HAND: Status: ACTIVE | Noted: 2018-10-01

## 2024-03-12 PROBLEM — R51.9 HEADACHE: Status: ACTIVE | Noted: 2024-03-12

## 2024-03-12 PROBLEM — K30 FUNCTIONAL DYSPEPSIA: Status: ACTIVE | Noted: 2018-06-08

## 2024-03-12 PROBLEM — U07.1 COVID: Status: ACTIVE | Noted: 2024-03-12

## 2024-03-12 PROBLEM — R94.39 ABNORMAL STRESS TEST: Status: ACTIVE | Noted: 2024-03-12

## 2024-03-12 PROBLEM — R13.10 DYSPHAGIA: Status: ACTIVE | Noted: 2018-06-08

## 2024-03-12 PROBLEM — G62.9 POLYNEUROPATHY: Status: ACTIVE | Noted: 2023-03-10

## 2024-03-12 PROBLEM — H53.9 VISUAL DISTURBANCE: Status: ACTIVE | Noted: 2023-04-03

## 2024-03-12 PROBLEM — S83.289A TEAR OF LATERAL MENISCUS OF KNEE: Status: ACTIVE | Noted: 2024-03-12

## 2024-03-12 PROBLEM — F41.9 ANXIETY DISORDER: Status: ACTIVE | Noted: 2022-07-30

## 2024-03-12 PROBLEM — R06.02 SHORTNESS OF BREATH: Status: ACTIVE | Noted: 2024-03-12

## 2024-03-12 PROBLEM — R10.9 ABDOMINAL PAIN: Status: ACTIVE | Noted: 2023-06-23

## 2024-03-12 PROBLEM — M25.571 ACUTE RIGHT ANKLE PAIN: Status: ACTIVE | Noted: 2024-03-12

## 2024-03-12 PROBLEM — G56.00 CARPAL TUNNEL SYNDROME: Status: ACTIVE | Noted: 2024-03-12

## 2024-03-12 PROBLEM — M79.644 PAIN OF RIGHT THUMB: Status: ACTIVE | Noted: 2024-03-12

## 2024-03-12 PROBLEM — R04.2 HEMOPTYSIS: Status: ACTIVE | Noted: 2024-03-12

## 2024-03-12 PROBLEM — R59.9 ADENOPATHY: Status: ACTIVE | Noted: 2024-03-12

## 2024-03-12 PROBLEM — M70.60 GREATER TROCHANTERIC BURSITIS: Status: ACTIVE | Noted: 2023-11-29

## 2024-03-12 PROBLEM — I20.0 UNSTABLE ANGINA (MULTI): Status: ACTIVE | Noted: 2018-02-15

## 2024-03-12 PROBLEM — R41.89 IMPAIRED COGNITION: Status: ACTIVE | Noted: 2023-03-10

## 2024-03-12 PROBLEM — H43.393 VITREOUS FLOATERS OF BOTH EYES: Status: ACTIVE | Noted: 2017-07-26

## 2024-03-12 PROBLEM — R73.9 HYPERGLYCEMIA: Status: ACTIVE | Noted: 2024-03-12

## 2024-03-12 PROBLEM — R41.3 MEMORY CHANGE: Status: ACTIVE | Noted: 2024-03-12

## 2024-03-12 PROBLEM — I24.0: Status: ACTIVE | Noted: 2018-10-12

## 2024-03-12 PROBLEM — J32.9 SINUSITIS: Status: ACTIVE | Noted: 2023-04-03

## 2024-03-12 PROBLEM — Z98.61 S/P PTCA (PERCUTANEOUS TRANSLUMINAL CORONARY ANGIOPLASTY): Status: ACTIVE | Noted: 2023-05-23

## 2024-03-12 PROBLEM — S99.921A INJURY OF RIGHT FOOT: Status: ACTIVE | Noted: 2024-03-12

## 2024-03-12 PROBLEM — M25.849 MASS OF JOINT OF FINGER: Status: ACTIVE | Noted: 2018-08-31

## 2024-03-12 PROBLEM — I49.3 FREQUENT PVCS: Status: ACTIVE | Noted: 2020-05-07

## 2024-03-12 PROBLEM — R19.5 OCCULT BLOOD IN STOOLS: Status: ACTIVE | Noted: 2018-10-01

## 2024-03-12 PROBLEM — Z95.5 STENTED CORONARY ARTERY: Status: ACTIVE | Noted: 2018-10-12
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
## 2024-03-12 PROBLEM — R10.32 CHRONIC LLQ PAIN: Status: ACTIVE | Noted: 2024-03-12

## 2024-03-12 PROBLEM — D70.3 NEUTROPENIA ASSOCIATED WITH INFECTION (CMS-HCC): Status: ACTIVE | Noted: 2024-03-12

## 2024-03-12 PROBLEM — G43.909 MIGRAINE HEADACHE: Status: ACTIVE | Noted: 2022-07-30

## 2024-03-12 LAB
ATRIAL RATE: 74 BPM
P AXIS: 3 DEGREES
P OFFSET: 172 MS
P ONSET: 117 MS
PR INTERVAL: 192 MS
Q ONSET: 213 MS
QRS COUNT: 13 BEATS
QRS DURATION: 98 MS
QT INTERVAL: 408 MS
QTC CALCULATION(BAZETT): 452 MS
QTC FREDERICIA: 437 MS
R AXIS: 62 DEGREES
T AXIS: 35 DEGREES
T OFFSET: 417 MS
VENTRICULAR RATE: 74 BPM

## 2024-03-12 PROCEDURE — 78264 GASTRIC EMPTYING IMG STUDY: CPT | Performed by: STUDENT IN AN ORGANIZED HEALTH CARE EDUCATION/TRAINING PROGRAM

## 2024-03-12 PROCEDURE — 3430000001 HC RX 343 DIAGNOSTIC RADIOPHARMACEUTICALS: Performed by: INTERNAL MEDICINE

## 2024-03-12 PROCEDURE — 78264 GASTRIC EMPTYING IMG STUDY: CPT

## 2024-03-12 RX ORDER — OXYCODONE HYDROCHLORIDE 5 MG/1
5 TABLET ORAL EVERY 4 HOURS PRN
COMMUNITY
Start: 2024-02-20 | End: 2024-03-20 | Stop reason: ALTCHOICE

## 2024-03-12 RX ORDER — HYDROMORPHONE HYDROCHLORIDE 1 MG/ML
INJECTION, SOLUTION INTRAMUSCULAR; INTRAVENOUS; SUBCUTANEOUS
COMMUNITY
Start: 2024-02-20 | End: 2024-03-20 | Stop reason: WASHOUT

## 2024-03-12 RX ADMIN — Medication 1.02 MILLICURIE: at 10:24

## 2024-03-20 ENCOUNTER — TELEPHONE (OUTPATIENT)
Dept: GASTROENTEROLOGY | Facility: CLINIC | Age: 54
End: 2024-03-20

## 2024-03-20 ENCOUNTER — OFFICE VISIT (OUTPATIENT)
Dept: PRIMARY CARE | Facility: CLINIC | Age: 54
End: 2024-03-20
Payer: COMMERCIAL

## 2024-03-20 VITALS
WEIGHT: 280 LBS | DIASTOLIC BLOOD PRESSURE: 78 MMHG | HEART RATE: 76 BPM | SYSTOLIC BLOOD PRESSURE: 150 MMHG | BODY MASS INDEX: 32.4 KG/M2 | HEIGHT: 78 IN

## 2024-03-20 DIAGNOSIS — I24.0: ICD-10-CM

## 2024-03-20 DIAGNOSIS — I25.119 CORONARY ARTERY DISEASE INVOLVING NATIVE CORONARY ARTERY OF NATIVE HEART WITH ANGINA PECTORIS (CMS-HCC): ICD-10-CM

## 2024-03-20 DIAGNOSIS — I49.3 FREQUENT PVCS: ICD-10-CM

## 2024-03-20 DIAGNOSIS — E55.9 VITAMIN D DEFICIENCY: ICD-10-CM

## 2024-03-20 DIAGNOSIS — Z98.61 S/P PTCA (PERCUTANEOUS TRANSLUMINAL CORONARY ANGIOPLASTY): ICD-10-CM

## 2024-03-20 DIAGNOSIS — M05.9 RHEUMATOID ARTHRITIS WITH POSITIVE RHEUMATOID FACTOR, INVOLVING UNSPECIFIED SITE (MULTI): ICD-10-CM

## 2024-03-20 DIAGNOSIS — G47.33 OSA (OBSTRUCTIVE SLEEP APNEA): ICD-10-CM

## 2024-03-20 DIAGNOSIS — I25.110 ATHEROSCLEROSIS OF NATIVE CORONARY ARTERY OF NATIVE HEART WITH UNSTABLE ANGINA PECTORIS (MULTI): ICD-10-CM

## 2024-03-20 DIAGNOSIS — I16.1 HYPERTENSIVE EMERGENCY: ICD-10-CM

## 2024-03-20 DIAGNOSIS — E78.2 MIXED HYPERLIPIDEMIA: ICD-10-CM

## 2024-03-20 DIAGNOSIS — D69.6 THROMBOCYTOPENIA (CMS-HCC): ICD-10-CM

## 2024-03-20 DIAGNOSIS — I20.0 UNSTABLE ANGINA (MULTI): ICD-10-CM

## 2024-03-20 DIAGNOSIS — K21.9 BILE ACID ESOPHAGEAL REFLUX: Primary | ICD-10-CM

## 2024-03-20 DIAGNOSIS — K21.9 GASTROESOPHAGEAL REFLUX DISEASE WITHOUT ESOPHAGITIS: ICD-10-CM

## 2024-03-20 DIAGNOSIS — E11.40 TYPE 2 DIABETES MELLITUS WITH DIABETIC NEUROPATHY, WITHOUT LONG-TERM CURRENT USE OF INSULIN (MULTI): ICD-10-CM

## 2024-03-20 PROBLEM — H66.009 ACUTE SUPPURATIVE OTITIS MEDIA: Status: RESOLVED | Noted: 2018-07-04 | Resolved: 2024-03-20

## 2024-03-20 PROCEDURE — 99214 OFFICE O/P EST MOD 30 MIN: CPT | Performed by: INTERNAL MEDICINE

## 2024-03-20 PROCEDURE — 4010F ACE/ARB THERAPY RXD/TAKEN: CPT | Performed by: INTERNAL MEDICINE

## 2024-03-20 PROCEDURE — 3044F HG A1C LEVEL LT 7.0%: CPT | Performed by: INTERNAL MEDICINE

## 2024-03-20 PROCEDURE — 1036F TOBACCO NON-USER: CPT | Performed by: INTERNAL MEDICINE

## 2024-03-20 PROCEDURE — 3078F DIAST BP <80 MM HG: CPT | Performed by: INTERNAL MEDICINE

## 2024-03-20 PROCEDURE — 3077F SYST BP >= 140 MM HG: CPT | Performed by: INTERNAL MEDICINE

## 2024-03-20 RX ORDER — COLESEVELAM 180 1/1
1250 TABLET ORAL
Qty: 120 TABLET | Refills: 11 | Status: SHIPPED | OUTPATIENT
Start: 2024-03-20 | End: 2024-03-27 | Stop reason: ALTCHOICE

## 2024-03-20 ASSESSMENT — ENCOUNTER SYMPTOMS
FATIGUE: 0
WHEEZING: 0
ABDOMINAL DISTENTION: 0
NAUSEA: 0
COUGH: 0
SINUS PRESSURE: 0
DIARRHEA: 0
WEAKNESS: 0
CHILLS: 0
SINUS PAIN: 0
SHORTNESS OF BREATH: 0
SORE THROAT: 0
VOMITING: 0
BACK PAIN: 0
ACTIVITY CHANGE: 0
NUMBNESS: 0
APPETITE CHANGE: 0

## 2024-03-20 NOTE — PROGRESS NOTES
"Subjective   Patient ID: Jhony Myles \"Chester\" is a 53 y.o. male who presents for ER Follow-up (03/01/2024/Lightheadedness, Chest pain, Dyspnea/).  ER Follow-up  Pertinent negatives include no chest pain, chills, congestion, coughing, fatigue, nausea, numbness, sore throat, vomiting or weakness.     Patient is here today for ED follow up   Pt reports that he was having chest pain and sob.   He had a hydrocel-ectomy about 10 days ago.  His blood pressure was low post- anesthesia.   He has an appt with his cardiologist tomorrow.       Review of Systems   Constitutional:  Negative for activity change, appetite change, chills and fatigue.   HENT:  Negative for congestion, postnasal drip, sinus pressure, sinus pain and sore throat.    Respiratory:  Negative for cough, shortness of breath and wheezing.    Cardiovascular:  Negative for chest pain and leg swelling.   Gastrointestinal:  Negative for abdominal distention, diarrhea, nausea and vomiting.   Musculoskeletal:  Negative for back pain.   Neurological:  Negative for weakness and numbness.       Objective   /78   Pulse 76   Ht 1.981 m (6' 6\")   Wt 127 kg (280 lb)   BMI 32.36 kg/m²     Physical Exam  Constitutional:       General: He is not in acute distress.     Appearance: Normal appearance.   HENT:      Head: Normocephalic.      Nose: Nose normal.      Mouth/Throat:      Mouth: Mucous membranes are dry.      Pharynx: No oropharyngeal exudate.   Eyes:      General:         Right eye: No discharge.         Left eye: No discharge.      Extraocular Movements: Extraocular movements intact.      Pupils: Pupils are equal, round, and reactive to light.   Cardiovascular:      Rate and Rhythm: Normal rate and regular rhythm.      Heart sounds: No murmur heard.     No gallop.   Pulmonary:      Effort: Pulmonary effort is normal. No respiratory distress.      Breath sounds: Normal breath sounds. No wheezing.   Musculoskeletal:         General: No swelling. Normal " range of motion.      Cervical back: Neck supple. No tenderness.   Skin:     General: Skin is warm and dry.      Coloration: Skin is not jaundiced.   Neurological:      General: No focal deficit present.      Mental Status: He is alert and oriented to person, place, and time.      Cranial Nerves: No cranial nerve deficit.   Psychiatric:         Mood and Affect: Mood normal.         Behavior: Behavior normal.           Assessment/Plan   Problem List Items Addressed This Visit       Type 2 diabetes mellitus with diabetic neuropathy, without long-term current use of insulin (CMS/[facility])    Coronary artery disease involving native coronary artery of native heart with angina pectoris (CMS/[facility])    Thrombocytopenia (CMS/[facility])    Gastroesophageal reflux disease without esophagitis    Frequent PVCs    Hyperlipidemia    Hypertensive emergency    SHRAVAN (obstructive sleep apnea)    S/P PTCA (percutaneous transluminal coronary angioplasty)    Unstable angina (CMS/[facility])    Vitamin D deficiency    Atherosclerosis of native coronary artery with unstable angina pectoris (CMS/[facility])    Coronary occlusion (CMS/[facility])    Rheumatoid arthritis with positive rheumatoid factor (CMS/[facility])     Other Visit Diagnoses       Bile acid esophageal reflux    -  Primary    Relevant Medications    colesevelam (WelChoL) 625 mg tablet          LUQ abd pain in the setting of thrombocytopenia, splenomegally,  previous evals with EGD and colonoscopy ok, pain persists, normal bloodwork other than low platelets   - cT SCAN only showed nonobstructing left upper pole calculus, and known hydrocele s/p removal now  - did see Dr Dow, barium swallow as normal    - will try well chol and see if it improves, maybe bile acid reflux   - continue ppi     2. Thrombocytopenia   - following with hematology     3. GERD   - continue prilosec      4. CAD with  RCA s/p multiple PCI with most recent being balloon angioplasty of several ostial stenosis in Dg1 07/2022  - following  with Cardiology, appt in March 24 next   - on crestor 5mg po daily   - continue losartan 50mg po daily   - continue imdur 30mg po daily   - continue hctzs 12.5mg 2 tabs daily   - on brilinta    - has an appt with cardiology tomorrow      5. DMII, controlled   -A1c 5.2% in 4.23, 5.6%      6. Right knee hip and thumb pain  - following with ortho        Final diagnoses:   [K21.9] Bile acid esophageal reflux   [I25.110] Atherosclerosis of native coronary artery of native heart with unstable angina pectoris (CMS/[facility])   [I25.119] Coronary artery disease involving native coronary artery of native heart with angina pectoris (CMS/[facility])   [I24.0] Coronary occlusion (CMS/[facility])   [I49.3] Frequent PVCs   [E78.2] Mixed hyperlipidemia   [I16.1] Hypertensive emergency   [Z98.61] S/P PTCA (percutaneous transluminal coronary angioplasty)   [I20.0] Unstable angina (CMS/[facility])   [D69.6] Thrombocytopenia (CMS/[facility])   [E11.40] Type 2 diabetes mellitus with diabetic neuropathy, without long-term current use of insulin (CMS/[facility])   [E55.9] Vitamin D deficiency   [K21.9] Gastroesophageal reflux disease without esophagitis   [M05.9] Rheumatoid arthritis with positive rheumatoid factor, involving unspecified site (CMS/[facility])   [G47.33] SHRAVAN (obstructive sleep apnea)

## 2024-03-20 NOTE — TELEPHONE ENCOUNTER
Patient was given results. He understood.   ----- Message from Umesh Dow DO sent at 3/13/2024  4:35 PM EDT -----  Normal gastric emptying.  Therefore no gastroparesis

## 2024-03-21 ENCOUNTER — OFFICE VISIT (OUTPATIENT)
Dept: CARDIOLOGY | Facility: CLINIC | Age: 54
End: 2024-03-21
Payer: COMMERCIAL

## 2024-03-21 VITALS
SYSTOLIC BLOOD PRESSURE: 140 MMHG | DIASTOLIC BLOOD PRESSURE: 80 MMHG | WEIGHT: 281 LBS | BODY MASS INDEX: 32.47 KG/M2 | HEART RATE: 70 BPM

## 2024-03-21 DIAGNOSIS — I25.119 CORONARY ARTERY DISEASE INVOLVING NATIVE CORONARY ARTERY OF NATIVE HEART WITH ANGINA PECTORIS (CMS-HCC): Primary | ICD-10-CM

## 2024-03-21 PROCEDURE — 1036F TOBACCO NON-USER: CPT | Performed by: INTERNAL MEDICINE

## 2024-03-21 PROCEDURE — 3077F SYST BP >= 140 MM HG: CPT | Performed by: INTERNAL MEDICINE

## 2024-03-21 PROCEDURE — 3079F DIAST BP 80-89 MM HG: CPT | Performed by: INTERNAL MEDICINE

## 2024-03-21 PROCEDURE — 4010F ACE/ARB THERAPY RXD/TAKEN: CPT | Performed by: INTERNAL MEDICINE

## 2024-03-21 PROCEDURE — 99213 OFFICE O/P EST LOW 20 MIN: CPT | Performed by: INTERNAL MEDICINE

## 2024-03-21 PROCEDURE — 3044F HG A1C LEVEL LT 7.0%: CPT | Performed by: INTERNAL MEDICINE

## 2024-03-21 ASSESSMENT — ENCOUNTER SYMPTOMS: PALPITATIONS: 1

## 2024-03-21 NOTE — PATIENT INSTRUCTIONS
Continue all current medications as prescribed.  Followup with Dr. Ortiz on 05/23/2024 as already scheduled.    If you have any questions or cardiac concerns, please call our office at [phone].

## 2024-03-21 NOTE — LETTER
March 21, 2024     Amanda Maki DO  [address] [facility]  [facility] [address]    Patient: Chester Myles   YOB: 1970   Date of Visit: 3/21/2024       Dear Dr. Amanda Maki DO:    Thank you for referring Chester Myles to me for evaluation. Below are my notes for this consultation.  If you have questions, please do not hesitate to call me. I look forward to following your patient along with you.       Sincerely,     Juan Ortiz MD      CC: No Recipients  ______________________________________________________________________________________    Counseling:  The patient was counseled regarding diagnostic results, instructions for management, risk factor reductions, prognosis, patient and family education, impressions, risks and benefits of treatment options and importance of compliance with treatment.      Chief Complaint:   The patient presents today for 4-month followup of CAD and HTN.       History Of Present Illness:    Jhony Myles is a 53 year old male patient who presents today in the company of his wife for 4-month followup of CAD and HTN. His PMH is significant for CAD with  RCA s/p multiple PCI with most recent being balloon angioplasty of several ostial stenosis in Dg1 07/2022, HTN, anxiety, agoraphobia, PVC's, fibromyalgia, GERD and hyperlipidemia. The patient was evaluated in the ED on 03/01/2024 with a chief complaint of left-sided back pain and right medial thigh pain since undergoing removal of a right hydrocele on 02/20/2024, as well as a 1-week h/o hypotension with orthostasis and associated presyncope since undergoing removal of a right hydrocele on 02/20/2024. While in the ED, the patient received a small fluid bolus s/t a slightly elevated BUN, and he was discharged home the same day with outpatient followup. Today, the patient states that he is feeling relatively well. He indicates that his BP was as low as 77/33 at home prior to ED  evaluation, and it has since stabilized when measured at home. BP was initially elevated today at 164/84, with repeat of 140/80. He also reports recurrence of postprandial palpitations, which is likely GI related (GERD).     Last Recorded Vitals:  Vitals:    03/21/24 1547 03/21/24 1556   BP: 164/84 140/80   Pulse: 70    Weight: 127 kg (281 lb)      Past Surgical History:  He has a past surgical history that includes Coronary angioplasty (02/27/2018); CT angio neck (12/2/2022); CT angio head w and wo IV contrast (12/2/2022); MR angio head wo IV contrast (4/3/2023); and MR angio neck wo IV contrast (4/3/2023).      Social History:  He reports that he has never smoked. He has never used smokeless tobacco. He reports that he does not drink alcohol and does not use drugs.    Family History:  Family History   Problem Relation Name Age of Onset   • Heart disease Mother Taylor    • Hypertension Mother Taylor    • Heart disease Father Jhony    • Hypertension Father Jhony         Allergies:  Amlodipine, Calcium channel blocking agent diltiazem analogues, Ciprofloxacin, Iodinated contrast media, Lisinopril, Nitroglycerin, Other, Penicillins, Statins-hmg-coa reductase inhibitors, Tramadol, Meperidine, Propoxyphene-acetaminophen, and Temazepam    Outpatient Medications:  Current Outpatient Medications   Medication Instructions   • ALPRAZolam (XANAX) 1 mg, oral, 2 times daily PRN   • aspirin 81 mg EC tablet 1 tablet, oral, Daily   • carvedilol (COREG) 25 mg, oral, 2 times daily with meals   • cetirizine (ZYRTEC) 10 mg   • colesevelam (WELCHOL) 1,250 mg, oral, 2 times daily with meals, Take with meal(s) and a liquid.   • cyanocobalamin (Vitamin B-12) 500 mcg tablet 1 tablet, oral, Every other day   • FreeStyle Nancy reader (FreeStyle Nancy 2 [place]) misc Use as instructed   • FreeStyle Nancy sensor system (FreeStyle Nancy 2 Sensor) kit Use as instructed   • hydroCHLOROthiazide (HYDRODIURIL) 25 mg, oral, Daily   •  HYDROcodone-acetaminophen (Norco) 5-325 mg tablet 1 tablet, oral, Every 6 hours PRN   • isosorbide mononitrate ER (IMDUR) 30 mg, oral, Every 12 hours   • ketoconazole (NIZOral) 2 % cream Daily to affected areas until controlled, then 1-2 times weekly   • losartan (COZAAR) 50 mg, oral, Daily   • minoxidil (Loniten) 2.5 mg tablet oral, 2 times daily   • nitroglycerin (NITROSTAT) 0.4 mg, sublingual   • olopatadine (Patanol) 0.1 % ophthalmic solution    • omeprazole (PriLOSEC) 20 mg DR capsule    • potassium chloride CR (Klor-Con M20) 20 mEq ER tablet 20 mEq, oral, Daily   • rosuvastatin (Crestor) 5 mg tablet 1 tablet, oral, Daily   • ticagrelor (BRILINTA) 90 mg, oral, 2 times daily     Review of Systems   Cardiovascular:  Positive for palpitations (postprandial).   All other systems reviewed and are negative.     Physical Exam:  Constitutional:       Appearance: Healthy appearance. Not in distress.   Neck:      Vascular: No JVR. JVD normal.   Pulmonary:      Effort: Pulmonary effort is normal.      Breath sounds: Normal breath sounds. No wheezing. No rhonchi. No rales.   Chest:      Chest wall: Not tender to palpatation.   Cardiovascular:      PMI at left midclavicular line. Normal rate. Regular rhythm. Normal S1. Normal S2.       Murmurs: There is no murmur.      No gallop.  No click. No rub.   Pulses:     Intact distal pulses.   Edema:     Peripheral edema absent.   Abdominal:      General: Bowel sounds are normal.      Palpations: Abdomen is soft.      Tenderness: There is no abdominal tenderness.   Musculoskeletal: Normal range of motion.         General: No tenderness. Skin:     General: Skin is warm and dry.   Neurological:      General: No focal deficit present.      Mental Status: Alert and oriented to person, place and time.          Last Labs:  CBC -  Lab Results   Component Value Date    WBC 5.3 03/01/2024    HGB 13.3 (L) 03/01/2024    HCT 37.6 (L) 03/01/2024    MCV 89 03/01/2024     03/01/2024        CMP -  Lab Results   Component Value Date    CALCIUM 9.7 03/01/2024    PROT 6.6 03/01/2024    ALBUMIN 4.2 03/01/2024    AST 16 03/01/2024    ALT 17 03/01/2024    ALKPHOS 44 03/01/2024    BILITOT 0.5 03/01/2024       LIPID PANEL -   Lab Results   Component Value Date    CHOL 110 04/01/2023    TRIG 86 04/01/2023    HDL 22.0 (A) 04/01/2023    CHHDL 5.0 04/01/2023    LDLF 71 04/01/2023    VLDL 17 04/01/2023       RENAL FUNCTION PANEL -   Lab Results   Component Value Date    GLUCOSE 159 (H) 03/01/2024     03/01/2024    K 3.9 03/01/2024     03/01/2024    CO2 28 03/01/2024    ANIONGAP 10 03/01/2024    BUN 25 (H) 03/01/2024    CREATININE 1.07 03/01/2024    GFRMALE 73 09/19/2023    CALCIUM 9.7 03/01/2024    ALBUMIN 4.2 03/01/2024        Lab Results   Component Value Date    BNP 26 09/19/2023    HGBA1C 5.6 01/03/2024       Last Cardiology Tests:  05/23/2023 - Cardiac Catheterization (LH)  1. Single vessel coronary artery disease without proximal left anterior descending involvement.  2. Left Ventricular end-diastolic pressure = 9.  3. Normal LV filling pressures.     04/24/2023 - TTE  Left ventricular systolic function is normal with a 60-65% estimated ejection fraction.     04/17/2023 - Vascular Lab Carotid Artery Duplex U/S   1. Right Carotid: Findings are consistent with less than 50% stenosis of the right proximal ICA. Laminar flow seen by color Doppler. Right external carotid artery appears patent with no evidence of stenosis. The right vertebral artery is patent with antegrade flow. No evidence of hemodynamically significant stenosis in the right subclavian artery.  2. Left Carotid: Findings are consistent with less than 50% stenosis of the left proximal ICA. Laminar flow seen by color Doppler. Left external carotid artery appears patent with no evidence of stenosis. The left vertebral artery is patent with antegrade flow. No evidence of hemodynamically significant stenosis in the left subclavian  artery.     04/03/2023 - MRI Brain/Head/Neck  1. There is no MRI evidence of acute infarction on the diffusion weighted images.  2. There is mild-to-moderate brain parenchymal volume loss.  3. There are small scattered nonspecific white matter changes within the cerebral hemispheres bilaterally which while nonspecific, given the patient's age, likely represent sequelae of more remote small vessel ischemic change.  4. The MRA of the neck demonstrates a short segmental area of diminished MRA signal suggestive of slab artifact along the distal right common carotid artery and origin of the left internal carotid artery. There is mild non hemodynamically significant narrowing noted along the proximal internal carotid arteries bilaterally. There is diminished MRA signal noted along the horizontal segments of the distal cervical vertebral arteries bilaterally felt to likely be technical/artifactual in origin. Otherwise, no significant focal stenosis noted along the cervical segments of the vertebral arteries. There is a left dominant vertebral artery.  5. The intracranial MRA demonstrates a small caliber distal right vertebral artery which terminates in a right posterior inferior cerebellar artery. No significant stenosis noted along the distal left dominant vertebral artery, basilar artery, or distal internal carotid arteries. Otherwise, no other significant intracranial stenosis or intracranial aneurysm is noted.     07/29/2022 - Cardiac Catheterization (LH)  1. Severe ostial stenosis in the Diagonal-1 vessel, status post balloon angioplasty.  2. Patent stent in the LAD and OM system.  3. LVEDP of 20 mmHg.     06/24/2022 - TTE  The left ventricular systolic function is normal with a 70-75% estimated ejection fraction.     06/23/2022 - Cardiac Catheterization (LH)  1. Double vessel coronary artery disease without proximal left anterior descending involvement.  2. Culprit vessel(s): left anterior descending.  3. Successful  PCI of LAD.     11/18/2021 - Cardiac Catheterization (LH)  Single vessel coronary artery disease without proximal left anterior descending involvement.     10/11/2021 - NM Cardiac Stress Test  1. Normal myocardial perfusion study without evidence of ischemia or prior infarction. The left ventricle is normal in size. Normal LV wall motion with an LV EF estimated at approximately 55%.  2. Baseline EKG shows normal sinus rhythm with no significant ST-T segment changes. With regadenoson infusion no ST-T segment changes of ischemia, or sustained ventricular arrhythmias are seen. Regadenoson stress EKG is negative for ischemia.      09/17/2021 - Cardiac Catheterization   1. The 1st obtuse marginal branch showed mild tortuosity, two previous stents and mild in-stent restenosis.  2. 2-vessel severe coronary artery disease.  3. Successful GILDA PCI to proximal D2 with 3.0 x 18 mm Resolut [place] post-dilated with 3.0 x 15 mm NC balloon.  4. Previous LAD stent has mild malapposition in a short segment within the mid stent and in the ostial stent edge due to an aneurysmal segment.     09/08/2021- NM Cardiac Stress Test  1. There is a small to moderate sized fixed perfusion defect in the anteroseptal to apical wall consistent with prior infarct. There is a low probability of ischemia. Calculated ejection fraction of 52% with mild hypokinesis of the septal wall.  2. No electrocardiographic evidence for ischemia at maximal infusion. Normal Stress Test.      07/23/2021 - Cardiac Catheterization ()  1. Double vessel coronary artery disease without proximal left anterior descending involvement.  2. Successful PCI of LAD.     03/19/2021 - TTE  The left ventricular systolic function is normal with a 55-60% estimated ejection fraction.     03/18/2021 - Cardiac Catheterization (LH)  1. Single vessel coronary artery disease with proximal left anterior descending involvement.  2. Successful PCI of LAD.     02/13/2019 - Cardiac  Catheterization (LH)  1. Patent stents in the circumflex territory.  2. Chronic total occlusion of the right coronary artery with collaterals from left system.  3. Mild LV dysfunction, EF 50%.  4. Medical therapy advised.     04/10/2018 - Vascular Lab Renal Artery U/S  1. Renal Artery Duplex: Bilateral renal arteries demonstrate no evidence of hemodynamically significant stenosis. The bilateral renal veins are widely patent.  2. Right Renal Artery: Cystlike structure noted in the kidney measuring approximately 3cm 2.7cm. Right kidney size not imaged due to tech error.     03/15/2018 - Vascular Lab Arterial Duplex U/S  Right Upper Arterial: History of cardiac catheterization via RUE approach, c/o pain near axilla. No evidence of pseudoaneurysm, stenosis or occlusion of the subclavian, axillary , radial and ulnar arteries.     02/15/2018 - NM Cardiac Stress Test  1. SPECT perfusion study: Normal.   2. Fair functional capacity for age and gender.  3. There is no scintigraphic evidence for inducible ischemia.   4. No evidence of scarred myocardium.  5. Left ventricle is mildly dilated. The left ventricle systolic function is normal.   6. Right ventricle is normal in size. THe right ventricle systolic function is normal.  7. This is a low risk scan.   8. EKG Portion: The test was terminated due to general fatigue. Adequate heart rate response of 89% predicted maximal heart rate, normal heart rate recovery @ 1 minute post exercise, normal chronotropic response index. Normal blood pressure response to stress, resting hypertension. Normal ST segment response to stress, T wave changes due to stress. Abnormal Duke treadmill score (<5 but >/= -10), intermediate risk. Typical anginal discomfort during stress, nausea during stress. Multifocal PVCs during the test, PACs during the test. Nondiagnostic due to abnormal resting ECG.      02/11/2018 - Echocardiogram   1. Technically difficult exam due to suboptimal positioning.  2.  Exam indication: Chest pain.  3. The left ventricle is normal in size. There is mild concentric left ventricular hypertrophy. Left ventricular systolic function is normal. EF = 60 +/- 5% (2D biplane).  4. The right ventricle is normal in size. Right ventricular systolic function is normal.   5. There are no significant valvular abnormalities.     Lab review: I have personally reviewed the laboratory result(s).    Assessment/Plan  1) Blurred Vision/?TIA  Admitted to hospital 03/31/2023 to 04/03/2023 with blurred vision left eye   CTA negative for acute cortical infarct or intracranial hemorrhage  MRI of brain/neck/head with mild narrowing of the carotid arteries  Scheduled to see opthalmology next week  TTE 04/24/2023 with LVEF 60-65%      2) Intermittent Sharp Abdominal Pain   Check CT abdomen/pelvis - declined by insurance      3) Scattered Petechiae/Thrombocytopenia   CBC drawn prior to discharge with low platelet count of 114  Referred to hematology     4) HTN  Three weeks prior to hospitalization 03/31/2023, patient reported low BP readings at 70s/30s, especially with changes in position.   No RENETTA per renal artery duplex in 2018  Intolerant of amlodipine in the past d/t BLE edema  On carvedilol 25 mg twice daily, HCTZ 25 mg daily, Imdur 30 mg BID (60 mg daily caused headaches), minoxidil 2.5 mg BID and losartan 50 mg daily  Trialed d/c minoxidil with increase in BP above 150 systolic   Patient restarted minoxidil with stabilization of BP in the 130-140/70-80 range   ED evaluation 03/01/2024 with left-sided back pain and right medial thigh pain since removal of right hydrocele 02/20/2024, and 1-week h/o hypotension with orthostasis and associated presyncope   Received small fluid bolus s/t a slightly elevated BUN  BP stabilized  F/U 05/23/2024 as scheduled     5) CAD s/p multiple PCI - repeat PCI of LAD July 2021 and June 2022 and 2nd diagonal Sept 2021, PTA D1 July 2022  On DAPT with aspirin 81 mg daily and  Brilinta 90 mg BID  On rosuvastatin 5 mg daily, carvedilol 25 mg BID, Imdur 30 mg BID - 60 mg daily dose caused headaches  Tolerating rosuvastatin 5 mg daily - admits to some reflux afterwards, but is tolerable  Intolerant of Imdur 60 mg s/t headaches  Known  RCA with collaterals  [facility] Nov 2021 with patent stent LAD, known  RCA  Lipid panel 04/01/2023 with LDL of 71  TTE 04/24/2023 with LVEF 60-65%   [facility] 05/23/2023 for evaluation of chest tightness when bending over showed single vessel CAD  Stable  F/U 05/23/2024 as scheduled    6) Carotid Bruit  MRI of brain/neck/head 04/03/2033 with mild narrowing of the carotid arteries  Carotid duplex 04/17/2023 with <50% stenosis of bilateral proximal ICAs     7) GERD  On omeprazole 40 mg BID.   Management per PCP. Seen by GI in the past   Reports recurrence of postprandial palpitations - likely GI related, and patient agrees         Scribe Attestation  By signing my name below, I, Jam Carre   attest that this documentation has been prepared under the direction and in the presence of Juan Ortiz MD.

## 2024-03-21 NOTE — PROGRESS NOTES
Counseling:  The patient was counseled regarding diagnostic results, instructions for management, risk factor reductions, prognosis, patient and family education, impressions, risks and benefits of treatment options and importance of compliance with treatment.      Chief Complaint:   The patient presents today for 4-month followup of CAD and HTN.       History Of Present Illness:    Jhony Myles is a 53 year old male patient who presents today in the company of his wife for 4-month followup of CAD and HTN. His PMH is significant for CAD with  RCA s/p multiple PCI with most recent being balloon angioplasty of several ostial stenosis in Dg1 07/2022, HTN, anxiety, agoraphobia, PVC's, fibromyalgia, GERD and hyperlipidemia. The patient was evaluated in the ED on 03/01/2024 with a chief complaint of left-sided back pain and right medial thigh pain since undergoing removal of a right hydrocele on 02/20/2024, as well as a 1-week h/o hypotension with orthostasis and associated presyncope since undergoing removal of a right hydrocele on 02/20/2024. While in the ED, the patient received a small fluid bolus s/t a slightly elevated BUN, and he was discharged home the same day with outpatient followup. Today, the patient states that he is feeling relatively well. He indicates that his BP was as low as 77/33 at home prior to ED evaluation, and it has since stabilized when measured at home. BP was initially elevated today at 164/84, with repeat of 140/80. He also reports recurrence of postprandial palpitations, which is likely GI related (GERD).     Last Recorded Vitals:  Vitals:    03/21/24 1547 03/21/24 1556   BP: 164/84 140/80   Pulse: 70    Weight: 127 kg (281 lb)      Past Surgical History:  He has a past surgical history that includes Coronary angioplasty (02/27/2018); CT angio neck (12/2/2022); CT angio head w and wo IV contrast (12/2/2022); MR angio head wo IV contrast (4/3/2023); and MR angio neck wo IV contrast  (4/3/2023).      Social History:  He reports that he has never smoked. He has never used smokeless tobacco. He reports that he does not drink alcohol and does not use drugs.    Family History:  Family History   Problem Relation Name Age of Onset    Heart disease Mother Taylor     Hypertension Mother Taylor     Heart disease Father Jhony     Hypertension Father Jhony         Allergies:  Amlodipine, Calcium channel blocking agent diltiazem analogues, Ciprofloxacin, Iodinated contrast media, Lisinopril, Nitroglycerin, Other, Penicillins, Statins-hmg-coa reductase inhibitors, Tramadol, Meperidine, Propoxyphene-acetaminophen, and Temazepam    Outpatient Medications:  Current Outpatient Medications   Medication Instructions    ALPRAZolam (XANAX) 1 mg, oral, 2 times daily PRN    aspirin 81 mg EC tablet 1 tablet, oral, Daily    carvedilol (COREG) 25 mg, oral, 2 times daily with meals    cetirizine (ZYRTEC) 10 mg    colesevelam (WELCHOL) 1,250 mg, oral, 2 times daily with meals, Take with meal(s) and a liquid.    cyanocobalamin (Vitamin B-12) 500 mcg tablet 1 tablet, oral, Every other day    FreeStyle Nancy reader (FreeStyle Nancy 2 Rodeo) misc Use as instructed    FreeStyle Nancy sensor system (FreeStyle Nancy 2 Sensor) kit Use as instructed    hydroCHLOROthiazide (HYDRODIURIL) 25 mg, oral, Daily    HYDROcodone-acetaminophen (Norco) 5-325 mg tablet 1 tablet, oral, Every 6 hours PRN    isosorbide mononitrate ER (IMDUR) 30 mg, oral, Every 12 hours    ketoconazole (NIZOral) 2 % cream Daily to affected areas until controlled, then 1-2 times weekly    losartan (COZAAR) 50 mg, oral, Daily    minoxidil (Loniten) 2.5 mg tablet oral, 2 times daily    nitroglycerin (NITROSTAT) 0.4 mg, sublingual    olopatadine (Patanol) 0.1 % ophthalmic solution     omeprazole (PriLOSEC) 20 mg DR capsule     potassium chloride CR (Klor-Con M20) 20 mEq ER tablet 20 mEq, oral, Daily    rosuvastatin (Crestor) 5 mg tablet 1 tablet, oral, Daily     ticagrelor (BRILINTA) 90 mg, oral, 2 times daily     Review of Systems   Cardiovascular:  Positive for palpitations (postprandial).   All other systems reviewed and are negative.     Physical Exam:  Constitutional:       Appearance: Healthy appearance. Not in distress.   Neck:      Vascular: No JVR. JVD normal.   Pulmonary:      Effort: Pulmonary effort is normal.      Breath sounds: Normal breath sounds. No wheezing. No rhonchi. No rales.   Chest:      Chest wall: Not tender to palpatation.   Cardiovascular:      PMI at left midclavicular line. Normal rate. Regular rhythm. Normal S1. Normal S2.       Murmurs: There is no murmur.      No gallop.  No click. No rub.   Pulses:     Intact distal pulses.   Edema:     Peripheral edema absent.   Abdominal:      General: Bowel sounds are normal.      Palpations: Abdomen is soft.      Tenderness: There is no abdominal tenderness.   Musculoskeletal: Normal range of motion.         General: No tenderness. Skin:     General: Skin is warm and dry.   Neurological:      General: No focal deficit present.      Mental Status: Alert and oriented to person, place and time.          Last Labs:  CBC -  Lab Results   Component Value Date    WBC 5.3 03/01/2024    HGB 13.3 (L) 03/01/2024    HCT 37.6 (L) 03/01/2024    MCV 89 03/01/2024     03/01/2024       CMP -  Lab Results   Component Value Date    CALCIUM 9.7 03/01/2024    PROT 6.6 03/01/2024    ALBUMIN 4.2 03/01/2024    AST 16 03/01/2024    ALT 17 03/01/2024    ALKPHOS 44 03/01/2024    BILITOT 0.5 03/01/2024       LIPID PANEL -   Lab Results   Component Value Date    CHOL 110 04/01/2023    TRIG 86 04/01/2023    HDL 22.0 (A) 04/01/2023    CHHDL 5.0 04/01/2023    LDLF 71 04/01/2023    VLDL 17 04/01/2023       RENAL FUNCTION PANEL -   Lab Results   Component Value Date    GLUCOSE 159 (H) 03/01/2024     03/01/2024    K 3.9 03/01/2024     03/01/2024    CO2 28 03/01/2024    ANIONGAP 10 03/01/2024    BUN 25 (H) 03/01/2024     CREATININE 1.07 03/01/2024    GFRMALE 73 09/19/2023    CALCIUM 9.7 03/01/2024    ALBUMIN 4.2 03/01/2024        Lab Results   Component Value Date    BNP 26 09/19/2023    HGBA1C 5.6 01/03/2024       Last Cardiology Tests:  05/23/2023 - Cardiac Catheterization (LH)  1. Single vessel coronary artery disease without proximal left anterior descending involvement.  2. Left Ventricular end-diastolic pressure = 9.  3. Normal LV filling pressures.     04/24/2023 - TTE  Left ventricular systolic function is normal with a 60-65% estimated ejection fraction.     04/17/2023 - Vascular Lab Carotid Artery Duplex U/S   1. Right Carotid: Findings are consistent with less than 50% stenosis of the right proximal ICA. Laminar flow seen by color Doppler. Right external carotid artery appears patent with no evidence of stenosis. The right vertebral artery is patent with antegrade flow. No evidence of hemodynamically significant stenosis in the right subclavian artery.  2. Left Carotid: Findings are consistent with less than 50% stenosis of the left proximal ICA. Laminar flow seen by color Doppler. Left external carotid artery appears patent with no evidence of stenosis. The left vertebral artery is patent with antegrade flow. No evidence of hemodynamically significant stenosis in the left subclavian artery.     04/03/2023 - MRI Brain/Head/Neck  1. There is no MRI evidence of acute infarction on the diffusion weighted images.  2. There is mild-to-moderate brain parenchymal volume loss.  3. There are small scattered nonspecific white matter changes within the cerebral hemispheres bilaterally which while nonspecific, given the patient's age, likely represent sequelae of more remote small vessel ischemic change.  4. The MRA of the neck demonstrates a short segmental area of diminished MRA signal suggestive of slab artifact along the distal right common carotid artery and origin of the left internal carotid artery. There is mild non  hemodynamically significant narrowing noted along the proximal internal carotid arteries bilaterally. There is diminished MRA signal noted along the horizontal segments of the distal cervical vertebral arteries bilaterally felt to likely be technical/artifactual in origin. Otherwise, no significant focal stenosis noted along the cervical segments of the vertebral arteries. There is a left dominant vertebral artery.  5. The intracranial MRA demonstrates a small caliber distal right vertebral artery which terminates in a right posterior inferior cerebellar artery. No significant stenosis noted along the distal left dominant vertebral artery, basilar artery, or distal internal carotid arteries. Otherwise, no other significant intracranial stenosis or intracranial aneurysm is noted.     07/29/2022 - Cardiac Catheterization (LH)  1. Severe ostial stenosis in the Diagonal-1 vessel, status post balloon angioplasty.  2. Patent stent in the LAD and OM system.  3. LVEDP of 20 mmHg.     06/24/2022 - TTE  The left ventricular systolic function is normal with a 70-75% estimated ejection fraction.     06/23/2022 - Cardiac Catheterization (LH)  1. Double vessel coronary artery disease without proximal left anterior descending involvement.  2. Culprit vessel(s): left anterior descending.  3. Successful PCI of LAD.     11/18/2021 - Cardiac Catheterization (LH)  Single vessel coronary artery disease without proximal left anterior descending involvement.     10/11/2021 - NM Cardiac Stress Test  1. Normal myocardial perfusion study without evidence of ischemia or prior infarction. The left ventricle is normal in size. Normal LV wall motion with an LV EF estimated at approximately 55%.  2. Baseline EKG shows normal sinus rhythm with no significant ST-T segment changes. With regadenoson infusion no ST-T segment changes of ischemia, or sustained ventricular arrhythmias are seen. Regadenoson stress EKG is negative for ischemia.       09/17/2021 - Cardiac Catheterization   1. The 1st obtuse marginal branch showed mild tortuosity, two previous stents and mild in-stent restenosis.  2. 2-vessel severe coronary artery disease.  3. Successful GILDA PCI to proximal D2 with 3.0 x 18 mm Resolut [place] post-dilated with 3.0 x 15 mm NC balloon.  4. Previous LAD stent has mild malapposition in a short segment within the mid stent and in the ostial stent edge due to an aneurysmal segment.     09/08/2021- NM Cardiac Stress Test  1. There is a small to moderate sized fixed perfusion defect in the anteroseptal to apical wall consistent with prior infarct. There is a low probability of ischemia. Calculated ejection fraction of 52% with mild hypokinesis of the septal wall.  2. No electrocardiographic evidence for ischemia at maximal infusion. Normal Stress Test.      07/23/2021 - Cardiac Catheterization (LH)  1. Double vessel coronary artery disease without proximal left anterior descending involvement.  2. Successful PCI of LAD.     03/19/2021 - TTE  The left ventricular systolic function is normal with a 55-60% estimated ejection fraction.     03/18/2021 - Cardiac Catheterization (LH)  1. Single vessel coronary artery disease with proximal left anterior descending involvement.  2. Successful PCI of LAD.     02/13/2019 - Cardiac Catheterization ()  1. Patent stents in the circumflex territory.  2. Chronic total occlusion of the right coronary artery with collaterals from left system.  3. Mild LV dysfunction, EF 50%.  4. Medical therapy advised.     04/10/2018 - Vascular Lab Renal Artery U/S  1. Renal Artery Duplex: Bilateral renal arteries demonstrate no evidence of hemodynamically significant stenosis. The bilateral renal veins are widely patent.  2. Right Renal Artery: Cystlike structure noted in the kidney measuring approximately 3cm 2.7cm. Right kidney size not imaged due to tech error.     03/15/2018 - Vascular Lab Arterial Duplex U/S  Right Upper Arterial:  History of cardiac catheterization via RUE approach, c/o pain near axilla. No evidence of pseudoaneurysm, stenosis or occlusion of the subclavian, axillary , radial and ulnar arteries.     02/15/2018 - NM Cardiac Stress Test  1. SPECT perfusion study: Normal.   2. Fair functional capacity for age and gender.  3. There is no scintigraphic evidence for inducible ischemia.   4. No evidence of scarred myocardium.  5. Left ventricle is mildly dilated. The left ventricle systolic function is normal.   6. Right ventricle is normal in size. THe right ventricle systolic function is normal.  7. This is a low risk scan.   8. EKG Portion: The test was terminated due to general fatigue. Adequate heart rate response of 89% predicted maximal heart rate, normal heart rate recovery @ 1 minute post exercise, normal chronotropic response index. Normal blood pressure response to stress, resting hypertension. Normal ST segment response to stress, T wave changes due to stress. Abnormal Duke treadmill score (<5 but >/= -10), intermediate risk. Typical anginal discomfort during stress, nausea during stress. Multifocal PVCs during the test, PACs during the test. Nondiagnostic due to abnormal resting ECG.      02/11/2018 - Echocardiogram   1. Technically difficult exam due to suboptimal positioning.  2. Exam indication: Chest pain.  3. The left ventricle is normal in size. There is mild concentric left ventricular hypertrophy. Left ventricular systolic function is normal. EF = 60 +/- 5% (2D biplane).  4. The right ventricle is normal in size. Right ventricular systolic function is normal.   5. There are no significant valvular abnormalities.     Lab review: I have personally reviewed the laboratory result(s).    Assessment/Plan   1) Blurred Vision/?TIA  Admitted to hospital 03/31/2023 to 04/03/2023 with blurred vision left eye   CTA negative for acute cortical infarct or intracranial hemorrhage  MRI of brain/neck/head with mild narrowing of  the carotid arteries  Scheduled to see opthalmology next week  TTE 04/24/2023 with LVEF 60-65%      2) Intermittent Sharp Abdominal Pain   Check CT abdomen/pelvis - declined by insurance      3) Scattered Petechiae/Thrombocytopenia   CBC drawn prior to discharge with low platelet count of 114  Referred to hematology     4) HTN  Three weeks prior to hospitalization 03/31/2023, patient reported low BP readings at 70s/30s, especially with changes in position.   No RENETTA per renal artery duplex in 2018  Intolerant of amlodipine in the past d/t BLE edema  On carvedilol 25 mg twice daily, HCTZ 25 mg daily, Imdur 30 mg BID (60 mg daily caused headaches), minoxidil 2.5 mg BID and losartan 50 mg daily  Trialed d/c minoxidil with increase in BP above 150 systolic   Patient restarted minoxidil with stabilization of BP in the 130-140/70-80 range   ED evaluation 03/01/2024 with left-sided back pain and right medial thigh pain since removal of right hydrocele 02/20/2024, and 1-week h/o hypotension with orthostasis and associated presyncope   Received small fluid bolus s/t a slightly elevated BUN  BP stabilized  F/U 05/23/2024 as scheduled     5) CAD s/p multiple PCI - repeat PCI of LAD July 2021 and June 2022 and 2nd diagonal Sept 2021, PTA D1 July 2022  On DAPT with aspirin 81 mg daily and Brilinta 90 mg BID  On rosuvastatin 5 mg daily, carvedilol 25 mg BID, Imdur 30 mg BID - 60 mg daily dose caused headaches  Tolerating rosuvastatin 5 mg daily - admits to some reflux afterwards, but is tolerable  Intolerant of Imdur 60 mg s/t headaches  Known  RCA with collaterals  [facility] Nov 2021 with patent stent LAD, known  RCA  Lipid panel 04/01/2023 with LDL of 71  TTE 04/24/2023 with LVEF 60-65%   [facility] 05/23/2023 for evaluation of chest tightness when bending over showed single vessel CAD  Stable  F/U 05/23/2024 as scheduled    6) Carotid Bruit  MRI of brain/neck/head 04/03/2033 with mild narrowing of the carotid arteries  Carotid duplex  04/17/2023 with <50% stenosis of bilateral proximal ICAs     7) GERD  On omeprazole 40 mg BID.   Management per PCP. Seen by GI in the past   Reports recurrence of postprandial palpitations - likely GI related, and patient agrees         Scribe Attestation  By signing my name below, I, Enrico Carr   attest that this documentation has been prepared under the direction and in the presence of Juan Ortiz MD.

## 2024-03-27 ENCOUNTER — TELEPHONE (OUTPATIENT)
Dept: PRIMARY CARE | Facility: CLINIC | Age: 54
End: 2024-03-27
Payer: COMMERCIAL

## 2024-03-27 DIAGNOSIS — K21.9 BILE ACID ESOPHAGEAL REFLUX: Primary | ICD-10-CM

## 2024-03-27 RX ORDER — COLESEVELAM HYDROCHLORIDE 3.75 G/1
3.75 POWDER, FOR SUSPENSION ORAL DAILY
Qty: 30 EACH | Refills: 3 | Status: SHIPPED | OUTPATIENT
Start: 2024-03-27 | End: 2024-04-25 | Stop reason: ALTCHOICE

## 2024-04-03 ENCOUNTER — LAB (OUTPATIENT)
Dept: LAB | Facility: LAB | Age: 54
End: 2024-04-03
Payer: COMMERCIAL

## 2024-04-03 DIAGNOSIS — R14.0 ABDOMINAL BLOATING: ICD-10-CM

## 2024-04-03 PROCEDURE — 83516 IMMUNOASSAY NONANTIBODY: CPT

## 2024-04-03 PROCEDURE — 36415 COLL VENOUS BLD VENIPUNCTURE: CPT

## 2024-04-04 LAB
GLIADIN PEPTIDE IGA SER IA-ACNC: <1 U/ML
TTG IGA SER IA-ACNC: <1 U/ML

## 2024-04-05 LAB
GLIADIN PEPTIDE IGG SER IA-ACNC: <0.56 FLU (ref 0–4.99)
TTG IGG SER IA-ACNC: <0.82 FLU (ref 0–4.99)

## 2024-04-16 ENCOUNTER — TELEPHONE (OUTPATIENT)
Dept: GASTROENTEROLOGY | Facility: CLINIC | Age: 54
End: 2024-04-16
Payer: COMMERCIAL

## 2024-04-16 NOTE — TELEPHONE ENCOUNTER
PATIENT NOTIFIED AND VERBALIZED UNDERSTANDING     ----- Message from Umesh Dow DO sent at 4/9/2024  3:17 PM EDT -----  Celiac lab work is negative no evidence of celiac disease

## 2024-04-18 ENCOUNTER — HOSPITAL ENCOUNTER (OUTPATIENT)
Dept: CARDIOLOGY | Facility: HOSPITAL | Age: 54
Discharge: HOME | End: 2024-04-18
Payer: COMMERCIAL

## 2024-04-18 ENCOUNTER — APPOINTMENT (OUTPATIENT)
Dept: RADIOLOGY | Facility: HOSPITAL | Age: 54
End: 2024-04-18
Payer: COMMERCIAL

## 2024-04-18 ENCOUNTER — HOSPITAL ENCOUNTER (EMERGENCY)
Facility: HOSPITAL | Age: 54
Discharge: HOME | End: 2024-04-18
Attending: EMERGENCY MEDICINE
Payer: COMMERCIAL

## 2024-04-18 VITALS
SYSTOLIC BLOOD PRESSURE: 128 MMHG | TEMPERATURE: 97.6 F | RESPIRATION RATE: 14 BRPM | OXYGEN SATURATION: 98 % | DIASTOLIC BLOOD PRESSURE: 77 MMHG | BODY MASS INDEX: 31.82 KG/M2 | HEIGHT: 78 IN | WEIGHT: 275 LBS | HEART RATE: 50 BPM

## 2024-04-18 DIAGNOSIS — R42 DIZZINESS: Primary | ICD-10-CM

## 2024-04-18 LAB
ANION GAP SERPL CALC-SCNC: 9 MMOL/L (ref 10–20)
BASOPHILS # BLD AUTO: 0.03 X10*3/UL (ref 0–0.1)
BASOPHILS NFR BLD AUTO: 1 %
BUN SERPL-MCNC: 22 MG/DL (ref 6–23)
CALCIUM SERPL-MCNC: 9.5 MG/DL (ref 8.6–10.3)
CARDIAC TROPONIN I PNL SERPL HS: 7 NG/L (ref 0–20)
CARDIAC TROPONIN I PNL SERPL HS: 8 NG/L (ref 0–20)
CHLORIDE SERPL-SCNC: 106 MMOL/L (ref 98–107)
CO2 SERPL-SCNC: 28 MMOL/L (ref 21–32)
CREAT SERPL-MCNC: 0.89 MG/DL (ref 0.5–1.3)
EGFRCR SERPLBLD CKD-EPI 2021: >90 ML/MIN/1.73M*2
EOSINOPHIL # BLD AUTO: 0.16 X10*3/UL (ref 0–0.7)
EOSINOPHIL NFR BLD AUTO: 5.3 %
ERYTHROCYTE [DISTWIDTH] IN BLOOD BY AUTOMATED COUNT: 12.7 % (ref 11.5–14.5)
GLUCOSE SERPL-MCNC: 142 MG/DL (ref 74–99)
HCT VFR BLD AUTO: 35.9 % (ref 41–52)
HGB BLD-MCNC: 12.7 G/DL (ref 13.5–17.5)
IMM GRANULOCYTES # BLD AUTO: 0.01 X10*3/UL (ref 0–0.7)
IMM GRANULOCYTES NFR BLD AUTO: 0.3 % (ref 0–0.9)
LYMPHOCYTES # BLD AUTO: 0.74 X10*3/UL (ref 1.2–4.8)
LYMPHOCYTES NFR BLD AUTO: 24.6 %
MCH RBC QN AUTO: 31.5 PG (ref 26–34)
MCHC RBC AUTO-ENTMCNC: 35.4 G/DL (ref 32–36)
MCV RBC AUTO: 89 FL (ref 80–100)
MONOCYTES # BLD AUTO: 0.35 X10*3/UL (ref 0.1–1)
MONOCYTES NFR BLD AUTO: 11.6 %
NEUTROPHILS # BLD AUTO: 1.72 X10*3/UL (ref 1.2–7.7)
NEUTROPHILS NFR BLD AUTO: 57.2 %
NRBC BLD-RTO: 0 /100 WBCS (ref 0–0)
PLATELET # BLD AUTO: 117 X10*3/UL (ref 150–450)
POTASSIUM SERPL-SCNC: 3.8 MMOL/L (ref 3.5–5.3)
RBC # BLD AUTO: 4.03 X10*6/UL (ref 4.5–5.9)
SODIUM SERPL-SCNC: 139 MMOL/L (ref 136–145)
WBC # BLD AUTO: 3 X10*3/UL (ref 4.4–11.3)

## 2024-04-18 PROCEDURE — 96374 THER/PROPH/DIAG INJ IV PUSH: CPT

## 2024-04-18 PROCEDURE — 84484 ASSAY OF TROPONIN QUANT: CPT | Performed by: EMERGENCY MEDICINE

## 2024-04-18 PROCEDURE — 96361 HYDRATE IV INFUSION ADD-ON: CPT

## 2024-04-18 PROCEDURE — 85025 COMPLETE CBC W/AUTO DIFF WBC: CPT | Performed by: EMERGENCY MEDICINE

## 2024-04-18 PROCEDURE — 36415 COLL VENOUS BLD VENIPUNCTURE: CPT | Performed by: EMERGENCY MEDICINE

## 2024-04-18 PROCEDURE — 70450 CT HEAD/BRAIN W/O DYE: CPT

## 2024-04-18 PROCEDURE — 71045 X-RAY EXAM CHEST 1 VIEW: CPT | Mod: FOREIGN READ | Performed by: RADIOLOGY

## 2024-04-18 PROCEDURE — 71045 X-RAY EXAM CHEST 1 VIEW: CPT

## 2024-04-18 PROCEDURE — 99285 EMERGENCY DEPT VISIT HI MDM: CPT | Mod: 25

## 2024-04-18 PROCEDURE — 93005 ELECTROCARDIOGRAM TRACING: CPT

## 2024-04-18 PROCEDURE — 2500000001 HC RX 250 WO HCPCS SELF ADMINISTERED DRUGS (ALT 637 FOR MEDICARE OP): Performed by: EMERGENCY MEDICINE

## 2024-04-18 PROCEDURE — 84520 ASSAY OF UREA NITROGEN: CPT | Performed by: EMERGENCY MEDICINE

## 2024-04-18 PROCEDURE — 70450 CT HEAD/BRAIN W/O DYE: CPT | Performed by: RADIOLOGY

## 2024-04-18 PROCEDURE — 2500000004 HC RX 250 GENERAL PHARMACY W/ HCPCS (ALT 636 FOR OP/ED): Performed by: EMERGENCY MEDICINE

## 2024-04-18 RX ORDER — MECLIZINE HYDROCHLORIDE 25 MG/1
25 TABLET ORAL ONCE
Status: COMPLETED | OUTPATIENT
Start: 2024-04-18 | End: 2024-04-18

## 2024-04-18 RX ORDER — MECLIZINE HYDROCHLORIDE 25 MG/1
25 TABLET ORAL 3 TIMES DAILY PRN
Qty: 15 TABLET | Refills: 0 | Status: SHIPPED | OUTPATIENT
Start: 2024-04-18 | End: 2024-04-23

## 2024-04-18 RX ORDER — ONDANSETRON HYDROCHLORIDE 2 MG/ML
4 INJECTION, SOLUTION INTRAVENOUS ONCE
Status: COMPLETED | OUTPATIENT
Start: 2024-04-18 | End: 2024-04-18

## 2024-04-18 RX ADMIN — ONDANSETRON 4 MG: 2 INJECTION INTRAMUSCULAR; INTRAVENOUS at 10:13

## 2024-04-18 RX ADMIN — SODIUM CHLORIDE 1000 ML: 9 INJECTION, SOLUTION INTRAVENOUS at 10:12

## 2024-04-18 RX ADMIN — MECLIZINE HYDROCHLORIDE 25 MG: 25 TABLET ORAL at 10:13

## 2024-04-18 ASSESSMENT — COLUMBIA-SUICIDE SEVERITY RATING SCALE - C-SSRS
6. HAVE YOU EVER DONE ANYTHING, STARTED TO DO ANYTHING, OR PREPARED TO DO ANYTHING TO END YOUR LIFE?: NO
1. IN THE PAST MONTH, HAVE YOU WISHED YOU WERE DEAD OR WISHED YOU COULD GO TO SLEEP AND NOT WAKE UP?: NO
2. HAVE YOU ACTUALLY HAD ANY THOUGHTS OF KILLING YOURSELF?: NO

## 2024-04-18 ASSESSMENT — PAIN SCALES - GENERAL
PAINLEVEL_OUTOF10: 0 - NO PAIN
PAINLEVEL_OUTOF10: 0 - NO PAIN

## 2024-04-18 ASSESSMENT — PAIN - FUNCTIONAL ASSESSMENT: PAIN_FUNCTIONAL_ASSESSMENT: 0-10

## 2024-04-18 NOTE — ED PROVIDER NOTES
HPI   Chief Complaint   Patient presents with    Dizziness     Patient reports waking up this am with dizziness and have nausea. Patient denies pain at this time.        Patient presents to the emergency department secondary to dizziness.  He states that he woke up this morning with dizziness to the point that he needed to hold onto the wall when ambulating.  Further history reports that the patient had similar episode about a year ago although it was also accompanied by transient vision loss.  He was admitted and had extensive testing including a CTA and MRI which showed no definitive cause of his symptoms.  He does take aspirin and Brilinta and has an extensive heart history.  He denies chest pain or shortness of breath.  He denies weakness in his extremities, confusion, visual disturbance, or dysarthria today.  States that he felt well yesterday and woke up with symptoms today.      History provided by:  Patient   used: No                        [place] Coma Scale Score: 15                     Patient History   Past Medical History:   Diagnosis Date    Anxiety 2009    Depression 2009    Diabetes mellitus (Multi) 2010    GERD (gastroesophageal reflux disease) 2005    Heart disease 2000    Hyperlipidemia 2015    Hypertension 1988    Myocardial infarction (Multi) 2005    Prostatitis 1994    Stroke (Multi) 2023     Past Surgical History:   Procedure Laterality Date    CORONARY ANGIOPLASTY  02/27/2018    PTCA    CT ANGIO NECK  12/2/2022    CT NECK ANGIO W AND WO IV CONTRAST 12/2/2022 [facility]    CT HEAD ANGIO W AND WO IV CONTRAST  12/2/2022    CT HEAD ANGIO W AND WO IV CONTRAST 12/2/2022 [facility]    MR HEAD ANGIO WO IV CONTRAST  4/3/2023    MR HEAD ANGIO WO IV CONTRAST [facility] MRI    MR NECK ANGIO WO IV CONTRAST  4/3/2023    MR NECK ANGIO WO IV CONTRAST [facility] MRI     Family History   Problem Relation Name Age of Onset    Heart disease Mother Taylor     Hypertension Mother Taylor      Heart disease Father Jhony     Hypertension Father Jhony      Social History     Tobacco Use    Smoking status: Never    Smokeless tobacco: Never   Substance Use Topics    Alcohol use: Never    Drug use: Never       Physical Exam   ED Triage Vitals [04/18/24 0917]   Temperature Heart Rate Respirations BP   35.8 °C (96.4 °F) 57 18 140/86      Pulse Ox Temp Source Heart Rate Source Patient Position   97 % Oral -- --      BP Location FiO2 (%)     -- --       Physical Exam  Vitals and nursing note reviewed.   Constitutional:       General: He is not in acute distress.     Appearance: Normal appearance. He is obese. He is not ill-appearing, toxic-appearing or diaphoretic.   HENT:      Head: Normocephalic and atraumatic.      Right Ear: Tympanic membrane, ear canal and external ear normal. There is no impacted cerumen.      Left Ear: Tympanic membrane, ear canal and external ear normal. There is no impacted cerumen.      Nose: Nose normal. No rhinorrhea.      Mouth/Throat:      Mouth: Mucous membranes are moist.      Pharynx: Oropharynx is clear. No oropharyngeal exudate or posterior oropharyngeal erythema.   Eyes:      Extraocular Movements: Extraocular movements intact.      Conjunctiva/sclera: Conjunctivae normal.      Pupils: Pupils are equal, round, and reactive to light.   Neck:      Comments: Trachea is midline  Cardiovascular:      Rate and Rhythm: Normal rate and regular rhythm.      Heart sounds: No murmur heard.  Pulmonary:      Effort: Pulmonary effort is normal.      Breath sounds: Normal breath sounds. No wheezing.   Abdominal:      General: Abdomen is flat. Bowel sounds are normal. There is no distension.      Palpations: Abdomen is soft.      Tenderness: There is no abdominal tenderness.   Musculoskeletal:         General: No swelling. Normal range of motion.      Cervical back: Normal range of motion.   Skin:     General: Skin is warm and dry.      Findings: No rash.   Neurological:      General: No  focal deficit present.      Mental Status: He is alert and oriented to person, place, and time. Mental status is at baseline.      Cranial Nerves: No cranial nerve deficit.      Sensory: No sensory deficit.      Motor: No weakness.      Coordination: Coordination normal.      Comments: Positive Hallpike   Psychiatric:         Mood and Affect: Mood normal.         Behavior: Behavior normal.         Thought Content: Thought content normal.         Judgment: Judgment normal.         ED Course & MDM   Diagnoses as of 04/18/24 1157   Dizziness       Medical Decision Making  Twelve-lead EKG was interpreted by myself and this was noted to contribute directly to patient care.  Study reveals normal sinus rhythm at 61 bpm, normal axis, normal R wave progression, no acute ischemic changes.    Patient was feeling better after fluids and Antivert.  His orthostatic vital signs are negative and he was up and ambulatory and felt steady on his feet and was no longer dizzy.  His neurologic examination was repeated and remains negative.  Differential considerations would include, but not limited to, benign positional vertigo, deconditioning, amongst many others.  I feel cerebellar stroke is unlikely based on the patient's clinical presentation, resolution of symptoms, and response to medication.  He has an NIH stroke scale score of 0.  The patient will be discharged with a prescription for Antivert and instructed to limit activity as tolerated.  Specifically instructed to return to the ER at any time if worse.        Procedure  Procedures     Ben Hong,   04/18/24 1159

## 2024-04-19 LAB
ATRIAL RATE: 61 BPM
P AXIS: 29 DEGREES
P OFFSET: 164 MS
P ONSET: 110 MS
PR INTERVAL: 208 MS
Q ONSET: 214 MS
QRS COUNT: 10 BEATS
QRS DURATION: 100 MS
QT INTERVAL: 436 MS
QTC CALCULATION(BAZETT): 438 MS
QTC FREDERICIA: 438 MS
R AXIS: 44 DEGREES
T AXIS: 34 DEGREES
T OFFSET: 432 MS
VENTRICULAR RATE: 61 BPM

## 2024-04-24 DIAGNOSIS — E11.65 TYPE 2 DIABETES MELLITUS WITH HYPERGLYCEMIA, WITHOUT LONG-TERM CURRENT USE OF INSULIN (MULTI): ICD-10-CM

## 2024-04-24 RX ORDER — FLASH GLUCOSE SENSOR
KIT MISCELLANEOUS
Qty: 2 EACH | Refills: 11 | Status: SHIPPED | OUTPATIENT
Start: 2024-04-24

## 2024-04-25 ENCOUNTER — OFFICE VISIT (OUTPATIENT)
Dept: PRIMARY CARE | Facility: CLINIC | Age: 54
End: 2024-04-25
Payer: COMMERCIAL

## 2024-04-25 VITALS
BODY MASS INDEX: 33.27 KG/M2 | HEIGHT: 78 IN | HEART RATE: 65 BPM | WEIGHT: 287.6 LBS | SYSTOLIC BLOOD PRESSURE: 167 MMHG | DIASTOLIC BLOOD PRESSURE: 95 MMHG

## 2024-04-25 DIAGNOSIS — D69.6 THROMBOCYTOPENIA (CMS-HCC): ICD-10-CM

## 2024-04-25 DIAGNOSIS — M53.3 PAIN IN THE COCCYX: ICD-10-CM

## 2024-04-25 DIAGNOSIS — I10 PRIMARY HYPERTENSION: ICD-10-CM

## 2024-04-25 DIAGNOSIS — H65.192 ACUTE OTITIS MEDIA WITH EFFUSION OF LEFT EAR: Primary | ICD-10-CM

## 2024-04-25 RX ORDER — AMOXICILLIN AND CLAVULANATE POTASSIUM 875; 125 MG/1; MG/1
875 TABLET, FILM COATED ORAL 2 TIMES DAILY
Qty: 20 TABLET | Refills: 0 | Status: SHIPPED | OUTPATIENT
Start: 2024-04-25 | End: 2024-05-05

## 2024-04-25 ASSESSMENT — ENCOUNTER SYMPTOMS
FEVER: 0
COLOR CHANGE: 0
COUGH: 0
DIARRHEA: 0
CONSTIPATION: 0
DIFFICULTY URINATING: 0
EYE REDNESS: 0
HEMATURIA: 0
DIZZINESS: 1
VOMITING: 0
NUMBNESS: 0
EYE ITCHING: 0
NAUSEA: 0
SINUS PRESSURE: 1
FATIGUE: 0
SORE THROAT: 0
ABDOMINAL PAIN: 0
HEADACHES: 0
CHILLS: 1
SINUS PAIN: 1
WEAKNESS: 0
FLANK PAIN: 0
HALLUCINATIONS: 0
EYE PAIN: 0
BACK PAIN: 0
SHORTNESS OF BREATH: 0
PALPITATIONS: 0

## 2024-04-25 NOTE — PROGRESS NOTES
Subjective   Patient ID: Chester Myles is a 53 y.o. male who presents for ER Follow-up and Dizziness (Was started on meclizine - it has helped).    ER Follow-up  Associated symptoms include chills. Pertinent negatives include no abdominal pain, chest pain, congestion, coughing, fatigue, fever, headaches, nausea, numbness, sore throat, vomiting or weakness.   Dizziness  Associated symptoms include chills. Pertinent negatives include no abdominal pain, chest pain, congestion, coughing, fatigue, fever, headaches, nausea, numbness, sore throat, vomiting or weakness.      ER FOLLOW-UP  Went to the ER 4/18 for dizziness, felt like his head fell backwards and he was spinning. At the Er he received IV fluids and antivert, he was Dc'd with antivert for home.  Reports only taking it about 3 times in the past week    Also reports sinus pressure and left ear pain for about 2 months on and off, he has been on 2 different antibiotics during that time without improvement. Today his biggest concern is his left ear pain.    Review of Systems   Constitutional:  Positive for chills. Negative for fatigue and fever.   HENT:  Positive for ear pain, sinus pressure and sinus pain. Negative for congestion and sore throat.    Eyes:  Negative for pain, redness and itching.   Respiratory:  Negative for cough and shortness of breath.    Cardiovascular:  Negative for chest pain, palpitations and leg swelling.   Gastrointestinal:  Negative for abdominal pain, constipation, diarrhea, nausea and vomiting.   Endocrine: Negative for cold intolerance and heat intolerance.   Genitourinary:  Negative for difficulty urinating, flank pain and hematuria.   Musculoskeletal:  Negative for back pain and gait problem.   Skin:  Negative for color change.   Neurological:  Positive for dizziness. Negative for weakness, numbness and headaches.   Psychiatric/Behavioral:  Negative for hallucinations and suicidal ideas.        Objective   BP (!) 167/95 (Patient  "Position: Sitting)   Pulse 65   Ht 1.981 m (6' 6\")   Wt 130 kg (287 lb 9.6 oz)   BMI 33.24 kg/m²     Physical Exam  Vitals and nursing note reviewed.   Constitutional:       Appearance: Normal appearance.   HENT:      Right Ear: Tympanic membrane normal.      Left Ear: Tenderness present. A middle ear effusion is present.      Nose: Nose normal.   Eyes:      Extraocular Movements: Extraocular movements intact.      Pupils: Pupils are equal, round, and reactive to light.   Cardiovascular:      Rate and Rhythm: Normal rate and regular rhythm.   Pulmonary:      Effort: Pulmonary effort is normal.      Breath sounds: Normal breath sounds.   Abdominal:      General: Abdomen is flat. Bowel sounds are normal.      Palpations: Abdomen is soft.   Musculoskeletal:      Cervical back: Normal range of motion.   Lymphadenopathy:      Cervical: No cervical adenopathy.   Skin:     General: Skin is warm and dry.      Capillary Refill: Capillary refill takes less than 2 seconds.   Neurological:      Mental Status: He is alert and oriented to person, place, and time.   Psychiatric:         Mood and Affect: Mood normal.         Behavior: Behavior normal.         Assessment/Plan       Problem List Items Addressed This Visit             ICD-10-CM       Cardiac and Vasculature    Primary hypertension I10       Coag and Thromboembolic    Thrombocytopenia (CMS-HCC) D69.6     Pt concerned about CBC results on 4/18 compared to 3/1, specifically the Hgb considering he doesn't return to hem/onc until June. Patient instructed to call/message hem/onc regarding these results and to let me know if they would like blood work prior to this apt. No concerns today for pallor or active bleeding.          Other Visit Diagnoses         Codes    Acute otitis media with effusion of left ear    -  Primary H65.192    Started on Augmentin 875-125 for 10 days. Call/message if symptoms persist or worsen. Pt also to let me know if he has side effects of the " medication     Relevant Medications    amoxicillin-pot clavulanate (Augmentin) 875-125 mg tablet    Pain in the coccyx     M53.3    Xray pf sacrum ordered. Will follow-up with results     Relevant Orders    XR sacrum coccyx 2+ views

## 2024-04-25 NOTE — ASSESSMENT & PLAN NOTE
Pt concerned about CBC results on 4/18 compared to 3/1, specifically the Hgb considering he doesn't return to hem/onc until June. Patient instructed to call/message hem/onc regarding these results and to let me know if they would like blood work prior to this apt. No concerns today for pallor or active bleeding.

## 2024-04-26 ENCOUNTER — HOSPITAL ENCOUNTER (OUTPATIENT)
Dept: RADIOLOGY | Facility: HOSPITAL | Age: 54
Discharge: HOME | End: 2024-04-26
Payer: COMMERCIAL

## 2024-04-26 ENCOUNTER — TELEPHONE (OUTPATIENT)
Dept: PRIMARY CARE | Facility: CLINIC | Age: 54
End: 2024-04-26
Payer: COMMERCIAL

## 2024-04-26 DIAGNOSIS — M53.3 PAIN IN THE COCCYX: ICD-10-CM

## 2024-04-26 PROCEDURE — 72220 X-RAY EXAM SACRUM TAILBONE: CPT

## 2024-04-26 PROCEDURE — 72220 X-RAY EXAM SACRUM TAILBONE: CPT | Performed by: RADIOLOGY

## 2024-04-26 NOTE — TELEPHONE ENCOUNTER
Phone call placed to Chester regarding his X-ray results. 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 Chester reports he is already set up with a Chiropractor and he will discuss it with that person, if there is no pain relief we will explore the options of pain mgt.

## 2024-05-21 ENCOUNTER — APPOINTMENT (OUTPATIENT)
Dept: PRIMARY CARE | Facility: CLINIC | Age: 54
End: 2024-05-21
Payer: COMMERCIAL

## 2024-05-22 NOTE — PROGRESS NOTES
Counseling:  The patient was counseled regarding diagnostic results, instructions for management, risk factor reductions, prognosis, patient and family education, impressions, risks and benefits of treatment options and importance of compliance with treatment.      Chief Complaint:   The patient presents today for 2-month followup of CAD and HTN.       History Of Present Illness:    Jhony Myles is a 53 year old male patient who presents today for 2-month followup of CAD and HTN. His PMH is significant for CAD with  RCA s/p multiple PCI with most recent being balloon angioplasty of several ostial stenosis in Dg1 07/2022, HTN, anxiety, agoraphobia, PVC's, fibromyalgia, GERD and hyperlipidemia. Over the past 2 months, the patient states that he has done well from a cardiac standpoint. He denies any CP, chest discomfort or SOB. He reports occasional palpitations. BP is elevated today; however, the patient states that it is stable at home in the 135-140 systolic range. The patient is compliant with his prescribed medications.     Last Recorded Vitals:  Vitals:    05/23/24 1402   BP: 152/82   Pulse: 60   Weight: 128 kg (282 lb)       Past Surgical History:  He has a past surgical history that includes Coronary angioplasty (02/27/2018); CT angio neck (12/2/2022); CT angio head w and wo IV contrast (12/2/2022); MR angio head wo IV contrast (4/3/2023); and MR angio neck wo IV contrast (4/3/2023).      Social History:  He reports that he has never smoked. He has never used smokeless tobacco. He reports that he does not drink alcohol and does not use drugs.    Family History:  Family History   Problem Relation Name Age of Onset    Heart disease Mother Taylor     Hypertension Mother Taylor     Heart disease Father Jhony     Hypertension Father Jhony         Allergies:  Amlodipine, Calcium channel blocking agent diltiazem analogues, Ciprofloxacin, Iodinated contrast media, Lisinopril, Nitroglycerin, Other, Penicillins,  Statins-hmg-coa reductase inhibitors, Tramadol, Meperidine, Propoxyphene-acetaminophen, and Temazepam    Outpatient Medications:  Current Outpatient Medications   Medication Instructions    ALPRAZolam (XANAX) 1 mg, oral, 2 times daily PRN    aspirin 81 mg EC tablet 1 tablet, oral, Daily    busPIRone (Buspar) 10 mg tablet     carvedilol (COREG) 25 mg, oral, 2 times daily (morning and late afternoon)    cetirizine (ZYRTEC) 10 mg    cyanocobalamin (Vitamin B-12) 500 mcg tablet 1 tablet, oral, Every other day    flash glucose sensor kit (FreeStyle Nancy 2 Sensor) kit Use as instructed    FreeStyle Nancy reader (FreeStyle Nancy 2 [place]) misc Use as instructed    hydroCHLOROthiazide (HYDRODIURIL) 25 mg, oral, Daily    HYDROcodone-acetaminophen (Norco) 5-325 mg tablet 1 tablet, oral, Every 6 hours PRN    isosorbide mononitrate ER (IMDUR) 30 mg, oral, Every 12 hours    ketoconazole (NIZOral) 2 % cream Daily to affected areas until controlled, then 1-2 times weekly    losartan (COZAAR) 50 mg, oral, Daily    minoxidil (Loniten) 2.5 mg tablet oral, 2 times daily    olopatadine (Patanol) 0.1 % ophthalmic solution     omeprazole (PriLOSEC) 20 mg DR capsule     potassium chloride CR (Klor-Con M20) 20 mEq ER tablet 20 mEq, oral, Daily    rosuvastatin (Crestor) 5 mg tablet 1 tablet, oral, Daily    ticagrelor (BRILINTA) 90 mg, oral, 2 times daily     Review of Systems   Cardiovascular:  Positive for palpitations.   All other systems reviewed and are negative.     Physical Exam:  Constitutional:       Appearance: Healthy appearance. Not in distress.   Neck:      Vascular: No JVR. JVD normal.   Pulmonary:      Effort: Pulmonary effort is normal.      Breath sounds: Normal breath sounds. No wheezing. No rhonchi. No rales.   Chest:      Chest wall: Not tender to palpatation.   Cardiovascular:      PMI at left midclavicular line. Normal rate. Regular rhythm. Normal S1. Normal S2.       Murmurs: There is no murmur.      No gallop.  No  click. No rub.   Pulses:     Intact distal pulses.   Edema:     Peripheral edema absent.   Abdominal:      General: Bowel sounds are normal.      Palpations: Abdomen is soft.      Tenderness: There is no abdominal tenderness.   Musculoskeletal: Normal range of motion.         General: No tenderness. Skin:     General: Skin is warm and dry.   Neurological:      General: No focal deficit present.      Mental Status: Alert and oriented to person, place and time.          Last Labs:  CBC -  Lab Results   Component Value Date    WBC 3.0 (L) 04/18/2024    HGB 12.7 (L) 04/18/2024    HCT 35.9 (L) 04/18/2024    MCV 89 04/18/2024     (L) 04/18/2024       CMP -  Lab Results   Component Value Date    CALCIUM 9.5 04/18/2024    PROT 6.6 03/01/2024    ALBUMIN 4.2 03/01/2024    AST 16 03/01/2024    ALT 17 03/01/2024    ALKPHOS 44 03/01/2024    BILITOT 0.5 03/01/2024       LIPID PANEL -   Lab Results   Component Value Date    CHOL 110 04/01/2023    TRIG 86 04/01/2023    HDL 22.0 (A) 04/01/2023    CHHDL 5.0 04/01/2023    LDLF 71 04/01/2023    VLDL 17 04/01/2023       RENAL FUNCTION PANEL -   Lab Results   Component Value Date    GLUCOSE 142 (H) 04/18/2024     04/18/2024    K 3.8 04/18/2024     04/18/2024    CO2 28 04/18/2024    ANIONGAP 9 (L) 04/18/2024    BUN 22 04/18/2024    CREATININE 0.89 04/18/2024    GFRMALE 73 09/19/2023    CALCIUM 9.5 04/18/2024    ALBUMIN 4.2 03/01/2024        Lab Results   Component Value Date    BNP 26 09/19/2023    HGBA1C 5.6 01/03/2024       Last Cardiology Tests:  05/23/2023 - Cardiac Catheterization (LH)  1. Single vessel coronary artery disease without proximal left anterior descending involvement.  2. Left Ventricular end-diastolic pressure = 9.  3. Normal LV filling pressures.     04/24/2023 - TTE  Left ventricular systolic function is normal with a 60-65% estimated ejection fraction.     04/17/2023 - Vascular Lab Carotid Artery Duplex U/S   1. Right Carotid: Findings are  consistent with less than 50% stenosis of the right proximal ICA. Laminar flow seen by color Doppler. Right external carotid artery appears patent with no evidence of stenosis. The right vertebral artery is patent with antegrade flow. No evidence of hemodynamically significant stenosis in the right subclavian artery.  2. Left Carotid: Findings are consistent with less than 50% stenosis of the left proximal ICA. Laminar flow seen by color Doppler. Left external carotid artery appears patent with no evidence of stenosis. The left vertebral artery is patent with antegrade flow. No evidence of hemodynamically significant stenosis in the left subclavian artery.     04/03/2023 - MRI Brain/Head/Neck  1. There is no MRI evidence of acute infarction on the diffusion weighted images.  2. There is mild-to-moderate brain parenchymal volume loss.  3. There are small scattered nonspecific white matter changes within the cerebral hemispheres bilaterally which while nonspecific, given the patient's age, likely represent sequelae of more remote small vessel ischemic change.  4. The MRA of the neck demonstrates a short segmental area of diminished MRA signal suggestive of slab artifact along the distal right common carotid artery and origin of the left internal carotid artery. There is mild non hemodynamically significant narrowing noted along the proximal internal carotid arteries bilaterally. There is diminished MRA signal noted along the horizontal segments of the distal cervical vertebral arteries bilaterally felt to likely be technical/artifactual in origin. Otherwise, no significant focal stenosis noted along the cervical segments of the vertebral arteries. There is a left dominant vertebral artery.  5. The intracranial MRA demonstrates a small caliber distal right vertebral artery which terminates in a right posterior inferior cerebellar artery. No significant stenosis noted along the distal left dominant vertebral artery,  basilar artery, or distal internal carotid arteries. Otherwise, no other significant intracranial stenosis or intracranial aneurysm is noted.     07/29/2022 - Cardiac Catheterization (LH)  1. Severe ostial stenosis in the Diagonal-1 vessel, status post balloon angioplasty.  2. Patent stent in the LAD and OM system.  3. LVEDP of 20 mmHg.     06/24/2022 - TTE  The left ventricular systolic function is normal with a 70-75% estimated ejection fraction.     06/23/2022 - Cardiac Catheterization ()  1. Double vessel coronary artery disease without proximal left anterior descending involvement.  2. Culprit vessel(s): left anterior descending.  3. Successful PCI of LAD.     11/18/2021 - Cardiac Catheterization ()  Single vessel coronary artery disease without proximal left anterior descending involvement.     10/11/2021 - NM Cardiac Stress Test  1. Normal myocardial perfusion study without evidence of ischemia or prior infarction. The left ventricle is normal in size. Normal LV wall motion with an LV EF estimated at approximately 55%.  2. Baseline EKG shows normal sinus rhythm with no significant ST-T segment changes. With regadenoson infusion no ST-T segment changes of ischemia, or sustained ventricular arrhythmias are seen. Regadenoson stress EKG is negative for ischemia.      09/17/2021 - Cardiac Catheterization   1. The 1st obtuse marginal branch showed mild tortuosity, two previous stents and mild in-stent restenosis.  2. 2-vessel severe coronary artery disease.  3. Successful GILDA PCI to proximal D2 with 3.0 x 18 mm Resolut [place] post-dilated with 3.0 x 15 mm NC balloon.  4. Previous LAD stent has mild malapposition in a short segment within the mid stent and in the ostial stent edge due to an aneurysmal segment.     09/08/2021- NM Cardiac Stress Test  1. There is a small to moderate sized fixed perfusion defect in the anteroseptal to apical wall consistent with prior infarct. There is a low probability of ischemia.  Calculated ejection fraction of 52% with mild hypokinesis of the septal wall.  2. No electrocardiographic evidence for ischemia at maximal infusion. Normal Stress Test.      07/23/2021 - Cardiac Catheterization ()  1. Double vessel coronary artery disease without proximal left anterior descending involvement.  2. Successful PCI of LAD.     03/19/2021 - TTE  The left ventricular systolic function is normal with a 55-60% estimated ejection fraction.     03/18/2021 - Cardiac Catheterization ()  1. Single vessel coronary artery disease with proximal left anterior descending involvement.  2. Successful PCI of LAD.     02/13/2019 - Cardiac Catheterization ()  1. Patent stents in the circumflex territory.  2. Chronic total occlusion of the right coronary artery with collaterals from left system.  3. Mild LV dysfunction, EF 50%.  4. Medical therapy advised.     04/10/2018 - Vascular Lab Renal Artery U/S  1. Renal Artery Duplex: Bilateral renal arteries demonstrate no evidence of hemodynamically significant stenosis. The bilateral renal veins are widely patent.  2. Right Renal Artery: Cystlike structure noted in the kidney measuring approximately 3cm 2.7cm. Right kidney size not imaged due to tech error.     03/15/2018 - Vascular Lab Arterial Duplex U/S  Right Upper Arterial: History of cardiac catheterization via RUE approach, c/o pain near axilla. No evidence of pseudoaneurysm, stenosis or occlusion of the subclavian, axillary , radial and ulnar arteries.     02/15/2018 - NM Cardiac Stress Test  1. SPECT perfusion study: Normal.   2. Fair functional capacity for age and gender.  3. There is no scintigraphic evidence for inducible ischemia.   4. No evidence of scarred myocardium.  5. Left ventricle is mildly dilated. The left ventricle systolic function is normal.   6. Right ventricle is normal in size. THe right ventricle systolic function is normal.  7. This is a low risk scan.   8. EKG Portion: The test was  terminated due to general fatigue. Adequate heart rate response of 89% predicted maximal heart rate, normal heart rate recovery @ 1 minute post exercise, normal chronotropic response index. Normal blood pressure response to stress, resting hypertension. Normal ST segment response to stress, T wave changes due to stress. Abnormal Duke treadmill score (<5 but >/= -10), intermediate risk. Typical anginal discomfort during stress, nausea during stress. Multifocal PVCs during the test, PACs during the test. Nondiagnostic due to abnormal resting ECG.      02/11/2018 - Echocardiogram   1. Technically difficult exam due to suboptimal positioning.  2. Exam indication: Chest pain.  3. The left ventricle is normal in size. There is mild concentric left ventricular hypertrophy. Left ventricular systolic function is normal. EF = 60 +/- 5% (2D biplane).  4. The right ventricle is normal in size. Right ventricular systolic function is normal.   5. There are no significant valvular abnormalities.     Lab review: I have personally reviewed the laboratory result(s).     Assessment/Plan   1) Blurred Vision/?TIA  Admitted to hospital 03/31/2023 to 04/03/2023 with blurred vision left eye   CTA negative for acute cortical infarct or intracranial hemorrhage  MRI of brain/neck/head with mild narrowing of the carotid arteries  Scheduled to see opthalmology next week  TTE 04/24/2023 with LVEF 60-65%      2) Intermittent Sharp Abdominal Pain   Check CT abdomen/pelvis - denied by insurance      3) Scattered Petechiae/Thrombocytopenia   CBC drawn prior to discharge with low platelet count of 114  Referred to hematology     4) HTN  Stable at home in 135-140 systolic range  On carvedilol 25 mg twice daily, HCTZ 25 mg daily, Imdur 30 mg BID (60 mg daily caused headaches), minoxidil 2.5 mg BID and losartan 50 mg daily  Intolerant of amlodipine in the past d/t BLE edema  Trialed d/c minoxidil with increase in BP above 150 systolic   Patient  restarted minoxidil with stabilization of BP in the 130-140/70-80 range   Three weeks prior to hospitalization 03/31/2023, patient reported low BP readings at 70s/30s, especially with changes in position.   No RENETTA per renal artery duplex in 2018  ED evaluation 03/01/2024 with left-sided back pain and right medial thigh pain since removal of right hydrocele 02/20/2024, and 1-week h/o hypotension with orthostasis and associated presyncope, received small fluid bolus s/t a slightly elevated BUN.  Continue current medical Rx  F/U 6 months    5) CAD s/p Multiple PCI - Repeat PCI of LAD July 2021 and June 2022 and 2nd diagonal Sept 2021, PTA D1 July 2022  On DAPT with aspirin 81 mg daily and Brilinta 90 mg BID  On rosuvastatin 5 mg daily, carvedilol 25 mg BID, Imdur 30 mg BID, HCTZ 25 mg daily, minoxidil 2.5 mg BID, losartan 50 mg daily.  Intolerant of Imdur 60 mg s/t headaches  Known  RCA with collaterals  [facility] Nov 2021 with patent stent LAD, known  RCA  TTE 04/24/2023 with LVEF 60-65%   [facility] 05/23/2023 for evaluation of chest tightness when bending over showed single vessel CAD  Lipoprotein profile 01/03/2024 with LDL-P of 1382, LDL-C of 107, small LDL-P of 1026, LDL size of 19.8, LP-IR score of 61 - non-fasting.  Denies CP, chest discomfort or SOB  Reports occasional palpitations   Increase rosuvastatin to 10 mg every other day  Check Apo-B and Lipid panel in 1 month  Continue all other medications as prescribed  F/U 6 months    6) Carotid Bruit  MRI of brain/neck/head 04/03/2033 with mild narrowing of the carotid arteries  Carotid duplex 04/17/2023 with <50% stenosis of bilateral proximal ICAs     7) GERD  On omeprazole 40 mg BID.   Management per PCP. Seen by GI in the past   Reports recurrence of postprandial palpitations - likely GI related, and patient agrees         Scribe Attestation  By signing my name below, I, Enrico Carr   attest that this documentation has been prepared under the direction  and in the presence of Juan Ortiz MD.

## 2024-05-23 ENCOUNTER — OFFICE VISIT (OUTPATIENT)
Dept: CARDIOLOGY | Facility: CLINIC | Age: 54
End: 2024-05-23
Payer: COMMERCIAL

## 2024-05-23 VITALS
BODY MASS INDEX: 32.59 KG/M2 | SYSTOLIC BLOOD PRESSURE: 152 MMHG | HEART RATE: 60 BPM | DIASTOLIC BLOOD PRESSURE: 82 MMHG | WEIGHT: 282 LBS

## 2024-05-23 DIAGNOSIS — I10 PRIMARY HYPERTENSION: ICD-10-CM

## 2024-05-23 DIAGNOSIS — I25.119 CORONARY ARTERY DISEASE INVOLVING NATIVE CORONARY ARTERY OF NATIVE HEART WITH ANGINA PECTORIS (CMS-HCC): ICD-10-CM

## 2024-05-23 PROCEDURE — 93000 ELECTROCARDIOGRAM COMPLETE: CPT | Performed by: INTERNAL MEDICINE

## 2024-05-23 PROCEDURE — 3044F HG A1C LEVEL LT 7.0%: CPT | Performed by: INTERNAL MEDICINE

## 2024-05-23 PROCEDURE — 3079F DIAST BP 80-89 MM HG: CPT | Performed by: INTERNAL MEDICINE

## 2024-05-23 PROCEDURE — 3077F SYST BP >= 140 MM HG: CPT | Performed by: INTERNAL MEDICINE

## 2024-05-23 PROCEDURE — 99213 OFFICE O/P EST LOW 20 MIN: CPT | Performed by: INTERNAL MEDICINE

## 2024-05-23 PROCEDURE — 4010F ACE/ARB THERAPY RXD/TAKEN: CPT | Performed by: INTERNAL MEDICINE

## 2024-05-23 RX ORDER — OLOPATADINE HYDROCHLORIDE 1 MG/ML
SOLUTION/ DROPS OPHTHALMIC
COMMUNITY
Start: 2024-05-18

## 2024-05-23 RX ORDER — HYDROCHLOROTHIAZIDE 25 MG/1
25 TABLET ORAL DAILY
Qty: 90 TABLET | Refills: 2 | Status: SHIPPED | OUTPATIENT
Start: 2024-05-23 | End: 2025-02-17

## 2024-05-23 RX ORDER — BUSPIRONE HYDROCHLORIDE 10 MG/1
TABLET ORAL
COMMUNITY
Start: 2024-05-18 | End: 2024-06-07 | Stop reason: SINTOL

## 2024-05-23 RX ORDER — ROSUVASTATIN CALCIUM 5 MG/1
TABLET, COATED ORAL
Qty: 45 TABLET | Refills: 3 | Status: SHIPPED | OUTPATIENT
Start: 2024-05-23

## 2024-05-23 ASSESSMENT — ENCOUNTER SYMPTOMS: PALPITATIONS: 1

## 2024-05-23 NOTE — PATIENT INSTRUCTIONS
Increase rosuvastatin to 10 mg every other day. If you have any of the 5 mg tablets left, you can take 2 tablets every other day until they are finished. A prescription for the new dose has been sent to your pharmacy.  Continue all other medications as prescribed.   Please have blood work drawn in 1 month. You will be notified of the results once they become available.  Followup with Dr. Ortiz in 6 months.    If you have any questions or cardiac concerns, please call our office at [phone].

## 2024-05-23 NOTE — Clinical Note
May 23, 2024       No Recipients    Patient: Cehster Myles   YOB: 1970   Date of Visit: 5/23/2024       Dear Dr. Rendon Recipients:    Thank you for referring Chester Myles to me for evaluation. Below are my notes for this consultation.  If you have questions, please do not hesitate to call me. I look forward to following your patient along with you.       Sincerely,     Juan Ortiz MD      CC:   No Recipients  ______________________________________________________________________________________    Counseling:  The patient was counseled regarding diagnostic results, instructions for management, risk factor reductions, prognosis, patient and family education, impressions, risks and benefits of treatment options and importance of compliance with treatment.      Chief Complaint:   The patient presents today for 2-month followup of CAD and HTN.       History Of Present Illness:    Jhony Myles is a 53 year old male patient who presents today for 2-month followup of CAD and HTN. His PMH is significant for CAD with  RCA s/p multiple PCI with most recent being balloon angioplasty of several ostial stenosis in Dg1 07/2022, HTN, anxiety, agoraphobia, PVC's, fibromyalgia, GERD and hyperlipidemia.     Last Recorded Vitals:  There were no vitals filed for this visit.    Past Surgical History:  He has a past surgical history that includes Coronary angioplasty (02/27/2018); CT angio neck (12/2/2022); CT angio head w and wo IV contrast (12/2/2022); MR angio head wo IV contrast (4/3/2023); and MR angio neck wo IV contrast (4/3/2023).      Social History:  He reports that he has never smoked. He has never used smokeless tobacco. He reports that he does not drink alcohol and does not use drugs.    Family History:  Family History   Problem Relation Name Age of Onset    Heart disease Mother Taylro     Hypertension Mother Taylor     Heart disease Father Jhony     Hypertension Father Jhony          Allergies:  Amlodipine, Calcium channel blocking agent diltiazem analogues, Ciprofloxacin, Iodinated contrast media, Lisinopril, Nitroglycerin, Other, Penicillins, Statins-hmg-coa reductase inhibitors, Tramadol, Meperidine, Propoxyphene-acetaminophen, and Temazepam    Outpatient Medications:  Current Outpatient Medications   Medication Instructions    ALPRAZolam (XANAX) 1 mg, oral, 2 times daily PRN    aspirin 81 mg EC tablet 1 tablet, oral, Daily    carvedilol (COREG) 25 mg, oral, 2 times daily (morning and late afternoon)    cetirizine (ZYRTEC) 10 mg    cyanocobalamin (Vitamin B-12) 500 mcg tablet 1 tablet, oral, Every other day    flash glucose sensor kit (FreeStyle Nancy 2 Sensor) kit Use as instructed    FreeStyle Nancy reader (FreeStyle Nancy 2 [place]) misc Use as instructed    hydroCHLOROthiazide (HYDRODIURIL) 25 mg, oral, Daily    HYDROcodone-acetaminophen (Norco) 5-325 mg tablet 1 tablet, oral, Every 6 hours PRN    isosorbide mononitrate ER (IMDUR) 30 mg, oral, Every 12 hours    ketoconazole (NIZOral) 2 % cream Daily to affected areas until controlled, then 1-2 times weekly    losartan (COZAAR) 50 mg, oral, Daily    minoxidil (Loniten) 2.5 mg tablet oral, 2 times daily    omeprazole (PriLOSEC) 20 mg DR capsule     potassium chloride CR (Klor-Con M20) 20 mEq ER tablet 20 mEq, oral, Daily    rosuvastatin (Crestor) 5 mg tablet 1 tablet, oral, Daily    ticagrelor (BRILINTA) 90 mg, oral, 2 times daily     Review of Systems   All other systems reviewed and are negative.     Physical Exam:  Constitutional:       Appearance: Healthy appearance. Not in distress.   Neck:      Vascular: No JVR. JVD normal.   Pulmonary:      Effort: Pulmonary effort is normal.      Breath sounds: Normal breath sounds. No wheezing. No rhonchi. No rales.   Chest:      Chest wall: Not tender to palpatation.   Cardiovascular:      PMI at left midclavicular line. Normal rate. Regular rhythm. Normal S1. Normal S2.       Murmurs: There is  no murmur.      No gallop.  No click. No rub.   Pulses:     Intact distal pulses.   Edema:     Peripheral edema absent.   Abdominal:      General: Bowel sounds are normal.      Palpations: Abdomen is soft.      Tenderness: There is no abdominal tenderness.   Musculoskeletal: Normal range of motion.         General: No tenderness. Skin:     General: Skin is warm and dry.   Neurological:      General: No focal deficit present.      Mental Status: Alert and oriented to person, place and time.          Last Labs:  CBC -  Lab Results   Component Value Date    WBC 3.0 (L) 04/18/2024    HGB 12.7 (L) 04/18/2024    HCT 35.9 (L) 04/18/2024    MCV 89 04/18/2024     (L) 04/18/2024       CMP -  Lab Results   Component Value Date    CALCIUM 9.5 04/18/2024    PROT 6.6 03/01/2024    ALBUMIN 4.2 03/01/2024    AST 16 03/01/2024    ALT 17 03/01/2024    ALKPHOS 44 03/01/2024    BILITOT 0.5 03/01/2024       LIPID PANEL -   Lab Results   Component Value Date    CHOL 110 04/01/2023    TRIG 86 04/01/2023    HDL 22.0 (A) 04/01/2023    CHHDL 5.0 04/01/2023    LDLF 71 04/01/2023    VLDL 17 04/01/2023       RENAL FUNCTION PANEL -   Lab Results   Component Value Date    GLUCOSE 142 (H) 04/18/2024     04/18/2024    K 3.8 04/18/2024     04/18/2024    CO2 28 04/18/2024    ANIONGAP 9 (L) 04/18/2024    BUN 22 04/18/2024    CREATININE 0.89 04/18/2024    GFRMALE 73 09/19/2023    CALCIUM 9.5 04/18/2024    ALBUMIN 4.2 03/01/2024        Lab Results   Component Value Date    BNP 26 09/19/2023    HGBA1C 5.6 01/03/2024       Last Cardiology Tests:  05/23/2023 - Cardiac Catheterization (LH)  1. Single vessel coronary artery disease without proximal left anterior descending involvement.  2. Left Ventricular end-diastolic pressure = 9.  3. Normal LV filling pressures.     04/24/2023 - TTE  Left ventricular systolic function is normal with a 60-65% estimated ejection fraction.     04/17/2023 - Vascular Lab Carotid Artery Duplex U/S   1. Right  Carotid: Findings are consistent with less than 50% stenosis of the right proximal ICA. Laminar flow seen by color Doppler. Right external carotid artery appears patent with no evidence of stenosis. The right vertebral artery is patent with antegrade flow. No evidence of hemodynamically significant stenosis in the right subclavian artery.  2. Left Carotid: Findings are consistent with less than 50% stenosis of the left proximal ICA. Laminar flow seen by color Doppler. Left external carotid artery appears patent with no evidence of stenosis. The left vertebral artery is patent with antegrade flow. No evidence of hemodynamically significant stenosis in the left subclavian artery.     04/03/2023 - MRI Brain/Head/Neck  1. There is no MRI evidence of acute infarction on the diffusion weighted images.  2. There is mild-to-moderate brain parenchymal volume loss.  3. There are small scattered nonspecific white matter changes within the cerebral hemispheres bilaterally which while nonspecific, given the patient's age, likely represent sequelae of more remote small vessel ischemic change.  4. The MRA of the neck demonstrates a short segmental area of diminished MRA signal suggestive of slab artifact along the distal right common carotid artery and origin of the left internal carotid artery. There is mild non hemodynamically significant narrowing noted along the proximal internal carotid arteries bilaterally. There is diminished MRA signal noted along the horizontal segments of the distal cervical vertebral arteries bilaterally felt to likely be technical/artifactual in origin. Otherwise, no significant focal stenosis noted along the cervical segments of the vertebral arteries. There is a left dominant vertebral artery.  5. The intracranial MRA demonstrates a small caliber distal right vertebral artery which terminates in a right posterior inferior cerebellar artery. No significant stenosis noted along the distal left dominant  vertebral artery, basilar artery, or distal internal carotid arteries. Otherwise, no other significant intracranial stenosis or intracranial aneurysm is noted.     07/29/2022 - Cardiac Catheterization (LH)  1. Severe ostial stenosis in the Diagonal-1 vessel, status post balloon angioplasty.  2. Patent stent in the LAD and OM system.  3. LVEDP of 20 mmHg.     06/24/2022 - TTE  The left ventricular systolic function is normal with a 70-75% estimated ejection fraction.     06/23/2022 - Cardiac Catheterization (LH)  1. Double vessel coronary artery disease without proximal left anterior descending involvement.  2. Culprit vessel(s): left anterior descending.  3. Successful PCI of LAD.     11/18/2021 - Cardiac Catheterization (LH)  Single vessel coronary artery disease without proximal left anterior descending involvement.     10/11/2021 - NM Cardiac Stress Test  1. Normal myocardial perfusion study without evidence of ischemia or prior infarction. The left ventricle is normal in size. Normal LV wall motion with an LV EF estimated at approximately 55%.  2. Baseline EKG shows normal sinus rhythm with no significant ST-T segment changes. With regadenoson infusion no ST-T segment changes of ischemia, or sustained ventricular arrhythmias are seen. Regadenoson stress EKG is negative for ischemia.      09/17/2021 - Cardiac Catheterization   1. The 1st obtuse marginal branch showed mild tortuosity, two previous stents and mild in-stent restenosis.  2. 2-vessel severe coronary artery disease.  3. Successful GILDA PCI to proximal D2 with 3.0 x 18 mm Resolut [place] post-dilated with 3.0 x 15 mm NC balloon.  4. Previous LAD stent has mild malapposition in a short segment within the mid stent and in the ostial stent edge due to an aneurysmal segment.     09/08/2021- NM Cardiac Stress Test  1. There is a small to moderate sized fixed perfusion defect in the anteroseptal to apical wall consistent with prior infarct. There is a low  probability of ischemia. Calculated ejection fraction of 52% with mild hypokinesis of the septal wall.  2. No electrocardiographic evidence for ischemia at maximal infusion. Normal Stress Test.      07/23/2021 - Cardiac Catheterization ()  1. Double vessel coronary artery disease without proximal left anterior descending involvement.  2. Successful PCI of LAD.     03/19/2021 - TTE  The left ventricular systolic function is normal with a 55-60% estimated ejection fraction.     03/18/2021 - Cardiac Catheterization ()  1. Single vessel coronary artery disease with proximal left anterior descending involvement.  2. Successful PCI of LAD.     02/13/2019 - Cardiac Catheterization ()  1. Patent stents in the circumflex territory.  2. Chronic total occlusion of the right coronary artery with collaterals from left system.  3. Mild LV dysfunction, EF 50%.  4. Medical therapy advised.     04/10/2018 - Vascular Lab Renal Artery U/S  1. Renal Artery Duplex: Bilateral renal arteries demonstrate no evidence of hemodynamically significant stenosis. The bilateral renal veins are widely patent.  2. Right Renal Artery: Cystlike structure noted in the kidney measuring approximately 3cm 2.7cm. Right kidney size not imaged due to tech error.     03/15/2018 - Vascular Lab Arterial Duplex U/S  Right Upper Arterial: History of cardiac catheterization via RUE approach, c/o pain near axilla. No evidence of pseudoaneurysm, stenosis or occlusion of the subclavian, axillary , radial and ulnar arteries.     02/15/2018 - NM Cardiac Stress Test  1. SPECT perfusion study: Normal.   2. Fair functional capacity for age and gender.  3. There is no scintigraphic evidence for inducible ischemia.   4. No evidence of scarred myocardium.  5. Left ventricle is mildly dilated. The left ventricle systolic function is normal.   6. Right ventricle is normal in size. THe right ventricle systolic function is normal.  7. This is a low risk scan.   8. EKG  Portion: The test was terminated due to general fatigue. Adequate heart rate response of 89% predicted maximal heart rate, normal heart rate recovery @ 1 minute post exercise, normal chronotropic response index. Normal blood pressure response to stress, resting hypertension. Normal ST segment response to stress, T wave changes due to stress. Abnormal Duke treadmill score (<5 but >/= -10), intermediate risk. Typical anginal discomfort during stress, nausea during stress. Multifocal PVCs during the test, PACs during the test. Nondiagnostic due to abnormal resting ECG.      02/11/2018 - Echocardiogram   1. Technically difficult exam due to suboptimal positioning.  2. Exam indication: Chest pain.  3. The left ventricle is normal in size. There is mild concentric left ventricular hypertrophy. Left ventricular systolic function is normal. EF = 60 +/- 5% (2D biplane).  4. The right ventricle is normal in size. Right ventricular systolic function is normal.   5. There are no significant valvular abnormalities.     Lab review: I have personally reviewed the laboratory result(s).     Assessment/Plan  1) Blurred Vision/?TIA  Admitted to hospital 03/31/2023 to 04/03/2023 with blurred vision left eye   CTA negative for acute cortical infarct or intracranial hemorrhage  MRI of brain/neck/head with mild narrowing of the carotid arteries  Scheduled to see opthalmology next week  TTE 04/24/2023 with LVEF 60-65%      2) Intermittent Sharp Abdominal Pain   Check CT abdomen/pelvis - denied by insurance      3) Scattered Petechiae/Thrombocytopenia   CBC drawn prior to discharge with low platelet count of 114  Referred to hematology     4) HTN  On carvedilol 25 mg twice daily, HCTZ 25 mg daily, Imdur 30 mg BID (60 mg daily caused headaches), minoxidil 2.5 mg BID and losartan 50 mg daily  Intolerant of amlodipine in the past d/t BLE edema  Trialed d/c minoxidil with increase in BP above 150 systolic   Patient restarted minoxidil with  stabilization of BP in the 130-140/70-80 range   Three weeks prior to hospitalization 03/31/2023, patient reported low BP readings at 70s/30s, especially with changes in position.   No RENETTA per renal artery duplex in 2018  ED evaluation 03/01/2024 with left-sided back pain and right medial thigh pain since removal of right hydrocele 02/20/2024, and 1-week h/o hypotension with orthostasis and associated presyncope, received small fluid bolus s/t a slightly elevated BUN.    5) CAD s/p Multiple PCI - Repeat PCI of LAD July 2021 and June 2022 and 2nd diagonal Sept 2021, PTA D1 July 2022  On DAPT with aspirin 81 mg daily and Brilinta 90 mg BID  On rosuvastatin 5 mg daily, carvedilol 25 mg BID, Imdur 30 mg BID   Intolerant of Imdur 60 mg s/t headaches  Known  RCA with collaterals  [facility] Nov 2021 with patent stent LAD, known  RCA  TTE 04/24/2023 with LVEF 60-65%   [facility] 05/23/2023 for evaluation of chest tightness when bending over showed single vessel CAD  Lipoprotein profile 01/03/2024 with LDL-P of 1382, LDL-C of 107, small LDL-P of 1026, LDL size of 19.8, LP-IR score of 61    6) Carotid Bruit  MRI of brain/neck/head 04/03/2033 with mild narrowing of the carotid arteries  Carotid duplex 04/17/2023 with <50% stenosis of bilateral proximal ICAs     7) GERD  On omeprazole 40 mg BID.   Management per PCP. Seen by GI in the past   Reports recurrence of postprandial palpitations - likely GI related, and patient agrees         Scribe Attestation  By signing my name below, I, Enrico Carr   attest that this documentation has been prepared under the direction and in the presence of Juan Ortiz MD.

## 2024-06-07 ENCOUNTER — OFFICE VISIT (OUTPATIENT)
Dept: HEMATOLOGY/ONCOLOGY | Facility: CLINIC | Age: 54
End: 2024-06-07
Payer: COMMERCIAL

## 2024-06-07 VITALS
SYSTOLIC BLOOD PRESSURE: 150 MMHG | WEIGHT: 283.2 LBS | TEMPERATURE: 96.4 F | DIASTOLIC BLOOD PRESSURE: 81 MMHG | OXYGEN SATURATION: 97 % | HEART RATE: 68 BPM | BODY MASS INDEX: 32.73 KG/M2 | RESPIRATION RATE: 16 BRPM

## 2024-06-07 DIAGNOSIS — R59.1 LYMPHADENOPATHY: ICD-10-CM

## 2024-06-07 DIAGNOSIS — D61.818 PANCYTOPENIA (MULTI): Primary | ICD-10-CM

## 2024-06-07 DIAGNOSIS — D69.6 THROMBOCYTOPENIA (CMS-HCC): ICD-10-CM

## 2024-06-07 LAB
ALBUMIN SERPL BCP-MCNC: 4.8 G/DL (ref 3.4–5)
ALP SERPL-CCNC: 42 U/L (ref 33–120)
ALT SERPL W P-5'-P-CCNC: 21 U/L (ref 10–52)
ANION GAP SERPL CALC-SCNC: 10 MMOL/L (ref 10–20)
AST SERPL W P-5'-P-CCNC: 19 U/L (ref 9–39)
BASOPHILS # BLD AUTO: 0.06 X10*3/UL (ref 0–0.1)
BASOPHILS NFR BLD AUTO: 1.4 %
BILIRUB SERPL-MCNC: 0.9 MG/DL (ref 0–1.2)
BUN SERPL-MCNC: 21 MG/DL (ref 6–23)
CALCIUM SERPL-MCNC: 9.8 MG/DL (ref 8.6–10.3)
CHLORIDE SERPL-SCNC: 106 MMOL/L (ref 98–107)
CO2 SERPL-SCNC: 28 MMOL/L (ref 21–32)
CREAT SERPL-MCNC: 0.87 MG/DL (ref 0.5–1.3)
EGFRCR SERPLBLD CKD-EPI 2021: >90 ML/MIN/1.73M*2
EOSINOPHIL # BLD AUTO: 0.27 X10*3/UL (ref 0–0.7)
EOSINOPHIL NFR BLD AUTO: 6.1 %
ERYTHROCYTE [DISTWIDTH] IN BLOOD BY AUTOMATED COUNT: 12.1 % (ref 11.5–14.5)
FOLATE SERPL-MCNC: 12.4 NG/ML
GLUCOSE SERPL-MCNC: 135 MG/DL (ref 74–99)
HCT VFR BLD AUTO: 41.6 % (ref 41–52)
HGB BLD-MCNC: 14.7 G/DL (ref 13.5–17.5)
IGA SERPL-MCNC: 133 MG/DL (ref 70–400)
IGG SERPL-MCNC: 774 MG/DL (ref 700–1600)
IGM SERPL-MCNC: 74 MG/DL (ref 40–230)
IMM GRANULOCYTES # BLD AUTO: 0.01 X10*3/UL (ref 0–0.7)
IMM GRANULOCYTES NFR BLD AUTO: 0.2 % (ref 0–0.9)
IRON SATN MFR SERPL: 43 % (ref 25–45)
IRON SERPL-MCNC: 145 UG/DL (ref 35–150)
LYMPHOCYTES # BLD AUTO: 0.91 X10*3/UL (ref 1.2–4.8)
LYMPHOCYTES NFR BLD AUTO: 20.6 %
MCH RBC QN AUTO: 31.2 PG (ref 26–34)
MCHC RBC AUTO-ENTMCNC: 35.3 G/DL (ref 32–36)
MCV RBC AUTO: 88 FL (ref 80–100)
MONOCYTES # BLD AUTO: 0.47 X10*3/UL (ref 0.1–1)
MONOCYTES NFR BLD AUTO: 10.7 %
NEUTROPHILS # BLD AUTO: 2.69 X10*3/UL (ref 1.2–7.7)
NEUTROPHILS NFR BLD AUTO: 61 %
NRBC BLD-RTO: 0 /100 WBCS (ref 0–0)
PLATELET # BLD AUTO: 158 X10*3/UL (ref 150–450)
POTASSIUM SERPL-SCNC: 4.3 MMOL/L (ref 3.5–5.3)
PROT SERPL-MCNC: 6.4 G/DL (ref 6.4–8.2)
PROT SERPL-MCNC: 6.8 G/DL (ref 6.4–8.2)
RBC # BLD AUTO: 4.71 X10*6/UL (ref 4.5–5.9)
SODIUM SERPL-SCNC: 140 MMOL/L (ref 136–145)
TIBC SERPL-MCNC: 336 UG/DL (ref 240–445)
UIBC SERPL-MCNC: 191 UG/DL (ref 110–370)
VIT B12 SERPL-MCNC: 336 PG/ML (ref 211–911)
WBC # BLD AUTO: 4.4 X10*3/UL (ref 4.4–11.3)

## 2024-06-07 PROCEDURE — 82607 VITAMIN B-12: CPT | Performed by: INTERNAL MEDICINE

## 2024-06-07 PROCEDURE — 3044F HG A1C LEVEL LT 7.0%: CPT | Performed by: INTERNAL MEDICINE

## 2024-06-07 PROCEDURE — 36415 COLL VENOUS BLD VENIPUNCTURE: CPT

## 2024-06-07 PROCEDURE — 99214 OFFICE O/P EST MOD 30 MIN: CPT | Performed by: INTERNAL MEDICINE

## 2024-06-07 PROCEDURE — 86334 IMMUNOFIX E-PHORESIS SERUM: CPT | Mod: SAMLAB | Performed by: INTERNAL MEDICINE

## 2024-06-07 PROCEDURE — 80053 COMPREHEN METABOLIC PANEL: CPT | Performed by: INTERNAL MEDICINE

## 2024-06-07 PROCEDURE — 82784 ASSAY IGA/IGD/IGG/IGM EACH: CPT | Mod: SAMLAB | Performed by: INTERNAL MEDICINE

## 2024-06-07 PROCEDURE — 4010F ACE/ARB THERAPY RXD/TAKEN: CPT | Performed by: INTERNAL MEDICINE

## 2024-06-07 PROCEDURE — 3079F DIAST BP 80-89 MM HG: CPT | Performed by: INTERNAL MEDICINE

## 2024-06-07 PROCEDURE — 3077F SYST BP >= 140 MM HG: CPT | Performed by: INTERNAL MEDICINE

## 2024-06-07 PROCEDURE — 84165 PROTEIN E-PHORESIS SERUM: CPT | Mod: SAMLAB | Performed by: INTERNAL MEDICINE

## 2024-06-07 PROCEDURE — 84155 ASSAY OF PROTEIN SERUM: CPT | Mod: 59 | Performed by: INTERNAL MEDICINE

## 2024-06-07 PROCEDURE — 83540 ASSAY OF IRON: CPT | Performed by: INTERNAL MEDICINE

## 2024-06-07 PROCEDURE — 82746 ASSAY OF FOLIC ACID SERUM: CPT | Performed by: INTERNAL MEDICINE

## 2024-06-07 PROCEDURE — 85025 COMPLETE CBC W/AUTO DIFF WBC: CPT | Performed by: INTERNAL MEDICINE

## 2024-06-07 RX ORDER — PREDNISONE 50 MG/1
50 TABLET ORAL DAILY
Qty: 1 TABLET | Refills: 0 | Status: SHIPPED | OUTPATIENT
Start: 2024-06-07 | End: 2024-06-17

## 2024-06-07 ASSESSMENT — ENCOUNTER SYMPTOMS
RHINORRHEA: 0
TROUBLE SWALLOWING: 0
COUGH: 1
ADENOPATHY: 0
NERVOUS/ANXIOUS: 0
SLEEP DISTURBANCE: 0
DIAPHORESIS: 1
DIARRHEA: 0
APPETITE CHANGE: 0
HEADACHES: 1
ABDOMINAL DISTENTION: 0
BRUISES/BLEEDS EASILY: 1
NUMBNESS: 0
LIGHT-HEADEDNESS: 0
WEAKNESS: 0
CONSTIPATION: 0
VOMITING: 0
BLOOD IN STOOL: 0
MYALGIAS: 1
SHORTNESS OF BREATH: 0
NAUSEA: 0
HEMATURIA: 0
SORE THROAT: 0
PALPITATIONS: 1
ARTHRALGIAS: 1
UNEXPECTED WEIGHT CHANGE: 0
FATIGUE: 1
DYSURIA: 0

## 2024-06-07 ASSESSMENT — PATIENT HEALTH QUESTIONNAIRE - PHQ9
4. FEELING TIRED OR HAVING LITTLE ENERGY: NEARLY EVERY DAY
9. THOUGHTS THAT YOU WOULD BE BETTER OFF DEAD, OR OF HURTING YOURSELF: NOT AT ALL
8. MOVING OR SPEAKING SO SLOWLY THAT OTHER PEOPLE COULD HAVE NOTICED. OR THE OPPOSITE, BEING SO FIGETY OR RESTLESS THAT YOU HAVE BEEN MOVING AROUND A LOT MORE THAN USUAL: NOT AT ALL
SUM OF ALL RESPONSES TO PHQ QUESTIONS 1-9: 11
1. LITTLE INTEREST OR PLEASURE IN DOING THINGS: NOT AT ALL
5. POOR APPETITE OR OVEREATING: NOT AT ALL
6. FEELING BAD ABOUT YOURSELF - OR THAT YOU ARE A FAILURE OR HAVE LET YOURSELF OR YOUR FAMILY DOWN: NOT AT ALL
3. TROUBLE FALLING OR STAYING ASLEEP OR SLEEPING TOO MUCH: NEARLY EVERY DAY
2. FEELING DOWN, DEPRESSED OR HOPELESS: NEARLY EVERY DAY
10. IF YOU CHECKED OFF ANY PROBLEMS, HOW DIFFICULT HAVE THESE PROBLEMS MADE IT FOR YOU TO DO YOUR WORK, TAKE CARE OF THINGS AT HOME, OR GET ALONG WITH OTHER PEOPLE: SOMEWHAT DIFFICULT
SUM OF ALL RESPONSES TO PHQ9 QUESTIONS 1 AND 2: 3
7. TROUBLE CONCENTRATING ON THINGS, SUCH AS READING THE NEWSPAPER OR WATCHING TELEVISION: MORE THAN HALF THE DAYS

## 2024-06-07 ASSESSMENT — PAIN SCALES - GENERAL: PAINLEVEL: 8

## 2024-06-07 NOTE — PROGRESS NOTES
Labs drawn today  Ct scans 6/21 8am arrival- prep given - instructed on pre med for allergy  Sent to PA  Rtc 6/26 1230 for MD  Reviewed AVS with patient- patient verbalizes understanding  I informed Dr Medina- pt scored a 11 on his depression score

## 2024-06-07 NOTE — PATIENT INSTRUCTIONS
Reviewed labs and recent medical history.  Discussed his last labs that showed that his white blood cells and platelets are still low. Reviewed further testing that we need to determine the cause.  Will check labs today. Orders placed.  Dr. Medina will call patient about the results. If these  labs do not give us information as to the cause of the low counts, we will consider a bone marrow biopsy. Reviewed the process of a biopsy.  Noted enlarged lymph nodes in the axilla and neck.  Will plan on CT scans to evaluate. Orders placed. Premeds sent to pharmacy.  Return to see MD in 3 weeks to review results.

## 2024-06-07 NOTE — PROGRESS NOTES
Patient ID:Jhony Myles is a 53 y.o. year old male patient with Pancytopenia (Multi) [D61.818]      Referring Physician: Milagros Medina MD  [address] [place] [address] [address]  [place] [phone]  Primary Care Provider: Juan Ortiz MD    Chief Complaint  Chief Complaint   Patient presents with    Follow-up     Thrombocytopenia          History of the Present Illness  10/30/2021.  CBC showed white count was 4.17, hemoglobin 14.9 hematocrit 42.4 and a platelet count was 169,000.  White blood cell differential count showed 61% polys, 21 lymphs, 12 monos 4 eosinophils.  Review laboratory data from 2/7/2020 through 6/16/2022 showed that his platelet count ranged from 130 779,000 with normal differentials.     5/8/2023.  Seen by cardiology Dr. Juan Ortiz in follow-up of his extensive cardiac history since at least 2018.  He was found to be thrombocytopenic with a platelet count of 114,000 and was referred to hematology.  He was on hydrochlorothiazide.  He underwent left heart cath on 5/23/2023 and had single-vessel coronary artery disease.     6/23/2023.  Followed up with Dr. Maki who noted that an appointment had not been made.     June 2023.  Seen in consultation by Dr. Moreno.  He noted that the patient had stable mild thrombocytopenia with minimally enlarged spleen.  He felt otherwise there were no other cause for his mild stable thrombocytopenia.  Multiple tests were ordered.     7/19/2023.  Follow-up with Dr. Moreno.  Laboratory testing showed that he was negative for hepatitis B and C, HIV with normal ESR at 3, ENRIQUE, negative rheumatoid factor less than 10 liver function test.     7/31/2023.  Follow-up with Dr. Dow for chronic left lower quadrant abdominal pain radiating into his left lower quadrant who recommended CT scan of the abdomen and pelvis because of ultrasound which showed splenomegaly (13.4 cm) unchanged from 7/27/2023.     10/23/2023.  CAT scan of the abdomen and pelvis showed kidney cysts,  atherosclerosis, right hydrocele, degenerative joint disease and no hepato-splenomegaly.  Surgery on the hydrocele was contemplated.  Brilinta needed to be held.     11/21/2023.  Follow-up with Dr. Maki with hip pain, knee pain, thumb pain, for which she was referred to orthopedics and subsequently delay to physical therapy.  There is a history of a lateral meniscus tear in the distant past..     12/12/2023.  ED visit for chest pain.  CBC at that time showed platelet count of 153,000.  White count was 4.8 hemoglobin 15 hematocrit 41 and white blood cell differential was normal.  He was able to be discharged.     1/3/2024.  Emergency department visit for palpitations and chest discomfort with pounding.  CBC at that time showed platelet count of 129,000 with normal white count and hemoglobin.  Hematocrit was 37 white blood cell differential was normal.  He was discharged after a negative chest x-ray, troponins and EKG.     1/15/2024.  Follow-up with Dr. Dow who ordered a celiac panel and nuclear medicine gastric emptying study.     2/20/2024.  Hydrocelectomy by Dr. Edward.     3/1/2024.  He is here today in follow-up.  This is our first meeting.  Review of his chart shows that since 2019 the lowest platelet count that he has ever had was 114 in April 2023.  The majority of his platelet counts have been below 150,000.  Occasionally he will have a white count less than normal of 4.4.  Most of the time his white count is normal.  His hemoglobin is usually normal but occasionally will drop down to 12.2.  MCV's have been normal.  White blood cell differential's have shown decreased absolute lymphocyte count on a regular basis.  Lower limit of normal is 1.2 and he is usually below that.  Multiple tests for autoimmune diseases have been negative.     The patient says that he has has significant pain in his right flank and he has been monitoring his blood pressure at home which has been about 90/50.  He comes in now  with a blood pressure of 170/83 and he feels clammy, sweaty, dizzy and weak.  He says that his appetite has been poor since surgery.  He has had a minor nosebleed which he attributes to Brilinta.  He has had no gum bleeding.  He has had no blood in his urine or stool..  He did have a sinus infection recently for which she was treated with a Z-Oswaldo and prednisone.  He continues on HydroDIURIL.     Because of his current symptoms and physical examination showing that he is clammy I advised him to go to the emergency room for further evaluation.     He will have a follow-up CBC in about 3 months.  He will contact his cardiologist about fluctuations in his blood pressure.    Interval Note    3/1/2024.  He was evaluated emergency department for the symptoms.  Laboratory data was essentially normal.  D-dimer was normal.  Lactic acid was normal.  C-reactive protein was normal.  Sed rate was normal.  He was given a small fluid bolus because of his elevated BUN and he was discharged home.    3/8/2024.  He was evaluated by Dr. Reji Silveira for a tear of the lateral meniscus of the right knee.  MRI had shown a complex tear of the lateral meniscus, small Baker's cyst and moderate cartilage loss in the patellofemoral compartment.  There is mild loss in lateral and medial compartments.  Since his symptoms have improved he is advised to take over-the-counter anti-inflammatory medication.    3/12/2024.  Nuclear medicine gastric emptying study showed normal findings without evidence of gastroparesis    3/20/2024.  Follow-up with Dr. Maki after ER visit.  She noted that he had left upper quadrant pain in the setting of thrombocytopenia and splenomegaly with negative evaluation and persistent pain and a nonobstructing left upper pole kidney stone.  He was prescribed colesevelam (Welchol) twice a day for bile acid reflux.  He could not afford it.    3/21/2024.  Follow-up with cardiology with Dr. Juan Ortiz for coronary artery  disease status post multiple stents and PCI's and hypertension.    3/22/2024.  Follow-up with surgeon Dr. Olson for his reflux and epigastric pain.  Previous colonoscopy had shown 6 mm polyp in the transverse colon in 2018 with follow-up in 2021 showed a 5 mm polyp in his transverse colon.  He has had multiple upper GI endoscopy symptoms on chronic extremities.  He noted the patient has been followed by multiple providers with multiple interventions.  He referred the patient back to GI    4/18/2024.  Emergency department visit for dizziness and nausea with transient vision loss.  Testing included electrocardiography, blood work, CT scan of the head.  He was given IV fluids and meclizine as well as ondansetron.  There is no change in his cardiogram and he felt better after IV fluids and Antivert.  Vital signs were normal.  Chest x-ray was normal.  CT scan of the head showed diffuse volume loss and periventricular white matter changes representative of small vessel ischemic disease.  There is no evidence of acute hemorrhage or cortical infarct.  Neurological examination was normal.  He was discharged home.    Laboratory data on 4/18/2024 showed a white count of 3.0 hemoglobin 12.7 hematocrit 35.9 and a platelet count of 117,000 with 57% polys, 24 lymphs, 11 monos, 5 eos and 1 basophil.    4/25/2024.  Primary care follow-up for otitis media with an effusion of the left ear.  The patient said that he had not needed to use his Antivert regularly.  He reported sinus pressure and left ear pain for 2 months and had been on antibiotics twice for this.  He continues to have left ear pain.  He also had coccygeal pain and an x-ray was ordered.    4/26/2024.  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  The patient said that he would see his  chiropractor.    5/23/2024.  Follow-up with Dr. Ortiz.  His Crestor was increased.    6/7/2024.  Laboratory data showed a white count of 4.4 with a hemoglobin of 14.7 hematocrit 41.6 and a platelet count of 158,000.  White blood cell differential count showed 61 polys, 20 lymphs, 10 monos, 6 eosinophils and 1 basophil.  Serum chemistries were normal with exception of a sugar of 135.  Iron, folate and B12 were all normal.    6/7/2024.  He comes in today saying that his GI evaluation was negative.  He does have a cough and occasionally if he coughs really hard he will see a slight bit of blood in his phlegm.  He says that he does bruise easily and when he flosses his teeth sometimes he sees some blood.  Otherwise he has had no bleeding.  He continues to have night sweats.  His appetite is good and he weighs 283 pounds.  On his last visit he weighed 272 pounds.                Comorbidities  Coronary artery disease with at least 5 PCI interventions, ejection fraction 60 to 65%  Hypertension  Anxiety  Agoraphobia  PVCs  Fibromyalgia  GERD  Hyperlipidemia  Peptic ulcer disease with bleeding  Diabetes  Rheumatoid arthritis   Obstructive sleep apnea, mild  Kidney stones  Seborrheic dermatitis    Monitoring Parameters  Physical examinations, CBCs, imaging studies as dictated by his symptomatology.     Review of Systems - Oncology   Review of Systems   Constitutional:  Positive for diaphoresis (night time sweats) and fatigue. Negative for appetite change and unexpected weight change.   HENT:  Negative for dental problem, mouth sores, rhinorrhea, sore throat and trouble swallowing.    Eyes:  Negative for visual disturbance.   Respiratory:  Positive for cough. Negative for shortness of breath.    Cardiovascular:  Positive for chest pain and palpitations.   Gastrointestinal:  Negative for abdominal distention, blood in stool, constipation, diarrhea, nausea and vomiting.        Has reflux symptoms   Endocrine: Negative for  polyuria.   Genitourinary:  Negative for dysuria, frequency, hematuria and urgency.   Musculoskeletal:  Positive for arthralgias and myalgias.   Skin:  Positive for pallor. Negative for rash.   Neurological:  Positive for headaches (occasional). Negative for weakness, light-headedness and numbness.   Hematological:  Negative for adenopathy. Bruises/bleeds easily.   Psychiatric/Behavioral:  Negative for self-injury, sleep disturbance and suicidal ideas. The patient is not nervous/anxious.         Past Medical History  Past Medical History:   Diagnosis Date    Anxiety 2009    Depression     Diabetes mellitus (Multi)     GERD (gastroesophageal reflux disease)     Heart disease     Hyperlipidemia     Hypertension 1988    Myocardial infarction (Multi)     Prostatitis 1994    Stroke (Multi)         Surgical History  Past Surgical History:   Procedure Laterality Date    CORONARY ANGIOPLASTY  2018    PTCA    CT ANGIO NECK  2022    CT NECK ANGIO W AND WO IV CONTRAST 2022 [facility]    CT HEAD ANGIO W AND WO IV CONTRAST  2022    CT HEAD ANGIO W AND WO IV CONTRAST 2022 DOCTOR OFFICE LEGACY    MR HEAD ANGIO WO IV CONTRAST  4/3/2023    MR HEAD ANGIO WO IV CONTRAST [facility]    MR NECK ANGIO WO IV CONTRAST  4/3/2023    MR NECK ANGIO WO IV CONTRAST [facility] MRI       Social History  Social History     Tobacco Use    Smoking status: Never    Smokeless tobacco: Never   Substance Use Topics    Alcohol use: Never    Drug use: Never        Family History  family history includes Heart disease in his father and mother; Hypertension in his father and mother. Diabetes in his parents. Father  of a stroke at age 67. Sister had cancer of the thyroglossal duct, asthma, hypertension, lupus. Maternal uncle had lung cancer.       Current Medications  Current Outpatient Medications   Medication Instructions    ALPRAZolam (XANAX) 1 mg, oral, 2 times daily PRN    aspirin 81 mg EC tablet 1  tablet, oral, Daily    carvedilol (COREG) 25 mg, oral, 2 times daily (morning and late afternoon)    cetirizine (ZYRTEC) 10 mg    cyanocobalamin (Vitamin B-12) 500 mcg tablet 1 tablet, oral, Every other day    diphenhydrAMINE (BENADRYL) 50 mg, oral, Once, Take 2 hours prior to exam.    flash glucose sensor kit (FreeStyle Nancy 2 Sensor) kit Use as instructed    FreeStyle Nancy reader (FreeStyle Nancy 2 [place]) misc Use as instructed    hydroCHLOROthiazide (HYDRODIURIL) 25 mg, oral, Daily    HYDROcodone-acetaminophen (Norco) 5-325 mg tablet 1 tablet, oral, Every 6 hours PRN    isosorbide mononitrate ER (IMDUR) 30 mg, oral, Every 12 hours    losartan (COZAAR) 50 mg, oral, Daily    minoxidil (Loniten) 2.5 mg tablet oral, 2 times daily    olopatadine (Patanol) 0.1 % ophthalmic solution     omeprazole (PriLOSEC) 20 mg DR capsule     potassium chloride CR (Klor-Con M20) 20 mEq ER tablet 20 mEq, oral, Daily    predniSONE (DELTASONE) 50 mg, oral, Daily    rosuvastatin (Crestor) 5 mg tablet 10 mg every other day    ticagrelor (BRILINTA) 90 mg, oral, 2 times daily           OARRS Review  I have personally reviewed the OARRS report for Jhony SALDIVAR Myles. I have considered the risks of abuse, dependence, addiction and diversion.  Evaluation of this document shows that he remains on alprazolam and he recently has had a prescription for Norco by Dr. Edward.    Vital Signs  /81 (BP Location: Right arm, Patient Position: Sitting, BP Cuff Size: Adult)   Pulse 68   Temp 35.8 °C (96.4 °F) (Skin)   Resp 16   Wt 128 kg (283 lb 3.2 oz)   SpO2 97%   BMI 32.73 kg/m²      Physical Exam  Vitals and nursing note reviewed. Exam conducted with a chaperone present.   Constitutional:       General: He is not in acute distress.     Appearance: He is not ill-appearing or toxic-appearing.      Comments: Well-nourished overweight somewhat pale  male who is in no acute distress.  He does not appear to be acutely or chronically ill.    HENT:      Head: Normocephalic and atraumatic.      Nose: Nose normal.      Mouth/Throat:      Mouth: Mucous membranes are moist.      Pharynx: Oropharynx is clear. No oropharyngeal exudate or posterior oropharyngeal erythema.   Eyes:      General: No scleral icterus.     Extraocular Movements: Extraocular movements intact.      Conjunctiva/sclera: Conjunctivae normal.      Pupils: Pupils are equal, round, and reactive to light.   Neck:      Comments: Right axilla and left axilla with 2-3 cm size lymph nodes. Non-tender. Prominent clavicular head on the right side  Cardiovascular:      Rate and Rhythm: Normal rate and regular rhythm.      Heart sounds: No murmur heard.  Pulmonary:      Effort: Pulmonary effort is normal. No respiratory distress.      Breath sounds: No wheezing, rhonchi or rales.   Abdominal:      Palpations: Abdomen is soft. There is no mass.      Tenderness: There is no abdominal tenderness. There is no guarding.      Comments: No obvious organomegaly.   Musculoskeletal:         General: Normal range of motion.      Cervical back: Normal range of motion. No rigidity.      Right lower leg: No edema.      Left lower leg: No edema.   Lymphadenopathy:      Cervical: No cervical adenopathy.   Skin:     General: Skin is warm and dry.      Coloration: Skin is pale. Skin is not jaundiced.   Neurological:      General: No focal deficit present.      Mental Status: He is alert and oriented to person, place, and time. Mental status is at baseline.      Cranial Nerves: No cranial nerve deficit.      Motor: No weakness.      Gait: Gait normal.   Psychiatric:         Mood and Affect: Mood normal.         Behavior: Behavior normal.         Thought Content: Thought content normal.         Judgment: Judgment normal.          Current Laboratory Data  Recent Results (from the past 168 hour(s))   Immunoglobulins (IgG, IgA, IgM)    Collection Time: 06/07/24 12:14 PM   Result Value Ref Range    IgG 774 700 - 1,600  mg/dL    IgA 133 70 - 400 mg/dL    IgM 74 40 - 230 mg/dL   Comprehensive Metabolic Panel    Collection Time: 06/07/24 12:14 PM   Result Value Ref Range    Glucose 135 (H) 74 - 99 mg/dL    Sodium 140 136 - 145 mmol/L    Potassium 4.3 3.5 - 5.3 mmol/L    Chloride 106 98 - 107 mmol/L    Bicarbonate 28 21 - 32 mmol/L    Anion Gap 10 10 - 20 mmol/L    Urea Nitrogen 21 6 - 23 mg/dL    Creatinine 0.87 0.50 - 1.30 mg/dL    eGFR >90 >60 mL/min/1.73m*2    Calcium 9.8 8.6 - 10.3 mg/dL    Albumin 4.8 3.4 - 5.0 g/dL    Alkaline Phosphatase 42 33 - 120 U/L    Total Protein 6.4 6.4 - 8.2 g/dL    AST 19 9 - 39 U/L    Bilirubin, Total 0.9 0.0 - 1.2 mg/dL    ALT 21 10 - 52 U/L   CBC and Auto Differential    Collection Time: 06/07/24 12:14 PM   Result Value Ref Range    WBC 4.4 4.4 - 11.3 x10*3/uL    nRBC 0.0 0.0 - 0.0 /100 WBCs    RBC 4.71 4.50 - 5.90 x10*6/uL    Hemoglobin 14.7 13.5 - 17.5 g/dL    Hematocrit 41.6 41.0 - 52.0 %    MCV 88 80 - 100 fL    MCH 31.2 26.0 - 34.0 pg    MCHC 35.3 32.0 - 36.0 g/dL    RDW 12.1 11.5 - 14.5 %    Platelets 158 150 - 450 x10*3/uL    Neutrophils % 61.0 40.0 - 80.0 %    Immature Granulocytes %, Automated 0.2 0.0 - 0.9 %    Lymphocytes % 20.6 13.0 - 44.0 %    Monocytes % 10.7 2.0 - 10.0 %    Eosinophils % 6.1 0.0 - 6.0 %    Basophils % 1.4 0.0 - 2.0 %    Neutrophils Absolute 2.69 1.20 - 7.70 x10*3/uL    Immature Granulocytes Absolute, Automated 0.01 0.00 - 0.70 x10*3/uL    Lymphocytes Absolute 0.91 (L) 1.20 - 4.80 x10*3/uL    Monocytes Absolute 0.47 0.10 - 1.00 x10*3/uL    Eosinophils Absolute 0.27 0.00 - 0.70 x10*3/uL    Basophils Absolute 0.06 0.00 - 0.10 x10*3/uL   Folate    Collection Time: 06/07/24 12:14 PM   Result Value Ref Range    Folate, Serum 12.4 >5.0 ng/mL   Vitamin B12    Collection Time: 06/07/24 12:14 PM   Result Value Ref Range    Vitamin B12 336 211 - 911 pg/mL   Iron and TIBC    Collection Time: 06/07/24 12:14 PM   Result Value Ref Range    Iron 145 35 - 150 ug/dL    UIBC 191 110 -  370 ug/dL    TIBC 336 240 - 445 ug/dL    % Saturation 43 25 - 45 %   Protein, Total    Collection Time: 06/07/24 12:14 PM   Result Value Ref Range    Total Protein 6.8 6.4 - 8.2 g/dL          Recent Imaging  ECG 12 lead (Clinic Performed)    Result Date: 5/23/2024  See scanned image         Pathology  Mild chronic thrombocytopenia  Persistent mild lymphopenia       Performance Status:  Symptomatic; fully ambulatory    Assessment/Plan    1.  Mild chronic thrombocytopenia.  No etiology has been discerned.  He fluctuates between low normal and mildly decreased platelet counts.  In combination with his persistent chronic lymphopenia we will check a flow cytometry.  Causes of chronic lymphopenia include autoimmune disorders for which she has been evaluated, lymphoma, sarcoid, kidney failure and other problems.  He does not have kidney failure and has had no evidence of sarcoid or lymphoma.  His platelet count has normalized.  Now he has palpable adenopathy in both axillae.  We will repeat his CT scan that was last done last fall.     2.  Flank pain, dizziness, bouts of hypotension and clamminess.  We sent him to the emergency department to make sure that he had no systemic infection.  He will follow-up with Dr. Edward.     3.  Multiple comorbidities.  These include but are not limited to:  Coronary artery disease with at least 5 PCI interventions, ejection fraction 60 to 65%  Hypertension  Anxiety  Agoraphobia  PVCs  Fibromyalgia  GERD  Hyperlipidemia  Peptic ulcer disease with bleeding  Diabetes  Rheumatoid arthritis   Obstructive sleep apnea, mild  Kidney stones  Seborrheic dermatitis      We have ordered flow cytometry   He knows that he should call if he has any change in his status, questions or concerns.  He has to follow-up with cardiology and primary care concerning his widely fluctuating blood pressure readings.     (This note was generated with voice recognition software and may contain errors including spelling,  grammar, syntax and misrecognition of what was dictated, that are not fully corrected)     Milagros Medina MD

## 2024-06-08 ASSESSMENT — ENCOUNTER SYMPTOMS: FREQUENCY: 0

## 2024-06-10 ENCOUNTER — APPOINTMENT (OUTPATIENT)
Dept: HEMATOLOGY/ONCOLOGY | Facility: CLINIC | Age: 54
End: 2024-06-10
Payer: COMMERCIAL

## 2024-06-12 ENCOUNTER — APPOINTMENT (OUTPATIENT)
Dept: UROLOGY | Facility: CLINIC | Age: 54
End: 2024-06-12
Payer: COMMERCIAL

## 2024-06-12 VITALS — SYSTOLIC BLOOD PRESSURE: 150 MMHG | DIASTOLIC BLOOD PRESSURE: 80 MMHG | BODY MASS INDEX: 32.7 KG/M2 | WEIGHT: 283 LBS

## 2024-06-12 DIAGNOSIS — R35.1 NOCTURIA: ICD-10-CM

## 2024-06-12 DIAGNOSIS — N20.0 KIDNEY STONES: ICD-10-CM

## 2024-06-12 DIAGNOSIS — Z87.438: ICD-10-CM

## 2024-06-12 DIAGNOSIS — N40.0 BENIGN PROSTATIC HYPERPLASIA WITHOUT LOWER URINARY TRACT SYMPTOMS: Primary | ICD-10-CM

## 2024-06-12 PROCEDURE — 3079F DIAST BP 80-89 MM HG: CPT | Performed by: UROLOGY

## 2024-06-12 PROCEDURE — 1036F TOBACCO NON-USER: CPT | Performed by: UROLOGY

## 2024-06-12 PROCEDURE — 3077F SYST BP >= 140 MM HG: CPT | Performed by: UROLOGY

## 2024-06-12 PROCEDURE — 99214 OFFICE O/P EST MOD 30 MIN: CPT | Performed by: UROLOGY

## 2024-06-12 PROCEDURE — 4010F ACE/ARB THERAPY RXD/TAKEN: CPT | Performed by: UROLOGY

## 2024-06-12 PROCEDURE — 3044F HG A1C LEVEL LT 7.0%: CPT | Performed by: UROLOGY

## 2024-06-12 RX ORDER — CLOTRIMAZOLE 1 %
60 CREAM (GRAM) TOPICAL DAILY
COMMUNITY
End: 2024-06-12 | Stop reason: SDUPTHER

## 2024-06-12 RX ORDER — CLOTRIMAZOLE 1 %
1 CREAM (GRAM) TOPICAL 2 TIMES DAILY
Qty: 60 G | Refills: 11 | Status: SHIPPED | OUTPATIENT
Start: 2024-06-12

## 2024-06-12 ASSESSMENT — ENCOUNTER SYMPTOMS
COUGH: 0
DIFFICULTY URINATING: 0
EYES NEGATIVE: 1
FEVER: 0
ENDOCRINE NEGATIVE: 1
NAUSEA: 0
ALLERGIC/IMMUNOLOGIC NEGATIVE: 1
PSYCHIATRIC NEGATIVE: 1
SHORTNESS OF BREATH: 0
CHILLS: 0

## 2024-06-12 NOTE — PROGRESS NOTES
Subjective   Patient ID: Chester Myles is a 53 y.o. male.    HPI  Patient is here for 3 month follow up. S/P Right hydrocelectomy on 2/20/24. He is pleased with results Only thing he notices is a pulling feeling when sitting but denies any swelling and has no concerns. Chronic LUT'S sx are mild and stable. Denies urgency and frequency. Denies dysuria. Denies hematuria. Nocturia x1. Most recent PSA was 1.43 on 1/24. Prior PSA was 1.2 oln 1/23. ED is not an issue-No need for Tx        Review of Systems   Constitutional:  Negative for chills and fever.   HENT: Negative.     Eyes: Negative.    Respiratory:  Negative for cough and shortness of breath.    Cardiovascular:  Negative for chest pain and leg swelling.   Gastrointestinal:  Negative for nausea.   Endocrine: Negative.    Genitourinary:  Negative for difficulty urinating.        Negative except for documented in HPI   Allergic/Immunologic: Negative.    Neurological:         Alert & oriented X 3   Hematological:         Denies blood thinners   Psychiatric/Behavioral: Negative.         Objective   Physical Exam  Vitals and nursing note reviewed.   Constitutional:       General: He is not in acute distress.     Appearance: Normal appearance.   Pulmonary:      Effort: Pulmonary effort is normal.   Abdominal:      Tenderness: There is no abdominal tenderness.   Genitourinary:     Comments: Kidneys non palpable bilaterally  Bladder non palpable or tender  Scrotum no mass, No hydrocele Well healed  Epididymis- No spermatocele. Non Tender.  Testicles: No mass  Urethra: No discharge  Penis uncircumcised. No phimosis. Mild Balanitis  Prostate -deferred  Neurological:      Mental Status: He is alert.       Assessment/Plan   Diagnoses and all orders for this visit:  Benign prostatic hyperplasia without lower urinary tract symptoms  Kidney stones  Nocturia  Hx of hydrocele      All available PSA values reviewed, Options discussed. Questions answered.   Diet changes for  prostate health discussed and educational information given. Pros/Cons of prostate health supplements discussed.   Treatment options for LUTS reviewed  Discussed timed voiding. Discussed fluid and caffeine intake  Treatment options for ED reviewed.  Lifestyle change to help prevent UTIs discussed. Encouraged fluid intake.  Lotrisone cream Rx given for balantits    F/U  1/25 with PSA

## 2024-06-13 LAB
ALBUMIN: 4.5 G/DL (ref 3.4–5)
ALPHA 1 GLOBULIN: 0.3 G/DL (ref 0.2–0.6)
ALPHA 2 GLOBULIN: 0.5 G/DL (ref 0.4–1.1)
BETA GLOBULIN: 0.7 G/DL (ref 0.5–1.2)
GAMMA GLOBULIN: 0.7 G/DL (ref 0.5–1.4)
IMMUNOFIXATION COMMENT: NORMAL
PATH REVIEW - SERUM IMMUNOFIXATION: NORMAL
PATH REVIEW-SERUM PROTEIN ELECTROPHORESIS: NORMAL
PROTEIN ELECTROPHORESIS COMMENT: NORMAL

## 2024-06-21 ENCOUNTER — HOSPITAL ENCOUNTER (OUTPATIENT)
Dept: RADIOLOGY | Facility: HOSPITAL | Age: 54
Discharge: HOME | End: 2024-06-21
Payer: COMMERCIAL

## 2024-06-21 DIAGNOSIS — D61.818 PANCYTOPENIA (MULTI): ICD-10-CM

## 2024-06-21 DIAGNOSIS — D69.6 THROMBOCYTOPENIA (CMS-HCC): ICD-10-CM

## 2024-06-21 PROCEDURE — 2550000001 HC RX 255 CONTRASTS: Performed by: INTERNAL MEDICINE

## 2024-06-21 PROCEDURE — A9698 NON-RAD CONTRAST MATERIALNOC: HCPCS | Performed by: INTERNAL MEDICINE

## 2024-06-21 PROCEDURE — 70491 CT SOFT TISSUE NECK W/DYE: CPT

## 2024-06-21 PROCEDURE — 74177 CT ABD & PELVIS W/CONTRAST: CPT

## 2024-06-26 ENCOUNTER — OFFICE VISIT (OUTPATIENT)
Dept: HEMATOLOGY/ONCOLOGY | Facility: CLINIC | Age: 54
End: 2024-06-26
Payer: COMMERCIAL

## 2024-06-26 ENCOUNTER — HOSPITAL ENCOUNTER (OUTPATIENT)
Dept: RADIOLOGY | Facility: HOSPITAL | Age: 54
Discharge: HOME | End: 2024-06-26
Payer: COMMERCIAL

## 2024-06-26 VITALS
WEIGHT: 281.6 LBS | DIASTOLIC BLOOD PRESSURE: 91 MMHG | OXYGEN SATURATION: 96 % | TEMPERATURE: 97.5 F | HEART RATE: 68 BPM | RESPIRATION RATE: 16 BRPM | SYSTOLIC BLOOD PRESSURE: 166 MMHG | BODY MASS INDEX: 32.54 KG/M2

## 2024-06-26 DIAGNOSIS — D61.818 PANCYTOPENIA (MULTI): ICD-10-CM

## 2024-06-26 DIAGNOSIS — R59.1 LYMPHADENOPATHY: ICD-10-CM

## 2024-06-26 DIAGNOSIS — D69.6 THROMBOCYTOPENIA (CMS-HCC): ICD-10-CM

## 2024-06-26 DIAGNOSIS — T14.90XA INJURY: Primary | ICD-10-CM

## 2024-06-26 DIAGNOSIS — T14.90XA INJURY: ICD-10-CM

## 2024-06-26 PROCEDURE — 99213 OFFICE O/P EST LOW 20 MIN: CPT | Performed by: INTERNAL MEDICINE

## 2024-06-26 PROCEDURE — 4010F ACE/ARB THERAPY RXD/TAKEN: CPT | Performed by: INTERNAL MEDICINE

## 2024-06-26 PROCEDURE — 3080F DIAST BP >= 90 MM HG: CPT | Performed by: INTERNAL MEDICINE

## 2024-06-26 PROCEDURE — 73090 X-RAY EXAM OF FOREARM: CPT | Mod: RT

## 2024-06-26 PROCEDURE — 3077F SYST BP >= 140 MM HG: CPT | Performed by: INTERNAL MEDICINE

## 2024-06-26 PROCEDURE — 3044F HG A1C LEVEL LT 7.0%: CPT | Performed by: INTERNAL MEDICINE

## 2024-06-26 ASSESSMENT — PATIENT HEALTH QUESTIONNAIRE - PHQ9
10. IF YOU CHECKED OFF ANY PROBLEMS, HOW DIFFICULT HAVE THESE PROBLEMS MADE IT FOR YOU TO DO YOUR WORK, TAKE CARE OF THINGS AT HOME, OR GET ALONG WITH OTHER PEOPLE: VERY DIFFICULT
2. FEELING DOWN, DEPRESSED OR HOPELESS: SEVERAL DAYS
1. LITTLE INTEREST OR PLEASURE IN DOING THINGS: NOT AT ALL
SUM OF ALL RESPONSES TO PHQ9 QUESTIONS 1 AND 2: 1

## 2024-06-26 ASSESSMENT — PAIN SCALES - GENERAL: PAINLEVEL: 4

## 2024-06-26 NOTE — PATIENT INSTRUCTIONS
Reviewed labs and recent medical history.  Discussed the results of his CT Scans and that there was no evidence of disease.  Discussed his injury to his right forearm and an order for an xray placed.  Encouraged him to call his urologist and make an appointment to follow up on his hydrocele.  He does not need another appt at this time. He will followed by his primary provider. Encouraged him to call with any questions or concerns.

## 2024-06-26 NOTE — PROGRESS NOTES
Patient ID:Jhony Myles is a 53 y.o. year old male patient with resolved pancytopenia and intermittent lymphadenopathy       Referring Physician: Milagros Medina MD  [address] [place] [address] Dr [facility] -[address] [place], [place] [id number]  Primary Care Provider: Juan Ortiz MD    Chief Complaint  Chief Complaint   Patient presents with    Pancytopenia     Thrombocytopenia            History of the Present Illness  10/30/2021.  CBC showed white count was 4.17, hemoglobin 14.9 hematocrit 42.4 and a platelet count was 169,000.  White blood cell differential count showed 61% polys, 21 lymphs, 12 monos 4 eosinophils.  Review laboratory data from 2/7/2020 through 6/16/2022 showed that his platelet count ranged from 130 779,000 with normal differentials.     5/8/2023.  Seen by cardiology Dr. Juan Ortiz in follow-up of his extensive cardiac history since at least 2018.  He was found to be thrombocytopenic with a platelet count of 114,000 and was referred to hematology.  He was on hydrochlorothiazide.  He underwent left heart cath on 5/23/2023 and had single-vessel coronary artery disease.     6/23/2023.  Followed up with Dr. Maki who noted that an appointment had not been made.     June 2023.  Seen in consultation by Dr. Moreno.  He noted that the patient had stable mild thrombocytopenia with minimally enlarged spleen.  He felt otherwise there were no other cause for his mild stable thrombocytopenia.  Multiple tests were ordered.     7/19/2023.  Follow-up with Dr. Moreno.  Laboratory testing showed that he was negative for hepatitis B and C, HIV with normal ESR at 3, ENRIQUE, negative rheumatoid factor less than 10 liver function test.     7/31/2023.  Follow-up with Dr. Dow for chronic left lower quadrant abdominal pain radiating into his left lower quadrant who recommended CT scan of the abdomen and pelvis because of ultrasound which showed splenomegaly (13.4 cm) unchanged from 7/27/2023.     10/23/2023.  CAT scan of the abdomen and pelvis showed  kidney cysts, atherosclerosis, right hydrocele, degenerative joint disease and no hepato-splenomegaly.  Surgery on the hydrocele was contemplated.  Brilinta needed to be held.     11/21/2023.  Follow-up with Dr. Maki with hip pain, knee pain, thumb pain, for which she was referred to orthopedics and subsequently delay to physical therapy.  There is a history of a lateral meniscus tear in the distant past..     12/12/2023.  ED visit for chest pain.  CBC at that time showed platelet count of 153,000.  White count was 4.8 hemoglobin 15 hematocrit 41 and white blood cell differential was normal.  He was able to be discharged.     1/3/2024.  Emergency department visit for palpitations and chest discomfort with pounding.  CBC at that time showed platelet count of 129,000 with normal white count and hemoglobin.  Hematocrit was 37 white blood cell differential was normal.  He was discharged after a negative chest x-ray, troponins and EKG.     1/15/2024.  Follow-up with Dr. Dow who ordered a celiac panel and nuclear medicine gastric emptying study.     2/20/2024.  Hydrocelectomy by Dr. Edward.     3/1/2024.  He is here today in follow-up.  This is our first meeting.  Review of his chart shows that since 2019 the lowest platelet count that he has ever had was 114 in April 2023.  The majority of his platelet counts have been below 150,000.  Occasionally he will have a white count less than normal of 4.4.  Most of the time his white count is normal.  His hemoglobin is usually normal but occasionally will drop down to 12.2.  MCV's have been normal.  White blood cell differential's have shown decreased absolute lymphocyte count on a regular basis.  Lower limit of normal is 1.2 and he is usually below that.  Multiple tests for autoimmune diseases have been negative.     The patient says that he has has significant pain in his right flank and he has been monitoring his blood pressure at home which has been about 90/50.  He  comes in now with a blood pressure of 170/83 and he feels clammy, sweaty, dizzy and weak.  He says that his appetite has been poor since surgery.  He has had a minor nosebleed which he attributes to Brilinta.  He has had no gum bleeding.  He has had no blood in his urine or stool..  He did have a sinus infection recently for which she was treated with a Z-Oswaldo and prednisone.  He continues on HydroDIURIL.     Because of his current symptoms and physical examination showing that he is clammy I advised him to go to the emergency room for further evaluation.     He will have a follow-up CBC in about 3 months.  He will contact his cardiologist about fluctuations in his blood pressure.     3/1/2024.  He was evaluated emergency department for the symptoms.  Laboratory data was essentially normal.  D-dimer was normal.  Lactic acid was normal.  C-reactive protein was normal.  Sed rate was normal.  He was given a small fluid bolus because of his elevated BUN and he was discharged home.     3/8/2024.  He was evaluated by Dr. Reji Silveira for a tear of the lateral meniscus of the right knee.  MRI had shown a complex tear of the lateral meniscus, small Baker's cyst and moderate cartilage loss in the patellofemoral compartment.  There is mild loss in lateral and medial compartments.  Since his symptoms have improved he is advised to take over-the-counter anti-inflammatory medication.     3/12/2024.  Nuclear medicine gastric emptying study showed normal findings without evidence of gastroparesis     3/20/2024.  Follow-up with Dr. Maki after ER visit.  She noted that he had left upper quadrant pain in the setting of thrombocytopenia and splenomegaly with negative evaluation and persistent pain and a nonobstructing left upper pole kidney stone.  He was prescribed colesevelam (Welchol) twice a day for bile acid reflux.  He could not afford it.     3/21/2024.  Follow-up with cardiology with Dr. Juan Ortiz for coronary artery  disease status post multiple stents and PCI's and hypertension.     3/22/2024.  Follow-up with surgeon Dr. Olson for his reflux and epigastric pain.  Previous colonoscopy had shown 6 mm polyp in the transverse colon in 2018 with follow-up in 2021 showed a 5 mm polyp in his transverse colon.  He has had multiple upper GI endoscopy symptoms on chronic extremities.  He noted the patient has been followed by multiple providers with multiple interventions.  He referred the patient back to GI     4/18/2024.  Emergency department visit for dizziness and nausea with transient vision loss.  Testing included electrocardiography, blood work, CT scan of the head.  He was given IV fluids and meclizine as well as ondansetron.  There is no change in his cardiogram and he felt better after IV fluids and Antivert.  Vital signs were normal.  Chest x-ray was normal.  CT scan of the head showed diffuse volume loss and periventricular white matter changes representative of small vessel ischemic disease.  There is no evidence of acute hemorrhage or cortical infarct.  Neurological examination was normal.  He was discharged home.     Laboratory data on 4/18/2024 showed a white count of 3.0 hemoglobin 12.7 hematocrit 35.9 and a platelet count of 117,000 with 57% polys, 24 lymphs, 11 monos, 5 eos and 1 basophil.     4/25/2024.  Primary care follow-up for otitis media with an effusion of the left ear.  The patient said that he had not needed to use his Antivert regularly.  He reported sinus pressure and left ear pain for 2 months and had been on antibiotics twice for this.  He continues to have left ear pain.  He also had coccygeal pain and an x-ray was ordered.     4/26/2024.  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  The patient said that he would see his chiropractor.      5/23/2024.  Follow-up with Dr. Ortiz.  His Crestor was increased.     6/7/2024.  Laboratory data showed a white count of 4.4 with a hemoglobin of 14.7 hematocrit 41.6 and a platelet count of 158,000.  White blood cell differential count showed 61 polys, 20 lymphs, 10 monos, 6 eosinophils and 1 basophil.  Serum chemistries were normal with exception of a sugar of 135.  Iron, folate and B12 were all normal.     6/7/2024.  He comes in today saying that his GI evaluation was negative.  He does have a cough and occasionally if he coughs really hard he will see a slight bit of blood in his phlegm.  He says that he does bruise easily and when he flosses his teeth sometimes he sees some blood.  Otherwise he has had no bleeding.  He continues to have night sweats.  His appetite is good and he weighs 283 pounds.  On his last visit he weighed 272 pounds.    Interval Note  6/12/2024.  Follow-up with Dr. Edward to follow-up on his recent hydrocelectomy.  He does note a pulling feeling when he sits down.  Most recent PSA was normal at 1.43.  He was told to come back in January 2025 for a PSA.    6/21/2024.  CAT scans of the chest abdomen and pelvis as well as soft tissues of the neck.  There is no evidence of hepatosplenomegaly or lymphadenopathy.  There is mild diffuse body wall edema similar to that previously seen.  Severe coronary artery calcification was seen and multiple small bilateral granulomas were seen without suspicious pulmonary nodules bilateral fat-containing inguinal hernias were seen in the liver and spleen were unchanged.  No masses were seen in the neck.    6/26/2024.  He is here today to review his imaging studies.  We did not find any clinical correlate to lymph nodes that were seen on physical examination.  He says that he is feeling about the same.  We suggested that because his CBC had normalized and his CAT scans were clear that no further appointment will be made but we already to see him again if his  status changes or if he has any questions or concerns.  He did sustain an injury to his right forearm with some swelling.  He requested an x-ray which was done today.    Comorbidities  Coronary artery disease with at least 5 PCI interventions, ejection fraction 60 to 65%  Hypertension  Anxiety  Agoraphobia  PVCs  Fibromyalgia  GERD  Hyperlipidemia  Peptic ulcer disease with bleeding  Diabetes  Rheumatoid arthritis   Obstructive sleep apnea, mild  Kidney stones  Seborrheic dermatitis    Monitoring Parameters  Physical examinations, CBCs, imaging studies as dictated by his symptomatology.     Review of Systems - Oncology   Review of Systems   Constitutional:  Positive for fatigue. Negative for appetite change and unexpected weight change.   HENT:  Negative for rhinorrhea and trouble swallowing.    Eyes:  Negative for visual disturbance.   Respiratory:  Negative for cough and shortness of breath.    Cardiovascular:  Negative for leg swelling.   Gastrointestinal:  Negative for constipation, diarrhea, nausea and vomiting.   Endocrine: Negative for polyuria.   Musculoskeletal:  Positive for arthralgias and myalgias (he states that he fell and injured his arm).   Skin:  Negative for pallor and rash.   Neurological:  Negative for weakness, light-headedness, numbness and headaches.   Psychiatric/Behavioral:  Negative for self-injury, sleep disturbance and suicidal ideas.         Past Medical History  Past Medical History:   Diagnosis Date    Anxiety 2009    Depression 2009    Diabetes mellitus (Multi) 2010    GERD (gastroesophageal reflux disease) 2005    Heart disease 2000    Hyperlipidemia 2015    Hypertension 1988    Myocardial infarction (Multi) 2005    Prostatitis 1994    Stroke (Multi) 2023        Surgical History  Past Surgical History:   Procedure Laterality Date    CORONARY ANGIOPLASTY  02/27/2018    PTCA    CT ANGIO NECK  12/2/2022    CT NECK ANGIO W AND WO IV CONTRAST 12/2/2022 [facility]    CT HEAD  ANGIO W AND WO IV CONTRAST  2022    CT HEAD ANGIO W AND WO IV CONTRAST 2022 [facility]    MR HEAD ANGIO WO IV CONTRAST  4/3/2023    MR HEAD ANGIO WO IV CONTRAST [facility]    MR NECK ANGIO WO IV CONTRAST  4/3/2023    MR NECK ANGIO WO IV CONTRAST [facility] MRI       Social History  Social History     Tobacco Use    Smoking status: Never    Smokeless tobacco: Never   Substance Use Topics    Alcohol use: Never    Drug use: Never        Family History  family history includes Heart disease in his father and mother; Hypertension in his father and mother.  Diabetes in his parents. Father  of a stroke at age 67. Sister had cancer of the thyroglossal duct, asthma, hypertension, lupus. Maternal uncle had lung cancer.       Current Medications  Current Outpatient Medications   Medication Instructions    ALPRAZolam (XANAX) 1 mg, oral, 2 times daily PRN    aspirin 81 mg EC tablet 1 tablet, oral, Daily    carvedilol (COREG) 25 mg, oral, 2 times daily (morning and late afternoon)    cetirizine (ZYRTEC) 10 mg    clotrimazole (Lotrimin) 1 % cream 1 Application, Topical, 2 times daily    cyanocobalamin (Vitamin B-12) 500 mcg tablet 1 tablet, oral, Every other day    diphenhydrAMINE (BENADRYL) 50 mg, oral, Once, Take 2 hours prior to exam.    flash glucose sensor kit (FreeStyle Nacny 2 Sensor) kit Use as instructed    FreeStyle Nancy reader (FreeStyle Nancy 2 [place]) misc Use as instructed    hydroCHLOROthiazide (HYDRODIURIL) 25 mg, oral, Daily    HYDROcodone-acetaminophen (Norco) 5-325 mg tablet 1 tablet, oral, Every 6 hours PRN    isosorbide mononitrate ER (IMDUR) 30 mg, oral, Every 12 hours    losartan (COZAAR) 50 mg, oral, Daily    minoxidil (Loniten) 2.5 mg tablet oral, 2 times daily    olopatadine (Patanol) 0.1 % ophthalmic solution     omeprazole (PriLOSEC) 20 mg DR capsule     potassium chloride CR (Klor-Con M20) 20 mEq ER tablet 20 mEq, oral, Daily    rosuvastatin (Crestor) 5 mg tablet 10 mg every other day     ticagrelor (BRILINTA) 90 mg, oral, 2 times daily           OARRS Review  I have personally reviewed the OARRS report for Jhony Myles. I have considered the risks of abuse, dependence, addiction and diversion. Evaluation of this document shows that he remains on alprazolam and he recently has had a prescription for Norco by Dr. Edward.     Vital Signs  BP (!) 166/91 (BP Location: Right arm, Patient Position: Sitting, BP Cuff Size: Adult)   Pulse 68   Temp 36.4 °C (97.5 °F) (Skin)   Resp 16   Wt 128 kg (281 lb 9.6 oz)   SpO2 96%   BMI 32.54 kg/m²      Physical Exam  Vitals and nursing note reviewed. Exam conducted with a chaperone present.   Constitutional:       General: He is not in acute distress.     Appearance: He is not ill-appearing or toxic-appearing.      Comments: Well-nourished overweight somewhat pale  male who is in no acute distress.  He does not appear to be acutely or chronically ill.    HENT:      Head: Normocephalic and atraumatic.      Nose: Nose normal.      Mouth/Throat:      Mouth: Mucous membranes are moist.   Eyes:      General: No scleral icterus.     Extraocular Movements: Extraocular movements intact.      Conjunctiva/sclera: Conjunctivae normal.      Pupils: Pupils are equal, round, and reactive to light.   Cardiovascular:      Rate and Rhythm: Normal rate.   Pulmonary:      Effort: Pulmonary effort is normal. No respiratory distress.   Musculoskeletal:         General: Tenderness (in his right arm) present. Normal range of motion.      Cervical back: Normal range of motion. No tenderness.   Lymphadenopathy:      Cervical: No cervical adenopathy.   Skin:     General: Skin is warm and dry.      Coloration: Skin is not pale.      Findings: No bruising or lesion.   Neurological:      General: No focal deficit present.      Mental Status: He is alert and oriented to person, place, and time. Mental status is at baseline.      Motor: No weakness.      Gait: Gait normal.    Psychiatric:         Mood and Affect: Mood normal.         Behavior: Behavior normal.         Thought Content: Thought content normal.         Judgment: Judgment normal.          Current Laboratory Data  No results found for this or any previous visit (from the past 168 hour(s)).       Recent Imaging  CT chest abdomen pelvis w IV contrast    Result Date: 6/22/2024  Interpreted By:  Matt Toro and Tavana Shahrzad STUDY: CT CHEST ABDOMEN PELVIS W IV CONTRAST;  6/21/2024 10:35 am   INDICATION: Signs/Symptoms:lymphadenopathy.   COMPARISON: Multiple prior CT examinations, most recently dated 10/23/2023   ACCESSION NUMBER(S): [id number]   ORDERING CLINICIAN: DARIANA CHAMBERS   TECHNIQUE: Contiguous axial images of the chest , abdomen, and pelvis were obtained following administration of 70 cc Omnipaque 350. Coronal and sagittal reformatted images were reconstructed from the axial data. The study was performed with oral contrast.   FINDINGS:     CT CHEST:   MEDIASTINUM AND LYMPH NODES: No enlarged intrathoracic or axillary lymph nodes.   VESSELS: Normal caliber aorta. Mild atherosclerotic calcifications throughout the aorta.   HEART:Normal size.Severe coronary artery calcifications. No significant pericardial effusion.   LUNG, AIRWAYS, PLEURA: The trachea and central bronchi are patent. Multiple bilateral small calcified granulomas are present. No suspicious pulmonary nodules identified. No consolidation, pulmonary edema, pleural effusion or pneumothorax.   CHEST WALL SOFT TISSUES:Mild diffuse chest wall edema is noted.The visualized thyroid gland is unremarkable. No significant abnormalities involving the esophagus.   OSSEOUS STRUCTURES:No acute osseous abnormality. No suspicious osseous lesions.     CT ABDOMEN/PELVIS:     ABDOMINAL WALL: Diffuse abdominal wall edema is noted, similar to prior CT on 10/23/2023. Bilateral fat containing inguinal hernias.   LIVER: The liver measures 17 cm in CC dimension, unchanged  compared to CTA dated 11/17/2021. no focal hepatic lesions identified.   BILE DUCTS: No significant intrahepatic or extrahepatic dilatation.   GALLBLADDER: No significant abnormality.   SPLEEN: The splenic size is at the upper limit of normal measuring 13 cm in length, unchanged dating back to 11/17/2021.   PANCREAS: No significant abnormality.   ADRENALS: No significant abnormality.   KIDNEYS, URETERS, BLADDER: A simple cyst is noted in the left kidney. No nephroureterolithiasis or hydroureteronephrosis. The bladder wall is normal thickness for degree of distention.   REPRODUCTIVE ORGANS: The prostate is not significantly enlarged.   VESSELS: Mild vascular calcifications. No aneurysm. The IVC is normal in caliber.   RETROPERITONEUM/LYMPH NODES: No enlarged lymph nodes.   BOWEL/MESENTERY/PERITONEUM: No significant abnormalities involving the stomach.No inflammatory bowel wall thickening or dilatation. The appendix is normal.   No ascites, free air, or fluid collection.   MUSCULOSKELETAL: No acute osseous abnormality. No suspicious osseous lesions identified. Moderate degenerative changes of bilateral hip joints noted. Moderate degenerative changes of the lumbar spine noted.       1. No significant hepatosplenomegaly or lymphadenopathy is identified throughout the chest, abdomen, or pelvis. 2. Mild diffuse body wall edema, similar to prior CT dated 10/23/2023. 3. Additional unchanged chronic findings are detailed above.     I personally reviewed the images/study and I agree with the findings as stated. This study was interpreted at [facility], [place], [place].   MACRO: None   Signed by: Matt Toro 6/22/2024 6:48 PM Dictation workstation:   [id number]    CT soft tissue neck w IV contrast    Result Date: 6/21/2024  Interpreted By:  Edgard Gonzalez, STUDY: CT SOFT TISSUE NECK W IV CONTRAST;  6/21/2024 10:39 am   INDICATION: Signs/Symptoms:lymphadenopathy.   COMPARISON: None.    ACCESSION NUMBER(S): [id number]   ORDERING CLINICIAN: DARIANA REYES   TECHNIQUE: Following intravenous injection  axial CT was performed from the skullbase to the thoracic inlet and multiplanar reconstructions were made. Opacification of vascular structures and other structures is limited due to suboptimal bolus timing.   FINDINGS: *  The visualized paranasal sinuses nasopharynx and oropharynx are unremarkable. *The mandible, maxilla and temporomandibular joints are normal. *The major salivary glands are normal. *There is no measurable cervical lymphadenopathy. *The larynx and related cartilages are normal. *Note is made of atherosclerotic calcifications at the carotid bifurcations. Luminal narrowing not excluded. *The thyroid gland is normal. *The thoracic inlet is normal.       * There is no evidence of mass, cyst or adenopathy in the neck.   MACRO: none   Signed by: Edgard Gonzalez 6/21/2024 10:50 AM Dictation workstation:   [id number]        Pathology  Mild chronic thrombocytopenia  Persistent mild lymphopenia        Performance Status:  Asymptomatic    Assessment/Plan    1.  Mild chronic thrombocytopenia.  No etiology has been discerned.  He fluctuates between low normal and mildly decreased platelet counts.  In combination with his persistent chronic lymphopenia we will check a flow cytometry.  Causes of chronic lymphopenia include autoimmune disorders for which she has been evaluated, lymphoma, sarcoid, kidney failure and other problems.  He does not have kidney failure and has had no evidence of sarcoid or lymphoma.  His platelet count has normalized.  Now he has palpable adenopathy in both axillae.  His CT scans does not show any significant adenopathy.  At this time I think that we should watch along with his primary care physician and be available for him to come back if the need arises.     2.  Flank pain, dizziness, bouts of hypotension and clamminess.  We sent him to the emergency department to make  sure that he had no systemic infection.  He will follow-up with Dr. Edward.     3.  Multiple comorbidities.  These include but are not limited to:  Coronary artery disease with at least 5 PCI interventions, ejection fraction 60 to 65%  Hypertension  Anxiety  Agoraphobia  PVCs  Fibromyalgia  GERD  Hyperlipidemia  Peptic ulcer disease with bleeding  Diabetes  Rheumatoid arthritis   Obstructive sleep apnea, mild  Kidney stones  Seborrheic dermatitis      We have ordered flow cytometry which was not done.  We will have him do this laboratory data to make sure that we are not missing anything.   He knows that he should call if he has any change in his status, questions or concerns.  He has to follow-up with cardiology and primary care concerning his widely fluctuating blood pressure readings.     (This note was generated with voice recognition software and may contain errors including spelling, grammar, syntax and misrecognition of what was dictated, that are not fully corrected)          Milagros Medina MD

## 2024-06-26 NOTE — PROGRESS NOTES
Pt going to [facility] for his forearm xray at his convenience  No follow up made at this time  Pt encouraged to call if any further issues- Reviewed AVS with patient- patient verbalizes understanding

## 2024-06-30 ASSESSMENT — ENCOUNTER SYMPTOMS
SLEEP DISTURBANCE: 0
COUGH: 0
NAUSEA: 0
NUMBNESS: 0
MYALGIAS: 1
VOMITING: 0
SHORTNESS OF BREATH: 0
ARTHRALGIAS: 1
DIARRHEA: 0
APPETITE CHANGE: 0
WEAKNESS: 0
FATIGUE: 1
RHINORRHEA: 0
TROUBLE SWALLOWING: 0
LIGHT-HEADEDNESS: 0
HEADACHES: 0
UNEXPECTED WEIGHT CHANGE: 0
CONSTIPATION: 0

## 2024-07-02 ENCOUNTER — INFUSION (OUTPATIENT)
Dept: HEMATOLOGY/ONCOLOGY | Facility: CLINIC | Age: 54
End: 2024-07-02
Payer: COMMERCIAL

## 2024-07-02 DIAGNOSIS — D61.818 PANCYTOPENIA (MULTI): ICD-10-CM

## 2024-07-02 DIAGNOSIS — D69.6 THROMBOCYTOPENIA (CMS-HCC): ICD-10-CM

## 2024-07-02 PROCEDURE — 36415 COLL VENOUS BLD VENIPUNCTURE: CPT

## 2024-07-02 PROCEDURE — 88185 FLOWCYTOMETRY/TC ADD-ON: CPT | Mod: TC,SAMLAB

## 2024-07-03 LAB
CELL COUNT (BLOOD): 4.71 X10*3/UL
CELL POPULATIONS: NORMAL
DIAGNOSIS: NORMAL
FLOW DIFFERENTIAL: NORMAL
FLOW TEST ORDERED: NORMAL
LAB TEST METHOD: NORMAL
NUMBER OF CELLS COLLECTED: NORMAL PER TUBE
PATH REPORT.TOTAL CANCER: NORMAL
SIGNATURE COMMENT: NORMAL
SPECIMEN VIABILITY: NORMAL

## 2024-07-12 DIAGNOSIS — I10 PRIMARY HYPERTENSION: Primary | ICD-10-CM

## 2024-07-12 RX ORDER — MINOXIDIL 2.5 MG/1
2.5 TABLET ORAL 2 TIMES DAILY
Qty: 180 TABLET | Refills: 1 | Status: SHIPPED | OUTPATIENT
Start: 2024-07-12

## 2024-07-16 ENCOUNTER — APPOINTMENT (OUTPATIENT)
Dept: PRIMARY CARE | Facility: CLINIC | Age: 54
End: 2024-07-16
Payer: COMMERCIAL

## 2024-07-16 VITALS
DIASTOLIC BLOOD PRESSURE: 74 MMHG | HEIGHT: 78 IN | BODY MASS INDEX: 32.28 KG/M2 | SYSTOLIC BLOOD PRESSURE: 127 MMHG | HEART RATE: 75 BPM | WEIGHT: 279 LBS

## 2024-07-16 DIAGNOSIS — I25.119 CORONARY ARTERY DISEASE INVOLVING NATIVE CORONARY ARTERY OF NATIVE HEART WITH ANGINA PECTORIS (CMS-HCC): ICD-10-CM

## 2024-07-16 DIAGNOSIS — F32.A DEPRESSIVE DISORDER: ICD-10-CM

## 2024-07-16 DIAGNOSIS — K21.9 GASTROESOPHAGEAL REFLUX DISEASE WITHOUT ESOPHAGITIS: ICD-10-CM

## 2024-07-16 DIAGNOSIS — D69.6 THROMBOCYTOPENIA (CMS-HCC): ICD-10-CM

## 2024-07-16 DIAGNOSIS — Z95.5 STENTED CORONARY ARTERY: ICD-10-CM

## 2024-07-16 DIAGNOSIS — I21.B MYOCARDIAL INFARCTION WITH CORONARY MICROVASCULAR DYSFUNCTION (MULTI): ICD-10-CM

## 2024-07-16 DIAGNOSIS — Z98.61 S/P PTCA (PERCUTANEOUS TRANSLUMINAL CORONARY ANGIOPLASTY): ICD-10-CM

## 2024-07-16 DIAGNOSIS — E11.40 TYPE 2 DIABETES MELLITUS WITH DIABETIC NEUROPATHY, WITHOUT LONG-TERM CURRENT USE OF INSULIN (MULTI): Primary | ICD-10-CM

## 2024-07-16 DIAGNOSIS — I10 PRIMARY HYPERTENSION: ICD-10-CM

## 2024-07-16 DIAGNOSIS — E78.2 MIXED HYPERLIPIDEMIA: ICD-10-CM

## 2024-07-16 DIAGNOSIS — I25.110 ATHEROSCLEROSIS OF NATIVE CORONARY ARTERY OF NATIVE HEART WITH UNSTABLE ANGINA PECTORIS (MULTI): ICD-10-CM

## 2024-07-16 PROCEDURE — 4010F ACE/ARB THERAPY RXD/TAKEN: CPT | Performed by: INTERNAL MEDICINE

## 2024-07-16 PROCEDURE — 99214 OFFICE O/P EST MOD 30 MIN: CPT | Performed by: INTERNAL MEDICINE

## 2024-07-16 PROCEDURE — 1036F TOBACCO NON-USER: CPT | Performed by: INTERNAL MEDICINE

## 2024-07-16 PROCEDURE — 3078F DIAST BP <80 MM HG: CPT | Performed by: INTERNAL MEDICINE

## 2024-07-16 PROCEDURE — 3044F HG A1C LEVEL LT 7.0%: CPT | Performed by: INTERNAL MEDICINE

## 2024-07-16 PROCEDURE — 3074F SYST BP LT 130 MM HG: CPT | Performed by: INTERNAL MEDICINE

## 2024-07-16 RX ORDER — OMEPRAZOLE 20 MG/1
20 CAPSULE, DELAYED RELEASE ORAL
Qty: 90 CAPSULE | Refills: 3 | Status: SHIPPED | OUTPATIENT
Start: 2024-07-16

## 2024-07-16 RX ORDER — ASPIRIN 81 MG/1
81 TABLET ORAL DAILY
Qty: 90 TABLET | Refills: 3 | Status: SHIPPED | OUTPATIENT
Start: 2024-07-16

## 2024-07-16 RX ORDER — BUSPIRONE HYDROCHLORIDE 10 MG/1
1 TABLET ORAL
COMMUNITY
Start: 2024-06-17

## 2024-07-16 ASSESSMENT — ENCOUNTER SYMPTOMS
COUGH: 0
SORE THROAT: 0
WHEEZING: 0
WEAKNESS: 0
VOMITING: 0
NUMBNESS: 0
ACTIVITY CHANGE: 0
SHORTNESS OF BREATH: 0
CHILLS: 0
ABDOMINAL DISTENTION: 0
DIARRHEA: 0
SINUS PRESSURE: 0
APPETITE CHANGE: 0
BACK PAIN: 0
SINUS PAIN: 0
NAUSEA: 0
FATIGUE: 0

## 2024-07-16 NOTE — PROGRESS NOTES
"Subjective   Patient ID: Jhony Myles \"Chester\" is a 53 y.o. male who presents for Follow-up (6 month).  HPI  Patient is here today for 6 mo follow up     Pt is continuing to have intermittent LUQ pain that comes and goes, same as previous.     Review of Systems   Constitutional:  Negative for activity change, appetite change, chills and fatigue.   HENT:  Negative for congestion, postnasal drip, sinus pressure, sinus pain and sore throat.    Respiratory:  Negative for cough, shortness of breath and wheezing.    Cardiovascular:  Negative for chest pain and leg swelling.   Gastrointestinal:  Negative for abdominal distention, diarrhea, nausea and vomiting.   Musculoskeletal:  Negative for back pain.   Neurological:  Negative for weakness and numbness.       Objective   /74   Pulse 75   Ht 1.981 m (6' 6\")   Wt 127 kg (279 lb)   BMI 32.24 kg/m²     Physical Exam  Constitutional:       General: He is not in acute distress.     Appearance: Normal appearance.   HENT:      Head: Normocephalic.      Nose: Nose normal.      Mouth/Throat:      Pharynx: No oropharyngeal exudate.   Eyes:      General:         Right eye: No discharge.         Left eye: No discharge.      Extraocular Movements: Extraocular movements intact.      Pupils: Pupils are equal, round, and reactive to light.   Cardiovascular:      Rate and Rhythm: Normal rate and regular rhythm.      Heart sounds: No murmur heard.     No gallop.   Pulmonary:      Effort: Pulmonary effort is normal. No respiratory distress.      Breath sounds: Normal breath sounds. No wheezing.   Abdominal:      General: Bowel sounds are normal. There is no distension.      Palpations: Abdomen is soft.      Tenderness: There is no abdominal tenderness.   Musculoskeletal:         General: No swelling. Normal range of motion.      Comments: +hematoma on right bicep with ecchymosis, no palatable muscle bulge and it is symmetrical to the other side    Skin:     General: Skin is warm " and dry.      Coloration: Skin is not jaundiced.   Neurological:      General: No focal deficit present.      Mental Status: He is alert and oriented to person, place, and time.      Cranial Nerves: No cranial nerve deficit.   Psychiatric:         Mood and Affect: Mood normal.         Behavior: Behavior normal.         Colonoscopy   Assessment/Plan   Problem List Items Addressed This Visit       Type 2 diabetes mellitus with diabetic neuropathy, without long-term current use of insulin (Multi) - Primary    Relevant Orders    Hemoglobin A1C    Primary hypertension    Relevant Medications    aspirin 81 mg EC tablet    Coronary artery disease involving native coronary artery of native heart with angina pectoris (CMS-HCC)    Thrombocytopenia (CMS-HCC)    Relevant Medications    aspirin 81 mg EC tablet    Depressive disorder    Hyperlipidemia    Myocardial infarction (Multi)    S/P PTCA (percutaneous transluminal coronary angioplasty)    Stented coronary artery    Atherosclerosis of native coronary artery with unstable angina pectoris (Multi)   LUQ abd pain in the setting of thrombocytopenia, splenomegally,  previous evals with EGD and colonoscopy ok, pain persists, normal bloodwork other than low platelets , pain continues but it is intermittent   - cT SCAN only showed nonobstructing left upper pole calculus in the past, recent ct scan did not show anything abnormal in this area, and known hydrocele s/p removal now  - did see Dr Dow, barium swallow as normal    - no improvement with wellchol  - continue ppi      2. Thrombocytopenia   - following with hematology     3. GERD   - continue prilosec      4. CAD with  RCA s/p multiple PCI with most recent being balloon angioplasty of several ostial stenosis in Dg1 07/2022  - following with Cardiology, appt in March 24 next   - on crestor 5mg po daily   - continue losartan 50mg po daily   - continue imdur 30mg po daily   - continue hctzs 12.5mg 2 tabs daily   - on  brilinta      5. DMII, controlled   -A1c 5.2% in 4.23, 5.6% , ordered A1c      6. Right knee hip and thumb pain  - following with ortho      7 Fall onto outstretched arms, had hematoma on right forearm that is resolved, possible small bicep tear but ROM is normal, advised if it worsens, or affects would see ortho asap    Final diagnoses:   [E11.40] Type 2 diabetes mellitus with diabetic neuropathy, without long-term current use of insulin (Multi)   [I10] Primary hypertension   [I25.110] Atherosclerosis of native coronary artery of native heart with unstable angina pectoris (Multi)   [I25.119] Coronary artery disease involving native coronary artery of native heart with angina pectoris (CMS-HCC)   [E78.2] Mixed hyperlipidemia   [I21.B] Myocardial infarction with coronary microvascular dysfunction (Multi)   [Z98.61] S/P PTCA (percutaneous transluminal coronary angioplasty)   [Z95.5] Stented coronary artery   [D69.6] Thrombocytopenia (CMS-HCC)   [F32.A] Depressive disorder

## 2024-07-18 ENCOUNTER — HOSPITAL ENCOUNTER (OUTPATIENT)
Dept: CARDIOLOGY | Facility: HOSPITAL | Age: 54
Discharge: HOME | End: 2024-07-18
Payer: COMMERCIAL

## 2024-07-18 ENCOUNTER — HOSPITAL ENCOUNTER (EMERGENCY)
Facility: HOSPITAL | Age: 54
Discharge: HOME | End: 2024-07-18
Attending: EMERGENCY MEDICINE
Payer: COMMERCIAL

## 2024-07-18 ENCOUNTER — APPOINTMENT (OUTPATIENT)
Dept: CARDIOLOGY | Facility: HOSPITAL | Age: 54
End: 2024-07-18
Payer: COMMERCIAL

## 2024-07-18 ENCOUNTER — APPOINTMENT (OUTPATIENT)
Dept: RADIOLOGY | Facility: HOSPITAL | Age: 54
End: 2024-07-18
Payer: COMMERCIAL

## 2024-07-18 VITALS
HEART RATE: 74 BPM | DIASTOLIC BLOOD PRESSURE: 85 MMHG | SYSTOLIC BLOOD PRESSURE: 148 MMHG | TEMPERATURE: 97.8 F | RESPIRATION RATE: 17 BRPM | BODY MASS INDEX: 31.82 KG/M2 | OXYGEN SATURATION: 97 % | WEIGHT: 275 LBS | HEIGHT: 78 IN

## 2024-07-18 DIAGNOSIS — R00.2 PALPITATIONS: Primary | ICD-10-CM

## 2024-07-18 DIAGNOSIS — R07.9 CHEST PAIN, UNSPECIFIED TYPE: ICD-10-CM

## 2024-07-18 LAB
ALBUMIN SERPL BCP-MCNC: 4.3 G/DL (ref 3.4–5)
ALP SERPL-CCNC: 42 U/L (ref 33–120)
ALT SERPL W P-5'-P-CCNC: 21 U/L (ref 10–52)
ANION GAP SERPL CALC-SCNC: 10 MMOL/L (ref 10–20)
AST SERPL W P-5'-P-CCNC: 20 U/L (ref 9–39)
BASOPHILS # BLD AUTO: 0.03 X10*3/UL (ref 0–0.1)
BASOPHILS NFR BLD AUTO: 0.6 %
BILIRUB SERPL-MCNC: 0.7 MG/DL (ref 0–1.2)
BUN SERPL-MCNC: 27 MG/DL (ref 6–23)
CALCIUM SERPL-MCNC: 9.7 MG/DL (ref 8.6–10.3)
CARDIAC TROPONIN I PNL SERPL HS: 8 NG/L (ref 0–20)
CARDIAC TROPONIN I PNL SERPL HS: 9 NG/L (ref 0–20)
CHLORIDE SERPL-SCNC: 105 MMOL/L (ref 98–107)
CO2 SERPL-SCNC: 29 MMOL/L (ref 21–32)
CREAT SERPL-MCNC: 1.13 MG/DL (ref 0.5–1.3)
D DIMER PPP FEU-MCNC: <215 NG/ML FEU
EGFRCR SERPLBLD CKD-EPI 2021: 77 ML/MIN/1.73M*2
EOSINOPHIL # BLD AUTO: 0.16 X10*3/UL (ref 0–0.7)
EOSINOPHIL NFR BLD AUTO: 3.3 %
ERYTHROCYTE [DISTWIDTH] IN BLOOD BY AUTOMATED COUNT: 12.6 % (ref 11.5–14.5)
GLUCOSE SERPL-MCNC: 184 MG/DL (ref 74–99)
HCT VFR BLD AUTO: 35.4 % (ref 41–52)
HGB BLD-MCNC: 12.5 G/DL (ref 13.5–17.5)
IMM GRANULOCYTES # BLD AUTO: 0.01 X10*3/UL (ref 0–0.7)
IMM GRANULOCYTES NFR BLD AUTO: 0.2 % (ref 0–0.9)
LYMPHOCYTES # BLD AUTO: 1.1 X10*3/UL (ref 1.2–4.8)
LYMPHOCYTES NFR BLD AUTO: 22.8 %
MAGNESIUM SERPL-MCNC: 1.98 MG/DL (ref 1.6–2.4)
MCH RBC QN AUTO: 31.4 PG (ref 26–34)
MCHC RBC AUTO-ENTMCNC: 35.3 G/DL (ref 32–36)
MCV RBC AUTO: 89 FL (ref 80–100)
MONOCYTES # BLD AUTO: 0.57 X10*3/UL (ref 0.1–1)
MONOCYTES NFR BLD AUTO: 11.8 %
NEUTROPHILS # BLD AUTO: 2.95 X10*3/UL (ref 1.2–7.7)
NEUTROPHILS NFR BLD AUTO: 61.3 %
NRBC BLD-RTO: 0 /100 WBCS (ref 0–0)
PLATELET # BLD AUTO: 112 X10*3/UL (ref 150–450)
POTASSIUM SERPL-SCNC: 4 MMOL/L (ref 3.5–5.3)
PROT SERPL-MCNC: 6 G/DL (ref 6.4–8.2)
RBC # BLD AUTO: 3.98 X10*6/UL (ref 4.5–5.9)
SODIUM SERPL-SCNC: 140 MMOL/L (ref 136–145)
TSH SERPL-ACNC: 1.54 MIU/L (ref 0.44–3.98)
WBC # BLD AUTO: 4.8 X10*3/UL (ref 4.4–11.3)

## 2024-07-18 PROCEDURE — 93005 ELECTROCARDIOGRAM TRACING: CPT

## 2024-07-18 PROCEDURE — 80053 COMPREHEN METABOLIC PANEL: CPT | Performed by: EMERGENCY MEDICINE

## 2024-07-18 PROCEDURE — 36415 COLL VENOUS BLD VENIPUNCTURE: CPT | Performed by: EMERGENCY MEDICINE

## 2024-07-18 PROCEDURE — 71045 X-RAY EXAM CHEST 1 VIEW: CPT

## 2024-07-18 PROCEDURE — 84443 ASSAY THYROID STIM HORMONE: CPT | Performed by: EMERGENCY MEDICINE

## 2024-07-18 PROCEDURE — 99283 EMERGENCY DEPT VISIT LOW MDM: CPT | Mod: 25

## 2024-07-18 PROCEDURE — 85025 COMPLETE CBC W/AUTO DIFF WBC: CPT | Performed by: EMERGENCY MEDICINE

## 2024-07-18 PROCEDURE — 84484 ASSAY OF TROPONIN QUANT: CPT | Performed by: EMERGENCY MEDICINE

## 2024-07-18 PROCEDURE — 85379 FIBRIN DEGRADATION QUANT: CPT | Performed by: EMERGENCY MEDICINE

## 2024-07-18 PROCEDURE — 83735 ASSAY OF MAGNESIUM: CPT | Performed by: EMERGENCY MEDICINE

## 2024-07-18 PROCEDURE — 71045 X-RAY EXAM CHEST 1 VIEW: CPT | Performed by: RADIOLOGY

## 2024-07-18 ASSESSMENT — PAIN DESCRIPTION - ONSET: ONSET: PROGRESSIVE

## 2024-07-18 ASSESSMENT — PAIN DESCRIPTION - LOCATION: LOCATION: CHEST

## 2024-07-18 ASSESSMENT — HEART SCORE
AGE: 45-64
HISTORY: SLIGHTLY SUSPICIOUS
ECG: NORMAL
RISK FACTORS: 1-2 RISK FACTORS
TROPONIN: LESS THAN OR EQUAL TO NORMAL LIMIT
HEART SCORE: 2

## 2024-07-18 ASSESSMENT — VISUAL ACUITY: OU: 1

## 2024-07-18 ASSESSMENT — PAIN DESCRIPTION - DESCRIPTORS: DESCRIPTORS: TIGHTNESS

## 2024-07-18 ASSESSMENT — PAIN DESCRIPTION - FREQUENCY: FREQUENCY: CONSTANT/CONTINUOUS

## 2024-07-18 ASSESSMENT — PAIN - FUNCTIONAL ASSESSMENT: PAIN_FUNCTIONAL_ASSESSMENT: 0-10

## 2024-07-18 ASSESSMENT — PAIN DESCRIPTION - PAIN TYPE: TYPE: ACUTE PAIN

## 2024-07-18 ASSESSMENT — PAIN SCALES - GENERAL: PAINLEVEL_OUTOF10: 6

## 2024-07-19 LAB
ATRIAL RATE: 90 BPM
P AXIS: 31 DEGREES
P OFFSET: 170 MS
P ONSET: 107 MS
PR INTERVAL: 214 MS
Q ONSET: 214 MS
QRS COUNT: 15 BEATS
QRS DURATION: 94 MS
QT INTERVAL: 370 MS
QTC CALCULATION(BAZETT): 452 MS
QTC FREDERICIA: 423 MS
R AXIS: 53 DEGREES
T AXIS: -4 DEGREES
T OFFSET: 399 MS
VENTRICULAR RATE: 90 BPM

## 2024-07-19 NOTE — ED PROVIDER NOTES
Palpitations, chest pressure.  This 53-year-old white male presents to the ED with complaint of palpitations that began around 5 PM he states he feels as though his heart was flip-flopping.  Also began experiencing generalized fatigue and some tightness of his chest with shortness of breath.  He states that he was working on his yard before the symptoms started.  He does admit to previous history of an MI has 6 cardiac stents he just saw his cardiologist 2 months ago-Dr. Ortiz and everything was fine.  He denies any leg pain or swelling denies any family history of heart attacks at a young age.  He denies any use of tobacco or cigarettes.      History provided by:  Patient   used: No         Physical Exam  Vitals and nursing note reviewed.   Constitutional:       General: He is awake.      Appearance: Normal appearance. He is obese.   HENT:      Head: Normocephalic and atraumatic.      Right Ear: Hearing and external ear normal.      Left Ear: Hearing and external ear normal.      Nose: Nose normal. No congestion or rhinorrhea.      Mouth/Throat:      Lips: Pink.      Mouth: Mucous membranes are moist.      Pharynx: Oropharynx is clear. Uvula midline. No oropharyngeal exudate or posterior oropharyngeal erythema.   Eyes:      General: Lids are normal. Vision grossly intact.         Right eye: No discharge.         Left eye: No discharge.      Extraocular Movements: Extraocular movements intact.      Conjunctiva/sclera: Conjunctivae normal.      Pupils: Pupils are equal, round, and reactive to light.   Cardiovascular:      Rate and Rhythm: Normal rate and regular rhythm.      Pulses: Normal pulses.      Heart sounds: Normal heart sounds. No murmur heard.     No friction rub. No gallop.   Pulmonary:      Effort: Pulmonary effort is normal. No respiratory distress.      Breath sounds: Normal breath sounds. No stridor. No wheezing, rhonchi or rales.   Chest:      Chest wall: No tenderness.    Abdominal:      General: Abdomen is protuberant. Bowel sounds are normal. There is no distension.      Palpations: Abdomen is soft. There is no mass.      Tenderness: There is no abdominal tenderness. There is no guarding or rebound.      Hernia: No hernia is present.      Comments: Patient has benign abdominal exam.   Musculoskeletal:         General: No swelling, tenderness, deformity or signs of injury. Normal range of motion.      Cervical back: Full passive range of motion without pain and neck supple.      Right lower leg: Normal. No edema.      Left lower leg: Normal. No edema.   Skin:     General: Skin is warm and dry.      Capillary Refill: Capillary refill takes less than 2 seconds.      Coloration: Skin is not jaundiced or pale.      Findings: No bruising, erythema, lesion or rash.   Neurological:      General: No focal deficit present.      Mental Status: He is alert and oriented to person, place, and time.      GCS: GCS eye subscore is 4. GCS verbal subscore is 5. GCS motor subscore is 6.      Cranial Nerves: Cranial nerves 2-12 are intact. No cranial nerve deficit.      Sensory: Sensation is intact. No sensory deficit.      Motor: Motor function is intact. No weakness.      Coordination: Coordination is intact. Coordination normal.      Gait: Gait is intact.      Deep Tendon Reflexes: Reflexes normal.   Psychiatric:         Attention and Perception: Attention and perception normal.         Mood and Affect: Mood and affect normal.         Speech: Speech normal.         Behavior: Behavior normal. Behavior is cooperative.         Thought Content: Thought content normal.         Cognition and Memory: Cognition and memory normal.         Judgment: Judgment normal.          Labs Reviewed   CBC WITH AUTO DIFFERENTIAL - Abnormal       Result Value    WBC 4.8      nRBC 0.0      RBC 3.98 (*)     Hemoglobin 12.5 (*)     Hematocrit 35.4 (*)     MCV 89      MCH 31.4      MCHC 35.3      RDW 12.6      Platelets 112  (*)     Neutrophils % 61.3      Immature Granulocytes %, Automated 0.2      Lymphocytes % 22.8      Monocytes % 11.8      Eosinophils % 3.3      Basophils % 0.6      Neutrophils Absolute 2.95      Immature Granulocytes Absolute, Automated 0.01      Lymphocytes Absolute 1.10 (*)     Monocytes Absolute 0.57      Eosinophils Absolute 0.16      Basophils Absolute 0.03     COMPREHENSIVE METABOLIC PANEL - Abnormal    Glucose 184 (*)     Sodium 140      Potassium 4.0      Chloride 105      Bicarbonate 29      Anion Gap 10      Urea Nitrogen 27 (*)     Creatinine 1.13      eGFR 77      Calcium 9.7      Albumin 4.3      Alkaline Phosphatase 42      Total Protein 6.0 (*)     AST 20      Bilirubin, Total 0.7      ALT 21     MAGNESIUM - Normal    Magnesium 1.98     TSH - Normal    Thyroid Stimulating Hormone 1.54      Narrative:     TSH testing is performed using different testing methodology at [facility] than at other [facility]. Direct result comparisons should only be made within the same method.     D-DIMER, VTE EXCLUSION - Normal    D-Dimer, Quantitative VTE Exclusion <215      Narrative:     The VTE Exclusion D-Dimer assay is reported in ng/mL Fibrinogen Equivalent Units (FEU).    Per 's instructions for use, a value of less than 500 ng/mL (FEU) may help to exclude DVT or PE in outpatients when the assay is used with a clinical pretest probability assessment.([facility] must utilize and document eCalc 'Wells Score Deep Vein Thrombosis Risk' for DVT exclusion only. Emergency Department should utilize  Guidelines for Emergency Department Use of the VTE Exclusion D-Dimer and Clinical Pretest probability assessment model for DVT or PE exclusion.)   SERIAL TROPONIN-INITIAL - Normal    Troponin I, High Sensitivity 8      Narrative:     Less than 99th percentile of normal range cutoff-  Female and children under 18 years old <14 ng/L; Male <21 ng/L: Negative  Repeat testing should be performed if  clinically indicated.     Female and children under 18 years old 14-50 ng/L; Male 21-50 ng/L:  Consistent with possible cardiac damage and possible increased clinical   risk. Serial measurements may help to assess extent of myocardial damage.     >50 ng/L: Consistent with cardiac damage, increased clinical risk and  myocardial infarction. Serial measurements may help assess extent of   myocardial damage.      NOTE: Children less than 1 year old may have higher baseline troponin   levels and results should be interpreted in conjunction with the overall   clinical context.     NOTE: Troponin I testing is performed using a different   testing methodology at [facility] than at other   [facility]. Direct result comparisons should only   be made within the same method.   SERIAL TROPONIN, 1 HOUR - Normal    Troponin I, High Sensitivity 9      Narrative:     Less than 99th percentile of normal range cutoff-  Female and children under 18 years old <14 ng/L; Male <21 ng/L: Negative  Repeat testing should be performed if clinically indicated.     Female and children under 18 years old 14-50 ng/L; Male 21-50 ng/L:  Consistent with possible cardiac damage and possible increased clinical   risk. Serial measurements may help to assess extent of myocardial damage.     >50 ng/L: Consistent with cardiac damage, increased clinical risk and  myocardial infarction. Serial measurements may help assess extent of   myocardial damage.      NOTE: Children less than 1 year old may have higher baseline troponin   levels and results should be interpreted in conjunction with the overall   clinical context.     NOTE: Troponin I testing is performed using a different   testing methodology at [facility] than at other   [facility]. Direct result comparisons should only   be made within the same method.   TROPONIN SERIES- (INITIAL, 1 HR)    Narrative:     The following orders were created for panel order  Troponin I Series, High Sensitivity (0, 1 HR).  Procedure                               Abnormality         Status                     ---------                               -----------         ------                     Troponin I, High Sensiti...[[id number]]  Normal              Final result               Troponin, High Sensitivi...[[id number]]  Normal              Final result                 Please view results for these tests on the individual orders.        XR chest 1 view   Final Result   No acute abnormalities        Signed by: Gianna Rowan 7/18/2024 8:58 PM   Dictation workstation:   [id number]           Procedures     Medical Decision Making  Patient was seen and evaluated due to complaints of palpitations and chest pressure.  Patient was ordered cardiac workup including troponins and D-dimer and a chest x-ray.  Patient was continuous cardiac monitor evaluation of the monitor strip reveals multiple PVCs and 2 beat of V. tach.  The CMP was normal except for glucose of 184 and a BUN of 27.  Protein was slightly low at 6.  Magnesium was normal at 1.98.  Initial troponin was 8 repeat was 9.  TSH was normal at 1.54.  D-dimer was less than 215 white cell count was normal at 4.8.  Patient mildly anemic hemoglobin of 12.5.  Chest x-ray was negative for acute disease process.  I did have a discussion with the patient concerning his lab and x-ray results.  She states that he was concerned about his telemetry strip and therefore I went through it and again only multiple PVCs and to be episode of V. tach.  The patient was subsequent discharged home with plan follow-up with his primary care doctor and/or cardiologist Dr. Ortiz.    Amount and/or Complexity of Data Reviewed  ECG/medicine tests: independent interpretation performed.     Details: EKG was interpreted by myself at 8:07 PM reveals sinus rhythm with first-degree AV block with flipped T's in leads III and aVF.  Heart rates 90 bpm.  (2014 ms.  QRS durations 94  ms.  The QTc is 452 ms Axis is 53 degrees.         Diagnoses as of 07/19/24 0526   Palpitations   Chest pain, unspecified type                    Jacques Lovelace DO  07/19/24 0551

## 2024-07-23 PROBLEM — Z86.69 HISTORY OF AMAUROSIS FUGAX: Status: ACTIVE | Noted: 2024-06-04

## 2024-07-28 NOTE — PROGRESS NOTES
"Counseling:  The patient was counseled regarding diagnostic results, instructions for management, risk factor reductions, prognosis, patient and family education, impressions, risks and benefits of treatment options and importance of compliance with treatment.      Chief Complaint:   The patient presents today for evaluation of palpitations and chest pressure.      History Of Present Illness:    Jhony Myles is a 53 year old male patient who presents today for for evaluation of palpitations and chest pressure. His PMH is significant for CAD with  RCA s/p multiple PCI with most recent being balloon angioplasty of several ostial stenosis in Dg1 07/2022, HTN, anxiety, agoraphobia, PVC's, fibromyalgia, GERD and hyperlipidemia. The patient presented to the ED on 07/18/2024 with a chief compliant of palpitations and chest pressure. While in the ED, EKG showed SR with first degree AV block with flipped T's in leads III and aVF, cardiac monitor strips showed multiple PVCs and a 2-beat run of v-tach, CXR was negative, and labs showed an elevated glucose of 184, BUN of 27, low protein of 6 and hemoglobin of 12.5. The patient was discharged home the same day with outpatient followup. The patient states that his symptoms began after performing yard work, describing that he felt very fatigued at the time as well. BP is elevated today, but the patient states that it is typically stable at home. EKG today is nonischemic.     Last Recorded Vitals:  Vitals:    07/30/24 1247   BP: (!) 180/96   Pulse: 62   Weight: 129 kg (284 lb 6.4 oz)   Height: 1.981 m (6' 6\")       Past Surgical History:  He has a past surgical history that includes Coronary angioplasty (02/27/2018); CT angio neck (12/2/2022); CT angio head w and wo IV contrast (12/2/2022); MR angio head wo IV contrast (4/3/2023); and MR angio neck wo IV contrast (4/3/2023).      Social History:  He reports that he has never smoked. He has never used smokeless tobacco. He " reports that he does not drink alcohol and does not use drugs.    Family History:  Family History   Problem Relation Name Age of Onset    Heart disease Mother Taylor     Hypertension Mother Taylor     Heart disease Father Jhony     Hypertension Father Jhony         Allergies:  Amlodipine, Calcium channel blocking agent diltiazem analogues, Ciprofloxacin, Iodinated contrast media, Lisinopril, Nitroglycerin, Other, Penicillins, Statins-hmg-coa reductase inhibitors, Tramadol, Meperidine, Propoxyphene-acetaminophen, and Temazepam    Outpatient Medications:  Current Outpatient Medications   Medication Instructions    ALPRAZolam (XANAX) 1 mg, oral, 2 times daily PRN    aspirin 81 mg, oral, Daily    carvedilol (COREG) 25 mg, oral, 2 times daily (morning and late afternoon)    cetirizine (ZYRTEC) 10 mg    clotrimazole (Lotrimin) 1 % cream 1 Application, Topical, 2 times daily    flash glucose sensor kit (FreeStyle Nancy 2 Sensor) kit Use as instructed    FreeStyle Nancy reader (FreeStyle Nancy 2 [place]) misc Use as instructed    hydroCHLOROthiazide (HYDRODIURIL) 25 mg, oral, Daily    isosorbide mononitrate ER (IMDUR) 30 mg, oral, Every 12 hours    losartan (COZAAR) 50 mg, oral, Daily    minoxidil (LONITEN) 2.5 mg, oral, 2 times daily    olopatadine (Patanol) 0.1 % ophthalmic solution     omeprazole (PRILOSEC) 20 mg, oral, Daily before breakfast, Do not crush or chew.    potassium chloride CR (Klor-Con M20) 20 mEq ER tablet 20 mEq, oral, Daily    rosuvastatin (Crestor) 5 mg tablet 10 mg every other day    ticagrelor (BRILINTA) 90 mg, oral, 2 times daily     Review of Systems   Constitutional: Positive for malaise/fatigue.   Cardiovascular:  Positive for chest pain and palpitations.   All other systems reviewed and are negative.     Physical Exam:  Constitutional:       Appearance: Healthy appearance. Not in distress.   Neck:      Vascular: No JVR. JVD normal.   Pulmonary:      Effort: Pulmonary effort is normal.       Breath sounds: Normal breath sounds. No wheezing. No rhonchi. No rales.   Chest:      Chest wall: Not tender to palpatation.   Cardiovascular:      PMI at left midclavicular line. Normal rate. Regular rhythm. Normal S1. Normal S2.       Murmurs: There is no murmur.      No gallop.  No click. No rub.   Pulses:     Intact distal pulses.   Edema:     Peripheral edema absent.   Abdominal:      General: Bowel sounds are normal.      Palpations: Abdomen is soft.      Tenderness: There is no abdominal tenderness.   Musculoskeletal: Normal range of motion.         General: No tenderness. Skin:     General: Skin is warm and dry.   Neurological:      General: No focal deficit present.      Mental Status: Alert and oriented to person, place and time.          Last Labs:  CBC -  Lab Results   Component Value Date    WBC 4.8 07/18/2024    HGB 12.5 (L) 07/18/2024    HCT 35.4 (L) 07/18/2024    MCV 89 07/18/2024     (L) 07/18/2024       CMP -  Lab Results   Component Value Date    CALCIUM 9.7 07/18/2024    PROT 6.0 (L) 07/18/2024    ALBUMIN 4.3 07/18/2024    AST 20 07/18/2024    ALT 21 07/18/2024    ALKPHOS 42 07/18/2024    BILITOT 0.7 07/18/2024       LIPID PANEL -   Lab Results   Component Value Date    CHOL 110 04/01/2023    TRIG 86 04/01/2023    HDL 22.0 (A) 04/01/2023    CHHDL 5.0 04/01/2023    LDLF 71 04/01/2023    VLDL 17 04/01/2023       RENAL FUNCTION PANEL -   Lab Results   Component Value Date    GLUCOSE 184 (H) 07/18/2024     07/18/2024    K 4.0 07/18/2024     07/18/2024    CO2 29 07/18/2024    ANIONGAP 10 07/18/2024    BUN 27 (H) 07/18/2024    CREATININE 1.13 07/18/2024    GFRMALE 73 09/19/2023    CALCIUM 9.7 07/18/2024    ALBUMIN 4.3 07/18/2024        Lab Results   Component Value Date    BNP 26 09/19/2023    HGBA1C 5.6 01/03/2024       Last Cardiology Tests:  05/23/2023 - Cardiac Catheterization (LH)  1. Single vessel coronary artery disease without proximal left anterior descending  involvement.  2. Left Ventricular end-diastolic pressure = 9.  3. Normal LV filling pressures.     04/24/2023 - TTE  Left ventricular systolic function is normal with a 60-65% estimated ejection fraction.     04/17/2023 - Vascular Lab Carotid Artery Duplex U/S   1. Right Carotid: Findings are consistent with less than 50% stenosis of the right proximal ICA. Laminar flow seen by color Doppler. Right external carotid artery appears patent with no evidence of stenosis. The right vertebral artery is patent with antegrade flow. No evidence of hemodynamically significant stenosis in the right subclavian artery.  2. Left Carotid: Findings are consistent with less than 50% stenosis of the left proximal ICA. Laminar flow seen by color Doppler. Left external carotid artery appears patent with no evidence of stenosis. The left vertebral artery is patent with antegrade flow. No evidence of hemodynamically significant stenosis in the left subclavian artery.     04/03/2023 - MRI Brain/Head/Neck  1. There is no MRI evidence of acute infarction on the diffusion weighted images.  2. There is mild-to-moderate brain parenchymal volume loss.  3. There are small scattered nonspecific white matter changes within the cerebral hemispheres bilaterally which while nonspecific, given the patient's age, likely represent sequelae of more remote small vessel ischemic change.  4. The MRA of the neck demonstrates a short segmental area of diminished MRA signal suggestive of slab artifact along the distal right common carotid artery and origin of the left internal carotid artery. There is mild non hemodynamically significant narrowing noted along the proximal internal carotid arteries bilaterally. There is diminished MRA signal noted along the horizontal segments of the distal cervical vertebral arteries bilaterally felt to likely be technical/artifactual in origin. Otherwise, no significant focal stenosis noted along the cervical segments of the  vertebral arteries. There is a left dominant vertebral artery.  5. The intracranial MRA demonstrates a small caliber distal right vertebral artery which terminates in a right posterior inferior cerebellar artery. No significant stenosis noted along the distal left dominant vertebral artery, basilar artery, or distal internal carotid arteries. Otherwise, no other significant intracranial stenosis or intracranial aneurysm is noted.     07/29/2022 - Cardiac Catheterization (LH)  1. Severe ostial stenosis in the Diagonal-1 vessel, status post balloon angioplasty.  2. Patent stent in the LAD and OM system.  3. LVEDP of 20 mmHg.     06/24/2022 - TTE  The left ventricular systolic function is normal with a 70-75% estimated ejection fraction.     06/23/2022 - Cardiac Catheterization (LH)  1. Double vessel coronary artery disease without proximal left anterior descending involvement.  2. Culprit vessel(s): left anterior descending.  3. Successful PCI of LAD.     11/18/2021 - Cardiac Catheterization (LH)  Single vessel coronary artery disease without proximal left anterior descending involvement.     10/11/2021 - NM Cardiac Stress Test  1. Normal myocardial perfusion study without evidence of ischemia or prior infarction. The left ventricle is normal in size. Normal LV wall motion with an LV EF estimated at approximately 55%.  2. Baseline EKG shows normal sinus rhythm with no significant ST-T segment changes. With regadenoson infusion no ST-T segment changes of ischemia, or sustained ventricular arrhythmias are seen. Regadenoson stress EKG is negative for ischemia.      09/17/2021 - Cardiac Catheterization   1. The 1st obtuse marginal branch showed mild tortuosity, two previous stents and mild in-stent restenosis.  2. 2-vessel severe coronary artery disease.  3. Successful GILDA PCI to proximal D2 with 3.0 x 18 mm Resolut [place] post-dilated with 3.0 x 15 mm NC balloon.  4. Previous LAD stent has mild malapposition in a short  segment within the mid stent and in the ostial stent edge due to an aneurysmal segment.     09/08/2021- NM Cardiac Stress Test  1. There is a small to moderate sized fixed perfusion defect in the anteroseptal to apical wall consistent with prior infarct. There is a low probability of ischemia. Calculated ejection fraction of 52% with mild hypokinesis of the septal wall.  2. No electrocardiographic evidence for ischemia at maximal infusion. Normal Stress Test.      07/23/2021 - Cardiac Catheterization ()  1. Double vessel coronary artery disease without proximal left anterior descending involvement.  2. Successful PCI of LAD.     03/19/2021 - TTE  The left ventricular systolic function is normal with a 55-60% estimated ejection fraction.     03/18/2021 - Cardiac Catheterization ()  1. Single vessel coronary artery disease with proximal left anterior descending involvement.  2. Successful PCI of LAD.     02/13/2019 - Cardiac Catheterization ()  1. Patent stents in the circumflex territory.  2. Chronic total occlusion of the right coronary artery with collaterals from left system.  3. Mild LV dysfunction, EF 50%.  4. Medical therapy advised.     04/10/2018 - Vascular Lab Renal Artery U/S  1. Renal Artery Duplex: Bilateral renal arteries demonstrate no evidence of hemodynamically significant stenosis. The bilateral renal veins are widely patent.  2. Right Renal Artery: Cystlike structure noted in the kidney measuring approximately 3cm 2.7cm. Right kidney size not imaged due to tech error.     03/15/2018 - Vascular Lab Arterial Duplex U/S  Right Upper Arterial: History of cardiac catheterization via RUE approach, c/o pain near axilla. No evidence of pseudoaneurysm, stenosis or occlusion of the subclavian, axillary , radial and ulnar arteries.     02/15/2018 - NM Cardiac Stress Test  1. SPECT perfusion study: Normal.   2. Fair functional capacity for age and gender.  3. There is no scintigraphic evidence for  inducible ischemia.   4. No evidence of scarred myocardium.  5. Left ventricle is mildly dilated. The left ventricle systolic function is normal.   6. Right ventricle is normal in size. THe right ventricle systolic function is normal.  7. This is a low risk scan.   8. EKG Portion: The test was terminated due to general fatigue. Adequate heart rate response of 89% predicted maximal heart rate, normal heart rate recovery @ 1 minute post exercise, normal chronotropic response index. Normal blood pressure response to stress, resting hypertension. Normal ST segment response to stress, T wave changes due to stress. Abnormal Duke treadmill score (<5 but >/= -10), intermediate risk. Typical anginal discomfort during stress, nausea during stress. Multifocal PVCs during the test, PACs during the test. Nondiagnostic due to abnormal resting ECG.      02/11/2018 - Echocardiogram   1. Technically difficult exam due to suboptimal positioning.  2. Exam indication: Chest pain.  3. The left ventricle is normal in size. There is mild concentric left ventricular hypertrophy. Left ventricular systolic function is normal. EF = 60 +/- 5% (2D biplane).  4. The right ventricle is normal in size. Right ventricular systolic function is normal.   5. There are no significant valvular abnormalities.     Lab review: I have personally reviewed the laboratory result(s).     Assessment/Plan   1) Blurred Vision/?TIA  Admitted to hospital 03/31/2023 to 04/03/2023 with blurred vision left eye   CTA negative for acute cortical infarct or intracranial hemorrhage  MRI of brain/neck/head with mild narrowing of the carotid arteries  Scheduled to see opthalmology next week  TTE 04/24/2023 with LVEF 60-65%      2) Intermittent Sharp Abdominal Pain   Check CT abdomen/pelvis - denied by insurance      3) Scattered Petechiae/Thrombocytopenia   CBC drawn prior to discharge with low platelet count of 114  Referred to hematology     4) HTN  Stable at home in  135-140 systolic range  On carvedilol 25 mg twice daily, HCTZ 25 mg daily, Imdur 30 mg BID (60 mg daily caused headaches), minoxidil 2.5 mg BID and losartan 50 mg daily  Intolerant of amlodipine in the past d/t BLE edema  Trialed d/c minoxidil with increase in BP above 150 systolic   Patient restarted minoxidil with stabilization of BP in the 130-140/70-80 range   Three weeks prior to hospitalization 03/31/2023, patient reported low BP readings at 70s/30s, especially with changes in position.   No RENETTA per renal artery duplex in 2018  ED evaluation 03/01/2024 with left-sided back pain and right medial thigh pain since removal of right hydrocele 02/20/2024, and 1-week h/o hypotension with orthostasis and associated presyncope, received small fluid bolus s/t a slightly elevated BUN.  Elevated today, but stable at home per patient    5) CAD s/p Multiple PCI - Repeat PCI of LAD July 2021 and June 2022 and 2nd diagonal Sept 2021, PTA D1 July 2022 - Now with Unstable Angina   On DAPT with aspirin 81 mg daily and Brilinta 90 mg BID  On rosuvastatin 5 mg every other day, carvedilol 25 mg BID, Imdur 30 mg BID, HCTZ 25 mg daily, minoxidil 2.5 mg BID, losartan 50 mg daily.  Intolerant of Imdur 60 mg s/t headaches  Known  RCA with collaterals  [facility] Nov 2021 with patent stent LAD, known  RCA  TTE 04/24/2023 with LVEF 60-65%   [facility] 05/23/2023 for evaluation of chest tightness when bending over showed single vessel CAD  Lipoprotein profile 01/03/2024 with LDL-P of 1382, LDL-C of 107, small LDL-P of 1026, LDL size of 19.8, LP-IR score of 61 - non-fasting.  Check Apo-B and Lipid panel in 06/2024 - not yet drawn  ED evaluation 07/18/2024 with palpitations and chest pressure - EKG showed SR with first degree AV block with flipped T's in leads III and aVF, cardiac monitor strips showed multiple PVCs and a 2-beat run of v-tach, CXR negative, labs with elevated glucose of 184, BUN of 27, low protein of 6 and hemoglobin of 12.5.    Symptoms began after performing yard work   Reports fatigue at that time as well   Check echo - same day as cath  Plan for CC/PTCA  Risks, benefits, alternatives of cardiac catheterization possible angioplasty and stenting including risk of death, stroke, MI, bleeding complications and renal failure were explained to patient and patient agreed to proceed.     6) Carotid Bruit  MRI of brain/neck/head 04/03/2033 with mild narrowing of the carotid arteries  Carotid duplex 04/17/2023 with <50% stenosis of bilateral proximal ICAs     7) GERD  On omeprazole 40 mg BID.   Management per PCP. Seen by GI in the past   Reports recurrence of postprandial palpitations - likely GI related, and patient agrees         Scribe Attestation  By signing my name below, I, Enrico Carr   attest that this documentation has been prepared under the direction and in the presence of Juan Ortiz MD.

## 2024-07-28 NOTE — H&P (VIEW-ONLY)
"Counseling:  The patient was counseled regarding diagnostic results, instructions for management, risk factor reductions, prognosis, patient and family education, impressions, risks and benefits of treatment options and importance of compliance with treatment.      Chief Complaint:   The patient presents today for evaluation of palpitations and chest pressure.      History Of Present Illness:    Jhony Myles is a 53 year old male patient who presents today for for evaluation of palpitations and chest pressure. His PMH is significant for CAD with  RCA s/p multiple PCI with most recent being balloon angioplasty of several ostial stenosis in Dg1 07/2022, HTN, anxiety, agoraphobia, PVC's, fibromyalgia, GERD and hyperlipidemia. The patient presented to the ED on 07/18/2024 with a chief compliant of palpitations and chest pressure. While in the ED, EKG showed SR with first degree AV block with flipped T's in leads III and aVF, cardiac monitor strips showed multiple PVCs and a 2-beat run of v-tach, CXR was negative, and labs showed an elevated glucose of 184, BUN of 27, low protein of 6 and hemoglobin of 12.5. The patient was discharged home the same day with outpatient followup. The patient states that his symptoms began after performing yard work, describing that he felt very fatigued at the time as well. BP is elevated today, but the patient states that it is typically stable at home. EKG today is nonischemic.     Last Recorded Vitals:  Vitals:    07/30/24 1247   BP: (!) 180/96   Pulse: 62   Weight: 129 kg (284 lb 6.4 oz)   Height: 1.981 m (6' 6\")       Past Surgical History:  He has a past surgical history that includes Coronary angioplasty (02/27/2018); CT angio neck (12/2/2022); CT angio head w and wo IV contrast (12/2/2022); MR angio head wo IV contrast (4/3/2023); and MR angio neck wo IV contrast (4/3/2023).      Social History:  He reports that he has never smoked. He has never used smokeless tobacco. He " reports that he does not drink alcohol and does not use drugs.    Family History:  Family History   Problem Relation Name Age of Onset    Heart disease Mother Taylor     Hypertension Mother Taylor     Heart disease Father Jhony     Hypertension Father Jhony         Allergies:  Amlodipine, Calcium channel blocking agent diltiazem analogues, Ciprofloxacin, Iodinated contrast media, Lisinopril, Nitroglycerin, Other, Penicillins, Statins-hmg-coa reductase inhibitors, Tramadol, Meperidine, Propoxyphene-acetaminophen, and Temazepam    Outpatient Medications:  Current Outpatient Medications   Medication Instructions    ALPRAZolam (XANAX) 1 mg, oral, 2 times daily PRN    aspirin 81 mg, oral, Daily    carvedilol (COREG) 25 mg, oral, 2 times daily (morning and late afternoon)    cetirizine (ZYRTEC) 10 mg    clotrimazole (Lotrimin) 1 % cream 1 Application, Topical, 2 times daily    flash glucose sensor kit (FreeStyle Nancy 2 Sensor) kit Use as instructed    FreeStyle Nancy reader (FreeStyle Nancy 2 [place]) misc Use as instructed    hydroCHLOROthiazide (HYDRODIURIL) 25 mg, oral, Daily    isosorbide mononitrate ER (IMDUR) 30 mg, oral, Every 12 hours    losartan (COZAAR) 50 mg, oral, Daily    minoxidil (LONITEN) 2.5 mg, oral, 2 times daily    olopatadine (Patanol) 0.1 % ophthalmic solution     omeprazole (PRILOSEC) 20 mg, oral, Daily before breakfast, Do not crush or chew.    potassium chloride CR (Klor-Con M20) 20 mEq ER tablet 20 mEq, oral, Daily    rosuvastatin (Crestor) 5 mg tablet 10 mg every other day    ticagrelor (BRILINTA) 90 mg, oral, 2 times daily     Review of Systems   Constitutional: Positive for malaise/fatigue.   Cardiovascular:  Positive for chest pain and palpitations.   All other systems reviewed and are negative.     Physical Exam:  Constitutional:       Appearance: Healthy appearance. Not in distress.   Neck:      Vascular: No JVR. JVD normal.   Pulmonary:      Effort: Pulmonary effort is normal.       Breath sounds: Normal breath sounds. No wheezing. No rhonchi. No rales.   Chest:      Chest wall: Not tender to palpatation.   Cardiovascular:      PMI at left midclavicular line. Normal rate. Regular rhythm. Normal S1. Normal S2.       Murmurs: There is no murmur.      No gallop.  No click. No rub.   Pulses:     Intact distal pulses.   Edema:     Peripheral edema absent.   Abdominal:      General: Bowel sounds are normal.      Palpations: Abdomen is soft.      Tenderness: There is no abdominal tenderness.   Musculoskeletal: Normal range of motion.         General: No tenderness. Skin:     General: Skin is warm and dry.   Neurological:      General: No focal deficit present.      Mental Status: Alert and oriented to person, place and time.          Last Labs:  CBC -  Lab Results   Component Value Date    WBC 4.8 07/18/2024    HGB 12.5 (L) 07/18/2024    HCT 35.4 (L) 07/18/2024    MCV 89 07/18/2024     (L) 07/18/2024       CMP -  Lab Results   Component Value Date    CALCIUM 9.7 07/18/2024    PROT 6.0 (L) 07/18/2024    ALBUMIN 4.3 07/18/2024    AST 20 07/18/2024    ALT 21 07/18/2024    ALKPHOS 42 07/18/2024    BILITOT 0.7 07/18/2024       LIPID PANEL -   Lab Results   Component Value Date    CHOL 110 04/01/2023    TRIG 86 04/01/2023    HDL 22.0 (A) 04/01/2023    CHHDL 5.0 04/01/2023    LDLF 71 04/01/2023    VLDL 17 04/01/2023       RENAL FUNCTION PANEL -   Lab Results   Component Value Date    GLUCOSE 184 (H) 07/18/2024     07/18/2024    K 4.0 07/18/2024     07/18/2024    CO2 29 07/18/2024    ANIONGAP 10 07/18/2024    BUN 27 (H) 07/18/2024    CREATININE 1.13 07/18/2024    GFRMALE 73 09/19/2023    CALCIUM 9.7 07/18/2024    ALBUMIN 4.3 07/18/2024        Lab Results   Component Value Date    BNP 26 09/19/2023    HGBA1C 5.6 01/03/2024       Last Cardiology Tests:  05/23/2023 - Cardiac Catheterization (LH)  1. Single vessel coronary artery disease without proximal left anterior descending  involvement.  2. Left Ventricular end-diastolic pressure = 9.  3. Normal LV filling pressures.     04/24/2023 - TTE  Left ventricular systolic function is normal with a 60-65% estimated ejection fraction.     04/17/2023 - Vascular Lab Carotid Artery Duplex U/S   1. Right Carotid: Findings are consistent with less than 50% stenosis of the right proximal ICA. Laminar flow seen by color Doppler. Right external carotid artery appears patent with no evidence of stenosis. The right vertebral artery is patent with antegrade flow. No evidence of hemodynamically significant stenosis in the right subclavian artery.  2. Left Carotid: Findings are consistent with less than 50% stenosis of the left proximal ICA. Laminar flow seen by color Doppler. Left external carotid artery appears patent with no evidence of stenosis. The left vertebral artery is patent with antegrade flow. No evidence of hemodynamically significant stenosis in the left subclavian artery.     04/03/2023 - MRI Brain/Head/Neck  1. There is no MRI evidence of acute infarction on the diffusion weighted images.  2. There is mild-to-moderate brain parenchymal volume loss.  3. There are small scattered nonspecific white matter changes within the cerebral hemispheres bilaterally which while nonspecific, given the patient's age, likely represent sequelae of more remote small vessel ischemic change.  4. The MRA of the neck demonstrates a short segmental area of diminished MRA signal suggestive of slab artifact along the distal right common carotid artery and origin of the left internal carotid artery. There is mild non hemodynamically significant narrowing noted along the proximal internal carotid arteries bilaterally. There is diminished MRA signal noted along the horizontal segments of the distal cervical vertebral arteries bilaterally felt to likely be technical/artifactual in origin. Otherwise, no significant focal stenosis noted along the cervical segments of the  vertebral arteries. There is a left dominant vertebral artery.  5. The intracranial MRA demonstrates a small caliber distal right vertebral artery which terminates in a right posterior inferior cerebellar artery. No significant stenosis noted along the distal left dominant vertebral artery, basilar artery, or distal internal carotid arteries. Otherwise, no other significant intracranial stenosis or intracranial aneurysm is noted.     07/29/2022 - Cardiac Catheterization (LH)  1. Severe ostial stenosis in the Diagonal-1 vessel, status post balloon angioplasty.  2. Patent stent in the LAD and OM system.  3. LVEDP of 20 mmHg.     06/24/2022 - TTE  The left ventricular systolic function is normal with a 70-75% estimated ejection fraction.     06/23/2022 - Cardiac Catheterization (LH)  1. Double vessel coronary artery disease without proximal left anterior descending involvement.  2. Culprit vessel(s): left anterior descending.  3. Successful PCI of LAD.     11/18/2021 - Cardiac Catheterization (LH)  Single vessel coronary artery disease without proximal left anterior descending involvement.     10/11/2021 - NM Cardiac Stress Test  1. Normal myocardial perfusion study without evidence of ischemia or prior infarction. The left ventricle is normal in size. Normal LV wall motion with an LV EF estimated at approximately 55%.  2. Baseline EKG shows normal sinus rhythm with no significant ST-T segment changes. With regadenoson infusion no ST-T segment changes of ischemia, or sustained ventricular arrhythmias are seen. Regadenoson stress EKG is negative for ischemia.      09/17/2021 - Cardiac Catheterization   1. The 1st obtuse marginal branch showed mild tortuosity, two previous stents and mild in-stent restenosis.  2. 2-vessel severe coronary artery disease.  3. Successful GILDA PCI to proximal D2 with 3.0 x 18 mm Resolut Dumfries post-dilated with 3.0 x 15 mm NC balloon.  4. Previous LAD stent has mild malapposition in a short  segment within the mid stent and in the ostial stent edge due to an aneurysmal segment.     09/08/2021- NM Cardiac Stress Test  1. There is a small to moderate sized fixed perfusion defect in the anteroseptal to apical wall consistent with prior infarct. There is a low probability of ischemia. Calculated ejection fraction of 52% with mild hypokinesis of the septal wall.  2. No electrocardiographic evidence for ischemia at maximal infusion. Normal Stress Test.      07/23/2021 - Cardiac Catheterization ()  1. Double vessel coronary artery disease without proximal left anterior descending involvement.  2. Successful PCI of LAD.     03/19/2021 - TTE  The left ventricular systolic function is normal with a 55-60% estimated ejection fraction.     03/18/2021 - Cardiac Catheterization ()  1. Single vessel coronary artery disease with proximal left anterior descending involvement.  2. Successful PCI of LAD.     02/13/2019 - Cardiac Catheterization ()  1. Patent stents in the circumflex territory.  2. Chronic total occlusion of the right coronary artery with collaterals from left system.  3. Mild LV dysfunction, EF 50%.  4. Medical therapy advised.     04/10/2018 - Vascular Lab Renal Artery U/S  1. Renal Artery Duplex: Bilateral renal arteries demonstrate no evidence of hemodynamically significant stenosis. The bilateral renal veins are widely patent.  2. Right Renal Artery: Cystlike structure noted in the kidney measuring approximately 3cm 2.7cm. Right kidney size not imaged due to tech error.     03/15/2018 - Vascular Lab Arterial Duplex U/S  Right Upper Arterial: History of cardiac catheterization via RUE approach, c/o pain near axilla. No evidence of pseudoaneurysm, stenosis or occlusion of the subclavian, axillary , radial and ulnar arteries.     02/15/2018 - NM Cardiac Stress Test  1. SPECT perfusion study: Normal.   2. Fair functional capacity for age and gender.  3. There is no scintigraphic evidence for  inducible ischemia.   4. No evidence of scarred myocardium.  5. Left ventricle is mildly dilated. The left ventricle systolic function is normal.   6. Right ventricle is normal in size. THe right ventricle systolic function is normal.  7. This is a low risk scan.   8. EKG Portion: The test was terminated due to general fatigue. Adequate heart rate response of 89% predicted maximal heart rate, normal heart rate recovery @ 1 minute post exercise, normal chronotropic response index. Normal blood pressure response to stress, resting hypertension. Normal ST segment response to stress, T wave changes due to stress. Abnormal Duke treadmill score (<5 but >/= -10), intermediate risk. Typical anginal discomfort during stress, nausea during stress. Multifocal PVCs during the test, PACs during the test. Nondiagnostic due to abnormal resting ECG.      02/11/2018 - Echocardiogram   1. Technically difficult exam due to suboptimal positioning.  2. Exam indication: Chest pain.  3. The left ventricle is normal in size. There is mild concentric left ventricular hypertrophy. Left ventricular systolic function is normal. EF = 60 +/- 5% (2D biplane).  4. The right ventricle is normal in size. Right ventricular systolic function is normal.   5. There are no significant valvular abnormalities.     Lab review: I have personally reviewed the laboratory result(s).     Assessment/Plan   1) Blurred Vision/?TIA  Admitted to hospital 03/31/2023 to 04/03/2023 with blurred vision left eye   CTA negative for acute cortical infarct or intracranial hemorrhage  MRI of brain/neck/head with mild narrowing of the carotid arteries  Scheduled to see opthalmology next week  TTE 04/24/2023 with LVEF 60-65%      2) Intermittent Sharp Abdominal Pain   Check CT abdomen/pelvis - denied by insurance      3) Scattered Petechiae/Thrombocytopenia   CBC drawn prior to discharge with low platelet count of 114  Referred to hematology     4) HTN  Stable at home in  135-140 systolic range  On carvedilol 25 mg twice daily, HCTZ 25 mg daily, Imdur 30 mg BID (60 mg daily caused headaches), minoxidil 2.5 mg BID and losartan 50 mg daily  Intolerant of amlodipine in the past d/t BLE edema  Trialed d/c minoxidil with increase in BP above 150 systolic   Patient restarted minoxidil with stabilization of BP in the 130-140/70-80 range   Three weeks prior to hospitalization 03/31/2023, patient reported low BP readings at 70s/30s, especially with changes in position.   No RENETTA per renal artery duplex in 2018  ED evaluation 03/01/2024 with left-sided back pain and right medial thigh pain since removal of right hydrocele 02/20/2024, and 1-week h/o hypotension with orthostasis and associated presyncope, received small fluid bolus s/t a slightly elevated BUN.  Elevated today, but stable at home per patient    5) CAD s/p Multiple PCI - Repeat PCI of LAD July 2021 and June 2022 and 2nd diagonal Sept 2021, PTA D1 July 2022 - Now with Unstable Angina   On DAPT with aspirin 81 mg daily and Brilinta 90 mg BID  On rosuvastatin 5 mg every other day, carvedilol 25 mg BID, Imdur 30 mg BID, HCTZ 25 mg daily, minoxidil 2.5 mg BID, losartan 50 mg daily.  Intolerant of Imdur 60 mg s/t headaches  Known  RCA with collaterals  [facility] Nov 2021 with patent stent LAD, known  RCA  TTE 04/24/2023 with LVEF 60-65%   [facility] 05/23/2023 for evaluation of chest tightness when bending over showed single vessel CAD  Lipoprotein profile 01/03/2024 with LDL-P of 1382, LDL-C of 107, small LDL-P of 1026, LDL size of 19.8, LP-IR score of 61 - non-fasting.  Check Apo-B and Lipid panel in 06/2024 - not yet drawn  ED evaluation 07/18/2024 with palpitations and chest pressure - EKG showed SR with first degree AV block with flipped T's in leads III and aVF, cardiac monitor strips showed multiple PVCs and a 2-beat run of v-tach, CXR negative, labs with elevated glucose of 184, BUN of 27, low protein of 6 and hemoglobin of 12.5.    Symptoms began after performing yard work   Reports fatigue at that time as well   Check echo - same day as cath  Plan for CC/PTCA  Risks, benefits, alternatives of cardiac catheterization possible angioplasty and stenting including risk of death, stroke, MI, bleeding complications and renal failure were explained to patient and patient agreed to proceed.     6) Carotid Bruit  MRI of brain/neck/head 04/03/2033 with mild narrowing of the carotid arteries  Carotid duplex 04/17/2023 with <50% stenosis of bilateral proximal ICAs     7) GERD  On omeprazole 40 mg BID.   Management per PCP. Seen by GI in the past   Reports recurrence of postprandial palpitations - likely GI related, and patient agrees         Scribe Attestation  By signing my name below, I, Enrico Carr   attest that this documentation has been prepared under the direction and in the presence of Juan Ortiz MD.

## 2024-07-30 ENCOUNTER — OFFICE VISIT (OUTPATIENT)
Dept: CARDIOLOGY | Facility: CLINIC | Age: 54
End: 2024-07-30
Payer: COMMERCIAL

## 2024-07-30 VITALS
WEIGHT: 284.4 LBS | HEIGHT: 78 IN | SYSTOLIC BLOOD PRESSURE: 180 MMHG | DIASTOLIC BLOOD PRESSURE: 96 MMHG | HEART RATE: 62 BPM | BODY MASS INDEX: 32.9 KG/M2

## 2024-07-30 DIAGNOSIS — R07.9 CHEST PAIN, UNSPECIFIED TYPE: ICD-10-CM

## 2024-07-30 DIAGNOSIS — I20.0 ANGINA PECTORIS, UNSTABLE (MULTI): ICD-10-CM

## 2024-07-30 DIAGNOSIS — R00.2 PALPITATIONS: ICD-10-CM

## 2024-07-30 DIAGNOSIS — I10 PRIMARY HYPERTENSION: Primary | ICD-10-CM

## 2024-07-30 PROCEDURE — 3044F HG A1C LEVEL LT 7.0%: CPT | Performed by: INTERNAL MEDICINE

## 2024-07-30 PROCEDURE — 3008F BODY MASS INDEX DOCD: CPT | Performed by: INTERNAL MEDICINE

## 2024-07-30 PROCEDURE — 3080F DIAST BP >= 90 MM HG: CPT | Performed by: INTERNAL MEDICINE

## 2024-07-30 PROCEDURE — 3077F SYST BP >= 140 MM HG: CPT | Performed by: INTERNAL MEDICINE

## 2024-07-30 PROCEDURE — 99213 OFFICE O/P EST LOW 20 MIN: CPT | Performed by: INTERNAL MEDICINE

## 2024-07-30 PROCEDURE — 4010F ACE/ARB THERAPY RXD/TAKEN: CPT | Performed by: INTERNAL MEDICINE

## 2024-07-30 PROCEDURE — 93000 ELECTROCARDIOGRAM COMPLETE: CPT | Performed by: INTERNAL MEDICINE

## 2024-07-30 PROCEDURE — 1036F TOBACCO NON-USER: CPT | Performed by: INTERNAL MEDICINE

## 2024-07-30 RX ORDER — SODIUM CHLORIDE 9 MG/ML
100 INJECTION, SOLUTION INTRAVENOUS CONTINUOUS
OUTPATIENT
Start: 2024-07-30

## 2024-07-30 ASSESSMENT — ENCOUNTER SYMPTOMS: PALPITATIONS: 1

## 2024-07-30 NOTE — PATIENT INSTRUCTIONS
Continue all current medications as prescribed.  Dr. Ortiz has ordered an echocardiogram (ultrasound of the heart) to followup on your heart function and structure. This will be performed the same day as the below procedure.  Dr. Ortiz has also placed orders for a heart catheterization to evaluate for any new blockages within your heart arteries. Instructions: Nothing to eat or drink from midnight the night before the procedure. If you take any morning medications, these can be taken with small sips of water; no coffee or tea. Please have someone with you to drive you home as you will receive light sedation and driving is not recommended. Please see the folder provided to you today for further information.   Followup with Dr. Ortiz after the above procedures.    If you have any questions or cardiac concerns, please call our office at [phone].

## 2024-07-31 ENCOUNTER — TELEPHONE (OUTPATIENT)
Dept: CARDIOLOGY | Facility: CLINIC | Age: 54
End: 2024-07-31

## 2024-07-31 PROBLEM — I20.0 ANGINA PECTORIS, UNSTABLE (MULTI): Status: ACTIVE | Noted: 2024-07-30

## 2024-07-31 NOTE — TELEPHONE ENCOUNTER
Spoke with patient with appointment for echo and left heart cath 08/13, patient verbalized agreement.

## 2024-08-02 ENCOUNTER — TELEPHONE (OUTPATIENT)
Dept: CARDIOLOGY | Facility: HOSPITAL | Age: 54
End: 2024-08-02

## 2024-08-02 ENCOUNTER — APPOINTMENT (OUTPATIENT)
Dept: CARDIOLOGY | Facility: CLINIC | Age: 54
End: 2024-08-02
Payer: COMMERCIAL

## 2024-08-02 DIAGNOSIS — Z88.8 ALLERGY TO IODINE: Primary | ICD-10-CM

## 2024-08-02 NOTE — TELEPHONE ENCOUNTER
8/2/24 1647  This is a plan of care note for a   53 year male patient who was referred for a heart catheterization due to USA/HTN.  Pt has a history of CAD/HTN/HLP.    Called to give pre procedure instructions for C to include  Date of procedure, show time of procedure and procedure time  NPO after midnight x for ASA, Brilinta, Carvedilol.   To have a ride due to sedation  To shower the night before and wear loose comfortable clothes  To bring an overnight bag in case of overnight stay  To labs done before procedure  To bring an ID card, insurance card and list of medications.  Patient has IV dye, medications ordered and patient instructed on taking them and pepcid and benadryl in accordance with  protocol.  Patient has a stable serum creatinine  Patient has recent  EKG and labs ordered.   Pt verbalized understanding of instructions.     Na SPEARS  ----- Message from Fernando HOPSON sent at 7/31/2024 11:49 AM EDT -----  Regarding: L heart cath (AG)  Left Heart Cath scheduled 08/13, procedure time 9:30, arrival 8:30am ( patient also has echo same day at 7:30)     Please call with instructions.

## 2024-08-04 RX ORDER — PREDNISONE 50 MG/1
50 TABLET ORAL DAILY
Qty: 3 TABLET | Refills: 0 | Status: SHIPPED | OUTPATIENT
Start: 2024-08-04 | End: 2024-08-07

## 2024-08-05 ENCOUNTER — LAB (OUTPATIENT)
Dept: LAB | Facility: LAB | Age: 54
End: 2024-08-05
Payer: COMMERCIAL

## 2024-08-05 DIAGNOSIS — I20.0 ANGINA PECTORIS, UNSTABLE (MULTI): ICD-10-CM

## 2024-08-05 DIAGNOSIS — N40.0 BENIGN PROSTATIC HYPERPLASIA WITHOUT LOWER URINARY TRACT SYMPTOMS: ICD-10-CM

## 2024-08-05 DIAGNOSIS — I10 PRIMARY HYPERTENSION: ICD-10-CM

## 2024-08-05 LAB
ANION GAP SERPL CALC-SCNC: 10 MMOL/L (ref 10–20)
BUN SERPL-MCNC: 14 MG/DL (ref 6–23)
CALCIUM SERPL-MCNC: 9.8 MG/DL (ref 8.6–10.3)
CHLORIDE SERPL-SCNC: 103 MMOL/L (ref 98–107)
CO2 SERPL-SCNC: 32 MMOL/L (ref 21–32)
CREAT SERPL-MCNC: 0.86 MG/DL (ref 0.5–1.3)
EGFRCR SERPLBLD CKD-EPI 2021: >90 ML/MIN/1.73M*2
ERYTHROCYTE [DISTWIDTH] IN BLOOD BY AUTOMATED COUNT: 13.2 % (ref 11.5–14.5)
GLUCOSE SERPL-MCNC: 149 MG/DL (ref 74–99)
HCT VFR BLD AUTO: 39 % (ref 41–52)
HGB BLD-MCNC: 14 G/DL (ref 13.5–17.5)
MCH RBC QN AUTO: 32 PG (ref 26–34)
MCHC RBC AUTO-ENTMCNC: 35.9 G/DL (ref 32–36)
MCV RBC AUTO: 89 FL (ref 80–100)
NRBC BLD-RTO: 0 /100 WBCS (ref 0–0)
PLATELET # BLD AUTO: 133 X10*3/UL (ref 150–450)
POTASSIUM SERPL-SCNC: 4.4 MMOL/L (ref 3.5–5.3)
PSA SERPL-MCNC: 1.64 NG/ML
RBC # BLD AUTO: 4.37 X10*6/UL (ref 4.5–5.9)
SODIUM SERPL-SCNC: 141 MMOL/L (ref 136–145)
WBC # BLD AUTO: 4.5 X10*3/UL (ref 4.4–11.3)

## 2024-08-05 PROCEDURE — 80048 BASIC METABOLIC PNL TOTAL CA: CPT

## 2024-08-05 PROCEDURE — 85027 COMPLETE CBC AUTOMATED: CPT

## 2024-08-05 PROCEDURE — 36415 COLL VENOUS BLD VENIPUNCTURE: CPT

## 2024-08-05 PROCEDURE — 84153 ASSAY OF PSA TOTAL: CPT

## 2024-08-13 ENCOUNTER — HOSPITAL ENCOUNTER (EMERGENCY)
Facility: HOSPITAL | Age: 54
Discharge: HOME | End: 2024-08-13
Payer: COMMERCIAL

## 2024-08-13 ENCOUNTER — HOSPITAL ENCOUNTER (OUTPATIENT)
Dept: CARDIOLOGY | Facility: HOSPITAL | Age: 54
Discharge: HOME | End: 2024-08-13
Payer: COMMERCIAL

## 2024-08-13 ENCOUNTER — APPOINTMENT (OUTPATIENT)
Dept: CARDIOLOGY | Facility: HOSPITAL | Age: 54
End: 2024-08-13
Payer: COMMERCIAL

## 2024-08-13 ENCOUNTER — HOSPITAL ENCOUNTER (OUTPATIENT)
Facility: HOSPITAL | Age: 54
Setting detail: OUTPATIENT SURGERY
Discharge: HOME | End: 2024-08-13
Attending: INTERNAL MEDICINE | Admitting: INTERNAL MEDICINE
Payer: COMMERCIAL

## 2024-08-13 ENCOUNTER — APPOINTMENT (OUTPATIENT)
Dept: RADIOLOGY | Facility: HOSPITAL | Age: 54
End: 2024-08-13
Payer: COMMERCIAL

## 2024-08-13 VITALS
DIASTOLIC BLOOD PRESSURE: 62 MMHG | WEIGHT: 271 LBS | OXYGEN SATURATION: 96 % | TEMPERATURE: 98.1 F | BODY MASS INDEX: 31.35 KG/M2 | RESPIRATION RATE: 16 BRPM | HEART RATE: 92 BPM | SYSTOLIC BLOOD PRESSURE: 129 MMHG | HEIGHT: 78 IN

## 2024-08-13 VITALS
BODY MASS INDEX: 31.37 KG/M2 | HEART RATE: 81 BPM | HEIGHT: 78 IN | WEIGHT: 271.17 LBS | RESPIRATION RATE: 12 BRPM | TEMPERATURE: 98.4 F | OXYGEN SATURATION: 96 % | SYSTOLIC BLOOD PRESSURE: 188 MMHG | DIASTOLIC BLOOD PRESSURE: 101 MMHG

## 2024-08-13 DIAGNOSIS — I25.10 ATHEROSCLEROTIC HEART DISEASE OF NATIVE CORONARY ARTERY WITHOUT ANGINA PECTORIS: ICD-10-CM

## 2024-08-13 DIAGNOSIS — I20.0 ANGINA PECTORIS, UNSTABLE (MULTI): ICD-10-CM

## 2024-08-13 DIAGNOSIS — R07.9 ACUTE CHEST PAIN: Primary | ICD-10-CM

## 2024-08-13 DIAGNOSIS — I10 PRIMARY HYPERTENSION: ICD-10-CM

## 2024-08-13 DIAGNOSIS — I10 PRIMARY HYPERTENSION: Primary | ICD-10-CM

## 2024-08-13 LAB
ALBUMIN SERPL BCP-MCNC: 4.7 G/DL (ref 3.4–5)
ALP SERPL-CCNC: 46 U/L (ref 33–120)
ALT SERPL W P-5'-P-CCNC: 18 U/L (ref 10–52)
ANION GAP SERPL CALC-SCNC: 12 MMOL/L (ref 10–20)
APTT PPP: 28 SECONDS (ref 27–38)
AST SERPL W P-5'-P-CCNC: 15 U/L (ref 9–39)
BASOPHILS # BLD AUTO: 0.01 X10*3/UL (ref 0–0.1)
BASOPHILS NFR BLD AUTO: 0.1 %
BILIRUB SERPL-MCNC: 0.8 MG/DL (ref 0–1.2)
BUN SERPL-MCNC: 29 MG/DL (ref 6–23)
CALCIUM SERPL-MCNC: 10.1 MG/DL (ref 8.6–10.3)
CARDIAC TROPONIN I PNL SERPL HS: 10 NG/L (ref 0–20)
CARDIAC TROPONIN I PNL SERPL HS: 11 NG/L (ref 0–20)
CHLORIDE SERPL-SCNC: 101 MMOL/L (ref 98–107)
CO2 SERPL-SCNC: 26 MMOL/L (ref 21–32)
CREAT SERPL-MCNC: 1.04 MG/DL (ref 0.5–1.3)
D DIMER PPP FEU-MCNC: 299 NG/ML FEU
EGFRCR SERPLBLD CKD-EPI 2021: 85 ML/MIN/1.73M*2
EOSINOPHIL # BLD AUTO: 0 X10*3/UL (ref 0–0.7)
EOSINOPHIL NFR BLD AUTO: 0 %
ERYTHROCYTE [DISTWIDTH] IN BLOOD BY AUTOMATED COUNT: 13 % (ref 11.5–14.5)
GLUCOSE SERPL-MCNC: 313 MG/DL (ref 74–99)
HCT VFR BLD AUTO: 39.3 % (ref 41–52)
HGB BLD-MCNC: 14.2 G/DL (ref 13.5–17.5)
IMM GRANULOCYTES # BLD AUTO: 0.06 X10*3/UL (ref 0–0.7)
IMM GRANULOCYTES NFR BLD AUTO: 0.5 % (ref 0–0.9)
INR PPP: 1.1 (ref 0.9–1.1)
LYMPHOCYTES # BLD AUTO: 0.5 X10*3/UL (ref 1.2–4.8)
LYMPHOCYTES NFR BLD AUTO: 3.9 %
MAGNESIUM SERPL-MCNC: 1.84 MG/DL (ref 1.6–2.4)
MCH RBC QN AUTO: 31.9 PG (ref 26–34)
MCHC RBC AUTO-ENTMCNC: 36.1 G/DL (ref 32–36)
MCV RBC AUTO: 88 FL (ref 80–100)
MONOCYTES # BLD AUTO: 0.56 X10*3/UL (ref 0.1–1)
MONOCYTES NFR BLD AUTO: 4.4 %
NEUTROPHILS # BLD AUTO: 11.7 X10*3/UL (ref 1.2–7.7)
NEUTROPHILS NFR BLD AUTO: 91.1 %
NRBC BLD-RTO: 0 /100 WBCS (ref 0–0)
PLATELET # BLD AUTO: 152 X10*3/UL (ref 150–450)
POTASSIUM SERPL-SCNC: 4.3 MMOL/L (ref 3.5–5.3)
PROT SERPL-MCNC: 6.9 G/DL (ref 6.4–8.2)
PROTHROMBIN TIME: 13.2 SECONDS (ref 9.8–12.8)
RBC # BLD AUTO: 4.45 X10*6/UL (ref 4.5–5.9)
SODIUM SERPL-SCNC: 135 MMOL/L (ref 136–145)
WBC # BLD AUTO: 12.8 X10*3/UL (ref 4.4–11.3)

## 2024-08-13 PROCEDURE — 36415 COLL VENOUS BLD VENIPUNCTURE: CPT | Performed by: PHYSICIAN ASSISTANT

## 2024-08-13 PROCEDURE — 93306 TTE W/DOPPLER COMPLETE: CPT | Performed by: INTERNAL MEDICINE

## 2024-08-13 PROCEDURE — 71045 X-RAY EXAM CHEST 1 VIEW: CPT

## 2024-08-13 PROCEDURE — 93458 L HRT ARTERY/VENTRICLE ANGIO: CPT | Performed by: INTERNAL MEDICINE

## 2024-08-13 PROCEDURE — 85730 THROMBOPLASTIN TIME PARTIAL: CPT | Performed by: PHYSICIAN ASSISTANT

## 2024-08-13 PROCEDURE — C1894 INTRO/SHEATH, NON-LASER: HCPCS | Performed by: INTERNAL MEDICINE

## 2024-08-13 PROCEDURE — 85379 FIBRIN DEGRADATION QUANT: CPT | Performed by: PHYSICIAN ASSISTANT

## 2024-08-13 PROCEDURE — 84484 ASSAY OF TROPONIN QUANT: CPT | Performed by: PHYSICIAN ASSISTANT

## 2024-08-13 PROCEDURE — 93005 ELECTROCARDIOGRAM TRACING: CPT

## 2024-08-13 PROCEDURE — 85025 COMPLETE CBC W/AUTO DIFF WBC: CPT | Performed by: PHYSICIAN ASSISTANT

## 2024-08-13 PROCEDURE — C1760 CLOSURE DEV, VASC: HCPCS | Performed by: INTERNAL MEDICINE

## 2024-08-13 PROCEDURE — C1887 CATHETER, GUIDING: HCPCS | Performed by: INTERNAL MEDICINE

## 2024-08-13 PROCEDURE — 2500000005 HC RX 250 GENERAL PHARMACY W/O HCPCS: Performed by: INTERNAL MEDICINE

## 2024-08-13 PROCEDURE — 99283 EMERGENCY DEPT VISIT LOW MDM: CPT | Mod: 25

## 2024-08-13 PROCEDURE — 2720000007 HC OR 272 NO HCPCS: Performed by: INTERNAL MEDICINE

## 2024-08-13 PROCEDURE — 2500000004 HC RX 250 GENERAL PHARMACY W/ HCPCS (ALT 636 FOR OP/ED): Performed by: NURSE PRACTITIONER

## 2024-08-13 PROCEDURE — 99152 MOD SED SAME PHYS/QHP 5/>YRS: CPT | Performed by: INTERNAL MEDICINE

## 2024-08-13 PROCEDURE — 7100000009 HC PHASE TWO TIME - INITIAL BASE CHARGE: Performed by: INTERNAL MEDICINE

## 2024-08-13 PROCEDURE — 2500000001 HC RX 250 WO HCPCS SELF ADMINISTERED DRUGS (ALT 637 FOR MEDICARE OP): Performed by: NURSE PRACTITIONER

## 2024-08-13 PROCEDURE — 2500000004 HC RX 250 GENERAL PHARMACY W/ HCPCS (ALT 636 FOR OP/ED): Performed by: INTERNAL MEDICINE

## 2024-08-13 PROCEDURE — 2550000001 HC RX 255 CONTRASTS: Performed by: INTERNAL MEDICINE

## 2024-08-13 PROCEDURE — 80053 COMPREHEN METABOLIC PANEL: CPT | Performed by: PHYSICIAN ASSISTANT

## 2024-08-13 PROCEDURE — 71045 X-RAY EXAM CHEST 1 VIEW: CPT | Performed by: RADIOLOGY

## 2024-08-13 PROCEDURE — 7100000010 HC PHASE TWO TIME - EACH INCREMENTAL 1 MINUTE: Performed by: INTERNAL MEDICINE

## 2024-08-13 PROCEDURE — 85610 PROTHROMBIN TIME: CPT | Performed by: PHYSICIAN ASSISTANT

## 2024-08-13 PROCEDURE — 83735 ASSAY OF MAGNESIUM: CPT | Performed by: PHYSICIAN ASSISTANT

## 2024-08-13 PROCEDURE — 93306 TTE W/DOPPLER COMPLETE: CPT

## 2024-08-13 RX ORDER — FENTANYL CITRATE 50 UG/ML
INJECTION, SOLUTION INTRAMUSCULAR; INTRAVENOUS AS NEEDED
Status: DISCONTINUED | OUTPATIENT
Start: 2024-08-13 | End: 2024-08-13 | Stop reason: HOSPADM

## 2024-08-13 RX ORDER — MIDAZOLAM HYDROCHLORIDE 1 MG/ML
INJECTION, SOLUTION INTRAMUSCULAR; INTRAVENOUS AS NEEDED
Status: DISCONTINUED | OUTPATIENT
Start: 2024-08-13 | End: 2024-08-13 | Stop reason: HOSPADM

## 2024-08-13 RX ORDER — HEPARIN SODIUM 1000 [USP'U]/ML
INJECTION, SOLUTION INTRAVENOUS; SUBCUTANEOUS AS NEEDED
Status: DISCONTINUED | OUTPATIENT
Start: 2024-08-13 | End: 2024-08-13 | Stop reason: HOSPADM

## 2024-08-13 RX ORDER — LIDOCAINE HYDROCHLORIDE 20 MG/ML
INJECTION, SOLUTION INFILTRATION; PERINEURAL AS NEEDED
Status: DISCONTINUED | OUTPATIENT
Start: 2024-08-13 | End: 2024-08-13 | Stop reason: HOSPADM

## 2024-08-13 RX ORDER — HYDRALAZINE HYDROCHLORIDE 20 MG/ML
10 INJECTION INTRAMUSCULAR; INTRAVENOUS ONCE
Status: COMPLETED | OUTPATIENT
Start: 2024-08-13 | End: 2024-08-13

## 2024-08-13 RX ORDER — LOSARTAN POTASSIUM 50 MG/1
50 TABLET ORAL ONCE
Status: COMPLETED | OUTPATIENT
Start: 2024-08-13 | End: 2024-08-13

## 2024-08-13 RX ORDER — LOSARTAN POTASSIUM 50 MG/1
TABLET ORAL
Status: DISCONTINUED
Start: 2024-08-13 | End: 2024-08-14 | Stop reason: HOSPADM

## 2024-08-13 RX ORDER — LOSARTAN POTASSIUM 50 MG/1
100 TABLET ORAL DAILY
Qty: 180 TABLET | Refills: 3 | Status: SHIPPED | OUTPATIENT
Start: 2024-08-13 | End: 2025-08-13

## 2024-08-13 RX ORDER — SODIUM CHLORIDE 9 MG/ML
100 INJECTION, SOLUTION INTRAVENOUS CONTINUOUS
Status: DISCONTINUED | OUTPATIENT
Start: 2024-08-13 | End: 2024-08-13 | Stop reason: HOSPADM

## 2024-08-13 RX ADMIN — LOSARTAN POTASSIUM 50 MG: 50 TABLET, FILM COATED ORAL at 14:40

## 2024-08-13 RX ADMIN — SODIUM CHLORIDE 100 ML/HR: 9 INJECTION, SOLUTION INTRAVENOUS at 08:35

## 2024-08-13 RX ADMIN — HYDRALAZINE HYDROCHLORIDE 10 MG: 20 INJECTION INTRAMUSCULAR; INTRAVENOUS at 14:22

## 2024-08-13 RX ADMIN — HYDRALAZINE HYDROCHLORIDE 10 MG: 20 INJECTION INTRAMUSCULAR; INTRAVENOUS at 13:35

## 2024-08-13 ASSESSMENT — ENCOUNTER SYMPTOMS
FEVER: 0
SORE THROAT: 0
VOMITING: 0
CHILLS: 0
SEIZURES: 0
ABDOMINAL PAIN: 0
PALPITATIONS: 0
COLOR CHANGE: 0
DYSURIA: 0
ARTHRALGIAS: 0
EYE PAIN: 0
COUGH: 0
HEMATURIA: 0
BACK PAIN: 0
SHORTNESS OF BREATH: 1

## 2024-08-13 ASSESSMENT — PAIN SCALES - GENERAL
PAINLEVEL_OUTOF10: 0 - NO PAIN
PAINLEVEL_OUTOF10: 0 - NO PAIN
PAINLEVEL_OUTOF10: 2
PAINLEVEL_OUTOF10: 0 - NO PAIN

## 2024-08-13 ASSESSMENT — COLUMBIA-SUICIDE SEVERITY RATING SCALE - C-SSRS
1. IN THE PAST MONTH, HAVE YOU WISHED YOU WERE DEAD OR WISHED YOU COULD GO TO SLEEP AND NOT WAKE UP?: NO
6. HAVE YOU EVER DONE ANYTHING, STARTED TO DO ANYTHING, OR PREPARED TO DO ANYTHING TO END YOUR LIFE?: NO
1. IN THE PAST MONTH, HAVE YOU WISHED YOU WERE DEAD OR WISHED YOU COULD GO TO SLEEP AND NOT WAKE UP?: NO
6. HAVE YOU EVER DONE ANYTHING, STARTED TO DO ANYTHING, OR PREPARED TO DO ANYTHING TO END YOUR LIFE?: NO
2. HAVE YOU ACTUALLY HAD ANY THOUGHTS OF KILLING YOURSELF?: NO
2. HAVE YOU ACTUALLY HAD ANY THOUGHTS OF KILLING YOURSELF?: NO

## 2024-08-13 ASSESSMENT — PAIN DESCRIPTION - LOCATION: LOCATION: CHEST

## 2024-08-13 ASSESSMENT — PAIN DESCRIPTION - DESCRIPTORS: DESCRIPTORS: BURNING

## 2024-08-13 ASSESSMENT — PAIN - FUNCTIONAL ASSESSMENT
PAIN_FUNCTIONAL_ASSESSMENT: 0-10
PAIN_FUNCTIONAL_ASSESSMENT: 0-10

## 2024-08-13 NOTE — Clinical Note
Sheath was removed in the right radial artery. Site closed by TR-Band. 14 ml air in the band at 1002

## 2024-08-13 NOTE — Clinical Note
Patient was discharged to Holding area/APC. Trilobed Flap Text: The defect edges were debeveled with a #15 scalpel blade.  Given the location of the defect and the proximity to free margins a trilobed flap was deemed most appropriate.  Using a sterile surgical marker, an appropriate trilobed flap drawn around the defect.    The area thus outlined was incised deep to adipose tissue with a #15 scalpel blade.  The skin margins were undermined to an appropriate distance in all directions utilizing iris scissors.

## 2024-08-13 NOTE — ED PROVIDER NOTES
Patient is a 53-year-old male with a history of coronary artery disease who presents to the emergency room with a chief complaint of midsternal chest pain.  Patient had a cardiac catheterization performed today at [facility] at around 10 AM.  He states that after his procedure it was noted that his blood pressure was elevated.  He states that they gave him hydralazine in which patient reports that he is taken in the past but could not remember why they decided to take him off of it.  Patient states that around 3:00 this afternoon he developed mid sternal discomfort which she describes as a burning sensation.  He states that he has associated shortness of breath. He is concerned that he may have an allergy to hydralazine and states that when he was reading about it, it was recommended that he go to the ED. He states that his cardiac cath was unremarkable.           Review of Systems   Constitutional:  Negative for chills and fever.   HENT:  Negative for ear pain and sore throat.    Eyes:  Negative for pain and visual disturbance.   Respiratory:  Positive for shortness of breath. Negative for cough.    Cardiovascular:  Positive for chest pain. Negative for palpitations.   Gastrointestinal:  Negative for abdominal pain and vomiting.   Genitourinary:  Negative for dysuria and hematuria.   Musculoskeletal:  Negative for arthralgias and back pain.   Skin:  Negative for color change and rash.   Neurological:  Negative for seizures and syncope.   All other systems reviewed and are negative.       Physical Exam  Vitals and nursing note reviewed.   Constitutional:       General: He is not in acute distress.     Appearance: He is well-developed. He is not ill-appearing.   HENT:      Head: Normocephalic and atraumatic.   Eyes:      Conjunctiva/sclera: Conjunctivae normal.   Cardiovascular:      Rate and Rhythm: Normal rate and regular rhythm.      Heart sounds: Normal heart sounds. No murmur heard.  Pulmonary:       Effort: Pulmonary effort is normal. No respiratory distress.      Breath sounds: Normal breath sounds. No decreased breath sounds, wheezing, rhonchi or rales.   Abdominal:      Palpations: Abdomen is soft.      Tenderness: There is no abdominal tenderness.   Musculoskeletal:         General: No swelling.      Cervical back: Normal range of motion and neck supple.      Right lower leg: No edema.      Left lower leg: No edema.   Skin:     General: Skin is warm and dry.      Capillary Refill: Capillary refill takes less than 2 seconds.   Neurological:      General: No focal deficit present.      Mental Status: He is alert.   Psychiatric:         Mood and Affect: Mood normal.          Labs Reviewed   CBC WITH AUTO DIFFERENTIAL - Abnormal       Result Value    WBC 12.8 (*)     nRBC 0.0      RBC 4.45 (*)     Hemoglobin 14.2      Hematocrit 39.3 (*)     MCV 88      MCH 31.9      MCHC 36.1 (*)     RDW 13.0      Platelets 152      Neutrophils % 91.1      Immature Granulocytes %, Automated 0.5      Lymphocytes % 3.9      Monocytes % 4.4      Eosinophils % 0.0      Basophils % 0.1      Neutrophils Absolute 11.70 (*)     Immature Granulocytes Absolute, Automated 0.06      Lymphocytes Absolute 0.50 (*)     Monocytes Absolute 0.56      Eosinophils Absolute 0.00      Basophils Absolute 0.01     COMPREHENSIVE METABOLIC PANEL - Abnormal    Glucose 313 (*)     Sodium 135 (*)     Potassium 4.3      Chloride 101      Bicarbonate 26      Anion Gap 12      Urea Nitrogen 29 (*)     Creatinine 1.04      eGFR 85      Calcium 10.1      Albumin 4.7      Alkaline Phosphatase 46      Total Protein 6.9      AST 15      Bilirubin, Total 0.8      ALT 18     PROTIME-INR - Abnormal    Protime 13.2 (*)     INR 1.1     MAGNESIUM - Normal    Magnesium 1.84     APTT - Normal    aPTT 28      Narrative:     The APTT is no longer used for monitoring Unfractionated Heparin Therapy. For monitoring Heparin Therapy, use the Heparin Assay.   SERIAL  TROPONIN-INITIAL - Normal    Troponin I, High Sensitivity 10      Narrative:     Less than 99th percentile of normal range cutoff-  Female and children under 18 years old <14 ng/L; Male <21 ng/L: Negative  Repeat testing should be performed if clinically indicated.     Female and children under 18 years old 14-50 ng/L; Male 21-50 ng/L:  Consistent with possible cardiac damage and possible increased clinical   risk. Serial measurements may help to assess extent of myocardial damage.     >50 ng/L: Consistent with cardiac damage, increased clinical risk and  myocardial infarction. Serial measurements may help assess extent of   myocardial damage.      NOTE: Children less than 1 year old may have higher baseline troponin   levels and results should be interpreted in conjunction with the overall   clinical context.     NOTE: Troponin I testing is performed using a different   testing methodology at [facility] than at other   [facility]. Direct result comparisons should only   be made within the same method.   SERIAL TROPONIN, 1 HOUR - Normal    Troponin I, High Sensitivity 11      Narrative:     Less than 99th percentile of normal range cutoff-  Female and children under 18 years old <14 ng/L; Male <21 ng/L: Negative  Repeat testing should be performed if clinically indicated.     Female and children under 18 years old 14-50 ng/L; Male 21-50 ng/L:  Consistent with possible cardiac damage and possible increased clinical   risk. Serial measurements may help to assess extent of myocardial damage.     >50 ng/L: Consistent with cardiac damage, increased clinical risk and  myocardial infarction. Serial measurements may help assess extent of   myocardial damage.      NOTE: Children less than 1 year old may have higher baseline troponin   levels and results should be interpreted in conjunction with the overall   clinical context.     NOTE: Troponin I testing is performed using a different   testing  methodology at [facility] than at other   [facility]. Direct result comparisons should only   be made within the same method.   D-DIMER, VTE EXCLUSION - Normal    D-Dimer, Quantitative VTE Exclusion 299      Narrative:     The VTE Exclusion D-Dimer assay is reported in ng/mL Fibrinogen Equivalent Units (FEU).    Per 's instructions for use, a value of less than 500 ng/mL (FEU) may help to exclude DVT or PE in outpatients when the assay is used with a clinical pretest probability assessment.([facility] must utilize and document eCalc 'Wells Score Deep Vein Thrombosis Risk' for DVT exclusion only. Emergency Department should utilize  Guidelines for Emergency Department Use of the VTE Exclusion D-Dimer and Clinical Pretest probability assessment model for DVT or PE exclusion.)   TROPONIN SERIES- (INITIAL, 1 HR)    Narrative:     The following orders were created for panel order Troponin I Series, High Sensitivity (0, 1 HR).  Procedure                               Abnormality         Status                     ---------                               -----------         ------                     Troponin I, High Sensiti...[[id number]]  Normal              Final result               Troponin, High Sensitivi...[[id number]]  Normal              Final result                 Please view results for these tests on the individual orders.        XR chest 1 view   Final Result   No acute findings.             Signed by: Yajiara Hobson 8/13/2024 7:47 PM   Dictation workstation:   [id number]           ECG 12 lead    Performed by: Paige Cadena PA-C  Authorized by: Paige Cadena PA-C    ECG interpreted by ED Physician in the absence of a cardiologist: yes    Comments:      EKG shows sinus rhythm with 1st degree AV block. No ST elevation. Rate of 100bpm. AK interval is 218ms and QRS duration is 96ms.   ECG 12 lead    Performed by: Paige Cadena PA-C  Authorized by: Paige Cadena PA-C     ECG interpreted by ED Physician in the absence of a cardiologist: yes    Comments:      EKG shows normal sinus rhythm with a rate of 91 bpm.  No ST elevation.  NJ interval is 208 ms and QRS duration is 94 ms.  EKG interpretation per myself, Paige Cadena       Medical Decision Making  Patient is a 52 yo male with a hx of CAD with 6 prior stents who actually had cardiac cath at [facility] performed today which patient reports was unremarkable. He presented today for chest pain that started at 3:00 pm and is intermittent. Workup is unremarkable. Discussed with cardiologist on call, Dr. Arellano who felt that patient could be discharged home. Recommend follow up with his cardiologist and return for any new or worsening symptoms.    DDX includes but not limited to: ACS, atypical chest pain, PE, aortic dissection, Pneumonia    Amount and/or Complexity of Data Reviewed  Labs: ordered. Decision-making details documented in ED Course.  ECG/medicine tests: ordered and independent interpretation performed. Decision-making details documented in ED Course.         Diagnoses as of 08/13/24 2103   Acute chest pain                    Paige Cadena PA-C  08/13/24 2103

## 2024-08-13 NOTE — DISCHARGE INSTRUCTIONS
If you have any questions, please call the Cath Lab Nurse Practitioner Mikaylaeneida Encinas at [phone] and leave a message. She will return your call the same day if calling before 3 PM, M-F. If you call on the weekend you can expect a call back on Monday morning. You may also call the Cath Lab at [phone] M-F, 7-3:30 with any questions. Weekends and after hours please call your cardiologist office number to reach a physician on call. The heart group office number is [phone]           CARDIAC CATHETERIZATION DISCHARGE INSTRUCTIONS     FOR SUDDEN AND SEVERE CHEST PAIN, SHORTNESS OF BREATH, EXCESSIVE BLEEDING, SIGNS OF STROKE, OR CHANGES IN MENTAL STATUS YOU SHOULD CALL 911 IMMEDIATELY.     If your provider has prescribed aspirin and/or clopidogrel (Plavix), or prasugrel (Effient), or ticagrelor (Brilinta), DO NOT STOP THESE MEDICATIONS for any reason without talking to your cardiologist first. If any of these were prescribed, you must take them every day without missing a single dose. If you are getting low on these medications, contact your provider immediately for a refill.     FOR NEXT 24 HOURS  - Upon discharge, you should return home and rest for the remainder of the day and evening. You do not have to stay on bed rest but should not be very active.  It is recommended a responsible adult be with you for the first 24 hours after the procedure.    - No driving for 24 hours after procedure. Please arrange for someone to drive you home from the hospital today.     - Do not drive, operate machinery, or use power tools for 24 hours after your procedure.     - Do not make any legal decisions for 24 hours after your procedure.     - Do not drink alcoholic beverages for 24 hours after your procedure.    WOUND CARE   *FOR FEMORAL (LEG) ACCESS*  ·      Avoid heavy lifting (over 10 pounds) for 7 days, squatting or excessive bending for 2 days, and strenuous exercise for 7 days.  ·      No submerged bathing, swimming, or  hot tubs for the next 7 days, or until fully healed.  ·      Avoid sexual activity for 3-4 days until any groin discomfort has ceased.     *FOR RADIAL (WRIST) ACCESS*  ·      No lifting more than 5 pounds or excessive use of the wrist for 24 hours - for example, treat your wrist as if it is sprained.  ·      Do not engage in vigorous activities (tennis, golf, bowling, weights) for at least 48 hours after the procedure.  ·      Do not submerge the wrist for 7 days after the procedure.  ·      You should expect mild tingling in your hand and tenderness at the puncture site for up to 3 days.    - The transparent dressing should be removed from the site 24 hours after the procedure.  Wash the site gently with soap and water. Rinse well and pat dry. Keep the area clean and dry. You may apply a Band-Aid to the site. Avoid lotions, ointments, or powders until fully healed.     - You may shower the day after your procedure.      - It is normal to notice a small bruise around the puncture site and/or a small grape sized or smaller lump. Any large bruising or large lump warrants a call to the office.     - If bleeding should occur, lay down and apply pressure to the affected area for 10 minutes.  If the bleeding stops notify your physician.  If there is a large amount of bleeding or spurting of blood CALL 911 immediately.  DO NOT drive yourself to the hospital.    - You may experience some tenderness, bruising or minimal inflammation.  If you have any concerns, you may contact the Cath Lab or if any of these symptoms become excessive, contact your cardiologist or go to the emergency room.     OTHER INSTRUCTIONS  - You may take acetaminophen (Tylenol) as directed for discomfort.  If pain is not relieved with acetaminophen (Tylenol), contact your doctor.    - If you notice or experience any of the following, you should notify your doctor or seek medical attention  Chest pain or discomfort  Change in mental status or weakness in  extremities.  Dizziness, light headedness, or feeling faint.  Change in the site where the procedure was performed, such as bleeding or an increased area of bruising or swelling.  Tingling, numbness, pain, or coolness in the leg/arm beyond the site where the procedure was performed.  Signs of infection (i.e. shaking chills, temperature > 100 degrees Fahrenheit, warmth, redness) in the leg/arm area where the procedure was performed.  Changes in urination   Bloody or black stools  Vomiting blood  Severe nose bleeds  Any excessive bleeding    - If you DO NOT have an appointment with your cardiologist within 2-4 weeks following your procedure, please contact their office.      Important ways to reduce your risk of coronary artery disease by healthy diet, maintaining a healthy weight, exercising regularly and stop using tobacco products.     Mediterranean Diet    About this topic  This is a heart healthy diet. It is based on widely used foods and cooking styles from many countries around the [place]. The main pattern for the diet is more plant foods and monounsaturated fats, or good fats, like olive oil. Protein in this diet comes from seafood, legumes, nuts, seeds, and poultry and eggs in lowered amounts. You will also eat more whole grains, vegetables, and fruits and moderate amounts of alcohol are also included. This diet has less red meats, dairy products, and processed foods.    What will the results be?  Your diet will have less saturated fat, cholesterol, calories, sodium, and added sugars. Your diet will be higher in fiber. This will help to keep your blood sugar steady. This diet lowers the chance of heart disease and other health problems.  What lifestyle changes are needed?  If you do not often eat this way, you will need to change your eating habits. Be sure to get regular exercise. It is believed to help the health benefits of this diet.  What changes to diet are needed?  You may need to limit the  "amount of red meat and processed foods in your diet. Ask your dietitian for help planning meals that are right for you.  What foods are good to eat?  Plenty of fish and other seafood  Fresh, frozen, or canned fruits and vegetables  Nuts and nut butters and dried beans, lentils, or peas  Foods high in fiber like whole grains and whole grain products  Olive oil (good fat), peanut or canola oil, margarine, or spreads that list vegetable oil as the first ingredient and do not contain trans fat or partially hydrogenated oil  Small amounts of poultry and eggs  What foods should be limited or avoided?  Red meats  Sweets, desserts, and processed foods  Butter, oils, and fats that contain trans fats or are hydrogenated or partially hydrogenated  Gravies and sauces  What problems could happen?  Your weight may rise because your diet will be higher in fat from olive oil and nuts.  You may have lower iron levels. Be sure to eat foods rich in iron. Also, eat foods rich in vitamin C. This will help your body take in iron.  You may have lower calcium levels because you are eating less dairy products. Ask your doctor if you need to take a calcium supplement.  Wine is often thought of as part of a Mediterranean diet. It is not needed and you may choose not to include it. Avoid wine if you are prone to alcohol abuse or are pregnant. Also, avoid it if you are at risk for breast cancer, have liver problems, or have other illnesses that make it important for you to not have alcohol.  When do I need to call the doctor?  If you have any concerns about your diet     Health risks of obesity    The Basics  Written by the doctors and editors at [facility]  What does it mean to have obesity? -- Doctors use a calculation called \"body mass index,\" or \"BMI,\" to decide whether a person is underweight, at a healthy weight, overweight, or has obesity.  Your BMI will tell you which category you are in based on your weight and height (figure 1):  ?If " "your BMI is between 25 and 29.9, you are overweight.  ?If your BMI is 30 or greater, you have obesity.  Obesity is a problem, because it increases the risks of many different health problems. It can also make it hard for you to move, breathe, and do other things that people who are at a healthy weight can do easily.  What are the health risks of obesity? -- Having obesity increases a person's risk of developing many health problems. Here are just a few examples:  ?Diabetes  ?High blood pressure  ?High cholesterol  ?Heart disease (including heart attacks)  ?Stroke  ?Sleep apnea (a disorder in which you stop breathing for short periods while asleep)  ?Asthma  ?Cancer  Does having obesity shorten a person's life? -- Yes. Studies show that people with obesity die younger than people who are a healthy weight. They also show that the risk of death goes up the heavier a person is. The degree of increased risk depends on how long the person has had obesity, and on what other medical problems they have.  People with \"central obesity\" might also be at risk of dying younger. Central obesity means carrying extra weight in the belly area, even if the BMI is normal.  Should I see an expert? -- Yes. If you are overweight or have obesity, you can talk to your doctor or nurse. They might have suggestions for healthy ways to lose weight. It can also help to work with a dietitian (food and nutrition expert). A dietitian can help you choose healthy foods and plan meals.  Are there medical treatments that can help me lose weight? -- Yes. There are medicines and surgery to help with weight loss. But those treatments are only for people who have not been able to lose weight through lifestyle changes such as diet and exercise. Also, weight loss treatments do not take the place of diet and exercise. People who have those treatments must also change how they eat and how active they are.  What can I do to prevent the problems caused by " obesity? -- The best thing that you can do is lose weight. But even if weight loss is not possible, you can improve your health and lower your risk if you:  ?Become more active - Many types of physical activity can help, including walking. You can start with a few minutes a day and add more as you get stronger and build up your endurance. Anything that gets your body moving is good for you.  ?Improve your diet - It is healthy to have regular meal times, eat smaller portions, and not skip meals. Limit sweets, and avoid processed foods. Try to eat more vegetables and fruits instead.  ?Quit smoking (if you smoke) - Some people start eating more after they stop smoking, so try to make healthy food choices. Even if it increases your appetite, quitting smoking is still one of the best things that you can do to improve your health.  ?Limit alcohol - For females, drink no more than 1 drink a day. For males, drink no more than 2 drinks a day.  What causes obesity? -- The thing that increases a person's risk the most is having an unhealthy lifestyle. Most people develop obesity because they eat too much, eat unhealthy foods, and move too little. That's especially true of people who watch too much TV. But there are also other things that seem to increase the risk of obesity that many people do not know about. Here are some things that might affect a person's chance of developing obesity:  ?Mother's habits during and after pregnancy - People who eat a lot of calories, have diabetes, or smoke during pregnancy have a higher chance of having babies who have obesity as adults. Also, babies who drink formula might be more likely than  babies to develop obesity later in life.  ?Habits and weight gain during childhood - Children or teens who are overweight or have obesity are more likely to become have obesity as an adult.  ?Sleeping too little - People who do not get enough sleep are more likely to develop obesity than  "people who sleep enough.  ?Taking certain medicines - Long-term use of certain medicines, such as some medicines to treat depression, can cause weight gain. If you are concerned that 1 of your medicines might be making you gain weight, talk to your doctor or nurse. They might be able to switch you to a different medicine instead.  ?Certain hormonal conditions - Some hormonal problems can increase the risk of developing obesity. For example, a condition called \"polycystic ovary syndrome\" can cause weight gain, along with other symptoms like irregular periods.  What if I want to get pregnant? -- If you are overweight or have obesity, it might be harder to get pregnant. For males, obesity can also cause sex problems, like having trouble getting or keeping an erection. This is more likely if you also have high blood pressure or diabetes.  What if my child has obesity? -- In children, obesity has many of the same risks as it does in adults. For example, it can increase the risk of diabetes, high blood pressure, asthma, and sleep apnea. It can also cause added problems related to childhood. For example, obesity can make children grow faster than normal and cause girls to go through puberty earlier than usual.  All topics are updated as new evidence becomes available and our peer review process is complete.  This topic retrieved from [employer] on: Jan 11, 2024.  Topic 71283 Version 17.0  Release: 31.6.4 - C32.10  © 2024 Up[employer]Date, Inc. and/or its affiliates. All rights reserved.  figure 1: Your body mass index (BMI)        If you use tobacco products please review   Quitting smoking    The Basics  Written by the doctors and editors at [employer]  What are the benefits of quitting smoking? -- Quitting smoking can dramatically improve your health and help you live longer. It lowers your risk of heart disease, lung disease, kidney failure, cancer, infection, stomach problems, and diabetes.  Quitting smoking can also lower your " "chances of getting osteoporosis, a condition that makes your bones weak.  Quitting is not easy for most people, and it might take several tries to completely quit. But help and support are available. Quitting smoking will improve your health no matter how old you are, even if you have smoked for a long time.  What should I do if I want to quit smoking? -- It's a good idea to start by talking with your doctor or nurse. It is possible to quit on your own, without help. But getting help greatly increases your chances of quitting successfully.  When you are ready to quit, you will make a plan to:  ?Set a quit date.  ?Tell your family and friends that you plan to quit.  ?Plan ahead for the challenges you will face, such as cigarette cravings.  ?Remove cigarettes from your home, car, and work.  How can my doctor or nurse help? -- Your doctor or nurse can give you advice on the best way to quit. They can also give you medicines to:  ?Reduce your craving for cigarettes  ?Reduce your \"withdrawal\" symptoms (symptoms that happen when you stop smoking)  Your doctor or nurse can also help you find a counselor to talk to. For most people who are trying to quit smoking, it works best to use both medicines and counseling.  You can also get help from a free phone line (1-358-QUITNOW, or 1-913.199.5177) or go online to www.smokefree.gov.  What are the symptoms of withdrawal? -- When you stop smoking, you might have symptoms such as:  ?Trouble sleeping  ?Feeling irritable, anxious, or restless  ?Getting frustrated or angry  ?Having trouble thinking clearly  These symptoms can be hard to deal with, which is why it can be so hard to quit. But medicines can help.  Some people who stop smoking become temporarily depressed. Some people need treatment for depression, such as counseling or medicines or both. People with depression might:  ?No longer enjoy or care about doing the things they used to like to do  ?Feel sad, down, hopeless, " nervous, or cranky most of the day, almost every day  ?Lose or gain weight  ?Sleep too much or too little  ?Feel tired or like they have no energy  ?Feel guilty or like they are worth nothing  ?Forget things or feel confused  ?Move and speak more slowly than usual  ?Act restless or have trouble staying still  ?Think about death or suicide  If you think you might be depressed, tell your doctor or nurse right away. They can talk to you about your symptoms and recommend treatment if needed.  Get help right away if you are thinking of hurting or killing yourself! -- Sometimes, people with depression think of hurting or killing themselves. If you ever feel like you might hurt yourself, help is available:  ?In the , contact the [employer] Suicide & Crisis Lifeline:  To speak to someone, call or text [employer].  To talk to someone online, go to www.[employer]/chat.  ?Call your doctor or nurse and tell them that it is an emergency.  ?Call for an ambulance (in the [place] and [place], call 9-1-1).  ?Go to the emergency department at your local hospital.  If you think your partner might have depression, or if you are worried that they might hurt themselves, get them help right away.  How does counseling work? -- A counselor can help you figure out:  ?What triggers you to want to smoke, and how to handle these situations  ?How to resist cravings  ?What you can do differently if you have tried to quit before  You can meet with a counselor in 1-on-1 sessions or as part of a group. There are other ways to get counseling, too, such as over the phone, through text messaging, or online.  How do medicines help you stop smoking? -- Different medicines work in different ways:  ?Nicotine replacement therapy - Nicotine is the main drug in cigarettes, and the reason they are addictive. These medicines reduce your body's craving for nicotine. They also help with withdrawal symptoms.  There are different forms of nicotine replacement, including skin  "patches, lozenges, gum, nasal sprays, and inhalers. Most can be bought without a prescription. Also, health insurance might cover some or all of the cost.  It often helps to use 2 forms of nicotine replacement. For example, you might wear a patch all the time, plus use gum or lozenges when you get a craving to smoke.  ?Varenicline - Varenicline (brand names: Chantix, Champix) is a prescription medicine that reduces withdrawal symptoms and cigarette cravings. Varenicline can increase the effects of alcohol in some people. It's a good idea to limit drinking while you're taking it, at least until you know how it affects you.  Even if you are not yet ready to commit to a quit date, varenicline can help reduce cravings. This can make it easier to quit when you are ready.  ?Bupropion - Bupropion (sample brand names: Wellbutrin, Zyban) is a prescription medicine that reduces your desire to smoke. It is also available in a generic version, which is cheaper than the brand name medicines. Doctors do not usually prescribe bupropion for people with seizures or who have had seizures in the past.  It might also be helpful to combine nicotine replacement with bupropion or varenicline. In some cases, a person might even take both bupropion and varenicline. Your doctor or nurse can help you figure out the best combination for you.  What about e-cigarettes? -- Sometimes people wonder if using electronic cigarettes, or \"e-cigarettes,\" can help them quit smoking. Using e-cigarettes is also called \"vaping.\" Doctors do not recommend e-cigarettes in place of using of medicines and counseling. That's because e-cigarettes still contain nicotine as well as other substances that might be harmful. It's also not clear how they can affect a person's health in the long term.  What if I am pregnant and I smoke? -- If you are pregnant, it's really important for the health of your baby that you quit. Ask your doctor what options you have, and what " is safest for your baby.  What if I have already smoked for a long time? -- The longer you have smoked, the higher your chances are of having health problems. But it is never too late to quit smoking. It helps your health even if you are older or have smoked for many years. The best thing you can do to lower your chance of having a health problem caused by smoking is to quit.  Will I gain weight if I quit? -- You might gain a few pounds. This can be frustrating for some people. But it's important to remember that you are improving your health by quitting smoking. You can help prevent gaining a lot of weight by staying active and eating a healthy diet.  What if I am not able to quit? -- If you don't quit on your first try, or if you quit but then start smoking again, don't give up hope. Lots of people have to try more than once before they are able to completely quit.  It might help to try to understand why quitting did not work. There might be something you can do differently when you try again. It can help to figure out which situations make you want to smoke, so you can avoid them.  What else can I do to improve my chances of quitting? -- You can:  ?Get regular exercise. Any type of physical activity, even gentle forms of movement, is good for your health. Physical activity can also help reduce stress.  ?Stay away from people who smoke and places that make you want to smoke. If people close to you smoke, ask them to quit with you or avoid smoking around you.  ?Carry gum, hard candy, or something to put in your mouth. If you get a craving for a cigarette, try 1 of these instead.  ?Don't give up, even if you start smoking again. It takes most people a few tries before they succeed.  All topics are updated as new evidence becomes available and our peer review process is complete.

## 2024-08-13 NOTE — NURSING NOTE
Final site assessment - right radial; no oozing, no hematoma, dressing applied, clean, dry and intact, distal pulses palpable. PIV removed and dressing applied, pt ambulated in hallway with no dizziness. Discharge instructions reviewed with patient, pt discharged via wheelchair to home.

## 2024-08-13 NOTE — PRE-SEDATION DOCUMENTATION
"Interventional Cardiology Preprocedure Note    Jhony Myles   Indication for procedure: The primary encounter diagnosis was Primary hypertension. A diagnosis of Angina pectoris, unstable (Multi) was also pertinent to this visit. Patient describes having frequent palpitations. He has CAD history with multiple stents in the past.      Relevant review of systems:  see above       BP (!) 188/101   Pulse 81   Temp 36.9 °C (98.4 °F) (Temporal)   Resp 12   Ht 1.981 m (6' 6\")   Wt 123 kg (271 lb 2.7 oz)   SpO2 96%   BMI 31.34 kg/m²    Relevant Labs:   Lab Results   Component Value Date    CREATININE 0.86 08/05/2024    EGFR >90 08/05/2024    INR 1.2 (H) 12/12/2023    PROTIME 13.4 (H) 12/12/2023       Planned Sedation/Anesthesia: Moderate    Airway assessment: normal    Physical Exam  Vitals reviewed.   Constitutional:       General: He is not in acute distress.     Appearance: Normal appearance. He is obese. He is not ill-appearing, toxic-appearing or diaphoretic.   HENT:      Head: Normocephalic.      Nose: Nose normal.      Mouth/Throat:      Mouth: Mucous membranes are moist.   Cardiovascular:      Rate and Rhythm: Normal rate and regular rhythm.      Pulses: Normal pulses.      Heart sounds: No murmur heard.     No friction rub. No gallop.   Pulmonary:      Effort: Pulmonary effort is normal.      Breath sounds: Normal breath sounds. No rales.   Abdominal:      Palpations: Abdomen is soft.   Skin:     General: Skin is warm and dry.      Capillary Refill: Capillary refill takes less than 2 seconds.   Neurological:      General: No focal deficit present.      Mental Status: He is alert. Mental status is at baseline. He is disoriented.   Psychiatric:         Mood and Affect: Mood normal.         Behavior: Behavior normal.         Thought Content: Thought content normal.         Judgment: Judgment normal.       Mallampati: III (soft and hard palate and base of uvula visible)    ASA Score: ASA 3 - Patient with " moderate systemic disease with functional limitations    Benefits, risks and alternatives of procedure and planned sedation have been discussed with the patient and/or their representative. All questions answered and they agree to proceed.     Tasha Encinas, APRN-CNP

## 2024-08-13 NOTE — Clinical Note
Patient ID band present and verified. Patient contact is in waiting room. Contact(s) present: spouse and Arleen.

## 2024-08-14 LAB
AORTIC VALVE MEAN GRADIENT: 3 MMHG
AORTIC VALVE PEAK VELOCITY: 1.25 M/S
ATRIAL RATE: 100 BPM
ATRIAL RATE: 91 BPM
AV PEAK GRADIENT: 6.3 MMHG
AVA (PEAK VEL): 2.28 CM2
AVA (VTI): 2.23 CM2
EJECTION FRACTION APICAL 4 CHAMBER: 51
EJECTION FRACTION: 62 %
LEFT ATRIUM VOLUME AREA LENGTH INDEX BSA: 21.9 ML/M2
LEFT VENTRICLE INTERNAL DIMENSION DIASTOLE: 4.9 CM (ref 3.5–6)
LEFT VENTRICULAR OUTFLOW TRACT DIAMETER: 2 CM
LV EJECTION FRACTION BIPLANE: 62 %
MITRAL VALVE E/A RATIO: 0.63
MITRAL VALVE E/E' RATIO: 9.1
P AXIS: 37 DEGREES
P AXIS: 55 DEGREES
P OFFSET: 177 MS
P OFFSET: 179 MS
P ONSET: 106 MS
P ONSET: 112 MS
PR INTERVAL: 208 MS
PR INTERVAL: 218 MS
Q ONSET: 215 MS
Q ONSET: 216 MS
QRS COUNT: 15 BEATS
QRS COUNT: 17 BEATS
QRS DURATION: 94 MS
QRS DURATION: 96 MS
QT INTERVAL: 358 MS
QT INTERVAL: 380 MS
QTC CALCULATION(BAZETT): 461 MS
QTC CALCULATION(BAZETT): 467 MS
QTC FREDERICIA: 424 MS
QTC FREDERICIA: 437 MS
R AXIS: 40 DEGREES
R AXIS: 47 DEGREES
T AXIS: 31 DEGREES
T AXIS: 47 DEGREES
T OFFSET: 394 MS
T OFFSET: 406 MS
TRICUSPID ANNULAR PLANE SYSTOLIC EXCURSION: 3.7 CM
VENTRICULAR RATE: 100 BPM
VENTRICULAR RATE: 91 BPM

## 2024-08-14 NOTE — POST-PROCEDURE NOTE
Physician Transition of Care Summary  Invasive Cardiovascular Lab    Procedure Date: 8/13/2024  Attending:    João Ortiz - Primary  Resident/Fellow/Other Assistant: Surgeons and Role:  * No surgeons found with a matching role *    Indications:   Pre-op Diagnosis      * Primary hypertension [I10]     * Angina pectoris, unstable (Multi) [I20.0]    Post-procedure diagnosis:   Post-op Diagnosis     * Primary hypertension [I10]     * Angina pectoris, unstable (Multi) [I20.0]    Procedure(s):   Left Heart Cath  72209 - GA CATH PLMT L HRT & ARTS W/NJX & ANGIO IMG S&I        Procedure Findings:    of the RCA  Patent previous LAD stent     Description of the Procedure:   LHC   RRA>TR band     Complications:   None     Stents/Implants:   Implants       No implant documentation for this case.            Anticoagulation/Antiplatelet Plan:   Continue Aspirin and Brilinta     Estimated Blood Loss:   5 mL    Anesthesia: Moderate Sedation Anesthesia Staff: No anesthesia staff entered.    Any Specimen(s) Removed:   No specimens collected during this procedure.    Disposition:   Home     Follow up with Dr. Ortiz in 2-3 weeks post cath   Increased losartan to 100 mg daily due to hypertension post procedure      Electronically signed by: CANDELARIO Chacon-CNP, 8/13/2024 10:54 PM

## 2024-09-05 ENCOUNTER — HOSPITAL ENCOUNTER (OUTPATIENT)
Dept: HOSPITAL 100 - LAB | Age: 54
Discharge: HOME | End: 2024-09-05
Payer: MEDICAID

## 2024-09-05 DIAGNOSIS — K29.70: Primary | ICD-10-CM

## 2024-09-05 DIAGNOSIS — R10.9: ICD-10-CM

## 2024-09-05 LAB
ALANINE AMINOTRANSFER ALT/SGPT: 24 U/L (ref 16–61)
ALBUMIN SERPL-MCNC: 4.2 G/DL (ref 3.2–5)
ALKALINE PHOSPHATASE: 56 U/L (ref 45–117)
ANION GAP: 5 (ref 5–15)
AST(SGOT): 14 U/L (ref 15–37)
BUN SERPL-MCNC: 19 MG/DL (ref 7–18)
BUN/CREAT RATIO: 21.5 RATIO (ref 10–20)
CALCIUM SERPL-MCNC: 9.7 MG/DL (ref 8.5–10.1)
CARBON DIOXIDE: 27 MMOL/L (ref 21–32)
CHLORIDE: 108 MMOL/L (ref 98–107)
CORTIS SERPL-MCNC: 14.9 UG/DL (ref 3.44–22.45)
DEPRECATED RDW RBC: 41.7 FL (ref 35.1–43.9)
ERYTHROCYTE [DISTWIDTH] IN BLOOD: 13.1 % (ref 11.6–14.6)
EST GLOM FILT RATE - AFR AMER: 115 ML/MIN (ref 60–?)
GLOBULIN: 3 G/DL (ref 2.2–4.2)
GLUCOSE: 156 MG/DL (ref 74–106)
HCT VFR BLD AUTO: 37.5 % (ref 40–54)
HEMOGLOBIN: 13.1 G/DL (ref 13–16.5)
HGB BLD-MCNC: 13.1 G/DL (ref 13–16.5)
IMMATURE GRANULOCYTES COUNT: 0 X10^3/UL (ref 0–0)
LIPASE: 41 U/L (ref 13–75)
MCV RBC: 88.2 FL (ref 80–94)
MEAN CORP HGB CONC: 34.9 G/DL (ref 32–36)
MEAN PLATELET VOL.: 9.4 FL (ref 6.2–12)
NRBC FLAGGED BY ANALYZER: 0 % (ref 0–5)
PLATELET # BLD: 152 K/MM3 (ref 150–450)
PLATELET COUNT: 152 K/MM3 (ref 150–450)
POTASSIUM: 4.3 MMOL/L (ref 3.5–5.1)
RBC # BLD AUTO: 4.25 M/MM3 (ref 4.6–6.2)
RBC DISTRIBUTION WIDTH CV: 13.1 % (ref 11.6–14.6)
RBC DISTRIBUTION WIDTH SD: 41.7 FL (ref 35.1–43.9)
WBC # BLD AUTO: 3.8 K/MM3 (ref 4.4–11)
WHITE BLOOD COUNT: 3.8 K/MM3 (ref 4.4–11)

## 2024-09-05 PROCEDURE — 83690 ASSAY OF LIPASE: CPT

## 2024-09-05 PROCEDURE — 82164 ANGIOTENSIN I ENZYME TEST: CPT

## 2024-09-05 PROCEDURE — 86003 ALLG SPEC IGE CRUDE XTRC EA: CPT

## 2024-09-05 PROCEDURE — 84443 ASSAY THYROID STIM HORMONE: CPT

## 2024-09-05 PROCEDURE — 82784 ASSAY IGA/IGD/IGG/IGM EACH: CPT

## 2024-09-05 PROCEDURE — 82533 TOTAL CORTISOL: CPT

## 2024-09-05 PROCEDURE — 86255 FLUORESCENT ANTIBODY SCREEN: CPT

## 2024-09-05 PROCEDURE — 36415 COLL VENOUS BLD VENIPUNCTURE: CPT

## 2024-09-05 PROCEDURE — 86340 INTRINSIC FACTOR ANTIBODY: CPT

## 2024-09-05 PROCEDURE — 83516 IMMUNOASSAY NONANTIBODY: CPT

## 2024-09-05 PROCEDURE — 86005 ALLG SPEC IGE MULTIALLG SCR: CPT

## 2024-09-05 PROCEDURE — 82941 ASSAY OF GASTRIN: CPT

## 2024-09-05 PROCEDURE — 82390 ASSAY OF CERULOPLASMIN: CPT

## 2024-09-05 PROCEDURE — 86256 FLUORESCENT ANTIBODY TITER: CPT

## 2024-09-05 PROCEDURE — 85025 COMPLETE CBC W/AUTO DIFF WBC: CPT

## 2024-09-05 PROCEDURE — 80053 COMPREHEN METABOLIC PANEL: CPT

## 2024-09-06 ENCOUNTER — HOSPITAL ENCOUNTER (OUTPATIENT)
Dept: HOSPITAL 100 - LABSPEC | Age: 54
Discharge: HOME | End: 2024-09-06
Payer: MEDICAID

## 2024-09-06 ENCOUNTER — APPOINTMENT (OUTPATIENT)
Dept: CARDIOLOGY | Facility: HOSPITAL | Age: 54
End: 2024-09-06
Payer: COMMERCIAL

## 2024-09-06 DIAGNOSIS — R10.9: ICD-10-CM

## 2024-09-06 DIAGNOSIS — K58.9: Primary | ICD-10-CM

## 2024-09-06 PROCEDURE — 87209 SMEAR COMPLEX STAIN: CPT

## 2024-09-06 PROCEDURE — 82274 ASSAY TEST FOR BLOOD FECAL: CPT

## 2024-09-06 PROCEDURE — 82653 EL-1 FECAL QUANTITATIVE: CPT

## 2024-09-06 PROCEDURE — 87177 OVA AND PARASITES SMEARS: CPT

## 2024-09-06 PROCEDURE — 82705 FATS/LIPIDS FECES QUAL: CPT

## 2024-09-06 PROCEDURE — 87506 IADNA-DNA/RNA PROBE TQ 6-11: CPT

## 2024-09-06 PROCEDURE — 83630 LACTOFERRIN FECAL (QUAL): CPT

## 2024-09-06 PROCEDURE — 87329 GIARDIA AG IA: CPT

## 2024-09-06 PROCEDURE — 83993 ASSAY FOR CALPROTECTIN FECAL: CPT

## 2024-09-06 PROCEDURE — 87493 C DIFF AMPLIFIED PROBE: CPT

## 2024-09-10 LAB
ACE SERPL-CCNC: 38 U/L (ref 14–82)
ACTIN IGG SERPL-ACNC: 7 UNITS (ref 0–19)
ANGIOTENSIN CONVERT ENZYME: 38 U/L (ref 14–82)
ANTI-PARIETAL CELL AB, QN: 1.5 UNITS (ref 0–20)
ANTI-SMOOTH MUSCLE ABS: 7 UNITS (ref 0–19)
C-ANCA SER-ACNC: (no result) TITER
GASTRIN SERPL-MCNC: 22 PG/ML (ref 0–115)
IGA SERPL-MCNC: 116 MG/DL (ref 90–386)
IMMUNOGLOBULIN A: 116 MG/DL (ref 90–386)
P-ANCA TITR SER IF: (no result) TITER

## 2024-09-11 LAB — ELASTASE PANC STL QL: 124 (ref 200–?)

## 2024-09-12 LAB
MUSSELS: <0.1 KU/L
WHOLE EGG IGE QN: <0.1 KU/L

## 2024-09-13 ENCOUNTER — HOSPITAL ENCOUNTER (OUTPATIENT)
Dept: HOSPITAL 100 - LAB | Age: 54
Discharge: HOME | End: 2024-09-13
Payer: MEDICAID

## 2024-09-13 DIAGNOSIS — R10.9: Primary | ICD-10-CM

## 2024-09-13 PROCEDURE — 82784 ASSAY IGA/IGD/IGG/IGM EACH: CPT

## 2024-09-13 PROCEDURE — 82787 IGG 1 2 3 OR 4 EACH: CPT

## 2024-09-13 PROCEDURE — 36415 COLL VENOUS BLD VENIPUNCTURE: CPT

## 2024-09-16 LAB
CALPROTECTIN, STOOL: <5 UG/G (ref 0–120)
IGG SERPL-MCNC: 738 MG/DL (ref 603–1613)
IGG1 SER-MCNC: 392 MG/DL (ref 248–810)
IGG2 SER-MCNC: 240 MG/DL (ref 130–555)
IGG3 SER-MCNC: 24 MG/DL (ref 15–102)
IGG4 SER-MCNC: 11 MG/DL (ref 2–96)

## 2024-09-24 ENCOUNTER — APPOINTMENT (OUTPATIENT)
Dept: PRIMARY CARE | Facility: CLINIC | Age: 54
End: 2024-09-24
Payer: COMMERCIAL

## 2024-09-24 VITALS
WEIGHT: 271 LBS | DIASTOLIC BLOOD PRESSURE: 68 MMHG | HEIGHT: 78 IN | BODY MASS INDEX: 31.35 KG/M2 | SYSTOLIC BLOOD PRESSURE: 134 MMHG | HEART RATE: 83 BPM

## 2024-09-24 DIAGNOSIS — D69.6 THROMBOCYTOPENIA (CMS-HCC): ICD-10-CM

## 2024-09-24 DIAGNOSIS — F32.A DEPRESSIVE DISORDER: ICD-10-CM

## 2024-09-24 DIAGNOSIS — R10.32 CHRONIC LLQ PAIN: ICD-10-CM

## 2024-09-24 DIAGNOSIS — R00.2 HEART PALPITATIONS: ICD-10-CM

## 2024-09-24 DIAGNOSIS — E11.40 TYPE 2 DIABETES MELLITUS WITH DIABETIC NEUROPATHY, WITHOUT LONG-TERM CURRENT USE OF INSULIN: ICD-10-CM

## 2024-09-24 DIAGNOSIS — I25.119 CORONARY ARTERY DISEASE INVOLVING NATIVE CORONARY ARTERY OF NATIVE HEART WITH ANGINA PECTORIS: ICD-10-CM

## 2024-09-24 DIAGNOSIS — G89.29 CHRONIC LLQ PAIN: ICD-10-CM

## 2024-09-24 DIAGNOSIS — R42 VERTIGO: ICD-10-CM

## 2024-09-24 DIAGNOSIS — I49.3 FREQUENT PVCS: ICD-10-CM

## 2024-09-24 DIAGNOSIS — E78.2 MIXED HYPERLIPIDEMIA: ICD-10-CM

## 2024-09-24 DIAGNOSIS — I10 PRIMARY HYPERTENSION: ICD-10-CM

## 2024-09-24 DIAGNOSIS — M05.9 RHEUMATOID ARTHRITIS WITH POSITIVE RHEUMATOID FACTOR, INVOLVING UNSPECIFIED SITE (MULTI): ICD-10-CM

## 2024-09-24 DIAGNOSIS — R20.2 PARESTHESIA: Primary | ICD-10-CM

## 2024-09-24 PROCEDURE — 99214 OFFICE O/P EST MOD 30 MIN: CPT | Performed by: INTERNAL MEDICINE

## 2024-09-24 PROCEDURE — 4010F ACE/ARB THERAPY RXD/TAKEN: CPT | Performed by: INTERNAL MEDICINE

## 2024-09-24 PROCEDURE — 3008F BODY MASS INDEX DOCD: CPT | Performed by: INTERNAL MEDICINE

## 2024-09-24 PROCEDURE — 3044F HG A1C LEVEL LT 7.0%: CPT | Performed by: INTERNAL MEDICINE

## 2024-09-24 PROCEDURE — 3078F DIAST BP <80 MM HG: CPT | Performed by: INTERNAL MEDICINE

## 2024-09-24 PROCEDURE — 3075F SYST BP GE 130 - 139MM HG: CPT | Performed by: INTERNAL MEDICINE

## 2024-09-24 PROCEDURE — 1036F TOBACCO NON-USER: CPT | Performed by: INTERNAL MEDICINE

## 2024-09-24 RX ORDER — MECLIZINE HYDROCHLORIDE 25 MG/1
25 TABLET ORAL 3 TIMES DAILY PRN
Qty: 30 TABLET | Refills: 11 | Status: SHIPPED | OUTPATIENT
Start: 2024-09-24 | End: 2025-09-24

## 2024-09-24 RX ORDER — PANCRELIPASE 36000; 180000; 114000 [USP'U]/1; [USP'U]/1; [USP'U]/1
CAPSULE, DELAYED RELEASE PELLETS ORAL
COMMUNITY
Start: 2024-09-19

## 2024-09-24 RX ORDER — DICYCLOMINE HYDROCHLORIDE 10 MG/1
CAPSULE ORAL
COMMUNITY
Start: 2024-09-16 | End: 2024-09-24 | Stop reason: ALTCHOICE

## 2024-09-24 ASSESSMENT — ENCOUNTER SYMPTOMS
DIARRHEA: 0
ABDOMINAL PAIN: 1
ABDOMINAL DISTENTION: 0

## 2024-09-24 NOTE — PROGRESS NOTES
"Subjective   Patient ID: Jhony Myles \"Chester\" is a 53 y.o. male who presents for Follow-up (6 MONTH).  HPI  Patient is here today for 6 mo follow up     Patient is seeing Gi Dr Friend in [place], considering pancreatic insufficiency, waiting for creon to be approved.       Review of Systems   Gastrointestinal:  Positive for abdominal pain. Negative for abdominal distention and diarrhea.       Objective   /68   Pulse 83   Ht 1.981 m (6' 6\")   Wt 123 kg (271 lb)   BMI 31.32 kg/m²     Physical Exam  Constitutional:       General: He is not in acute distress.     Appearance: Normal appearance.   HENT:      Head: Normocephalic.      Nose: Nose normal.      Mouth/Throat:      Pharynx: No oropharyngeal exudate.   Eyes:      General:         Right eye: No discharge.         Left eye: No discharge.      Extraocular Movements: Extraocular movements intact.      Pupils: Pupils are equal, round, and reactive to light.   Cardiovascular:      Rate and Rhythm: Normal rate and regular rhythm.      Heart sounds: No murmur heard.     No gallop.   Pulmonary:      Effort: Pulmonary effort is normal. No respiratory distress.      Breath sounds: Normal breath sounds. No wheezing.   Musculoskeletal:         General: No swelling. Normal range of motion.   Skin:     General: Skin is warm and dry.      Coloration: Skin is not jaundiced.   Neurological:      General: No focal deficit present.      Mental Status: He is alert and oriented to person, place, and time.      Cranial Nerves: No cranial nerve deficit.   Psychiatric:         Mood and Affect: Mood normal.         Behavior: Behavior normal.           Assessment/Plan   Problem List Items Addressed This Visit       Type 2 diabetes mellitus with diabetic neuropathy, without long-term current use of insulin (Multi)    Primary hypertension    Coronary artery disease involving native coronary artery of native heart with angina pectoris (CMS-HCC)    Thrombocytopenia (CMS-HCC)    " Depressive disorder    Frequent PVCs    Heart palpitations    Hyperlipidemia    Rheumatoid arthritis with positive rheumatoid factor (Multi)    Chronic LLQ pain     Other Visit Diagnoses       Paresthesia    -  Primary    Relevant Orders    Vitamin B12    TSH with reflex to Free T4 if abnormal    Vertigo        Relevant Medications    meclizine (Antivert) 25 mg tablet          Immunizations   Flu shot declines   COVID received   PNA --   Shingles recommended   RSV --     PSA --   Colon cancer screening 2021     LUQ abd pain in the setting of thrombocytopenia, splenomegally,  previous evals with EGD and colonoscopy ok, pain persists, normal bloodwork other than low platelets , pain continues but it is intermittent   - cT SCAN only showed nonobstructing left upper pole calculus in the past, recent ct scan did not show anything abnormal in this area, and known hydrocele s/p removal now  - did see Dr Dow, barium swallow as normal   - did get second Gi opinion with DR Schroeder, exploring pancreatic insuficiency, was rx creon, waiting for insurance approval    - no improvement with wellchol  - continue ppi      2. Thrombocytopenia   - following with hematology     3. GERD   - continue prilosec      4. CAD with  RCA s/p multiple PCI with most recent being balloon angioplasty of several ostial stenosis in Dg1 07/2022  - following with Cardiology, appt in March 24 next   - on crestor 5mg po daily   - continue losartan 50mg po daily   - continue imdur 30mg po daily   - continue hctzs 12.5mg 2 tabs daily   - on brilinta      5. DMII, controlled   -A1c 5.2% in 4.23, 5.6% , ordered A1c pending     6. Right knee hip and thumb pain  - following with ortho       Final diagnoses:   [R20.2] Paresthesia   [R42] Vertigo   [I25.119] Coronary artery disease involving native coronary artery of native heart with angina pectoris (CMS-HCC)   [I49.3] Frequent PVCs   [R00.2] Heart palpitations   [E78.2] Mixed hyperlipidemia   [I10] Primary  hypertension   [D69.6] Thrombocytopenia (CMS-HCC)   [E11.40] Type 2 diabetes mellitus with diabetic neuropathy, without long-term current use of insulin (Multi)   [R10.32, G89.29] Chronic LLQ pain   [F32.A] Depressive disorder   [M05.9] Rheumatoid arthritis with positive rheumatoid factor, involving unspecified site (Multi)

## 2024-10-08 DIAGNOSIS — I25.119 CORONARY ARTERY DISEASE INVOLVING NATIVE CORONARY ARTERY OF NATIVE HEART WITH ANGINA PECTORIS: ICD-10-CM

## 2024-10-08 DIAGNOSIS — E87.6 HYPOKALEMIA: ICD-10-CM

## 2024-10-08 RX ORDER — POTASSIUM CHLORIDE 20 MEQ/1
20 TABLET, EXTENDED RELEASE ORAL DAILY
Qty: 90 TABLET | Refills: 1 | Status: SHIPPED | OUTPATIENT
Start: 2024-10-08 | End: 2025-04-06

## 2024-10-08 RX ORDER — ISOSORBIDE MONONITRATE 30 MG/1
30 TABLET, EXTENDED RELEASE ORAL EVERY 12 HOURS
Qty: 180 TABLET | Refills: 3 | Status: SHIPPED | OUTPATIENT
Start: 2024-10-08

## 2024-11-18 ENCOUNTER — TELEPHONE (OUTPATIENT)
Dept: CARDIOLOGY | Facility: HOSPITAL | Age: 54
End: 2024-11-18
Payer: COMMERCIAL

## 2024-11-18 ENCOUNTER — HOSPITAL ENCOUNTER (OUTPATIENT)
Age: 54
Discharge: HOME | End: 2024-11-18
Payer: MEDICAID

## 2024-11-18 DIAGNOSIS — I25.119 CORONARY ARTERY DISEASE INVOLVING NATIVE CORONARY ARTERY OF NATIVE HEART WITH ANGINA PECTORIS: ICD-10-CM

## 2024-11-18 DIAGNOSIS — I10 PRIMARY HYPERTENSION: ICD-10-CM

## 2024-11-18 DIAGNOSIS — M25.511: Primary | ICD-10-CM

## 2024-11-18 DIAGNOSIS — E11.9: ICD-10-CM

## 2024-11-18 PROCEDURE — 36415 COLL VENOUS BLD VENIPUNCTURE: CPT

## 2024-11-18 PROCEDURE — 73030 X-RAY EXAM OF SHOULDER: CPT

## 2024-11-18 PROCEDURE — 83036 HEMOGLOBIN GLYCOSYLATED A1C: CPT

## 2024-11-18 RX ORDER — ROSUVASTATIN CALCIUM 10 MG/1
10 TABLET, COATED ORAL EVERY OTHER DAY
Qty: 45 TABLET | Refills: 3 | Status: SHIPPED | OUTPATIENT
Start: 2024-11-18 | End: 2025-11-18

## 2024-11-18 NOTE — TELEPHONE ENCOUNTER
Patients Pharmacy called in asking for clarification on the usage instructions for patients medication. The medication being Rosuvastatin. Pharmacy offered a call back [phone].

## 2024-12-04 ENCOUNTER — HOSPITAL ENCOUNTER (OUTPATIENT)
Dept: HOSPITAL 100 - PSN | Age: 54
Discharge: HOME | End: 2024-12-04
Payer: MEDICAID

## 2024-12-04 DIAGNOSIS — R20.2: Primary | ICD-10-CM

## 2024-12-04 DIAGNOSIS — M79.601: ICD-10-CM

## 2024-12-04 PROCEDURE — 95909 NRV CNDJ TST 5-6 STUDIES: CPT

## 2024-12-04 PROCEDURE — 95886 MUSC TEST DONE W/N TEST COMP: CPT

## 2024-12-17 ENCOUNTER — APPOINTMENT (OUTPATIENT)
Dept: CARDIOLOGY | Facility: HOSPITAL | Age: 54
End: 2024-12-17
Payer: COMMERCIAL

## 2024-12-23 ENCOUNTER — APPOINTMENT (OUTPATIENT)
Dept: CARDIOLOGY | Facility: CLINIC | Age: 54
End: 2024-12-23
Payer: COMMERCIAL

## 2024-12-31 RX ORDER — CYCLOBENZAPRINE HCL 10 MG
10 TABLET ORAL 3 TIMES DAILY PRN
COMMUNITY
Start: 2024-11-13

## 2025-01-03 ENCOUNTER — TELEPHONE (OUTPATIENT)
Dept: CARDIOLOGY | Facility: HOSPITAL | Age: 55
End: 2025-01-03

## 2025-01-03 ENCOUNTER — APPOINTMENT (OUTPATIENT)
Dept: CARDIOLOGY | Facility: HOSPITAL | Age: 55
End: 2025-01-03
Payer: COMMERCIAL

## 2025-01-03 VITALS
DIASTOLIC BLOOD PRESSURE: 72 MMHG | BODY MASS INDEX: 32.28 KG/M2 | HEIGHT: 78 IN | HEART RATE: 67 BPM | OXYGEN SATURATION: 96 % | WEIGHT: 279 LBS | SYSTOLIC BLOOD PRESSURE: 110 MMHG

## 2025-01-03 DIAGNOSIS — I25.119 CORONARY ARTERY DISEASE INVOLVING NATIVE CORONARY ARTERY OF NATIVE HEART WITH ANGINA PECTORIS: Primary | ICD-10-CM

## 2025-01-03 PROCEDURE — 3074F SYST BP LT 130 MM HG: CPT | Performed by: INTERNAL MEDICINE

## 2025-01-03 PROCEDURE — 3008F BODY MASS INDEX DOCD: CPT | Performed by: INTERNAL MEDICINE

## 2025-01-03 PROCEDURE — 99213 OFFICE O/P EST LOW 20 MIN: CPT | Performed by: INTERNAL MEDICINE

## 2025-01-03 PROCEDURE — 3078F DIAST BP <80 MM HG: CPT | Performed by: INTERNAL MEDICINE

## 2025-01-03 PROCEDURE — 4010F ACE/ARB THERAPY RXD/TAKEN: CPT | Performed by: INTERNAL MEDICINE

## 2025-01-03 RX ORDER — TICAGRELOR 90 MG/1
90 TABLET ORAL
COMMUNITY
Start: 2024-12-13

## 2025-01-03 ASSESSMENT — ENCOUNTER SYMPTOMS: PALPITATIONS: 1

## 2025-01-03 NOTE — TELEPHONE ENCOUNTER
LVM for patient at this time to see if it was more convenient for him to come in earlier.    Thank you!  Davian ANGLIN

## 2025-01-03 NOTE — PATIENT INSTRUCTIONS
Continue all current medications as prescribed.  Dr. Ortiz has ordered blood work to followup on your cholesterol (fasting).  Followup with Mady Tarango NP, in 6 months.      If you have any questions or cardiac concerns, please call our office at [phone].

## 2025-01-03 NOTE — PROGRESS NOTES
"Counseling:  The patient was counseled regarding diagnostic results, instructions for management, risk factor reductions, prognosis, patient and family education, impressions, risks and benefits of treatment options and importance of compliance with treatment.      Chief Complaint:   The patient presents today for 6-month followup of CAD and HTN.       History Of Present Illness:    Jhony Myles is a 54 year old male patient who presents today in the company of family for 6-month followup of CAD and HTN. His PMH is significant for CAD with  RCA s/p multiple PCI with most recent being balloon angioplasty of several ostial stenosis in Dg1 07/2022, HTN, anxiety, agoraphobia, PVC's, fibromyalgia, GERD and hyperlipidemia. Over the past 6 months, the patient states that he has done well from a cardiac standpoint. He denies any CP, chest discomfort or SOB. He reports occasional palpitations/PVCs. BP has been stable. The patient is compliant with his prescribed medications.     Last Recorded Vitals:  Vitals:    01/03/25 1148   BP: 110/72   BP Location: Left arm   Pulse: 67   SpO2: 96%   Weight: 127 kg (279 lb)   Height: 1.981 m (6' 6\")       Past Surgical History:  He has a past surgical history that includes Coronary angioplasty (02/27/2018); CT angio neck (12/2/2022); CT angio head w and wo IV contrast (12/2/2022); MR angio head wo IV contrast (4/3/2023); MR angio neck wo IV contrast (4/3/2023); and Cardiac catheterization (N/A, 8/13/2024).      Social History:  He reports that he has never smoked. He has never used smokeless tobacco. He reports that he does not drink alcohol and does not use drugs.    Family History:  Family History   Problem Relation Name Age of Onset    Heart disease Mother Taylor     Hypertension Mother Taylor     Heart disease Father Jhony     Hypertension Father Jhony         Allergies:  Amlodipine, Calcium channel blocking agent diltiazem analogues, Ciprofloxacin, Hydralazine, Iodinated " contrast media, Lisinopril, Nitroglycerin, Other, Penicillins, Statins-hmg-coa reductase inhibitors, Tramadol, Meperidine, Propoxyphene-acetaminophen, and Temazepam    Outpatient Medications:  Current Outpatient Medications   Medication Instructions    ALPRAZolam (XANAX) 1 mg, 2 times daily PRN    aspirin 81 mg, oral, Daily    Brilinta 90 mg    carvedilol (COREG) 25 mg, oral, 2 times daily (morning and late afternoon)    cetirizine (ZYRTEC) 10 mg    Creon 36,000-114,000- 180,000 unit capsule,delayed release(DR/EC) capsule     cyclobenzaprine (FLEXERIL) 10 mg, 3 times daily PRN    flash glucose sensor kit (FreeStyle Nancy 2 Sensor) kit Use as instructed    FreeStyle Nancy reader (FreeStyle Nancy 2 Patrick) misc Use as instructed    hydroCHLOROthiazide (HYDRODIURIL) 25 mg, oral, Daily    isosorbide mononitrate ER (IMDUR) 30 mg, oral, Every 12 hours    losartan (COZAAR) 100 mg, oral, Daily    meclizine (ANTIVERT) 25 mg, oral, 3 times daily PRN    minoxidil (LONITEN) 2.5 mg, oral, 2 times daily    olopatadine (Patanol) 0.1 % ophthalmic solution     omeprazole (PRILOSEC) 20 mg, oral, Daily before breakfast, Do not crush or chew.    potassium chloride CR (Klor-Con M20) 20 mEq ER tablet 20 mEq, oral, Daily    rosuvastatin (CRESTOR) 10 mg, oral, Every other day, 10 mg every other day     Review of Systems   Cardiovascular:  Positive for palpitations.   All other systems reviewed and are negative.     Physical Exam:  Constitutional:       Appearance: Healthy appearance. Not in distress.   Neck:      Vascular: No JVR. JVD normal.   Pulmonary:      Effort: Pulmonary effort is normal.      Breath sounds: Normal breath sounds. No wheezing. No rhonchi. No rales.   Chest:      Chest wall: Not tender to palpatation.   Cardiovascular:      PMI at left midclavicular line. Normal rate. Regular rhythm. Normal S1. Normal S2.       Murmurs: There is no murmur.      No gallop.  No click. No rub.   Pulses:     Intact distal pulses.    Edema:     Peripheral edema absent.   Abdominal:      General: Bowel sounds are normal.      Palpations: Abdomen is soft.      Tenderness: There is no abdominal tenderness.   Musculoskeletal: Normal range of motion.         General: No tenderness. Skin:     General: Skin is warm and dry.   Neurological:      General: No focal deficit present.      Mental Status: Alert and oriented to person, place and time.          Last Labs:  CBC -  Lab Results   Component Value Date    WBC 12.8 (H) 08/13/2024    HGB 14.2 08/13/2024    HCT 39.3 (L) 08/13/2024    MCV 88 08/13/2024     08/13/2024       CMP -  Lab Results   Component Value Date    CALCIUM 10.1 08/13/2024    PROT 6.9 08/13/2024    ALBUMIN 4.7 08/13/2024    AST 15 08/13/2024    ALT 18 08/13/2024    ALKPHOS 46 08/13/2024    BILITOT 0.8 08/13/2024       LIPID PANEL -   Lab Results   Component Value Date    CHOL 110 04/01/2023    TRIG 86 04/01/2023    HDL 22.0 (A) 04/01/2023    CHHDL 5.0 04/01/2023    LDLF 71 04/01/2023    VLDL 17 04/01/2023       RENAL FUNCTION PANEL -   Lab Results   Component Value Date    GLUCOSE 313 (H) 08/13/2024     (L) 08/13/2024    K 4.3 08/13/2024     08/13/2024    CO2 26 08/13/2024    ANIONGAP 12 08/13/2024    BUN 29 (H) 08/13/2024    CREATININE 1.04 08/13/2024    GFRMALE 73 09/19/2023    CALCIUM 10.1 08/13/2024    ALBUMIN 4.7 08/13/2024        Lab Results   Component Value Date    BNP 26 09/19/2023    HGBA1C 5.6 01/03/2024       Last Cardiology Tests:  08/13/2024 - Cardiac Catheterization (LH)  1. Single vessel disease.  2. Left Ventricular end-diastolic pressure = 13.  3. Normal LV filling pressures.    08/13/2024 - TTE  1. The left ventricular systolic function is normal, with a Conte's biplane calculated ejection fraction of 62%.  2. Spectral Doppler shows an impaired relaxation pattern of left ventricular diastolic filling.  3. There is normal right ventricular global systolic function.    05/23/2023 - Cardiac  Catheterization (LH)  1. Single vessel coronary artery disease without proximal left anterior descending involvement.  2. Left Ventricular end-diastolic pressure = 9.  3. Normal LV filling pressures.     04/24/2023 - TTE  Left ventricular systolic function is normal with a 60-65% estimated ejection fraction.     04/17/2023 - Vascular Lab Carotid Artery Duplex U/S   1. Right Carotid: Findings are consistent with less than 50% stenosis of the right proximal ICA. Laminar flow seen by color Doppler. Right external carotid artery appears patent with no evidence of stenosis. The right vertebral artery is patent with antegrade flow. No evidence of hemodynamically significant stenosis in the right subclavian artery.  2. Left Carotid: Findings are consistent with less than 50% stenosis of the left proximal ICA. Laminar flow seen by color Doppler. Left external carotid artery appears patent with no evidence of stenosis. The left vertebral artery is patent with antegrade flow. No evidence of hemodynamically significant stenosis in the left subclavian artery.     04/03/2023 - MRI Brain/Head/Neck  1. There is no MRI evidence of acute infarction on the diffusion weighted images.  2. There is mild-to-moderate brain parenchymal volume loss.  3. There are small scattered nonspecific white matter changes within the cerebral hemispheres bilaterally which while nonspecific, given the patient's age, likely represent sequelae of more remote small vessel ischemic change.  4. The MRA of the neck demonstrates a short segmental area of diminished MRA signal suggestive of slab artifact along the distal right common carotid artery and origin of the left internal carotid artery. There is mild non hemodynamically significant narrowing noted along the proximal internal carotid arteries bilaterally. There is diminished MRA signal noted along the horizontal segments of the distal cervical vertebral arteries bilaterally felt to likely be  technical/artifactual in origin. Otherwise, no significant focal stenosis noted along the cervical segments of the vertebral arteries. There is a left dominant vertebral artery.  5. The intracranial MRA demonstrates a small caliber distal right vertebral artery which terminates in a right posterior inferior cerebellar artery. No significant stenosis noted along the distal left dominant vertebral artery, basilar artery, or distal internal carotid arteries. Otherwise, no other significant intracranial stenosis or intracranial aneurysm is noted.     07/29/2022 - Cardiac Catheterization (LH)  1. Severe ostial stenosis in the Diagonal-1 vessel, status post balloon angioplasty.  2. Patent stent in the LAD and OM system.  3. LVEDP of 20 mmHg.     06/24/2022 - TTE  The left ventricular systolic function is normal with a 70-75% estimated ejection fraction.     06/23/2022 - Cardiac Catheterization ()  1. Double vessel coronary artery disease without proximal left anterior descending involvement.  2. Culprit vessel(s): left anterior descending.  3. Successful PCI of LAD.     11/18/2021 - Cardiac Catheterization ()  Single vessel coronary artery disease without proximal left anterior descending involvement.     10/11/2021 - NM Cardiac Stress Test  1. Normal myocardial perfusion study without evidence of ischemia or prior infarction. The left ventricle is normal in size. Normal LV wall motion with an LV EF estimated at approximately 55%.  2. Baseline EKG shows normal sinus rhythm with no significant ST-T segment changes. With regadenoson infusion no ST-T segment changes of ischemia, or sustained ventricular arrhythmias are seen. Regadenoson stress EKG is negative for ischemia.      09/17/2021 - Cardiac Catheterization   1. The 1st obtuse marginal branch showed mild tortuosity, two previous stents and mild in-stent restenosis.  2. 2-vessel severe coronary artery disease.  3. Successful GILDA PCI to proximal D2 with 3.0 x 18 mm  Resolut Daniel post-dilated with 3.0 x 15 mm NC balloon.  4. Previous LAD stent has mild malapposition in a short segment within the mid stent and in the ostial stent edge due to an aneurysmal segment.     09/08/2021- NM Cardiac Stress Test  1. There is a small to moderate sized fixed perfusion defect in the anteroseptal to apical wall consistent with prior infarct. There is a low probability of ischemia. Calculated ejection fraction of 52% with mild hypokinesis of the septal wall.  2. No electrocardiographic evidence for ischemia at maximal infusion. Normal Stress Test.      07/23/2021 - Cardiac Catheterization (LH)  1. Double vessel coronary artery disease without proximal left anterior descending involvement.  2. Successful PCI of LAD.     03/19/2021 - TTE  The left ventricular systolic function is normal with a 55-60% estimated ejection fraction.     03/18/2021 - Cardiac Catheterization (LH)  1. Single vessel coronary artery disease with proximal left anterior descending involvement.  2. Successful PCI of LAD.     02/13/2019 - Cardiac Catheterization ()  1. Patent stents in the circumflex territory.  2. Chronic total occlusion of the right coronary artery with collaterals from left system.  3. Mild LV dysfunction, EF 50%.  4. Medical therapy advised.     04/10/2018 - Vascular Lab Renal Artery U/S  1. Renal Artery Duplex: Bilateral renal arteries demonstrate no evidence of hemodynamically significant stenosis. The bilateral renal veins are widely patent.  2. Right Renal Artery: Cystlike structure noted in the kidney measuring approximately 3cm 2.7cm. Right kidney size not imaged due to tech error.     03/15/2018 - Vascular Lab Arterial Duplex U/S  Right Upper Arterial: History of cardiac catheterization via RUE approach, c/o pain near axilla. No evidence of pseudoaneurysm, stenosis or occlusion of the subclavian, axillary , radial and ulnar arteries.     02/15/2018 - NM Cardiac Stress Test  1. SPECT perfusion  study: Normal.   2. Fair functional capacity for age and gender.  3. There is no scintigraphic evidence for inducible ischemia.   4. No evidence of scarred myocardium.  5. Left ventricle is mildly dilated. The left ventricle systolic function is normal.   6. Right ventricle is normal in size. THe right ventricle systolic function is normal.  7. This is a low risk scan.   8. EKG Portion: The test was terminated due to general fatigue. Adequate heart rate response of 89% predicted maximal heart rate, normal heart rate recovery @ 1 minute post exercise, normal chronotropic response index. Normal blood pressure response to stress, resting hypertension. Normal ST segment response to stress, T wave changes due to stress. Abnormal Duke treadmill score (<5 but >/= -10), intermediate risk. Typical anginal discomfort during stress, nausea during stress. Multifocal PVCs during the test, PACs during the test. Nondiagnostic due to abnormal resting ECG.      02/11/2018 - Echocardiogram   1. Technically difficult exam due to suboptimal positioning.  2. Exam indication: Chest pain.  3. The left ventricle is normal in size. There is mild concentric left ventricular hypertrophy. Left ventricular systolic function is normal. EF = 60 +/- 5% (2D biplane).  4. The right ventricle is normal in size. Right ventricular systolic function is normal.   5. There are no significant valvular abnormalities.     Lab review: I have personally reviewed the laboratory result(s).     Assessment/Plan   1) Blurred Vision/?TIA  Admitted to hospital 03/31/2023 to 04/03/2023 with blurred vision left eye   CTA negative for acute cortical infarct or intracranial hemorrhage  MRI of brain/neck/head with mild narrowing of the carotid arteries  Scheduled to see opthalmology next week  TTE 08/13/2024 with LVEF 62%     2) Intermittent Sharp Abdominal Pain   Check CT abdomen/pelvis - denied by insurance      3) Scattered Petechiae/Thrombocytopenia   Previously  referred to hematology  CBC 08/13/2024 with platelet count of 155     4) HTN  Stable  On carvedilol 25 mg twice daily, HCTZ 25 mg daily, Imdur 30 mg BID (60 mg daily caused headaches), minoxidil 2.5 mg BID, losartan 50 mg daily  Intolerant of amlodipine in the past d/t BLE edema  Trialed d/c minoxidil with increase in BP above 150 systolic   Patient restarted minoxidil with stabilization of BP   Three weeks prior to hospitalization 03/31/2023, patient reported low BP readings at 70s/30s, especially with changes in position.   No RENETTA per renal artery duplex in 2018  ED evaluation 03/01/2024 with left-sided back pain and right medial thigh pain since removal of right hydrocele 02/20/2024, and 1-week h/o hypotension with orthostasis and associated presyncope, received small fluid bolus s/t a slightly elevated BUN.  Continue current medical Rx   Followup with Mady Tarango NP, in 6 months    5) CAD s/p Multiple PCI - Repeat PCI of LAD July 2021 and June 2022 and 2nd diagonal Sept 2021, PTA D1 July 2022.  On DAPT - ASA 81 mg daily, Brilinta 90 mg BID  On rosuvastatin 10 mg every other day, carvedilol 25 mg BID, Imdur 30 mg BID, HCTZ 25 mg daily, minoxidil 2.5 mg BID, losartan 50 mg daily.  Intolerant of Imdur 60 mg s/t headaches  Known  RCA with collaterals  [facility] Nov 2021 with patent stent LAD, known  RCA  TTE 04/24/2023 with LVEF 60-65%   [facility] 05/23/2023 for evaluation of chest tightness when bending over showed single vessel CAD  Lipoprotein profile 01/03/2024 with LDL-P of 1382, LDL-C of 107, small LDL-P of 1026, LDL size of 19.8, LP-IR score of 61 - non-fasting.  ED evaluation 07/18/2024 with palpitations and chest pressure - EKG showed SR with first degree AV block with flipped T's in leads III and aVF, cardiac monitor strips showed multiple PVCs and a 2-beat run of v-tach, CXR negative, labs with elevated glucose of 184, BUN of 27, low protein of 6 and hemoglobin of 12.5.   TTE 08/13/2024 with LVEF 62%,  impaired relaxation pattern of left ventricular diastolic filling, normal RV systolic function.   [facility] 08/13/2024 with single vessel disease  Denies CP, chest discomfort or SOB  Reports occasional palpitations/PVCs  BP stable  Check Lipid Panel  Continue current medical Rx   Followup with Mady Tarango NP, in 6 months    6) Carotid Bruit  MRI of brain/neck/head 04/03/2033 with mild narrowing of the carotid arteries  Carotid duplex 04/17/2023 with <50% stenosis of bilateral proximal ICAs     7) GERD  On omeprazole 40 mg BID  Management per PCP  Seen by GI in the past   Reports recurrence of postprandial palpitations - likely GI related, and patient agrees         Scribe Attestation  By signing my name below, I, Nadine Carribe   attest that this documentation has been prepared under the direction and in the presence of Juan Ortiz MD.

## 2025-01-07 DIAGNOSIS — I25.119 CORONARY ARTERY DISEASE INVOLVING NATIVE CORONARY ARTERY OF NATIVE HEART WITH ANGINA PECTORIS: ICD-10-CM

## 2025-01-07 RX ORDER — CARVEDILOL 25 MG/1
25 TABLET ORAL
Qty: 180 TABLET | Refills: 3 | Status: SHIPPED | OUTPATIENT
Start: 2025-01-07 | End: 2026-01-07

## 2025-01-15 DIAGNOSIS — I10 PRIMARY HYPERTENSION: ICD-10-CM

## 2025-01-15 DIAGNOSIS — I25.119 CORONARY ARTERY DISEASE INVOLVING NATIVE CORONARY ARTERY OF NATIVE HEART WITH ANGINA PECTORIS: Primary | ICD-10-CM

## 2025-01-16 ENCOUNTER — APPOINTMENT (OUTPATIENT)
Dept: PRIMARY CARE | Facility: CLINIC | Age: 55
End: 2025-01-16
Payer: COMMERCIAL

## 2025-01-16 VITALS
SYSTOLIC BLOOD PRESSURE: 122 MMHG | DIASTOLIC BLOOD PRESSURE: 80 MMHG | BODY MASS INDEX: 31.47 KG/M2 | WEIGHT: 272 LBS | HEIGHT: 78 IN | HEART RATE: 72 BPM

## 2025-01-16 DIAGNOSIS — R10.9 FLANK PAIN: Primary | ICD-10-CM

## 2025-01-16 DIAGNOSIS — R14.0 ABDOMINAL BLOATING: ICD-10-CM

## 2025-01-16 DIAGNOSIS — Z95.5 STENTED CORONARY ARTERY: ICD-10-CM

## 2025-01-16 DIAGNOSIS — I10 PRIMARY HYPERTENSION: ICD-10-CM

## 2025-01-16 DIAGNOSIS — E55.9 VITAMIN D DEFICIENCY: ICD-10-CM

## 2025-01-16 DIAGNOSIS — K21.9 GASTROESOPHAGEAL REFLUX DISEASE WITHOUT ESOPHAGITIS: ICD-10-CM

## 2025-01-16 DIAGNOSIS — Z98.61 S/P PTCA (PERCUTANEOUS TRANSLUMINAL CORONARY ANGIOPLASTY): ICD-10-CM

## 2025-01-16 DIAGNOSIS — D69.6 THROMBOCYTOPENIA (CMS-HCC): ICD-10-CM

## 2025-01-16 DIAGNOSIS — I25.119 CORONARY ARTERY DISEASE INVOLVING NATIVE CORONARY ARTERY OF NATIVE HEART WITH ANGINA PECTORIS: ICD-10-CM

## 2025-01-16 DIAGNOSIS — E11.9 TYPE 2 DIABETES MELLITUS WITHOUT RETINOPATHY (MULTI): ICD-10-CM

## 2025-01-16 DIAGNOSIS — Z12.5 SCREENING FOR PROSTATE CANCER: ICD-10-CM

## 2025-01-16 PROCEDURE — 3008F BODY MASS INDEX DOCD: CPT | Performed by: INTERNAL MEDICINE

## 2025-01-16 PROCEDURE — 3079F DIAST BP 80-89 MM HG: CPT | Performed by: INTERNAL MEDICINE

## 2025-01-16 PROCEDURE — 3074F SYST BP LT 130 MM HG: CPT | Performed by: INTERNAL MEDICINE

## 2025-01-16 PROCEDURE — 99214 OFFICE O/P EST MOD 30 MIN: CPT | Performed by: INTERNAL MEDICINE

## 2025-01-16 PROCEDURE — 4010F ACE/ARB THERAPY RXD/TAKEN: CPT | Performed by: INTERNAL MEDICINE

## 2025-01-16 PROCEDURE — 1036F TOBACCO NON-USER: CPT | Performed by: INTERNAL MEDICINE

## 2025-01-16 RX ORDER — TICAGRELOR 90 MG/1
90 TABLET ORAL 2 TIMES DAILY
Qty: 180 TABLET | Refills: 3 | Status: SHIPPED | OUTPATIENT
Start: 2025-01-16

## 2025-01-16 RX ORDER — MINOXIDIL 2.5 MG/1
2.5 TABLET ORAL 2 TIMES DAILY
Qty: 180 TABLET | Refills: 3 | Status: SHIPPED | OUTPATIENT
Start: 2025-01-16

## 2025-01-16 RX ORDER — SIMETHICONE 180 MG
180 CAPSULE ORAL EVERY 6 HOURS PRN
Qty: 120 CAPSULE | Refills: 3 | Status: SHIPPED | OUTPATIENT
Start: 2025-01-16

## 2025-01-16 ASSESSMENT — ENCOUNTER SYMPTOMS
APPETITE CHANGE: 0
SHORTNESS OF BREATH: 0
SINUS PRESSURE: 0
VOMITING: 0
NUMBNESS: 0
DIARRHEA: 0
FATIGUE: 0
BACK PAIN: 0
WEAKNESS: 0
SINUS PAIN: 0
NAUSEA: 0
SORE THROAT: 0
WHEEZING: 0
ABDOMINAL DISTENTION: 0
ACTIVITY CHANGE: 0
COUGH: 0
CHILLS: 0

## 2025-01-16 NOTE — PROGRESS NOTES
"Subjective   Patient ID: Jhony Myles \"Chester\" is a 54 y.o. male who presents for Follow-up (6 month).  HPI  Patient is here today for 6 mo follow up     Pt reports that he still has chest pains that come and goes, if he takes mylanta it does help, he has not been to the ED in the last 3 mo.   Saw his cardiologist last month and was told everything looked ok.   He has had his gallbladder looked into in the past and was normal..     Reports benny flank pain for the last few days, worse on the right, feels like it is internal , no blood in his urine that he has noticed, some chills, feels like he has difficultly urinating.     Review of Systems   Constitutional:  Negative for activity change, appetite change, chills and fatigue.   HENT:  Negative for congestion, postnasal drip, sinus pressure, sinus pain and sore throat.    Respiratory:  Negative for cough, shortness of breath and wheezing.    Cardiovascular:  Negative for chest pain and leg swelling.   Gastrointestinal:  Negative for abdominal distention, diarrhea, nausea and vomiting.   Musculoskeletal:  Negative for back pain.   Neurological:  Negative for weakness and numbness.       Objective   /80 (BP Location: Right arm, Patient Position: Sitting, BP Cuff Size: Adult)   Pulse 72   Ht 1.981 m (6' 6\")   Wt 123 kg (272 lb)   BMI 31.43 kg/m²     Physical Exam  Constitutional:       General: He is not in acute distress.     Appearance: Normal appearance.   HENT:      Head: Normocephalic.      Nose: Nose normal.      Mouth/Throat:      Pharynx: No oropharyngeal exudate.   Eyes:      General:         Right eye: No discharge.         Left eye: No discharge.      Extraocular Movements: Extraocular movements intact.      Pupils: Pupils are equal, round, and reactive to light.   Cardiovascular:      Rate and Rhythm: Normal rate and regular rhythm.      Heart sounds: No murmur heard.     No gallop.   Pulmonary:      Effort: Pulmonary effort is normal. No " respiratory distress.      Breath sounds: Normal breath sounds. No wheezing.   Abdominal:      General: Bowel sounds are normal. There is no distension.      Palpations: Abdomen is soft.      Tenderness: There is no abdominal tenderness.   Musculoskeletal:         General: Tenderness (tenderness over benny flank right more than left) present. No swelling. Normal range of motion.   Skin:     General: Skin is warm and dry.      Coloration: Skin is not jaundiced.   Neurological:      General: No focal deficit present.      Mental Status: He is alert and oriented to person, place, and time.      Cranial Nerves: No cranial nerve deficit.   Psychiatric:         Mood and Affect: Mood normal.         Behavior: Behavior normal.           Assessment/Plan   Problem List Items Addressed This Visit       Primary hypertension    Coronary artery disease involving native coronary artery of native heart with angina pectoris    Thrombocytopenia (CMS-HCC)    Gastroesophageal reflux disease without esophagitis    Abdominal bloating    Relevant Medications    simethicone (Mylicon,Gas-X) 180 mg capsule    S/P PTCA (percutaneous transluminal coronary angioplasty)    Stented coronary artery    Type 2 diabetes mellitus without retinopathy (Multi)    Vitamin D deficiency     Other Visit Diagnoses       Flank pain    -  Primary    Relevant Orders    CBC and Auto Differential    Comprehensive Metabolic Panel    Urinalysis with Reflex Microscopic    Urine Culture    Screening for prostate cancer        Relevant Orders    Prostate Spec.Ag,Screen          Immunizations   Flu shot declines   COVID received   PNA --   Shingles recommended   RSV --      PSA --   Colon cancer screening 2021      LUQ abd pain in the setting of thrombocytopenia, splenomegally,  previous evals with EGD and colonoscopy ok, pain persists, normal bloodwork other than low platelets , pain continues but it is intermittent   - cT SCAN only showed nonobstructing left upper pole  calculus in the past, recent ct scan did not show anything abnormal in this area, and known hydrocele s/p removal now  - did see Dr Dow, barium swallow as normal   - did get second Gi opinion with DR Schroeder, exploring pancreatic insuficiency, was rx creon, but dc'ed due to side effects has a cta scheduled with Dr Schroeder    - no improvement with wellchol  - continue ppi      2. Thrombocytopenia   - following with hematology     3. GERD   - continue prilosec      4. CAD with  RCA s/p multiple PCI with most recent being balloon angioplasty of several ostial stenosis in Dg1 07/2022  - following with Cardiology  - on crestor 5mg po daily   - continue losartan 50mg po daily   - continue imdur 30mg po daily   - continue hctzs 12.5mg 2 tabs daily   - on brilinta      5. DMII, controlled   -A1c 5.2% in 4.23, 5.6%      6. Right knee hip and thumb pain  - following with ortho     7. Right flank pain   - he has a ct scan scheduled for gi next week   - possible concern for kidney stone,s uti or pyelonephritis   - feels like he has trouble urinating   - will order ua and culture and see ct scan results , as well as cbc, cmp and psa    Advised pt that I will be leaving  at the end of Feb 2025.   Final diagnoses:   [R10.9] Flank pain   [Z12.5] Screening for prostate cancer   [R14.0] Abdominal bloating   [Z95.5] Stented coronary artery   [Z98.61] S/P PTCA (percutaneous transluminal coronary angioplasty)   [I10] Primary hypertension   [I25.119] Coronary artery disease involving native coronary artery of native heart with angina pectoris   [D69.6] Thrombocytopenia (CMS-HCC)   [E55.9] Vitamin D deficiency   [E11.9] Type 2 diabetes mellitus without retinopathy (Multi)   [K21.9] Gastroesophageal reflux disease without esophagitis        No

## 2025-01-17 ENCOUNTER — LAB (OUTPATIENT)
Dept: LAB | Facility: LAB | Age: 55
End: 2025-01-17
Payer: COMMERCIAL

## 2025-01-17 DIAGNOSIS — R20.2 PARESTHESIA: ICD-10-CM

## 2025-01-17 DIAGNOSIS — R10.9 FLANK PAIN: ICD-10-CM

## 2025-01-17 DIAGNOSIS — I25.119 CORONARY ARTERY DISEASE INVOLVING NATIVE CORONARY ARTERY OF NATIVE HEART WITH ANGINA PECTORIS: ICD-10-CM

## 2025-01-17 DIAGNOSIS — E11.40 TYPE 2 DIABETES MELLITUS WITH DIABETIC NEUROPATHY, WITHOUT LONG-TERM CURRENT USE OF INSULIN: ICD-10-CM

## 2025-01-17 DIAGNOSIS — Z12.5 SCREENING FOR PROSTATE CANCER: ICD-10-CM

## 2025-01-17 LAB
ALBUMIN SERPL BCP-MCNC: 4.8 G/DL (ref 3.4–5)
ALP SERPL-CCNC: 47 U/L (ref 33–120)
ALT SERPL W P-5'-P-CCNC: 16 U/L (ref 10–52)
ANION GAP SERPL CALC-SCNC: 9 MMOL/L (ref 10–20)
APPEARANCE UR: CLEAR
AST SERPL W P-5'-P-CCNC: 18 U/L (ref 9–39)
BASOPHILS # BLD AUTO: 0.06 X10*3/UL (ref 0–0.1)
BASOPHILS NFR BLD AUTO: 1.4 %
BILIRUB SERPL-MCNC: 1.2 MG/DL (ref 0–1.2)
BILIRUB UR STRIP.AUTO-MCNC: NEGATIVE MG/DL
BUN SERPL-MCNC: 20 MG/DL (ref 6–23)
CALCIUM SERPL-MCNC: 9.6 MG/DL (ref 8.6–10.3)
CHLORIDE SERPL-SCNC: 106 MMOL/L (ref 98–107)
CHOLEST SERPL-MCNC: 104 MG/DL (ref 0–199)
CHOLESTEROL/HDL RATIO: 5
CO2 SERPL-SCNC: 30 MMOL/L (ref 21–32)
COLOR UR: YELLOW
CREAT SERPL-MCNC: 0.81 MG/DL (ref 0.5–1.3)
EGFRCR SERPLBLD CKD-EPI 2021: >90 ML/MIN/1.73M*2
EOSINOPHIL # BLD AUTO: 0.15 X10*3/UL (ref 0–0.7)
EOSINOPHIL NFR BLD AUTO: 3.4 %
ERYTHROCYTE [DISTWIDTH] IN BLOOD BY AUTOMATED COUNT: 12.9 % (ref 11.5–14.5)
GLUCOSE SERPL-MCNC: 131 MG/DL (ref 74–99)
GLUCOSE UR STRIP.AUTO-MCNC: NORMAL MG/DL
HCT VFR BLD AUTO: 39 % (ref 41–52)
HDLC SERPL-MCNC: 21 MG/DL
HGB BLD-MCNC: 14 G/DL (ref 13.5–17.5)
IMM GRANULOCYTES # BLD AUTO: 0 X10*3/UL (ref 0–0.7)
IMM GRANULOCYTES NFR BLD AUTO: 0 % (ref 0–0.9)
KETONES UR STRIP.AUTO-MCNC: NEGATIVE MG/DL
LDLC SERPL CALC-MCNC: 55 MG/DL
LEUKOCYTE ESTERASE UR QL STRIP.AUTO: NEGATIVE
LYMPHOCYTES # BLD AUTO: 1.02 X10*3/UL (ref 1.2–4.8)
LYMPHOCYTES NFR BLD AUTO: 23.2 %
MCH RBC QN AUTO: 32.1 PG (ref 26–34)
MCHC RBC AUTO-ENTMCNC: 35.9 G/DL (ref 32–36)
MCV RBC AUTO: 89 FL (ref 80–100)
MONOCYTES # BLD AUTO: 0.53 X10*3/UL (ref 0.1–1)
MONOCYTES NFR BLD AUTO: 12.1 %
MUCOUS THREADS #/AREA URNS AUTO: NORMAL /LPF
NEUTROPHILS # BLD AUTO: 2.63 X10*3/UL (ref 1.2–7.7)
NEUTROPHILS NFR BLD AUTO: 59.9 %
NITRITE UR QL STRIP.AUTO: NEGATIVE
NON HDL CHOLESTEROL: 83 MG/DL (ref 0–149)
NRBC BLD-RTO: 0 /100 WBCS (ref 0–0)
PH UR STRIP.AUTO: 7.5 [PH]
PLATELET # BLD AUTO: 128 X10*3/UL (ref 150–450)
POTASSIUM SERPL-SCNC: 4.3 MMOL/L (ref 3.5–5.3)
PROT SERPL-MCNC: 6.5 G/DL (ref 6.4–8.2)
PROT UR STRIP.AUTO-MCNC: NORMAL MG/DL
PSA SERPL-MCNC: 1.58 NG/ML
RBC # BLD AUTO: 4.36 X10*6/UL (ref 4.5–5.9)
RBC # UR STRIP.AUTO: NEGATIVE /UL
RBC #/AREA URNS AUTO: NORMAL /HPF
SODIUM SERPL-SCNC: 141 MMOL/L (ref 136–145)
SP GR UR STRIP.AUTO: 1.02
TRIGL SERPL-MCNC: 138 MG/DL (ref 0–149)
TSH SERPL-ACNC: 1.81 MIU/L (ref 0.44–3.98)
UROBILINOGEN UR STRIP.AUTO-MCNC: NORMAL MG/DL
VIT B12 SERPL-MCNC: 230 PG/ML (ref 211–911)
VLDL: 28 MG/DL (ref 0–40)
WBC # BLD AUTO: 4.4 X10*3/UL (ref 4.4–11.3)
WBC #/AREA URNS AUTO: NORMAL /HPF

## 2025-01-17 PROCEDURE — 82607 VITAMIN B-12: CPT

## 2025-01-17 PROCEDURE — 85025 COMPLETE CBC W/AUTO DIFF WBC: CPT

## 2025-01-17 PROCEDURE — 84443 ASSAY THYROID STIM HORMONE: CPT

## 2025-01-17 PROCEDURE — 80061 LIPID PANEL: CPT

## 2025-01-17 PROCEDURE — 87086 URINE CULTURE/COLONY COUNT: CPT

## 2025-01-17 PROCEDURE — 80053 COMPREHEN METABOLIC PANEL: CPT

## 2025-01-17 PROCEDURE — 83036 HEMOGLOBIN GLYCOSYLATED A1C: CPT

## 2025-01-17 PROCEDURE — 81001 URINALYSIS AUTO W/SCOPE: CPT

## 2025-01-17 PROCEDURE — 84153 ASSAY OF PSA TOTAL: CPT

## 2025-01-18 LAB
EST. AVERAGE GLUCOSE BLD GHB EST-MCNC: 94 MG/DL
HBA1C MFR BLD: 4.9 %

## 2025-01-19 LAB — BACTERIA UR CULT: NORMAL

## 2025-01-22 ENCOUNTER — APPOINTMENT (OUTPATIENT)
Dept: UROLOGY | Facility: CLINIC | Age: 55
End: 2025-01-22
Payer: COMMERCIAL

## 2025-01-24 ENCOUNTER — OFFICE VISIT (OUTPATIENT)
Dept: PULMONOLOGY | Facility: HOSPITAL | Age: 55
End: 2025-01-24
Payer: COMMERCIAL

## 2025-01-24 ENCOUNTER — LAB (OUTPATIENT)
Dept: LAB | Facility: LAB | Age: 55
End: 2025-01-24
Payer: COMMERCIAL

## 2025-01-24 VITALS
HEIGHT: 78 IN | WEIGHT: 272 LBS | DIASTOLIC BLOOD PRESSURE: 88 MMHG | SYSTOLIC BLOOD PRESSURE: 182 MMHG | RESPIRATION RATE: 16 BRPM | HEART RATE: 76 BPM | TEMPERATURE: 96.6 F | BODY MASS INDEX: 31.47 KG/M2 | OXYGEN SATURATION: 97 %

## 2025-01-24 DIAGNOSIS — R06.02 SHORTNESS OF BREATH: ICD-10-CM

## 2025-01-24 DIAGNOSIS — J45.909 ASTHMA, UNSPECIFIED ASTHMA SEVERITY, UNSPECIFIED WHETHER COMPLICATED, UNSPECIFIED WHETHER PERSISTENT (HHS-HCC): Primary | ICD-10-CM

## 2025-01-24 DIAGNOSIS — J30.9 ALLERGIC RHINITIS, UNSPECIFIED SEASONALITY, UNSPECIFIED TRIGGER: ICD-10-CM

## 2025-01-24 PROCEDURE — 3077F SYST BP >= 140 MM HG: CPT | Performed by: NURSE PRACTITIONER

## 2025-01-24 PROCEDURE — 4010F ACE/ARB THERAPY RXD/TAKEN: CPT | Performed by: NURSE PRACTITIONER

## 2025-01-24 PROCEDURE — 86003 ALLG SPEC IGE CRUDE XTRC EA: CPT

## 2025-01-24 PROCEDURE — 99214 OFFICE O/P EST MOD 30 MIN: CPT | Performed by: NURSE PRACTITIONER

## 2025-01-24 PROCEDURE — 3079F DIAST BP 80-89 MM HG: CPT | Performed by: NURSE PRACTITIONER

## 2025-01-24 PROCEDURE — 3008F BODY MASS INDEX DOCD: CPT | Performed by: NURSE PRACTITIONER

## 2025-01-24 PROCEDURE — 3048F LDL-C <100 MG/DL: CPT | Performed by: NURSE PRACTITIONER

## 2025-01-24 PROCEDURE — 82785 ASSAY OF IGE: CPT

## 2025-01-24 PROCEDURE — 1036F TOBACCO NON-USER: CPT | Performed by: NURSE PRACTITIONER

## 2025-01-24 PROCEDURE — 3044F HG A1C LEVEL LT 7.0%: CPT | Performed by: NURSE PRACTITIONER

## 2025-01-24 PROCEDURE — 99204 OFFICE O/P NEW MOD 45 MIN: CPT | Performed by: NURSE PRACTITIONER

## 2025-01-24 RX ORDER — AZELASTINE 1 MG/ML
1 SPRAY, METERED NASAL 2 TIMES DAILY
Qty: 1 ML | Refills: 11 | Status: SHIPPED | OUTPATIENT
Start: 2025-01-24

## 2025-01-24 RX ORDER — LORATADINE 10 MG/1
10 TABLET ORAL DAILY
Qty: 30 TABLET | Refills: 11 | Status: SHIPPED | OUTPATIENT
Start: 2025-01-24

## 2025-01-24 RX ORDER — ALBUTEROL SULFATE 90 UG/1
4 INHALANT RESPIRATORY (INHALATION) ONCE
OUTPATIENT
Start: 2025-01-24

## 2025-01-24 RX ORDER — ALBUTEROL SULFATE 0.83 MG/ML
3 SOLUTION RESPIRATORY (INHALATION) ONCE
OUTPATIENT
Start: 2025-01-24 | End: 2025-01-24

## 2025-01-24 RX ORDER — ALBUTEROL SULFATE 90 UG/1
2 INHALANT RESPIRATORY (INHALATION) EVERY 4 HOURS PRN
Qty: 18 G | Refills: 11 | Status: SHIPPED | OUTPATIENT
Start: 2025-01-24

## 2025-01-24 ASSESSMENT — ENCOUNTER SYMPTOMS
COUGH: 1
WHEEZING: 1
SHORTNESS OF BREATH: 1
RHINORRHEA: 0
CHILLS: 0
FATIGUE: 0
FEVER: 0
UNEXPECTED WEIGHT CHANGE: 0

## 2025-01-24 NOTE — PATIENT INSTRUCTIONS
Start on albuterol 1-2 puffs as needed, you can use this every 4 hours for shortness of breath, coughing fits, or wheezing.   Start on Loratadine one tablet once a day, everyday for now and stop the cetirizine.   Start on Azelastine nasal spray 1-2 times per day, everyday for now.   Please get blood work done.   Please get get lung test done.   Call with any questions or concerns.   Follow up with me on June 16th at 1:20 pm.

## 2025-01-24 NOTE — PROGRESS NOTES
"Subjective   Patient ID: Jhony Myles \"Migel" is a 54 y.o. male who presents for NPV for shortness of breath.     HPI: Patient has PMH of CAD s/p PCI, HTN, anxiety, fibromyalgia and GERD. Patient is here for shortness of breath. He states this happens a few times per week.  He reports coughing up phlegm, that is worse in the winter and happens about 3-4 times per week also. He is currently on cetirizine for sinus drainage/congestion. He has had allergies lifelong and used to get pneumonia frequently growing up. He is not currently on any inhalers. He states that he will get triggered by certain perfumes also. He will hear himself wheezing at times. He also has issues with GERD. He will get occasional chest tightness and he does follow closely with cardiology. His mother and his sisters have asthma. He worked in a steel mill. He has a history of SHRAVAN but then repeat testing he states he no longer has it and no longer needs CPAP. He has no other concerns.     Review of Systems   Constitutional:  Negative for chills, fatigue, fever and unexpected weight change.   HENT:  Negative for congestion, postnasal drip and rhinorrhea.    Respiratory:  Positive for cough (denies hemoptysis.), shortness of breath and wheezing.    Cardiovascular:  Negative for chest pain and leg swelling.   All other systems reviewed and are negative.      Objective   Physical Exam  Vitals reviewed.   Constitutional:       Appearance: Normal appearance.   HENT:      Head: Normocephalic.   Cardiovascular:      Rate and Rhythm: Normal rate and regular rhythm.   Pulmonary:      Effort: Pulmonary effort is normal.      Breath sounds: Normal breath sounds.   Skin:     General: Skin is warm and dry.   Neurological:      Mental Status: He is alert.         Assessment/Plan   Suspect bronchial asthma  Allergic rhinitis   CAD s/p PCI  GERD    Plan:    -PFTs ordered.   -Start on albuterol 1-2 puffs prn, educated on use. If helpful will consider maintenance " inhaler for him also.   -IGE/RAST ordered.   -Stop cetirizine and start on Loratadine daily.   -Start on Azelastine BID.   -He follows closely with cardiology. His blood pressure is elevated today he states this is not his normal, he will continue to monitor it and recommend notify cardiology if this persists.   -He is on GERD management.     Overall I suspect that his cough/shortness of breath is secondary to allergic rhinitis and bronchial asthma. I will optimize management and obtain testing for him. He lives in [place] but brings his mother to the clinic here at [place] and prefers to follow up here. I will call him with testing results and he will notify me sooner if breathing/cough worsens, but otherwise I will see him back in June and coordinate appointments for them.    Total time:  45 min.

## 2025-01-25 LAB
A ALTERNATA IGE QN: <0.1 KU/L
A FUMIGATUS IGE QN: <0.1 KU/L
BERMUDA GRASS IGE QN: 0.28 KU/L
BOXELDER IGE QN: <0.1 KU/L
C HERBARUM IGE QN: <0.1 KU/L
CALIF WALNUT POLN IGE QN: <0.1 KU/L
CAT DANDER IGE QN: <0.1 KU/L
CMN PIGWEED IGE QN: <0.1 KU/L
COMMON RAGWEED IGE QN: 0.38 KU/L
COTTONWOOD IGE QN: <0.1 KU/L
D FARINAE IGE QN: <0.1 KU/L
D PTERONYSS IGE QN: <0.1 KU/L
DOG DANDER IGE QN: <0.1 KU/L
ENGL PLANTAIN IGE QN: <0.1 KU/L
GOOSEFOOT IGE QN: <0.1 KU/L
JOHNSON GRASS IGE QN: 0.76 KU/L
KENT BLUE GRASS IGE QN: 5.31 KU/L
LONDON PLANE IGE QN: <0.1 KU/L
MT JUNIPER IGE QN: <0.1 KU/L
P NOTATUM IGE QN: <0.1 KU/L
PECAN/HICK TREE IGE QN: <0.1 KU/L
ROACH IGE QN: <0.1 KU/L
SALTWORT IGE QN: <0.1 KU/L
SHEEP SORREL IGE QN: <0.1 KU/L
SILVER BIRCH IGE QN: <0.1 KU/L
TIMOTHY IGE QN: 4.28 KU/L
TOTAL IGE SMQN RAST: 100 KU/L
WHITE ASH IGE QN: <0.1 KU/L
WHITE ELM IGE QN: <0.1 KU/L
WHITE MULBERRY IGE QN: <0.1 KU/L
WHITE OAK IGE QN: <0.1 KU/L

## 2025-01-27 ENCOUNTER — TELEPHONE (OUTPATIENT)
Dept: PULMONOLOGY | Facility: HOSPITAL | Age: 55
End: 2025-01-27

## 2025-01-27 ENCOUNTER — HOSPITAL ENCOUNTER (OUTPATIENT)
Dept: RADIOLOGY | Facility: CLINIC | Age: 55
Discharge: HOME | End: 2025-01-27
Payer: COMMERCIAL

## 2025-01-27 ENCOUNTER — APPOINTMENT (OUTPATIENT)
Dept: PRIMARY CARE | Facility: CLINIC | Age: 55
End: 2025-01-27
Payer: COMMERCIAL

## 2025-01-27 VITALS
WEIGHT: 280.4 LBS | SYSTOLIC BLOOD PRESSURE: 142 MMHG | OXYGEN SATURATION: 98 % | BODY MASS INDEX: 32.4 KG/M2 | DIASTOLIC BLOOD PRESSURE: 80 MMHG | HEART RATE: 60 BPM

## 2025-01-27 DIAGNOSIS — E11.40 TYPE 2 DIABETES MELLITUS WITH DIABETIC NEUROPATHY, WITHOUT LONG-TERM CURRENT USE OF INSULIN: ICD-10-CM

## 2025-01-27 DIAGNOSIS — R10.9 FLANK PAIN: ICD-10-CM

## 2025-01-27 DIAGNOSIS — R10.9 FLANK PAIN: Primary | ICD-10-CM

## 2025-01-27 PROBLEM — I24.0: Status: RESOLVED | Noted: 2018-10-12 | Resolved: 2025-01-27

## 2025-01-27 PROBLEM — H16.143 PUNCTATE KERATITIS, BILATERAL: Status: RESOLVED | Noted: 2018-07-31 | Resolved: 2025-01-27

## 2025-01-27 PROBLEM — M25.849 MASS OF JOINT OF FINGER: Status: RESOLVED | Noted: 2018-08-31 | Resolved: 2025-01-27

## 2025-01-27 PROBLEM — M54.50 LOW BACK PAIN, UNSPECIFIED: Status: RESOLVED | Noted: 2022-11-22 | Resolved: 2025-01-27

## 2025-01-27 PROBLEM — R09.81 CONGESTION OF PARANASAL SINUS: Status: RESOLVED | Noted: 2024-03-12 | Resolved: 2025-01-27

## 2025-01-27 PROBLEM — H81.399 PERIPHERAL VERTIGO: Status: RESOLVED | Noted: 2022-12-02 | Resolved: 2025-01-27

## 2025-01-27 PROBLEM — I20.0 UNSTABLE ANGINA (MULTI): Status: RESOLVED | Noted: 2018-02-15 | Resolved: 2025-01-27

## 2025-01-27 PROBLEM — J32.9 SINUSITIS: Status: RESOLVED | Noted: 2023-04-03 | Resolved: 2025-01-27

## 2025-01-27 PROBLEM — R06.81 APNEA: Status: RESOLVED | Noted: 2023-11-21 | Resolved: 2025-01-27

## 2025-01-27 PROBLEM — E87.6 HYPOKALEMIA: Status: RESOLVED | Noted: 2024-03-12 | Resolved: 2025-01-27

## 2025-01-27 PROBLEM — R07.9 CHEST PAIN: Status: RESOLVED | Noted: 2018-09-11 | Resolved: 2025-01-27

## 2025-01-27 PROBLEM — S83.289A TEAR OF LATERAL MENISCUS OF KNEE: Status: RESOLVED | Noted: 2024-03-12 | Resolved: 2025-01-27

## 2025-01-27 PROBLEM — S23.41XA COSTOCHONDRAL JOINT SPRAIN: Status: RESOLVED | Noted: 2024-03-12 | Resolved: 2025-01-27

## 2025-01-27 PROBLEM — R41.3 MEMORY CHANGE: Status: RESOLVED | Noted: 2024-03-12 | Resolved: 2025-01-27

## 2025-01-27 PROBLEM — R10.12 LUQ ABDOMINAL PAIN: Status: RESOLVED | Noted: 2023-06-23 | Resolved: 2025-01-27

## 2025-01-27 PROBLEM — H43.393 VITREOUS FLOATERS OF BOTH EYES: Status: RESOLVED | Noted: 2017-07-26 | Resolved: 2025-01-27

## 2025-01-27 PROBLEM — M76.31 ILIOTIBIAL BAND SYNDROME OF RIGHT SIDE: Status: RESOLVED | Noted: 2023-11-21 | Resolved: 2025-01-27

## 2025-01-27 PROBLEM — B34.9 NONSPECIFIC SYNDROME SUGGESTIVE OF VIRAL ILLNESS: Status: RESOLVED | Noted: 2024-03-12 | Resolved: 2025-01-27

## 2025-01-27 PROBLEM — R35.1 NOCTURIA: Status: RESOLVED | Noted: 2024-01-22 | Resolved: 2025-01-27

## 2025-01-27 PROBLEM — R10.12 LUQ DISCOMFORT: Status: RESOLVED | Noted: 2019-09-25 | Resolved: 2025-01-27

## 2025-01-27 PROBLEM — G89.29 CHRONIC LLQ PAIN: Status: RESOLVED | Noted: 2024-03-12 | Resolved: 2025-01-27

## 2025-01-27 PROBLEM — N50.9 DISORDER OF MALE GENITAL ORGANS: Status: RESOLVED | Noted: 2024-01-22 | Resolved: 2025-01-27

## 2025-01-27 PROBLEM — R41.89 IMPAIRED COGNITION: Status: RESOLVED | Noted: 2023-03-10 | Resolved: 2025-01-27

## 2025-01-27 PROBLEM — R14.0 ABDOMINAL BLOATING: Status: RESOLVED | Noted: 2024-01-15 | Resolved: 2025-01-27

## 2025-01-27 PROBLEM — R94.39 ABNORMAL STRESS TEST: Status: RESOLVED | Noted: 2024-03-12 | Resolved: 2025-01-27

## 2025-01-27 PROBLEM — M70.60 GREATER TROCHANTERIC BURSITIS: Status: RESOLVED | Noted: 2023-11-29 | Resolved: 2025-01-27

## 2025-01-27 PROBLEM — I20.0 ANGINA PECTORIS, UNSTABLE (MULTI): Status: RESOLVED | Noted: 2024-07-30 | Resolved: 2025-01-27

## 2025-01-27 PROBLEM — M05.9 RHEUMATOID ARTHRITIS WITH POSITIVE RHEUMATOID FACTOR (MULTI): Status: RESOLVED | Noted: 2024-01-02 | Resolved: 2025-01-27

## 2025-01-27 PROBLEM — J06.9 UPPER RESPIRATORY TRACT INFECTION: Status: RESOLVED | Noted: 2024-03-12 | Resolved: 2025-01-27

## 2025-01-27 PROBLEM — R51.9 HEADACHE: Status: RESOLVED | Noted: 2024-03-12 | Resolved: 2025-01-27

## 2025-01-27 PROBLEM — Z20.822 SUSPECTED SEVERE ACUTE RESPIRATORY SYNDROME CORONAVIRUS 2 (SARS-COV-2) INFECTION: Status: RESOLVED | Noted: 2024-03-12 | Resolved: 2025-01-27

## 2025-01-27 PROBLEM — K92.0 HEMATEMESIS: Status: RESOLVED | Noted: 2024-03-12 | Resolved: 2025-01-27
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
## 2025-01-27 PROBLEM — N43.3 HYDROCELE: Status: RESOLVED | Noted: 2024-01-22 | Resolved: 2025-01-27

## 2025-01-27 PROBLEM — I69.391 DYSPHAGIA FOLLOWING CEREBROVASCULAR ACCIDENT: Status: RESOLVED | Noted: 2018-10-01 | Resolved: 2025-01-27

## 2025-01-27 PROBLEM — M70.61 GREATER TROCHANTERIC BURSITIS OF RIGHT HIP: Status: RESOLVED | Noted: 2023-11-29 | Resolved: 2025-01-27

## 2025-01-27 PROBLEM — S46.919A STRAIN OF SHOULDER: Status: RESOLVED | Noted: 2023-09-15 | Resolved: 2025-01-27

## 2025-01-27 PROBLEM — M19.90 INFLAMMATORY ARTHRITIS: Status: RESOLVED | Noted: 2019-02-27 | Resolved: 2025-01-27

## 2025-01-27 PROBLEM — I16.1 HYPERTENSIVE EMERGENCY: Status: RESOLVED | Noted: 2024-03-12 | Resolved: 2025-01-27

## 2025-01-27 PROBLEM — H90.42 SENSORINEURAL HEARING LOSS (SNHL) OF LEFT EAR WITH UNRESTRICTED HEARING OF RIGHT EAR: Status: RESOLVED | Noted: 2018-07-10 | Resolved: 2025-01-27

## 2025-01-27 PROBLEM — I49.3 FREQUENT PVCS: Status: RESOLVED | Noted: 2020-05-07 | Resolved: 2025-01-27

## 2025-01-27 PROBLEM — R35.0 URINARY FREQUENCY: Status: RESOLVED | Noted: 2024-03-12 | Resolved: 2025-01-27

## 2025-01-27 PROBLEM — H53.9 VISUAL DISTURBANCE: Status: RESOLVED | Noted: 2023-04-03 | Resolved: 2025-01-27

## 2025-01-27 PROBLEM — S99.921A INJURY OF RIGHT FOOT: Status: RESOLVED | Noted: 2024-03-12 | Resolved: 2025-01-27

## 2025-01-27 PROBLEM — R73.9 HYPERGLYCEMIA: Status: RESOLVED | Noted: 2024-03-12 | Resolved: 2025-01-27

## 2025-01-27 PROBLEM — U07.1 COVID: Status: RESOLVED | Noted: 2024-03-12 | Resolved: 2025-01-27

## 2025-01-27 PROBLEM — I25.110: Status: RESOLVED | Noted: 2018-02-16 | Resolved: 2025-01-27

## 2025-01-27 PROBLEM — M79.603 PAIN OF UPPER EXTREMITY: Status: RESOLVED | Noted: 2023-03-10 | Resolved: 2025-01-27

## 2025-01-27 PROBLEM — R59.9 ADENOPATHY: Status: RESOLVED | Noted: 2024-03-12 | Resolved: 2025-01-27

## 2025-01-27 PROBLEM — R09.89 CAROTID BRUIT: Status: RESOLVED | Noted: 2024-03-12 | Resolved: 2025-01-27

## 2025-01-27 PROBLEM — Z09 HOSPITAL DISCHARGE FOLLOW-UP: Status: RESOLVED | Noted: 2023-04-20 | Resolved: 2025-01-27

## 2025-01-27 PROBLEM — S83.8X2A INJURY OF MENISCUS OF LEFT KNEE: Status: RESOLVED | Noted: 2019-03-27 | Resolved: 2025-01-27

## 2025-01-27 PROBLEM — R06.02 SHORTNESS OF BREATH: Status: RESOLVED | Noted: 2024-03-12 | Resolved: 2025-01-27

## 2025-01-27 PROBLEM — Z87.438: Status: RESOLVED | Noted: 2024-06-12 | Resolved: 2025-01-27

## 2025-01-27 PROBLEM — I21.9 MYOCARDIAL INFARCTION (MULTI): Status: RESOLVED | Noted: 2024-03-12 | Resolved: 2025-01-27

## 2025-01-27 PROBLEM — R10.11 RIGHT UPPER QUADRANT PAIN: Status: RESOLVED | Noted: 2018-06-08 | Resolved: 2025-01-27

## 2025-01-27 PROBLEM — Z20.822 CONTACT WITH AND (SUSPECTED) EXPOSURE TO COVID-19: Status: RESOLVED | Noted: 2022-07-30 | Resolved: 2025-01-27

## 2025-01-27 PROBLEM — M67.442 GANGLION CYST OF FINGER OF LEFT HAND: Status: RESOLVED | Noted: 2018-10-01 | Resolved: 2025-01-27

## 2025-01-27 PROBLEM — R13.10 DYSPHAGIA: Status: RESOLVED | Noted: 2018-06-08 | Resolved: 2025-01-27

## 2025-01-27 PROBLEM — R10.32 CHRONIC LLQ PAIN: Status: RESOLVED | Noted: 2024-03-12 | Resolved: 2025-01-27

## 2025-01-27 PROBLEM — M79.644 PAIN OF RIGHT THUMB: Status: RESOLVED | Noted: 2024-03-12 | Resolved: 2025-01-27

## 2025-01-27 PROBLEM — R19.5 OCCULT BLOOD IN STOOLS: Status: RESOLVED | Noted: 2018-10-01 | Resolved: 2025-01-27

## 2025-01-27 PROBLEM — R04.2 HEMOPTYSIS: Status: RESOLVED | Noted: 2024-03-12 | Resolved: 2025-01-27
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
## 2025-01-27 PROBLEM — M25.571 ACUTE RIGHT ANKLE PAIN: Status: RESOLVED | Noted: 2024-03-12 | Resolved: 2025-01-27

## 2025-01-27 PROBLEM — M05.9 RHEUMATOID ARTHRITIS WITH POSITIVE RHEUMATOID FACTOR, INVOLVING UNSPECIFIED SITE (MULTI): Status: RESOLVED | Noted: 2024-01-02 | Resolved: 2025-01-27

## 2025-01-27 PROBLEM — R39.15 URINARY URGENCY: Status: RESOLVED | Noted: 2018-09-12 | Resolved: 2025-01-27

## 2025-01-27 PROCEDURE — 74022 RADEX COMPL AQT ABD SERIES: CPT | Performed by: RADIOLOGY

## 2025-01-27 PROCEDURE — 3077F SYST BP >= 140 MM HG: CPT | Performed by: FAMILY MEDICINE

## 2025-01-27 PROCEDURE — 74022 RADEX COMPL AQT ABD SERIES: CPT

## 2025-01-27 PROCEDURE — 3048F LDL-C <100 MG/DL: CPT | Performed by: FAMILY MEDICINE

## 2025-01-27 PROCEDURE — 99214 OFFICE O/P EST MOD 30 MIN: CPT | Performed by: FAMILY MEDICINE

## 2025-01-27 PROCEDURE — 3044F HG A1C LEVEL LT 7.0%: CPT | Performed by: FAMILY MEDICINE

## 2025-01-27 PROCEDURE — 1036F TOBACCO NON-USER: CPT | Performed by: FAMILY MEDICINE

## 2025-01-27 PROCEDURE — 4010F ACE/ARB THERAPY RXD/TAKEN: CPT | Performed by: FAMILY MEDICINE

## 2025-01-27 PROCEDURE — 3079F DIAST BP 80-89 MM HG: CPT | Performed by: FAMILY MEDICINE

## 2025-01-27 NOTE — TELEPHONE ENCOUNTER
Patient acknowledged understanding. All questions answered at this time.    ----- Message from Dee Dee Kelley sent at 1/27/2025 10:17 AM EST -----  Please call the patient and let him know that allergy panel showed he is allergic to several grasses and ragweed, recommend continue current plan of care.

## 2025-01-27 NOTE — PROGRESS NOTES
Subjective   Patient ID: Chester Myles is a 54 y.o. male who presents for Establish Care.    HPI     LUQ abd pain in the setting of thrombocytopenia, splenomegally,  previous evals with EGD and colonoscopy ok, pain persists, normal bloodwork other than low platelets , pain continues but it is intermittent   - cT SCAN only showed nonobstructing left upper pole calculus in the past, recent ct scan did not show anything abnormal in this area, and known hydrocele s/p removal now  - did see Dr Dow, barium swallow as normal   - did get second Gi opinion with Dr Schroeder, exploring pancreatic insuficiency, was rx creon, but SD'ed due to side effects has a cta scheduled with Dr Schroeder    - no improvement with wellchol  - continue ppi   Insurance refused CT of abd and reschedule for 2/5  H/o kidney stone passed on on   UA showed no blood   Has had pain for 4 weeks is a little  less intense and is constant ache and then get stabbing   Nonradiating except last week had medial left leg pain associated with dysuria just one day last week     H/o fibromyalgia  Has neck crepitus with rotatation   Has chronic benny UQ pain on the ribs for years  PT last time 2024 for right knee       SHRAVAN      Wt loss and repeat sleep study was normal      After lost 130 pounds      410 max 20 years     2. Thrombocytopenia   - following with hematology     3. GERD   - continue prilosec      4. CAD with  RCA s/p multiple PCI with most recent being balloon angioplasty of several ostial stenosis in Dg1 07/2022  - following with Cardiology  - on crestor 5mg po daily   - continue losartan 50mg po daily   - continue imdur 30mg po daily   - continue hctzs 12.5mg 2 tabs daily   - on brilinta      5. DMII, controlled   -A1c 4.9 Jan 2025   Plant based diet since 3 months      6. Right knee hip and thumb pain  - following with ortho        [facility]    Sangeetha Phan rx xanax      Review of Systems    Objective   /80   Pulse 60    Wt 127 kg (280 lb 6.4 oz)   SpO2 98%   BMI 32.40 kg/m²     Physical Exam  Vitals reviewed.   Constitutional:       Appearance: Normal appearance.   HENT:      Head: Normocephalic and atraumatic.   Eyes:      Conjunctiva/sclera: Conjunctivae normal.   Cardiovascular:      Rate and Rhythm: Normal rate and regular rhythm.   Pulmonary:      Effort: Pulmonary effort is normal.      Breath sounds: Normal breath sounds.   Musculoskeletal:      Cervical back: Neck supple.   Skin:     General: Skin is warm and dry.   Neurological:      General: No focal deficit present.      Mental Status: He is alert and oriented to person, place, and time.   Psychiatric:         Mood and Affect: Mood normal.         Behavior: Behavior normal.         Thought Content: Thought content normal.         Judgment: Judgment normal.         Assessment/Plan   Diagnoses and all orders for this visit:  Flank pain  -     XR abdomen 2 views w chest 1 view; Future  Type 2 diabetes mellitus with diabetic neuropathy, without long-term current use of insulin  -     Hemoglobin A1C; Future  -     Basic Metabolic Panel; Future  -     Albumin-Creatinine Ratio, Urine Random; Future

## 2025-02-07 ENCOUNTER — APPOINTMENT (OUTPATIENT)
Dept: RESPIRATORY THERAPY | Facility: HOSPITAL | Age: 55
End: 2025-02-07
Payer: COMMERCIAL

## 2025-02-10 ENCOUNTER — HOSPITAL ENCOUNTER (OUTPATIENT)
Dept: RESPIRATORY THERAPY | Facility: HOSPITAL | Age: 55
Discharge: HOME | End: 2025-02-10
Payer: COMMERCIAL

## 2025-02-10 DIAGNOSIS — R06.02 SHORTNESS OF BREATH: ICD-10-CM

## 2025-02-10 PROCEDURE — 94010 BREATHING CAPACITY TEST: CPT | Mod: CCI

## 2025-02-10 PROCEDURE — 2500000001 HC RX 250 WO HCPCS SELF ADMINISTERED DRUGS (ALT 637 FOR MEDICARE OP): Performed by: NURSE PRACTITIONER

## 2025-02-10 RX ORDER — ALBUTEROL SULFATE 90 UG/1
4 INHALANT RESPIRATORY (INHALATION) ONCE
Status: COMPLETED | OUTPATIENT
Start: 2025-02-10 | End: 2025-02-10

## 2025-02-10 RX ORDER — ALBUTEROL SULFATE 0.83 MG/ML
3 SOLUTION RESPIRATORY (INHALATION) ONCE
Status: COMPLETED | OUTPATIENT
Start: 2025-02-10 | End: 2025-02-10

## 2025-02-10 RX ADMIN — ALBUTEROL SULFATE 4 PUFF: 90 AEROSOL, METERED RESPIRATORY (INHALATION) at 10:21

## 2025-02-19 ENCOUNTER — APPOINTMENT (OUTPATIENT)
Dept: DERMATOLOGY | Facility: CLINIC | Age: 55
End: 2025-02-19
Payer: COMMERCIAL

## 2025-02-19 DIAGNOSIS — D18.01 HEMANGIOMA OF SKIN: ICD-10-CM

## 2025-02-19 DIAGNOSIS — Z12.83 SKIN CANCER SCREENING: ICD-10-CM

## 2025-02-19 DIAGNOSIS — L81.4 LENTIGO: ICD-10-CM

## 2025-02-19 DIAGNOSIS — Z12.83 ENCOUNTER FOR SCREENING FOR MALIGNANT NEOPLASM OF SKIN: Primary | ICD-10-CM

## 2025-02-19 DIAGNOSIS — D22.9 MELANOCYTIC NEVUS, UNSPECIFIED LOCATION: ICD-10-CM

## 2025-02-19 PROCEDURE — 4010F ACE/ARB THERAPY RXD/TAKEN: CPT | Performed by: NURSE PRACTITIONER

## 2025-02-19 PROCEDURE — 99213 OFFICE O/P EST LOW 20 MIN: CPT | Performed by: NURSE PRACTITIONER

## 2025-02-19 PROCEDURE — 3048F LDL-C <100 MG/DL: CPT | Performed by: NURSE PRACTITIONER

## 2025-02-19 PROCEDURE — 1036F TOBACCO NON-USER: CPT | Performed by: NURSE PRACTITIONER

## 2025-02-19 PROCEDURE — 3044F HG A1C LEVEL LT 7.0%: CPT | Performed by: NURSE PRACTITIONER

## 2025-02-19 NOTE — PROGRESS NOTES
Subjective     Chester Myles is a 54 y.o. male who presents for the following: Skin Check (Pt presents to office for full skin exam.  Last full skin exam 02/14/2024.  Pt has family history of NMSC in paternal uncle.  Pt has scattered lesions of concern at this time. Chaperone offered and declined.//).     Review of Systems:  No other skin or systemic complaints other than what is documented elsewhere in the note.    The following portions of the chart were reviewed this encounter and updated as appropriate:  Tobacco  Allergies  Meds  Problems  Med Hx  Surg Hx  Fam Hx         Skin Cancer History  No skin cancer on file.      Specialty Problems    None       Objective   Well appearing patient in no apparent distress; mood and affect are within normal limits.    A full examination was performed including scalp, head, eyes, ears, nose, lips, neck, chest, axillae, abdomen, back, buttocks, bilateral upper extremities, bilateral lower extremities, hands, feet, fingers, toes, fingernails, and toenails. All findings within normal limits unless otherwise noted below.    Assessment/Plan   1. Encounter for screening for malignant neoplasm of skin  Scattered benign lesions    - Protective measures, such as avoiding skin exposure to sunlight during peak sun hours (10 AM to 3 PM), wearing protective clothing, and applying high-SPF sunscreen, are essential for reducing exposure to harmful ultraviolet (UV) light.  - Monthly self-examination of the skin is helpful to detect new lesions or changes in existing lesions.  - Discussed signs and symptoms of sun-related skin cancers.   - Make sure your moles are not signs of skin cancer (melanoma). Remember the ABCDEs of melanoma lesions:  A - Asymmetry: One half of the lesion does not mirror the other half.  B - Border: The borders are irregular or vague (indistinct).  C - Color: More than one color may be noted within the mole.  D - Diameter: Size greater than 6 mm (roughly the  size of a pencil eraser) may be concerning.  E - Evolving: Notable changes in the lesion over time are suspicious signs for skin cancer.    2. Skin cancer screening    Related Procedures  Follow Up In Dermatology - Established Patient    3. Melanocytic nevus, unspecified location  Uniform pigmented macule(s)/papule(s) with reassuring findings on dermoscopy    -Discussed nature of condition  -Reassurance, benign-appearing features on examination today  -Recommend continued observation    4. Hemangioma of skin  Violaceous/red papule with maroon lagoons     - A cherry hemangioma is a small macule (small, flat, smooth area) or papule (small, solid bump) formed from an overgrowth of tiny blood vessels in the skin. Cherry hemangiomas are characteristically red or purplish in color. They often first appear in middle adulthood and usually increase in number with age. Cherry hemangiomas are noncancerous (benign) and are common in adults.  - Lesions are benign, reassured patient.     5. Lentigo  Scattered tan macules in sun-exposed areas.    A solar lentigo (plural, solar lentigines), sometimes called an age spot or liver spot, is a brown macule (small, flat, smooth area of skin) caused by chronic sun or artificial ultraviolet (UV) light exposure. There may be just one lentigo or there may be multiple. This type of lentigo is different from lentigo simplex (discussed separately) because it is caused by exposure to UV light. Solar lentigines are benign, but they do indicate excessive sun exposure, a risk factor for the development of skin cancer.  Lesions are benign, no treatment needed.     Follow up in 12 months for a total body skin exam. Please call me if there are any changes or development of concerning symptoms (lesion/skin condition is changing, bleeding, enlarging, or worsening).

## 2025-03-03 ENCOUNTER — TELEPHONE (OUTPATIENT)
Dept: PULMONOLOGY | Facility: HOSPITAL | Age: 55
End: 2025-03-03
Payer: COMMERCIAL

## 2025-03-03 NOTE — TELEPHONE ENCOUNTER
Called and spoke with patient regarding his results. Answered all questions at this time. Instructed patient to call us back with any worsening of symptoms, questions or concerns. Patient was agreeable to treatment plan and acknowledged understanding.     ----- Message from Dee Dee Kelley sent at 3/3/2025  2:48 PM EST -----  Regarding: RE: PFT Results  Can you let him know that his PFT shows moderate obstruction suggesting asthma, can you see how he is feeling? Sorry, I did not get his results either!  ----- Message -----  From: Bhumika Knox MA  Sent: 3/3/2025   2:47 PM EST  To: CANDELARIO Reed-CNP  Subject: PFT Results                                      Patient needing PFT results

## 2025-03-17 ENCOUNTER — APPOINTMENT (OUTPATIENT)
Dept: RADIOLOGY | Facility: HOSPITAL | Age: 55
End: 2025-03-17
Payer: COMMERCIAL

## 2025-03-17 ENCOUNTER — HOSPITAL ENCOUNTER (EMERGENCY)
Facility: HOSPITAL | Age: 55
Discharge: HOME | End: 2025-03-17
Attending: EMERGENCY MEDICINE
Payer: COMMERCIAL

## 2025-03-17 VITALS
TEMPERATURE: 97.9 F | OXYGEN SATURATION: 97 % | DIASTOLIC BLOOD PRESSURE: 78 MMHG | BODY MASS INDEX: 31.24 KG/M2 | HEART RATE: 59 BPM | RESPIRATION RATE: 16 BRPM | SYSTOLIC BLOOD PRESSURE: 133 MMHG | WEIGHT: 270 LBS | HEIGHT: 78 IN

## 2025-03-17 DIAGNOSIS — I31.39 PERICARDIAL EFFUSION (HHS-HCC): ICD-10-CM

## 2025-03-17 DIAGNOSIS — G89.29 ACUTE ON CHRONIC BACK PAIN: Primary | ICD-10-CM

## 2025-03-17 DIAGNOSIS — M54.9 ACUTE ON CHRONIC BACK PAIN: Primary | ICD-10-CM

## 2025-03-17 LAB
ANION GAP SERPL CALC-SCNC: 9 MMOL/L (ref 10–20)
APPEARANCE UR: CLEAR
BASOPHILS # BLD AUTO: 0.04 X10*3/UL (ref 0–0.1)
BASOPHILS NFR BLD AUTO: 1 %
BILIRUB UR STRIP.AUTO-MCNC: NEGATIVE MG/DL
BUN SERPL-MCNC: 17 MG/DL (ref 6–23)
CALCIUM SERPL-MCNC: 9.2 MG/DL (ref 8.6–10.3)
CHLORIDE SERPL-SCNC: 107 MMOL/L (ref 98–107)
CO2 SERPL-SCNC: 27 MMOL/L (ref 21–32)
COLOR UR: YELLOW
CREAT SERPL-MCNC: 0.83 MG/DL (ref 0.5–1.3)
EGFRCR SERPLBLD CKD-EPI 2021: >90 ML/MIN/1.73M*2
EOSINOPHIL # BLD AUTO: 0.15 X10*3/UL (ref 0–0.7)
EOSINOPHIL NFR BLD AUTO: 3.9 %
ERYTHROCYTE [DISTWIDTH] IN BLOOD BY AUTOMATED COUNT: 12.5 % (ref 11.5–14.5)
GLUCOSE SERPL-MCNC: 111 MG/DL (ref 74–99)
GLUCOSE UR STRIP.AUTO-MCNC: NORMAL MG/DL
HCT VFR BLD AUTO: 36.4 % (ref 41–52)
HGB BLD-MCNC: 13.2 G/DL (ref 13.5–17.5)
HOLD SPECIMEN: NORMAL
IMM GRANULOCYTES # BLD AUTO: 0.01 X10*3/UL (ref 0–0.7)
IMM GRANULOCYTES NFR BLD AUTO: 0.3 % (ref 0–0.9)
KETONES UR STRIP.AUTO-MCNC: NEGATIVE MG/DL
LEUKOCYTE ESTERASE UR QL STRIP.AUTO: NEGATIVE
LYMPHOCYTES # BLD AUTO: 0.7 X10*3/UL (ref 1.2–4.8)
LYMPHOCYTES NFR BLD AUTO: 18.3 %
MCH RBC QN AUTO: 31.5 PG (ref 26–34)
MCHC RBC AUTO-ENTMCNC: 36.3 G/DL (ref 32–36)
MCV RBC AUTO: 87 FL (ref 80–100)
MONOCYTES # BLD AUTO: 0.43 X10*3/UL (ref 0.1–1)
MONOCYTES NFR BLD AUTO: 11.3 %
MUCOUS THREADS #/AREA URNS AUTO: NORMAL /LPF
NEUTROPHILS # BLD AUTO: 2.49 X10*3/UL (ref 1.2–7.7)
NEUTROPHILS NFR BLD AUTO: 65.2 %
NITRITE UR QL STRIP.AUTO: NEGATIVE
NRBC BLD-RTO: 0 /100 WBCS (ref 0–0)
PH UR STRIP.AUTO: 6 [PH]
PLATELET # BLD AUTO: 109 X10*3/UL (ref 150–450)
POTASSIUM SERPL-SCNC: 4.2 MMOL/L (ref 3.5–5.3)
PROT UR STRIP.AUTO-MCNC: NORMAL MG/DL
RBC # BLD AUTO: 4.19 X10*6/UL (ref 4.5–5.9)
RBC # UR STRIP.AUTO: NEGATIVE MG/DL
RBC #/AREA URNS AUTO: NORMAL /HPF
SODIUM SERPL-SCNC: 139 MMOL/L (ref 136–145)
SP GR UR STRIP.AUTO: 1.02
UROBILINOGEN UR STRIP.AUTO-MCNC: NORMAL MG/DL
WBC # BLD AUTO: 3.8 X10*3/UL (ref 4.4–11.3)
WBC #/AREA URNS AUTO: NORMAL /HPF

## 2025-03-17 PROCEDURE — 74176 CT ABD & PELVIS W/O CONTRAST: CPT

## 2025-03-17 PROCEDURE — 99284 EMERGENCY DEPT VISIT MOD MDM: CPT | Mod: 25 | Performed by: EMERGENCY MEDICINE

## 2025-03-17 PROCEDURE — 2500000004 HC RX 250 GENERAL PHARMACY W/ HCPCS (ALT 636 FOR OP/ED): Performed by: EMERGENCY MEDICINE

## 2025-03-17 PROCEDURE — 36415 COLL VENOUS BLD VENIPUNCTURE: CPT | Performed by: EMERGENCY MEDICINE

## 2025-03-17 PROCEDURE — 74176 CT ABD & PELVIS W/O CONTRAST: CPT | Performed by: RADIOLOGY

## 2025-03-17 PROCEDURE — 80048 BASIC METABOLIC PNL TOTAL CA: CPT | Performed by: EMERGENCY MEDICINE

## 2025-03-17 PROCEDURE — 96374 THER/PROPH/DIAG INJ IV PUSH: CPT

## 2025-03-17 PROCEDURE — 85025 COMPLETE CBC W/AUTO DIFF WBC: CPT | Performed by: EMERGENCY MEDICINE

## 2025-03-17 PROCEDURE — 81001 URINALYSIS AUTO W/SCOPE: CPT | Performed by: EMERGENCY MEDICINE

## 2025-03-17 RX ORDER — KETOROLAC TROMETHAMINE 30 MG/ML
15 INJECTION, SOLUTION INTRAMUSCULAR; INTRAVENOUS ONCE
Status: COMPLETED | OUTPATIENT
Start: 2025-03-17 | End: 2025-03-17

## 2025-03-17 RX ORDER — HYDROCODONE BITARTRATE AND ACETAMINOPHEN 5; 325 MG/1; MG/1
1 TABLET ORAL EVERY 6 HOURS PRN
Qty: 12 TABLET | Refills: 0 | Status: SHIPPED | OUTPATIENT
Start: 2025-03-17 | End: 2025-03-20

## 2025-03-17 RX ADMIN — KETOROLAC TROMETHAMINE 15 MG: 30 INJECTION, SOLUTION INTRAMUSCULAR at 09:55

## 2025-03-17 ASSESSMENT — PAIN DESCRIPTION - ORIENTATION
ORIENTATION: RIGHT;LOWER

## 2025-03-17 ASSESSMENT — PAIN - FUNCTIONAL ASSESSMENT
PAIN_FUNCTIONAL_ASSESSMENT: 0-10

## 2025-03-17 ASSESSMENT — PAIN SCALES - GENERAL
PAINLEVEL_OUTOF10: 5 - MODERATE PAIN
PAINLEVEL_OUTOF10: 7
PAINLEVEL_OUTOF10: 10 - WORST POSSIBLE PAIN

## 2025-03-17 ASSESSMENT — PAIN DESCRIPTION - PAIN TYPE
TYPE: ACUTE PAIN
TYPE: ACUTE PAIN

## 2025-03-17 ASSESSMENT — PAIN DESCRIPTION - LOCATION
LOCATION: OTHER (COMMENT)
LOCATION: OTHER (COMMENT)
LOCATION: BACK

## 2025-03-17 ASSESSMENT — PAIN DESCRIPTION - ONSET: ONSET: ONGOING

## 2025-03-17 ASSESSMENT — PAIN DESCRIPTION - DESCRIPTORS: DESCRIPTORS: ACHING

## 2025-03-17 ASSESSMENT — PAIN DESCRIPTION - FREQUENCY: FREQUENCY: CONSTANT/CONTINUOUS

## 2025-03-17 ASSESSMENT — PAIN DESCRIPTION - PROGRESSION: CLINICAL_PROGRESSION: GRADUALLY IMPROVING

## 2025-03-17 NOTE — ED PROVIDER NOTES
HPI   Chief Complaint   Patient presents with    Flank Pain     C/o right flank pain for 3 days, he did have an episode of dark orange urine 2 days ago and burning with urination. Denies fevers.       Patient presents to the emergency department secondary to right lower back pain.  The patient states this began 2 to 3 days ago and had 1 episode of dark, orange-colored urine.  He has a history of kidney stones but also has a history of herniated disc disease.  No history of any new injury.  Denies loss of bowel or bladder continence.  Denies saddle anesthesia.      History provided by:  Patient   used: No            Patient History   Past Medical History:   Diagnosis Date    Anxiety 2009    Depression 2009    Diabetes mellitus (Multi) 2010    GERD (gastroesophageal reflux disease) 2005    Heart disease 2000    Hyperlipidemia 2015    Hypertension 1988    Myocardial infarction (Multi) 2005    Prostatitis 1994    Stroke (Multi) 2023     Past Surgical History:   Procedure Laterality Date    CARDIAC CATHETERIZATION N/A 8/13/2024    Procedure: Left Heart Cath;  Surgeon: Juan Ortiz MD;  Location: [facility];  Service: Cardiovascular;  Laterality: N/A;  echo @ 7:30    CORONARY ANGIOPLASTY  02/27/2018    PTCA    CT ANGIO NECK  12/2/2022    CT NECK ANGIO W AND WO IV CONTRAST 12/2/2022 [facility]    CT HEAD ANGIO W AND WO IV CONTRAST  12/2/2022    CT HEAD ANGIO W AND WO IV CONTRAST 12/2/2022 DOCTOR OFFICE [facility]GAC[facility]    MR HEAD ANGIO WO IV CONTRAST  4/3/2023    MR HEAD ANGIO WO IV CONTRAST [facility]    MR NECK ANGIO WO IV CONTRAST  4/3/2023    MR NECK ANGIO WO IV CONTRAST [facility]     Family History   Problem Relation Name Age of Onset    Heart disease Mother Taylor     Hypertension Mother Taylor     Heart disease Father Jhony     Hypertension Father Jhony      Social History     Tobacco Use    Smoking status: Never    Smokeless tobacco: Never   Vaping Use    Vaping status: Never Used    Substance Use Topics    Alcohol use: Never    Drug use: Never       Physical Exam   ED Triage Vitals [03/17/25 0932]   Temperature Heart Rate Respirations BP   36.6 °C (97.9 °F) 65 18 143/90      Pulse Ox Temp Source Heart Rate Source Patient Position   99 % Temporal Monitor --      BP Location FiO2 (%)     -- --       Physical Exam  Vitals and nursing note reviewed.   Constitutional:       General: He is not in acute distress.     Appearance: Normal appearance. He is obese. He is not ill-appearing, toxic-appearing or diaphoretic.      Comments: Resting comfortably.  Not displaying any renal colic   HENT:      Head: Normocephalic and atraumatic.      Nose: Nose normal. No rhinorrhea.      Mouth/Throat:      Mouth: Mucous membranes are moist.      Pharynx: Oropharynx is clear. No oropharyngeal exudate or posterior oropharyngeal erythema.   Neck:      Comments: Trachea is midline  Cardiovascular:      Rate and Rhythm: Normal rate and regular rhythm.      Pulses: Normal pulses.      Heart sounds: No murmur heard.  Pulmonary:      Effort: Pulmonary effort is normal.      Breath sounds: Normal breath sounds. No wheezing.   Abdominal:      General: Abdomen is flat. Bowel sounds are normal. There is no distension.      Palpations: Abdomen is soft.      Tenderness: There is no abdominal tenderness. There is no right CVA tenderness, left CVA tenderness, guarding or rebound.   Musculoskeletal:         General: Tenderness present. No swelling, deformity or signs of injury. Normal range of motion.      Cervical back: Normal range of motion.      Comments: Patient has reproducible pain to palpation to the paraspinal musculature in the region of L3 and L4 off to the right.  No midline tenderness or tenderness over the flank   Skin:     General: Skin is warm and dry.      Findings: No rash.   Neurological:      General: No focal deficit present.      Mental Status: He is alert and oriented to person, place, and time. Mental  status is at baseline.      Sensory: No sensory deficit.      Comments: No saddle anesthesia   Psychiatric:         Mood and Affect: Mood normal.         Behavior: Behavior normal.         Thought Content: Thought content normal.         Judgment: Judgment normal.           ED Course & MDM   Diagnoses as of 03/17/25 1151   Acute on chronic back pain   Pericardial effusion (HHS-HCC)                 No data recorded                                 Medical Decision Making  Patient had multiple incidental findings on his CAT scan.  I printed off a copy of his CAT scan report and disclosed these findings to him.  Specifically I discussed that the pericardial effusion is an incidental finding but will need to be followed up by his cardiologist at his next routine appointment.  Additionally, I explained that he has concern for congenital narrowing of the ureters and this should be followed by urology with whom he will be referred.    The patient reports no history of opiate abuse or addiction issues.  He is currently not taking any narcotic pain medication at home and his OARRS report is negative.  He will be prescribed 12 tablets of Norco to help with his breakthrough pain and instructed otherwise follow-up with his private physician.  Limit activity as tolerated and return if worse.        Procedure  Procedures     Ben Hong,   03/17/25 1152

## 2025-03-17 NOTE — DISCHARGE INSTRUCTIONS
Follow-up with urology in regards to your CAT scan findings as discussed.  Also follow-up with your cardiologist in regards to the pericardial effusion as discussed.

## 2025-03-18 ENCOUNTER — ANCILLARY PROCEDURE (OUTPATIENT)
Dept: CARDIOLOGY | Facility: HOSPITAL | Age: 55
End: 2025-03-18
Payer: COMMERCIAL

## 2025-03-18 ENCOUNTER — TELEPHONE (OUTPATIENT)
Dept: CARDIOLOGY | Facility: HOSPITAL | Age: 55
End: 2025-03-18
Payer: COMMERCIAL

## 2025-03-18 DIAGNOSIS — I31.39 PERICARDIAL EFFUSION (HHS-HCC): ICD-10-CM

## 2025-03-18 DIAGNOSIS — I31.39 PERICARDIAL EFFUSION (HHS-HCC): Primary | ICD-10-CM

## 2025-03-18 NOTE — TELEPHONE ENCOUNTER
RN called and spoke with patient, patient requesting holter monitor be mailed [place]. Patient also provided with the number for central scheduling. Tasked to Yoan to send holter.

## 2025-03-20 ENCOUNTER — TELEPHONE (OUTPATIENT)
Dept: CARDIOLOGY | Facility: HOSPITAL | Age: 55
End: 2025-03-20
Payer: COMMERCIAL

## 2025-03-20 NOTE — TELEPHONE ENCOUNTER
Called pt and LVM letting him know I scheduled his TTE for the soonest appt available. I asked pt to give us a call back if the date and time does not work for him.    Yoan PCNA

## 2025-03-25 ENCOUNTER — APPOINTMENT (OUTPATIENT)
Dept: PRIMARY CARE | Facility: CLINIC | Age: 55
End: 2025-03-25
Payer: COMMERCIAL

## 2025-04-01 ENCOUNTER — HOSPITAL ENCOUNTER (OUTPATIENT)
Dept: HOSPITAL 100 - NS | Age: 55
LOS: 29 days | End: 2025-04-30
Payer: MEDICAID

## 2025-04-01 DIAGNOSIS — E78.5: ICD-10-CM

## 2025-04-01 DIAGNOSIS — I25.10: Primary | ICD-10-CM

## 2025-04-01 DIAGNOSIS — Z71.3: ICD-10-CM

## 2025-04-01 PROCEDURE — 97803 MED NUTRITION INDIV SUBSEQ: CPT

## 2025-04-09 ENCOUNTER — HOSPITAL ENCOUNTER (OUTPATIENT)
Dept: CARDIOLOGY | Facility: HOSPITAL | Age: 55
Discharge: HOME | End: 2025-04-09
Payer: COMMERCIAL

## 2025-04-09 ENCOUNTER — HOSPITAL ENCOUNTER (EMERGENCY)
Age: 55
Discharge: HOME | End: 2025-04-09
Payer: MEDICAID

## 2025-04-09 VITALS
HEART RATE: 85 BPM | OXYGEN SATURATION: 97 % | TEMPERATURE: 98.06 F | DIASTOLIC BLOOD PRESSURE: 99 MMHG | SYSTOLIC BLOOD PRESSURE: 149 MMHG | RESPIRATION RATE: 18 BRPM

## 2025-04-09 VITALS
DIASTOLIC BLOOD PRESSURE: 92 MMHG | TEMPERATURE: 97.6 F | HEART RATE: 79 BPM | OXYGEN SATURATION: 96 % | SYSTOLIC BLOOD PRESSURE: 177 MMHG | RESPIRATION RATE: 15 BRPM

## 2025-04-09 VITALS — BODY MASS INDEX: 31.1 KG/M2

## 2025-04-09 DIAGNOSIS — I31.39 PERICARDIAL EFFUSION (HHS-HCC): ICD-10-CM

## 2025-04-09 DIAGNOSIS — I25.10: ICD-10-CM

## 2025-04-09 DIAGNOSIS — Z79.899: ICD-10-CM

## 2025-04-09 DIAGNOSIS — K21.9: ICD-10-CM

## 2025-04-09 DIAGNOSIS — M79.7: ICD-10-CM

## 2025-04-09 DIAGNOSIS — Z79.82: ICD-10-CM

## 2025-04-09 DIAGNOSIS — I25.2: ICD-10-CM

## 2025-04-09 DIAGNOSIS — G89.29: ICD-10-CM

## 2025-04-09 DIAGNOSIS — Z95.5: ICD-10-CM

## 2025-04-09 DIAGNOSIS — I50.9: ICD-10-CM

## 2025-04-09 DIAGNOSIS — E11.9: ICD-10-CM

## 2025-04-09 DIAGNOSIS — M54.16: ICD-10-CM

## 2025-04-09 DIAGNOSIS — I11.0: ICD-10-CM

## 2025-04-09 DIAGNOSIS — F41.9: ICD-10-CM

## 2025-04-09 DIAGNOSIS — M54.50: Primary | ICD-10-CM

## 2025-04-09 DIAGNOSIS — E78.00: ICD-10-CM

## 2025-04-09 LAB
BLOOD UR QL: (no result) /HPF (ref 0–5)
BLOOD UR QL: 10 /UL
MUCOUS THREADS URNS QL MICRO: (no result) /HPF
PROT UR QL STRIP.AUTO: 30 MG/DL
SQUAMOUS URNS QL MICRO: (no result) /HPF (ref 0–5)
URINE PRESERVATIVE: (no result)

## 2025-04-09 PROCEDURE — 99283 EMERGENCY DEPT VISIT LOW MDM: CPT

## 2025-04-09 PROCEDURE — 81001 URINALYSIS AUTO W/SCOPE: CPT

## 2025-04-09 PROCEDURE — 93308 TTE F-UP OR LMTD: CPT | Performed by: STUDENT IN AN ORGANIZED HEALTH CARE EDUCATION/TRAINING PROGRAM

## 2025-04-09 PROCEDURE — 93308 TTE F-UP OR LMTD: CPT

## 2025-04-09 PROCEDURE — 96372 THER/PROPH/DIAG INJ SC/IM: CPT

## 2025-04-09 PROCEDURE — 82274 ASSAY TEST FOR BLOOD FECAL: CPT

## 2025-04-10 ENCOUNTER — APPOINTMENT (OUTPATIENT)
Dept: CARDIOLOGY | Facility: HOSPITAL | Age: 55
End: 2025-04-10
Payer: COMMERCIAL

## 2025-04-11 LAB
AORTIC VALVE MEAN GRADIENT: 7 MMHG
AORTIC VALVE PEAK VELOCITY: 1.81 M/S
AV PEAK GRADIENT: 13 MMHG
AVA (PEAK VEL): 2.97 CM2
AVA (VTI): 3.07 CM2
EJECTION FRACTION APICAL 4 CHAMBER: 70.1
EJECTION FRACTION: 63 %
LEFT ATRIUM VOLUME AREA LENGTH INDEX BSA: 18.9 ML/M2
LEFT VENTRICLE INTERNAL DIMENSION DIASTOLE: 4.33 CM (ref 3.5–6)
LEFT VENTRICULAR OUTFLOW TRACT DIAMETER: 2.4 CM
LV EJECTION FRACTION BIPLANE: 65 %
MITRAL VALVE E/A RATIO: 0.75
RIGHT VENTRICLE PEAK SYSTOLIC PRESSURE: 35.7 MMHG
TRICUSPID ANNULAR PLANE SYSTOLIC EXCURSION: 2.5 CM

## 2025-04-15 DIAGNOSIS — E87.6 HYPOKALEMIA: Primary | ICD-10-CM

## 2025-04-15 RX ORDER — POTASSIUM CHLORIDE 20 MEQ/1
20 TABLET, EXTENDED RELEASE ORAL DAILY
Qty: 90 TABLET | Refills: 1 | Status: ON HOLD | OUTPATIENT
Start: 2025-04-15 | End: 2025-10-12

## 2025-04-18 ENCOUNTER — APPOINTMENT (OUTPATIENT)
Dept: CARDIOLOGY | Facility: HOSPITAL | Age: 55
End: 2025-04-18
Payer: COMMERCIAL

## 2025-04-18 ENCOUNTER — APPOINTMENT (OUTPATIENT)
Dept: RADIOLOGY | Facility: HOSPITAL | Age: 55
End: 2025-04-18
Payer: COMMERCIAL

## 2025-04-18 ENCOUNTER — HOSPITAL ENCOUNTER (OUTPATIENT)
Facility: HOSPITAL | Age: 55
Setting detail: OBSERVATION
End: 2025-04-18
Attending: EMERGENCY MEDICINE | Admitting: STUDENT IN AN ORGANIZED HEALTH CARE EDUCATION/TRAINING PROGRAM
Payer: COMMERCIAL

## 2025-04-18 DIAGNOSIS — I10 PRIMARY HYPERTENSION: ICD-10-CM

## 2025-04-18 DIAGNOSIS — R55 SYNCOPE, UNSPECIFIED SYNCOPE TYPE: Primary | ICD-10-CM

## 2025-04-18 DIAGNOSIS — M48.062 SPINAL STENOSIS OF LUMBAR REGION WITH NEUROGENIC CLAUDICATION: ICD-10-CM

## 2025-04-18 LAB
ANION GAP SERPL CALC-SCNC: 11 MMOL/L (ref 10–20)
BASOPHILS # BLD AUTO: 0.04 X10*3/UL (ref 0–0.1)
BASOPHILS NFR BLD AUTO: 0.9 %
BUN SERPL-MCNC: 15 MG/DL (ref 6–23)
CALCIUM SERPL-MCNC: 9.4 MG/DL (ref 8.6–10.3)
CHLORIDE SERPL-SCNC: 104 MMOL/L (ref 98–107)
CO2 SERPL-SCNC: 25 MMOL/L (ref 21–32)
CREAT SERPL-MCNC: 0.79 MG/DL (ref 0.5–1.3)
EGFRCR SERPLBLD CKD-EPI 2021: >90 ML/MIN/1.73M*2
EOSINOPHIL # BLD AUTO: 0.12 X10*3/UL (ref 0–0.7)
EOSINOPHIL NFR BLD AUTO: 2.6 %
ERYTHROCYTE [DISTWIDTH] IN BLOOD BY AUTOMATED COUNT: 12.3 % (ref 11.5–14.5)
GLUCOSE SERPL-MCNC: 213 MG/DL (ref 74–99)
HCT VFR BLD AUTO: 34.8 % (ref 41–52)
HGB BLD-MCNC: 12.8 G/DL (ref 13.5–17.5)
IMM GRANULOCYTES # BLD AUTO: 0.01 X10*3/UL (ref 0–0.7)
IMM GRANULOCYTES NFR BLD AUTO: 0.2 % (ref 0–0.9)
LYMPHOCYTES # BLD AUTO: 1.16 X10*3/UL (ref 1.2–4.8)
LYMPHOCYTES NFR BLD AUTO: 24.8 %
MAGNESIUM SERPL-MCNC: 1.95 MG/DL (ref 1.6–2.4)
MCH RBC QN AUTO: 31.6 PG (ref 26–34)
MCHC RBC AUTO-ENTMCNC: 36.8 G/DL (ref 32–36)
MCV RBC AUTO: 86 FL (ref 80–100)
MONOCYTES # BLD AUTO: 0.54 X10*3/UL (ref 0.1–1)
MONOCYTES NFR BLD AUTO: 11.5 %
NEUTROPHILS # BLD AUTO: 2.81 X10*3/UL (ref 1.2–7.7)
NEUTROPHILS NFR BLD AUTO: 60 %
NRBC BLD-RTO: 0 /100 WBCS (ref 0–0)
PLATELET # BLD AUTO: 107 X10*3/UL (ref 150–450)
POTASSIUM SERPL-SCNC: 3.6 MMOL/L (ref 3.5–5.3)
RBC # BLD AUTO: 4.05 X10*6/UL (ref 4.5–5.9)
SODIUM SERPL-SCNC: 136 MMOL/L (ref 136–145)
WBC # BLD AUTO: 4.7 X10*3/UL (ref 4.4–11.3)

## 2025-04-18 PROCEDURE — 85025 COMPLETE CBC W/AUTO DIFF WBC: CPT | Performed by: EMERGENCY MEDICINE

## 2025-04-18 PROCEDURE — 84484 ASSAY OF TROPONIN QUANT: CPT | Performed by: EMERGENCY MEDICINE

## 2025-04-18 PROCEDURE — 36415 COLL VENOUS BLD VENIPUNCTURE: CPT | Performed by: EMERGENCY MEDICINE

## 2025-04-18 PROCEDURE — 93005 ELECTROCARDIOGRAM TRACING: CPT

## 2025-04-18 PROCEDURE — 80048 BASIC METABOLIC PNL TOTAL CA: CPT | Performed by: EMERGENCY MEDICINE

## 2025-04-18 PROCEDURE — 96374 THER/PROPH/DIAG INJ IV PUSH: CPT

## 2025-04-18 PROCEDURE — 99285 EMERGENCY DEPT VISIT HI MDM: CPT | Mod: 25 | Performed by: EMERGENCY MEDICINE

## 2025-04-18 PROCEDURE — 70450 CT HEAD/BRAIN W/O DYE: CPT | Performed by: STUDENT IN AN ORGANIZED HEALTH CARE EDUCATION/TRAINING PROGRAM

## 2025-04-18 PROCEDURE — 83735 ASSAY OF MAGNESIUM: CPT | Performed by: EMERGENCY MEDICINE

## 2025-04-18 PROCEDURE — 71045 X-RAY EXAM CHEST 1 VIEW: CPT

## 2025-04-18 PROCEDURE — 96361 HYDRATE IV INFUSION ADD-ON: CPT

## 2025-04-18 PROCEDURE — 70450 CT HEAD/BRAIN W/O DYE: CPT

## 2025-04-18 PROCEDURE — 71045 X-RAY EXAM CHEST 1 VIEW: CPT | Mod: FOREIGN READ | Performed by: RADIOLOGY

## 2025-04-18 PROCEDURE — 2500000004 HC RX 250 GENERAL PHARMACY W/ HCPCS (ALT 636 FOR OP/ED): Mod: JZ | Performed by: EMERGENCY MEDICINE

## 2025-04-18 RX ORDER — ONDANSETRON HYDROCHLORIDE 2 MG/ML
4 INJECTION, SOLUTION INTRAVENOUS ONCE
Status: COMPLETED | OUTPATIENT
Start: 2025-04-18 | End: 2025-04-18

## 2025-04-18 RX ADMIN — SODIUM CHLORIDE 1000 ML: 0.9 INJECTION, SOLUTION INTRAVENOUS at 23:28

## 2025-04-18 RX ADMIN — ONDANSETRON 4 MG: 2 INJECTION, SOLUTION INTRAMUSCULAR; INTRAVENOUS at 23:28

## 2025-04-18 ASSESSMENT — PAIN DESCRIPTION - LOCATION: LOCATION: BACK

## 2025-04-18 ASSESSMENT — COLUMBIA-SUICIDE SEVERITY RATING SCALE - C-SSRS
2. HAVE YOU ACTUALLY HAD ANY THOUGHTS OF KILLING YOURSELF?: NO
6. HAVE YOU EVER DONE ANYTHING, STARTED TO DO ANYTHING, OR PREPARED TO DO ANYTHING TO END YOUR LIFE?: NO
1. IN THE PAST MONTH, HAVE YOU WISHED YOU WERE DEAD OR WISHED YOU COULD GO TO SLEEP AND NOT WAKE UP?: NO

## 2025-04-18 ASSESSMENT — PAIN SCALES - GENERAL: PAINLEVEL_OUTOF10: 5 - MODERATE PAIN

## 2025-04-18 ASSESSMENT — PAIN - FUNCTIONAL ASSESSMENT: PAIN_FUNCTIONAL_ASSESSMENT: 0-10

## 2025-04-19 PROBLEM — R55 SYNCOPE AND COLLAPSE: Status: ACTIVE | Noted: 2025-04-19

## 2025-04-19 LAB
CARDIAC TROPONIN I PNL SERPL HS: 10 NG/L (ref 0–20)
CARDIAC TROPONIN I PNL SERPL HS: 10 NG/L (ref 0–20)
GLUCOSE BLD MANUAL STRIP-MCNC: 186 MG/DL (ref 74–99)

## 2025-04-19 PROCEDURE — 2500000001 HC RX 250 WO HCPCS SELF ADMINISTERED DRUGS (ALT 637 FOR MEDICARE OP): Performed by: STUDENT IN AN ORGANIZED HEALTH CARE EDUCATION/TRAINING PROGRAM

## 2025-04-19 PROCEDURE — 82947 ASSAY GLUCOSE BLOOD QUANT: CPT

## 2025-04-19 PROCEDURE — 36415 COLL VENOUS BLD VENIPUNCTURE: CPT | Performed by: EMERGENCY MEDICINE

## 2025-04-19 PROCEDURE — 2500000004 HC RX 250 GENERAL PHARMACY W/ HCPCS (ALT 636 FOR OP/ED): Mod: JZ | Performed by: STUDENT IN AN ORGANIZED HEALTH CARE EDUCATION/TRAINING PROGRAM

## 2025-04-19 PROCEDURE — G0378 HOSPITAL OBSERVATION PER HR: HCPCS

## 2025-04-19 PROCEDURE — 84484 ASSAY OF TROPONIN QUANT: CPT | Performed by: EMERGENCY MEDICINE

## 2025-04-19 PROCEDURE — 99222 1ST HOSP IP/OBS MODERATE 55: CPT | Performed by: STUDENT IN AN ORGANIZED HEALTH CARE EDUCATION/TRAINING PROGRAM

## 2025-04-19 PROCEDURE — 2500000002 HC RX 250 W HCPCS SELF ADMINISTERED DRUGS (ALT 637 FOR MEDICARE OP, ALT 636 FOR OP/ED): Performed by: STUDENT IN AN ORGANIZED HEALTH CARE EDUCATION/TRAINING PROGRAM

## 2025-04-19 PROCEDURE — 2500000001 HC RX 250 WO HCPCS SELF ADMINISTERED DRUGS (ALT 637 FOR MEDICARE OP): Performed by: HOSPITALIST

## 2025-04-19 PROCEDURE — 2500000001 HC RX 250 WO HCPCS SELF ADMINISTERED DRUGS (ALT 637 FOR MEDICARE OP): Performed by: EMERGENCY MEDICINE

## 2025-04-19 PROCEDURE — 2500000005 HC RX 250 GENERAL PHARMACY W/O HCPCS: Performed by: HOSPITALIST

## 2025-04-19 RX ORDER — LOSARTAN POTASSIUM 50 MG/1
50 TABLET ORAL DAILY
Status: DISCONTINUED | OUTPATIENT
Start: 2025-04-19 | End: 2025-04-21

## 2025-04-19 RX ORDER — PROCHLORPERAZINE EDISYLATE 5 MG/ML
10 INJECTION INTRAMUSCULAR; INTRAVENOUS EVERY 6 HOURS PRN
Status: DISCONTINUED | OUTPATIENT
Start: 2025-04-19 | End: 2025-04-23 | Stop reason: HOSPADM

## 2025-04-19 RX ORDER — POLYETHYLENE GLYCOL 3350 17 G/17G
17 POWDER, FOR SOLUTION ORAL DAILY PRN
Status: DISCONTINUED | OUTPATIENT
Start: 2025-04-19 | End: 2025-04-23 | Stop reason: HOSPADM

## 2025-04-19 RX ORDER — CYCLOBENZAPRINE HCL 10 MG
10 TABLET ORAL 3 TIMES DAILY PRN
Status: DISCONTINUED | OUTPATIENT
Start: 2025-04-19 | End: 2025-04-23 | Stop reason: HOSPADM

## 2025-04-19 RX ORDER — KETOROLAC TROMETHAMINE 30 MG/ML
30 INJECTION, SOLUTION INTRAMUSCULAR; INTRAVENOUS EVERY 6 HOURS PRN
Status: DISCONTINUED | OUTPATIENT
Start: 2025-04-19 | End: 2025-04-22

## 2025-04-19 RX ORDER — CARVEDILOL 25 MG/1
25 TABLET ORAL
Status: DISCONTINUED | OUTPATIENT
Start: 2025-04-19 | End: 2025-04-23 | Stop reason: HOSPADM

## 2025-04-19 RX ORDER — ALPRAZOLAM 0.5 MG/1
1 TABLET ORAL 2 TIMES DAILY PRN
Status: DISCONTINUED | OUTPATIENT
Start: 2025-04-19 | End: 2025-04-23 | Stop reason: HOSPADM

## 2025-04-19 RX ORDER — TRAZODONE HYDROCHLORIDE 50 MG/1
50 TABLET ORAL NIGHTLY PRN
Status: DISCONTINUED | OUTPATIENT
Start: 2025-04-19 | End: 2025-04-23 | Stop reason: HOSPADM

## 2025-04-19 RX ORDER — ROSUVASTATIN CALCIUM 5 MG/1
10 TABLET, COATED ORAL EVERY OTHER DAY
Status: DISCONTINUED | OUTPATIENT
Start: 2025-04-20 | End: 2025-04-23 | Stop reason: HOSPADM

## 2025-04-19 RX ORDER — HYDROCODONE BITARTRATE AND ACETAMINOPHEN 5; 325 MG/1; MG/1
1 TABLET ORAL ONCE
Refills: 0 | Status: COMPLETED | OUTPATIENT
Start: 2025-04-19 | End: 2025-04-19

## 2025-04-19 RX ORDER — HYDROCHLOROTHIAZIDE 25 MG/1
25 TABLET ORAL DAILY
Status: DISCONTINUED | OUTPATIENT
Start: 2025-04-19 | End: 2025-04-23 | Stop reason: HOSPADM

## 2025-04-19 RX ORDER — ACETAMINOPHEN 325 MG/1
650 TABLET ORAL EVERY 4 HOURS PRN
Status: DISCONTINUED | OUTPATIENT
Start: 2025-04-19 | End: 2025-04-22

## 2025-04-19 RX ORDER — HYDROXYZINE HYDROCHLORIDE 50 MG/1
25 TABLET, FILM COATED ORAL EVERY 4 HOURS PRN
Status: DISCONTINUED | OUTPATIENT
Start: 2025-04-19 | End: 2025-04-19

## 2025-04-19 RX ORDER — DICLOFENAC SODIUM 10 MG/G
4 GEL TOPICAL 4 TIMES DAILY PRN
Status: DISCONTINUED | OUTPATIENT
Start: 2025-04-19 | End: 2025-04-23 | Stop reason: HOSPADM

## 2025-04-19 RX ORDER — ASPIRIN 81 MG/1
81 TABLET ORAL DAILY
Status: DISCONTINUED | OUTPATIENT
Start: 2025-04-19 | End: 2025-04-23 | Stop reason: HOSPADM

## 2025-04-19 RX ORDER — DICLOFENAC SODIUM 10 MG/G
4 GEL TOPICAL 4 TIMES DAILY PRN
Status: DISCONTINUED | OUTPATIENT
Start: 2025-04-19 | End: 2025-04-19

## 2025-04-19 RX ORDER — METOPROLOL TARTRATE 1 MG/ML
5 INJECTION, SOLUTION INTRAVENOUS EVERY 6 HOURS PRN
Status: DISCONTINUED | OUTPATIENT
Start: 2025-04-19 | End: 2025-04-23 | Stop reason: HOSPADM

## 2025-04-19 RX ORDER — TALC
6 POWDER (GRAM) TOPICAL NIGHTLY PRN
Status: DISCONTINUED | OUTPATIENT
Start: 2025-04-19 | End: 2025-04-23 | Stop reason: HOSPADM

## 2025-04-19 RX ORDER — PANTOPRAZOLE SODIUM 40 MG/1
40 TABLET, DELAYED RELEASE ORAL
Status: DISCONTINUED | OUTPATIENT
Start: 2025-04-19 | End: 2025-04-23 | Stop reason: HOSPADM

## 2025-04-19 RX ORDER — LIDOCAINE 560 MG/1
1 PATCH PERCUTANEOUS; TOPICAL; TRANSDERMAL DAILY
Status: DISCONTINUED | OUTPATIENT
Start: 2025-04-19 | End: 2025-04-23 | Stop reason: HOSPADM

## 2025-04-19 RX ORDER — CETIRIZINE HYDROCHLORIDE 10 MG/1
10 TABLET ORAL DAILY
Status: DISCONTINUED | OUTPATIENT
Start: 2025-04-19 | End: 2025-04-23 | Stop reason: HOSPADM

## 2025-04-19 RX ORDER — ONDANSETRON HYDROCHLORIDE 2 MG/ML
4 INJECTION, SOLUTION INTRAVENOUS EVERY 4 HOURS PRN
Status: DISCONTINUED | OUTPATIENT
Start: 2025-04-19 | End: 2025-04-23 | Stop reason: HOSPADM

## 2025-04-19 RX ORDER — BISACODYL 10 MG/1
10 SUPPOSITORY RECTAL DAILY PRN
Status: DISCONTINUED | OUTPATIENT
Start: 2025-04-19 | End: 2025-04-23 | Stop reason: HOSPADM

## 2025-04-19 RX ORDER — CALCIUM CARBONATE 200(500)MG
2 TABLET,CHEWABLE ORAL 4 TIMES DAILY PRN
Status: DISCONTINUED | OUTPATIENT
Start: 2025-04-19 | End: 2025-04-23 | Stop reason: HOSPADM

## 2025-04-19 RX ORDER — LANOLIN ALCOHOL/MO/W.PET/CERES
1 CREAM (GRAM) TOPICAL DAILY
COMMUNITY

## 2025-04-19 RX ORDER — ALUMINUM HYDROXIDE, MAGNESIUM HYDROXIDE, AND SIMETHICONE 1200; 120; 1200 MG/30ML; MG/30ML; MG/30ML
20 SUSPENSION ORAL 4 TIMES DAILY PRN
Status: DISCONTINUED | OUTPATIENT
Start: 2025-04-19 | End: 2025-04-23 | Stop reason: HOSPADM

## 2025-04-19 RX ORDER — GUAIFENESIN 600 MG/1
600 TABLET, EXTENDED RELEASE ORAL 2 TIMES DAILY PRN
Status: DISCONTINUED | OUTPATIENT
Start: 2025-04-19 | End: 2025-04-23 | Stop reason: HOSPADM

## 2025-04-19 RX ORDER — ISOSORBIDE MONONITRATE 30 MG/1
30 TABLET, EXTENDED RELEASE ORAL EVERY 12 HOURS
Status: DISCONTINUED | OUTPATIENT
Start: 2025-04-19 | End: 2025-04-23 | Stop reason: HOSPADM

## 2025-04-19 RX ORDER — BENZONATATE 100 MG/1
100 CAPSULE ORAL 3 TIMES DAILY PRN
Status: DISCONTINUED | OUTPATIENT
Start: 2025-04-19 | End: 2025-04-23 | Stop reason: HOSPADM

## 2025-04-19 RX ADMIN — HYDROCODONE BITARTRATE AND ACETAMINOPHEN 1 TABLET: 5; 325 TABLET ORAL at 01:12

## 2025-04-19 RX ADMIN — KETOROLAC TROMETHAMINE 30 MG: 30 INJECTION, SOLUTION INTRAMUSCULAR at 18:38

## 2025-04-19 RX ADMIN — ISOSORBIDE MONONITRATE 30 MG: 30 TABLET, EXTENDED RELEASE ORAL at 20:40

## 2025-04-19 RX ADMIN — CARVEDILOL 25 MG: 25 TABLET, FILM COATED ORAL at 16:23

## 2025-04-19 RX ADMIN — ISOSORBIDE MONONITRATE 30 MG: 30 TABLET, EXTENDED RELEASE ORAL at 09:10

## 2025-04-19 RX ADMIN — TICAGRELOR 90 MG: 90 TABLET ORAL at 09:10

## 2025-04-19 RX ADMIN — CARVEDILOL 25 MG: 25 TABLET, FILM COATED ORAL at 08:16

## 2025-04-19 RX ADMIN — DICLOFENAC SODIUM 4 G: 10 GEL TOPICAL at 10:54

## 2025-04-19 RX ADMIN — TICAGRELOR 90 MG: 90 TABLET ORAL at 20:40

## 2025-04-19 RX ADMIN — LOSARTAN POTASSIUM 50 MG: 50 TABLET, FILM COATED ORAL at 09:10

## 2025-04-19 RX ADMIN — ASPIRIN 81 MG: 81 TABLET, COATED ORAL at 09:09

## 2025-04-19 RX ADMIN — LIDOCAINE 4% 1 PATCH: 40 PATCH TOPICAL at 09:10

## 2025-04-19 RX ADMIN — PANTOPRAZOLE SODIUM 40 MG: 40 TABLET, DELAYED RELEASE ORAL at 06:09

## 2025-04-19 SDOH — SOCIAL STABILITY: SOCIAL INSECURITY
WITHIN THE LAST YEAR, HAVE YOU BEEN RAPED OR FORCED TO HAVE ANY KIND OF SEXUAL ACTIVITY BY YOUR PARTNER OR EX-PARTNER?: NO

## 2025-04-19 SDOH — SOCIAL STABILITY: SOCIAL INSECURITY
WITHIN THE LAST YEAR, HAVE YOU BEEN KICKED, HIT, SLAPPED, OR OTHERWISE PHYSICALLY HURT BY YOUR PARTNER OR EX-PARTNER?: NO

## 2025-04-19 SDOH — ECONOMIC STABILITY: INCOME INSECURITY: IN THE PAST 12 MONTHS HAS THE ELECTRIC, GAS, OIL, OR WATER COMPANY THREATENED TO SHUT OFF SERVICES IN YOUR HOME?: NO

## 2025-04-19 SDOH — ECONOMIC STABILITY: FOOD INSECURITY: WITHIN THE PAST 12 MONTHS, YOU WORRIED THAT YOUR FOOD WOULD RUN OUT BEFORE YOU GOT THE MONEY TO BUY MORE.: NEVER TRUE

## 2025-04-19 SDOH — ECONOMIC STABILITY: TRANSPORTATION INSECURITY

## 2025-04-19 SDOH — ECONOMIC STABILITY: FOOD INSECURITY

## 2025-04-19 SDOH — SOCIAL STABILITY: SOCIAL INSECURITY: ARE THERE ANY APPARENT SIGNS OF INJURIES/BEHAVIORS THAT COULD BE RELATED TO ABUSE/NEGLECT?: NO

## 2025-04-19 SDOH — ECONOMIC STABILITY: HOUSING INSECURITY

## 2025-04-19 SDOH — SOCIAL STABILITY: SOCIAL INSECURITY: WITHIN THE LAST YEAR, HAVE YOU BEEN HUMILIATED OR EMOTIONALLY ABUSED IN OTHER WAYS BY YOUR PARTNER OR EX-PARTNER?: NO

## 2025-04-19 SDOH — HEALTH STABILITY: PHYSICAL HEALTH
HOW OFTEN DO YOU NEED TO HAVE SOMEONE HELP YOU WHEN YOU READ INSTRUCTIONS, PAMPHLETS, OR OTHER WRITTEN MATERIAL FROM YOUR DOCTOR OR PHARMACY?: NEVER

## 2025-04-19 SDOH — SOCIAL STABILITY: SOCIAL INSECURITY: WITHIN THE LAST YEAR, HAVE YOU BEEN AFRAID OF YOUR PARTNER OR EX-PARTNER?: NO

## 2025-04-19 SDOH — ECONOMIC STABILITY: FOOD INSECURITY: WITHIN THE PAST 12 MONTHS, THE FOOD YOU BOUGHT JUST DIDN'T LAST AND YOU DIDN'T HAVE MONEY TO GET MORE.: NEVER TRUE

## 2025-04-19 SDOH — SOCIAL STABILITY: SOCIAL INSECURITY: ARE YOU OR HAVE YOU BEEN THREATENED OR ABUSED PHYSICALLY, EMOTIONALLY, OR SEXUALLY BY ANYONE?: NO

## 2025-04-19 SDOH — SOCIAL STABILITY: SOCIAL INSECURITY: DO YOU FEEL UNSAFE GOING BACK TO THE PLACE WHERE YOU ARE LIVING?: NO

## 2025-04-19 SDOH — SOCIAL STABILITY: SOCIAL INSECURITY: DO YOU FEEL ANYONE HAS EXPLOITED OR TAKEN ADVANTAGE OF YOU FINANCIALLY OR OF YOUR PERSONAL PROPERTY?: NO

## 2025-04-19 SDOH — ECONOMIC STABILITY: GENERAL

## 2025-04-19 SDOH — SOCIAL STABILITY: SOCIAL INSECURITY: HAVE YOU HAD ANY THOUGHTS OF HARMING ANYONE ELSE?: NO

## 2025-04-19 SDOH — SOCIAL STABILITY: SOCIAL INSECURITY: DOES ANYONE TRY TO KEEP YOU FROM HAVING/CONTACTING OTHER FRIENDS OR DOING THINGS OUTSIDE YOUR HOME?: NO

## 2025-04-19 SDOH — SOCIAL STABILITY: SOCIAL INSECURITY: WERE YOU ABLE TO COMPLETE ALL THE BEHAVIORAL HEALTH SCREENINGS?: YES

## 2025-04-19 SDOH — SOCIAL STABILITY: SOCIAL INSECURITY: ABUSE: ADULT

## 2025-04-19 SDOH — SOCIAL STABILITY: SOCIAL INSECURITY: HAVE YOU HAD THOUGHTS OF HARMING ANYONE ELSE?: NO

## 2025-04-19 SDOH — SOCIAL STABILITY: SOCIAL INSECURITY: HAS ANYONE EVER THREATENED TO HURT YOUR FAMILY OR YOUR PETS?: NO

## 2025-04-19 ASSESSMENT — COGNITIVE AND FUNCTIONAL STATUS - GENERAL
PATIENT BASELINE BEDBOUND: NO
MOBILITY SCORE: 23
WALKING IN HOSPITAL ROOM: A LITTLE
DAILY ACTIVITIY SCORE: 24

## 2025-04-19 ASSESSMENT — PAIN - FUNCTIONAL ASSESSMENT
PAIN_FUNCTIONAL_ASSESSMENT: 0-10

## 2025-04-19 ASSESSMENT — PAIN DESCRIPTION - LOCATION
LOCATION: BACK
LOCATION: BACK
LOCATION: BUTTOCKS

## 2025-04-19 ASSESSMENT — ACTIVITIES OF DAILY LIVING (ADL)
LACK_OF_TRANSPORTATION: NO
TOILETING: INDEPENDENT
GROOMING: INDEPENDENT
HEARING - LEFT EAR: FUNCTIONAL
DRESSING YOURSELF: INDEPENDENT
BATHING: INDEPENDENT
ASSISTIVE_DEVICE: CANE;EYEGLASSES
PATIENT'S MEMORY ADEQUATE TO SAFELY COMPLETE DAILY ACTIVITIES?: YES
HEARING - RIGHT EAR: FUNCTIONAL
JUDGMENT_ADEQUATE_SAFELY_COMPLETE_DAILY_ACTIVITIES: YES
WALKS IN HOME: INDEPENDENT
ADEQUATE_TO_COMPLETE_ADL: YES
FEEDING YOURSELF: INDEPENDENT

## 2025-04-19 ASSESSMENT — PAIN SCALES - GENERAL
PAINLEVEL_OUTOF10: 7
PAINLEVEL_OUTOF10: 5 - MODERATE PAIN
PAINLEVEL_OUTOF10: 3
PAINLEVEL_OUTOF10: 6
PAINLEVEL_OUTOF10: 7

## 2025-04-19 ASSESSMENT — LIFESTYLE VARIABLES
HOW MANY STANDARD DRINKS CONTAINING ALCOHOL DO YOU HAVE ON A TYPICAL DAY: PATIENT DOES NOT DRINK
AUDIT-C TOTAL SCORE: 0
SUBSTANCE_ABUSE_PAST_12_MONTHS: NO
HOW OFTEN DO YOU HAVE 6 OR MORE DRINKS ON ONE OCCASION: NEVER
SKIP TO QUESTIONS 9-10: 1
HOW OFTEN DO YOU HAVE A DRINK CONTAINING ALCOHOL: NEVER
AUDIT-C TOTAL SCORE: 0
PRESCIPTION_ABUSE_PAST_12_MONTHS: NO

## 2025-04-19 ASSESSMENT — PAIN DESCRIPTION - ORIENTATION: ORIENTATION: LOWER

## 2025-04-19 ASSESSMENT — PATIENT HEALTH QUESTIONNAIRE - PHQ9
2. FEELING DOWN, DEPRESSED OR HOPELESS: SEVERAL DAYS
SUM OF ALL RESPONSES TO PHQ9 QUESTIONS 1 & 2: 1
1. LITTLE INTEREST OR PLEASURE IN DOING THINGS: NOT AT ALL

## 2025-04-19 NOTE — H&P
"History Of Present Illness  Jhony Myles is a 54 y.o. male presenting with syncopal event. He has a known history of chronic back pain. He was actually lying on the floor, which he typically will do for comfort, and upon standing he had increasing pain and felt faint. Had his mother called the paramedics. He walked down the stairs of the house and was sitting at the bottom of the stairs and when the paramedics arrived apparently the patient was syncopal. Paramedics state that he had involuntary generalized movements lasting only several seconds and then came to immediately. There was no postictal state, he did not lose continence of his bowel or bladder, and did not bite his tongue. His work appears unremarkable. He has a significant cardiac history.   Chief Complaint   Patient presents with    Syncope     To er per [facility] ems with c/o weakness and ? Syncopal episode. States he's been having lower back pain and was laying on the floor, when he stood go to the br, he felt weak and lightheaded. After ems arrival pt had brief syncopal episode and started \"shaking\" pt is alert and oriented now. Denies chest pain or sob          HPI obtained from pt.   Notes from ED physician and nurse reviewed. Case discussed with attending ED physician.     Past Medical History  Medical History[1]     Surgical History  Surgical History[2]   Recent Surgeries in Hospitalist            No cases to display           Surgical History[3]      Social History  Social History     Substance and Sexual Activity   Alcohol Use Never      Social History     Substance and Sexual Activity   Drug Use Never      Social History     Substance and Sexual Activity   Sexual Activity Never      Tobacco Use History[4]   Food Insecurity: No Food Insecurity (4/19/2025)    Hunger Vital Sign     Worried About Running Out of Food in the Last Year: Never true     Ran Out of Food in the Last Year: Never true      Financial Resource Strain: Low Risk  " (4/19/2025)    Overall Financial Resource Strain (CARDIA)     Difficulty of Paying Living Expenses: Not hard at all   Recent Concern: Financial Resource Strain - Medium Risk (4/12/2025)    Received from [facility]    Overall Financial Resource Strain (CARDIA)     Difficulty of Paying Living Expenses: Somewhat hard      Intimate Partner Violence: Not At Risk (4/19/2025)    Humiliation, Afraid, Rape, and Kick questionnaire     Fear of Current or Ex-Partner: No     Emotionally Abused: No     Physically Abused: No     Sexually Abused: No      Transportation Needs: No Transportation Needs (4/19/2025)    PRAPARE - Transportation     Lack of Transportation (Medical): No     Lack of Transportation (Non-Medical): No   Recent Concern: Transportation Needs - Unmet Transportation Needs (4/12/2025)    Received from [facility]    PRAPARE - Transportation     Lack of Transportation (Medical): Yes     Lack of Transportation (Non-Medical): Patient declined        Family History  Family History[5]      Allergies  RX Allergies[6]   Allergies[7]     Physical Exam   Physical Exam   Constitutional:       General: He is not in acute distress.     Appearance: Normal appearance. He is normal weight. He is not ill-appearing, toxic-appearing or diaphoretic.   HENT:      Head: Normocephalic and atraumatic.      Nose: Nose normal. No rhinorrhea.  Cardiovascular:      Rate and Rhythm: Normal rate and regular rhythm.      Heart sounds: No murmur heard.  Pulmonary:      Effort: Pulmonary effort is normal.      Breath sounds: Normal breath sounds. No wheezing.   Abdominal:      General: Abdomen is flat. Bowel sounds are normal. There is no distension.      Palpations: Abdomen is soft.      Tenderness: There is no abdominal tenderness.   Musculoskeletal:         General: Normal range of motion.      Cervical back: Normal range of motion.   Skin:     General: Skin is warm and dry.      Findings: No rash.   Neurological:      General: No  "focal deficit present.      Mental Status: He is alert and oriented to person, place, and time. Mental status is at baseline.      Cranial Nerves: No cranial nerve deficit.      Sensory: No sensory deficit.   Psychiatric:         Mood and Affect: Mood normal.         Behavior: Behavior normal.         Thought Content: Thought content normal.         Judgment: Judgment normal.     Last Recorded Vitals  Visit Vitals  /74   Pulse 58   Temp 37.2 °C (99 °F) (Oral)   Resp 14      Visit Vitals  /74   Pulse 58   Temp 37.2 °C (99 °F) (Oral)   Resp 14   Ht 1.981 m (6' 6\")   Wt 122 kg (270 lb)   SpO2 95%   BMI 31.20 kg/m²   Smoking Status Never   BSA 2.59 m²        Relevant Results      Results for orders placed or performed during the hospital encounter of 04/18/25 (from the past 24 hours)   CBC and Auto Differential   Result Value Ref Range    WBC 4.7 4.4 - 11.3 x10*3/uL    nRBC 0.0 0.0 - 0.0 /100 WBCs    RBC 4.05 (L) 4.50 - 5.90 x10*6/uL    Hemoglobin 12.8 (L) 13.5 - 17.5 g/dL    Hematocrit 34.8 (L) 41.0 - 52.0 %    MCV 86 80 - 100 fL    MCH 31.6 26.0 - 34.0 pg    MCHC 36.8 (H) 32.0 - 36.0 g/dL    RDW 12.3 11.5 - 14.5 %    Platelets 107 (L) 150 - 450 x10*3/uL    Neutrophils % 60.0 40.0 - 80.0 %    Immature Granulocytes %, Automated 0.2 0.0 - 0.9 %    Lymphocytes % 24.8 13.0 - 44.0 %    Monocytes % 11.5 2.0 - 10.0 %    Eosinophils % 2.6 0.0 - 6.0 %    Basophils % 0.9 0.0 - 2.0 %    Neutrophils Absolute 2.81 1.20 - 7.70 x10*3/uL    Immature Granulocytes Absolute, Automated 0.01 0.00 - 0.70 x10*3/uL    Lymphocytes Absolute 1.16 (L) 1.20 - 4.80 x10*3/uL    Monocytes Absolute 0.54 0.10 - 1.00 x10*3/uL    Eosinophils Absolute 0.12 0.00 - 0.70 x10*3/uL    Basophils Absolute 0.04 0.00 - 0.10 x10*3/uL   Basic metabolic panel   Result Value Ref Range    Glucose 213 (H) 74 - 99 mg/dL    Sodium 136 136 - 145 mmol/L    Potassium 3.6 3.5 - 5.3 mmol/L    Chloride 104 98 - 107 mmol/L    Bicarbonate 25 21 - 32 mmol/L    Anion " Gap 11 10 - 20 mmol/L    Urea Nitrogen 15 6 - 23 mg/dL    Creatinine 0.79 0.50 - 1.30 mg/dL    eGFR >90 >60 mL/min/1.73m*2    Calcium 9.4 8.6 - 10.3 mg/dL   Magnesium   Result Value Ref Range    Magnesium 1.95 1.60 - 2.40 mg/dL   Troponin I, High Sensitivity, Initial   Result Value Ref Range    Troponin I, High Sensitivity 10 0 - 20 ng/L   Troponin, High Sensitivity, 1 Hour   Result Value Ref Range    Troponin I, High Sensitivity 10 0 - 20 ng/L      Imaging  XR chest 1 view  Result Date: 4/19/2025  No acute cardiopulmonary disease. Signed by Harsha Ortiz    CT head wo IV contrast  Result Date: 4/19/2025  No acute intracranial abnormality.   New air-fluid level in the left sphenoid sinus. Findings may relate to developing sinusitis.   MACRO: None.   Signed by: Dontrell Schulz 4/19/2025 12:32 AM Dictation workstation:   [id number]      Cardiology, Vascular, and Other Imaging  No other imaging results found for the past 2 days       I personally reviewed and interpreted the above results, multiple of which contributed to my medical making decisions.        Assessment/Plan       Syncope  ?Hx TIA  Hx Migraines  -likely orthostatic hypotension, with possible seizure vs psychogenic reaction  -monitor overnight  -unremarkable BRISENO so far  -received norco in ED for headache, would avoid further narcotics    Hx DM2  Hyperglycemia  -recent A1c 4.9    HTN  CAD s/p PCA and stents  HLD  -continue home meds  -ECG and trops unremarkable      Fluids: oral  Nutrition: general  Bowel prophylaxis:   DVT prophylaxis:  SCDs        Jaymie Montano MD           [1]   Past Medical History:  Diagnosis Date    Anxiety 2009    Depression 2009    Diabetes mellitus (Multi) 2010    GERD (gastroesophageal reflux disease) 2005    Heart disease 2000    Hyperlipidemia 2015    Hypertension 1988    Myocardial infarction (Multi) 2005    Prostatitis 1994    Stroke (Multi) 2023   [2]   Past Surgical History:  Procedure Laterality Date    CARDIAC  CATHETERIZATION N/A 8/13/2024    Procedure: Left Heart Cath;  Surgeon: Juan Ortiz MD;  Location: [facility];  Service: Cardiovascular;  Laterality: N/A;  echo @ 7:30    CORONARY ANGIOPLASTY  02/27/2018    [facility]    CT ANGIO NECK  12/2/2022    CT NECK ANGIO W AND WO IV CONTRAST 12/2/2022 [facility]    CT HEAD ANGIO W AND WO IV CONTRAST  12/2/2022    CT HEAD ANGIO W AND WO IV CONTRAST 12/2/2022 [facility]    MR HEAD ANGIO WO IV CONTRAST  4/3/2023    MR HEAD ANGIO WO IV CONTRAST [facility]    MR NECK ANGIO WO IV CONTRAST  4/3/2023    MR NECK ANGIO WO IV CONTRAST [facility]   [3]   Past Surgical History:  Procedure Laterality Date    CARDIAC CATHETERIZATION N/A 8/13/2024    Procedure: Left Heart Cath;  Surgeon: Juan Ortiz MD;  Location: [facility];  Service: Cardiovascular;  Laterality: N/A;  echo @ 7:30    CORONARY ANGIOPLASTY  02/27/2018    [facility]    CT ANGIO NECK  12/2/2022    CT NECK ANGIO W AND WO IV CONTRAST 12/2/2022 [facility]    CT HEAD ANGIO W AND WO IV CONTRAST  12/2/2022    CT HEAD ANGIO W AND WO IV CONTRAST 12/2/2022 [facility]    MR HEAD ANGIO WO IV CONTRAST  4/3/2023    MR HEAD ANGIO WO IV CONTRAST [facility]    MR NECK ANGIO WO IV CONTRAST  4/3/2023    MR NECK ANGIO WO IV CONTRAST [facility]   [4]   Social History  Tobacco Use   Smoking Status Never   Smokeless Tobacco Never   [5]   Family History  Problem Relation Name Age of Onset    Heart disease Mother Taylor     Hypertension Mother Taylor     Heart disease Father Jhony     Hypertension Father Jhony    [6]   Allergies  Allergen Reactions    Amlodipine Swelling     Bilateral leg swelling    Calcium Channel Blocking Agent Diltiazem Analogues Unknown    Ciprofloxacin Other    Hydralazine Other     Causes chest pain    Iodinated Contrast Media Unknown    Lisinopril Swelling    Nitroglycerin Unknown    Other Unknown    Penicillins Unknown and Angioedema    Statins-Hmg-Coa Reductase Inhibitors Unknown     "Tramadol Swelling    Meperidine Rash and Unknown     \"knocked me out\"    Propoxyphene-Acetaminophen Unknown     Knocked me out.    Temazepam Unknown     memory loss   [7]   Allergies  Allergen Reactions    Amlodipine Swelling     Bilateral leg swelling    Calcium Channel Blocking Agent Diltiazem Analogues Unknown    Ciprofloxacin Other    Hydralazine Other     Causes chest pain    Iodinated Contrast Media Unknown    Lisinopril Swelling    Nitroglycerin Unknown    Other Unknown    Penicillins Unknown and Angioedema    Statins-Hmg-Coa Reductase Inhibitors Unknown    Tramadol Swelling    Meperidine Rash and Unknown     \"knocked me out\"    Propoxyphene-Acetaminophen Unknown     Knocked me out.    Temazepam Unknown     memory loss     "

## 2025-04-19 NOTE — CARE PLAN
"The patient's goals for the shift include \"not black out again\"    The clinical goals for the shift include prevention of falls      Problem: Fall/Injury  Goal: Not fall by end of shift  Outcome: Progressing     Problem: Fall/Injury  Goal: Be free from injury by end of the shift  Outcome: Progressing     Problem: Fall/Injury  Goal: Verbalize understanding of personal risk factors for fall in the hospital  Outcome: Progressing     Problem: Fall/Injury  Goal: Verbalize understanding of risk factor reduction measures to prevent injury from fall in the home  Outcome: Progressing   .    "

## 2025-04-19 NOTE — PROGRESS NOTES
04/19/25 1013   Discharge Planning   Living Arrangements Parent;Family members   Support Systems Parent;Family members   Assistance Needed none   Type of Residence Private residence   Number of Stairs to Enter Residence 12   Number of Stairs Within Residence 12   Do you have animals or pets at home? No   Who is requesting discharge planning? Provider   Home or Post Acute Services None   Expected Discharge Disposition Home   Does the patient need discharge transport arranged? No   Financial Resource Strain   How hard is it for you to pay for the very basics like food, housing, medical care, and heating? Not hard     SW met with patient and reviewed the role that CT has in the discharge process. Confirmed his address and insurance. His PCP is at [facility] in [place] and uses [facility] in [place] for his prescriptions. He does live at home with his mother and also sister. He is independent with all care and denies any needs at discharge. No issues with obtaining medications. Plan is for patient to return home when he is medically ready. CT to follow.

## 2025-04-19 NOTE — ED PROVIDER NOTES
"HPI   Chief Complaint   Patient presents with    Syncope     To er per [facility] ems with c/o weakness and ? Syncopal episode. States he's been having lower back pain and was laying on the floor, when he stood go to the br, he felt weak and lightheaded. After ems arrival pt had brief syncopal episode and started \"shaking\" pt is alert and oriented now. Denies chest pain or sob       Patient presents to the emergency department by squad from home.  The patient states that he was lying on the floor as this helps with his chronic back pain.  Upon getting up he felt weak and felt like he was going to pass out.  He had his mother called the paramedics.  The patient states that he went downstairs so he could be sitting down at the bottom of the stairs waiting for the paramedics.  When the paramedics got there they state that the patient had a seizure.  He fell backwards while sitting on the stairs and one of the paramedics caught the patient so there was no trauma.  There is no postictal state and the patient's episode resolved within several seconds without medications being used.  At the time of arrival the patient reports feeling nauseated and tired.  No history of prior similar symptoms.      History provided by:  Patient   used: No            Patient History   Medical History[1]  Surgical History[2]  Family History[3]  Social History[4]    Physical Exam   ED Triage Vitals   Temp Pulse Resp BP   -- -- -- --      SpO2 Temp src Heart Rate Source Patient Position   -- -- -- --      BP Location FiO2 (%)     -- --       Physical Exam  Vitals and nursing note reviewed.   Constitutional:       General: He is not in acute distress.     Appearance: Normal appearance. He is normal weight. He is not ill-appearing, toxic-appearing or diaphoretic.   HENT:      Head: Normocephalic and atraumatic.      Nose: Nose normal. No rhinorrhea.   Neck:      Comments: Trachea is midline  Cardiovascular:      Rate and " Rhythm: Normal rate and regular rhythm.      Heart sounds: No murmur heard.  Pulmonary:      Effort: Pulmonary effort is normal.      Breath sounds: Normal breath sounds. No wheezing.   Abdominal:      General: Abdomen is flat. Bowel sounds are normal. There is no distension.      Palpations: Abdomen is soft.      Tenderness: There is no abdominal tenderness.   Musculoskeletal:         General: Normal range of motion.      Cervical back: Normal range of motion.   Skin:     General: Skin is warm and dry.      Findings: No rash.   Neurological:      General: No focal deficit present.      Mental Status: He is alert and oriented to person, place, and time. Mental status is at baseline.      Cranial Nerves: No cranial nerve deficit.      Sensory: No sensory deficit.   Psychiatric:         Mood and Affect: Mood normal.         Behavior: Behavior normal.         Thought Content: Thought content normal.         Judgment: Judgment normal.           ED Course & MDM                  No data recorded                                 Medical Decision Making  Twelve-lead EKG was interpreted by myself and this was noted to contribute record to patient care.  Study reveals sinus bradycardia 56 bpm, normal axis, normal R wave progression, no acute ischemic changes.    Patient was medicated with [place] for his chronic back pain which was noted to improve his symptoms considerably.  He had no altered mental status or syncopal events while here in the ER.  Based on the history I do not feel the patient had a seizure.  This is more consistent with syncope.  Given the patient's cardiac history I feel he would be appropriate for observation on telemetry.  He is agreeable to this.  Patient case was discussed with the hospitalist who accepted the patient to their service.  Patient will be admitted in stable condition.        Procedure  Procedures         [1]   Past Medical History:  Diagnosis Date    Anxiety 2009    Depression 2009     Diabetes mellitus (Multi) 2010    GERD (gastroesophageal reflux disease) 2005    Heart disease 2000    Hyperlipidemia 2015    Hypertension 1988    Myocardial infarction (Multi) 2005    Prostatitis 1994    Stroke (Multi) 2023   [2]   Past Surgical History:  Procedure Laterality Date    CARDIAC CATHETERIZATION N/A 8/13/2024    Procedure: Left Heart Cath;  Surgeon: Juan Ortiz MD;  Location: [facility] Cardiac Cath Lab;  Service: Cardiovascular;  Laterality: N/A;  echo @ 7:30    CORONARY ANGIOPLASTY  02/27/2018    PTCA    CT ANGIO NECK  12/2/2022    CT NECK ANGIO W AND WO IV CONTRAST 12/2/2022 [facility]    CT HEAD ANGIO W AND WO IV CONTRAST  12/2/2022    CT HEAD ANGIO W AND WO IV CONTRAST 12/2/2022 DOCTOR OFFICE [facility]GAC[facility]    MR HEAD ANGIO WO IV CONTRAST  4/3/2023    MR HEAD ANGIO WO IV CONTRAST [facility] MRI    MR NECK ANGIO WO IV CONTRAST  4/3/2023    MR NECK ANGIO WO IV CONTRAST [facility] MRI   [3]   Family History  Problem Relation Name Age of Onset    Heart disease Mother Taylor     Hypertension Mother Taylor     Heart disease Father Jhony     Hypertension Father Jhoyn    [4]   Social History  Tobacco Use    Smoking status: Never    Smokeless tobacco: Never   Vaping Use    Vaping status: Never Used   Substance Use Topics    Alcohol use: Never    Drug use: Never        Ben Hong DO  04/19/25 0233

## 2025-04-20 VITALS
BODY MASS INDEX: 30 KG/M2 | OXYGEN SATURATION: 99 % | WEIGHT: 259.26 LBS | TEMPERATURE: 97.2 F | RESPIRATION RATE: 18 BRPM | DIASTOLIC BLOOD PRESSURE: 76 MMHG | SYSTOLIC BLOOD PRESSURE: 170 MMHG | HEIGHT: 78 IN | HEART RATE: 59 BPM

## 2025-04-20 LAB
ANION GAP SERPL CALC-SCNC: 9 MMOL/L (ref 10–20)
BUN SERPL-MCNC: 14 MG/DL (ref 6–23)
CALCIUM SERPL-MCNC: 9.5 MG/DL (ref 8.6–10.3)
CHLORIDE SERPL-SCNC: 105 MMOL/L (ref 98–107)
CO2 SERPL-SCNC: 29 MMOL/L (ref 21–32)
CREAT SERPL-MCNC: 0.83 MG/DL (ref 0.5–1.3)
EGFRCR SERPLBLD CKD-EPI 2021: >90 ML/MIN/1.73M*2
ERYTHROCYTE [DISTWIDTH] IN BLOOD BY AUTOMATED COUNT: 12.3 % (ref 11.5–14.5)
GLUCOSE BLD MANUAL STRIP-MCNC: 168 MG/DL (ref 74–99)
GLUCOSE SERPL-MCNC: 136 MG/DL (ref 74–99)
HCT VFR BLD AUTO: 35.7 % (ref 41–52)
HGB BLD-MCNC: 13 G/DL (ref 13.5–17.5)
MCH RBC QN AUTO: 31.3 PG (ref 26–34)
MCHC RBC AUTO-ENTMCNC: 36.4 G/DL (ref 32–36)
MCV RBC AUTO: 86 FL (ref 80–100)
NRBC BLD-RTO: 0 /100 WBCS (ref 0–0)
PLATELET # BLD AUTO: 141 X10*3/UL (ref 150–450)
POTASSIUM SERPL-SCNC: 3.6 MMOL/L (ref 3.5–5.3)
RBC # BLD AUTO: 4.15 X10*6/UL (ref 4.5–5.9)
SODIUM SERPL-SCNC: 139 MMOL/L (ref 136–145)
WBC # BLD AUTO: 5.1 X10*3/UL (ref 4.4–11.3)

## 2025-04-20 PROCEDURE — 80048 BASIC METABOLIC PNL TOTAL CA: CPT | Performed by: HOSPITALIST

## 2025-04-20 PROCEDURE — 2500000001 HC RX 250 WO HCPCS SELF ADMINISTERED DRUGS (ALT 637 FOR MEDICARE OP): Performed by: HOSPITALIST

## 2025-04-20 PROCEDURE — 2500000001 HC RX 250 WO HCPCS SELF ADMINISTERED DRUGS (ALT 637 FOR MEDICARE OP): Performed by: STUDENT IN AN ORGANIZED HEALTH CARE EDUCATION/TRAINING PROGRAM

## 2025-04-20 PROCEDURE — 2500000004 HC RX 250 GENERAL PHARMACY W/ HCPCS (ALT 636 FOR OP/ED): Mod: JZ | Performed by: STUDENT IN AN ORGANIZED HEALTH CARE EDUCATION/TRAINING PROGRAM

## 2025-04-20 PROCEDURE — G0378 HOSPITAL OBSERVATION PER HR: HCPCS

## 2025-04-20 PROCEDURE — 2500000004 HC RX 250 GENERAL PHARMACY W/ HCPCS (ALT 636 FOR OP/ED): Mod: JZ | Performed by: INTERNAL MEDICINE

## 2025-04-20 PROCEDURE — 36415 COLL VENOUS BLD VENIPUNCTURE: CPT | Performed by: HOSPITALIST

## 2025-04-20 PROCEDURE — 82947 ASSAY GLUCOSE BLOOD QUANT: CPT

## 2025-04-20 PROCEDURE — 99233 SBSQ HOSP IP/OBS HIGH 50: CPT | Performed by: HOSPITALIST

## 2025-04-20 PROCEDURE — 2500000002 HC RX 250 W HCPCS SELF ADMINISTERED DRUGS (ALT 637 FOR MEDICARE OP, ALT 636 FOR OP/ED): Performed by: STUDENT IN AN ORGANIZED HEALTH CARE EDUCATION/TRAINING PROGRAM

## 2025-04-20 PROCEDURE — 85027 COMPLETE CBC AUTOMATED: CPT | Performed by: HOSPITALIST

## 2025-04-20 RX ORDER — OXYCODONE HYDROCHLORIDE 5 MG/1
5 TABLET ORAL EVERY 6 HOURS PRN
Refills: 0 | Status: DISCONTINUED | OUTPATIENT
Start: 2025-04-20 | End: 2025-04-23 | Stop reason: HOSPADM

## 2025-04-20 RX ORDER — MORPHINE SULFATE 2 MG/ML
1 INJECTION, SOLUTION INTRAMUSCULAR; INTRAVENOUS EVERY 4 HOURS PRN
Status: DISCONTINUED | OUTPATIENT
Start: 2025-04-20 | End: 2025-04-21

## 2025-04-20 RX ORDER — DOCUSATE SODIUM 100 MG/1
100 CAPSULE, LIQUID FILLED ORAL 2 TIMES DAILY
Status: DISCONTINUED | OUTPATIENT
Start: 2025-04-20 | End: 2025-04-23 | Stop reason: HOSPADM

## 2025-04-20 RX ORDER — DEXAMETHASONE SODIUM PHOSPHATE 10 MG/ML
10 INJECTION INTRAMUSCULAR; INTRAVENOUS EVERY 8 HOURS
Status: DISCONTINUED | OUTPATIENT
Start: 2025-04-20 | End: 2025-04-20

## 2025-04-20 RX ORDER — MORPHINE SULFATE 2 MG/ML
2 INJECTION, SOLUTION INTRAMUSCULAR; INTRAVENOUS ONCE
Status: COMPLETED | OUTPATIENT
Start: 2025-04-20 | End: 2025-04-20

## 2025-04-20 RX ADMIN — TICAGRELOR 90 MG: 90 TABLET ORAL at 08:06

## 2025-04-20 RX ADMIN — TICAGRELOR 90 MG: 90 TABLET ORAL at 21:52

## 2025-04-20 RX ADMIN — CARVEDILOL 25 MG: 25 TABLET, FILM COATED ORAL at 17:19

## 2025-04-20 RX ADMIN — MORPHINE SULFATE 2 MG: 2 INJECTION, SOLUTION INTRAMUSCULAR; INTRAVENOUS at 05:03

## 2025-04-20 RX ADMIN — ASPIRIN 81 MG: 81 TABLET, COATED ORAL at 08:06

## 2025-04-20 RX ADMIN — ROSUVASTATIN CALCIUM 10 MG: 5 TABLET, FILM COATED ORAL at 08:06

## 2025-04-20 RX ADMIN — CARVEDILOL 25 MG: 25 TABLET, FILM COATED ORAL at 07:36

## 2025-04-20 RX ADMIN — KETOROLAC TROMETHAMINE 30 MG: 30 INJECTION, SOLUTION INTRAMUSCULAR at 01:56

## 2025-04-20 RX ADMIN — PANTOPRAZOLE SODIUM 40 MG: 40 TABLET, DELAYED RELEASE ORAL at 06:16

## 2025-04-20 RX ADMIN — OXYCODONE HYDROCHLORIDE 5 MG: 5 TABLET ORAL at 15:14

## 2025-04-20 RX ADMIN — ACETAMINOPHEN 650 MG: 325 TABLET ORAL at 07:50

## 2025-04-20 RX ADMIN — ISOSORBIDE MONONITRATE 30 MG: 30 TABLET, EXTENDED RELEASE ORAL at 08:06

## 2025-04-20 RX ADMIN — CETIRIZINE HYDROCHLORIDE 10 MG: 10 TABLET, FILM COATED ORAL at 08:06

## 2025-04-20 RX ADMIN — CYCLOBENZAPRINE 10 MG: 10 TABLET, FILM COATED ORAL at 00:54

## 2025-04-20 RX ADMIN — HYDROCHLOROTHIAZIDE 25 MG: 25 TABLET ORAL at 08:06

## 2025-04-20 RX ADMIN — MORPHINE SULFATE 2 MG: 2 INJECTION, SOLUTION INTRAMUSCULAR; INTRAVENOUS at 03:16

## 2025-04-20 RX ADMIN — CYCLOBENZAPRINE 10 MG: 10 TABLET, FILM COATED ORAL at 14:26

## 2025-04-20 RX ADMIN — ISOSORBIDE MONONITRATE 30 MG: 30 TABLET, EXTENDED RELEASE ORAL at 21:52

## 2025-04-20 RX ADMIN — OXYCODONE HYDROCHLORIDE 5 MG: 5 TABLET ORAL at 09:33

## 2025-04-20 RX ADMIN — OXYCODONE HYDROCHLORIDE 5 MG: 5 TABLET ORAL at 21:52

## 2025-04-20 RX ADMIN — LOSARTAN POTASSIUM 50 MG: 50 TABLET, FILM COATED ORAL at 08:06

## 2025-04-20 ASSESSMENT — PAIN - FUNCTIONAL ASSESSMENT
PAIN_FUNCTIONAL_ASSESSMENT: 0-10

## 2025-04-20 ASSESSMENT — PAIN DESCRIPTION - ORIENTATION
ORIENTATION: RIGHT

## 2025-04-20 ASSESSMENT — PAIN SCALES - GENERAL
PAINLEVEL_OUTOF10: 10 - WORST POSSIBLE PAIN
PAINLEVEL_OUTOF10: 5 - MODERATE PAIN
PAINLEVEL_OUTOF10: 4
PAINLEVEL_OUTOF10: 3
PAINLEVEL_OUTOF10: 8
PAINLEVEL_OUTOF10: 6
PAINLEVEL_OUTOF10: 5 - MODERATE PAIN
PAINLEVEL_OUTOF10: 7
PAINLEVEL_OUTOF10: 10 - WORST POSSIBLE PAIN
PAINLEVEL_OUTOF10: 9

## 2025-04-20 ASSESSMENT — PAIN DESCRIPTION - LOCATION
LOCATION: BACK
LOCATION: BUTTOCKS
LOCATION: BUTTOCKS
LOCATION: BACK

## 2025-04-20 NOTE — CARE PLAN
"The patient's goals for the shift include \"not black out again\"    The clinical goals for the shift include pain control  Problem: Fall/Injury  Goal: Be free from injury by end of the shift  Outcome: Progressing     Problem: Fall/Injury  Goal: Verbalize understanding of personal risk factors for fall in the hospital  Outcome: Progressing     Problem: Fall/Injury  Goal: Verbalize understanding of risk factor reduction measures to prevent injury from fall in the home  Outcome: Progressing     Problem: Fall/Injury  Goal: Use assistive devices by end of the shift  Outcome: Progressing       "

## 2025-04-20 NOTE — PROGRESS NOTES
"Jhony Myles \"Chester\" is a 54 y.o. male on day 0 of admission presenting with Syncope and collapse.    Subjective   Pain in right hip and lower back as well leg  No bowel bladder incontinence or saddle anesthesia       Objective     Physical Exam  General Appearance: AAO x 3, in slight distress  Skin: skin color pink, warm, and dry; no suspicious rashes or lesions  Eyes : PERRL, EOM's intact  ENT: mucous membranes pink and moist  Neck: normocephalic  Respiratory: lungs clear to auscultation anteriorly; no wheezing, rhonchi, or crackles.   Heart: regular rate and rhythm.   Abdomen: Nondistended, positive bowel sounds x4, soft,  nontender  Extremities: no edema   Peripheral pulses: normal x4 extremities  Neuro: alert, coherent and conversant, no focal motor deficits  Last Recorded Vitals  Blood pressure 154/77, pulse 61, temperature 36.4 °C (97.5 °F), temperature source Temporal, resp. rate 18, height 1.981 m (6' 5.99\"), weight 118 kg (259 lb 4.2 oz), SpO2 98%.  Intake/Output last 3 Shifts:  I/O last 3 completed shifts:  In: 2749 (23.4 mL/kg) [P.O.:1750; IV Piggyback:999]  Out: 3050 (25.9 mL/kg) [Urine:3050 (0.7 mL/kg/hr)]  Weight: 117.6 kg     Relevant Results    Results for orders placed or performed during the hospital encounter of 04/18/25 (from the past 24 hours)   POCT GLUCOSE   Result Value Ref Range    POCT Glucose 186 (H) 74 - 99 mg/dL   CBC   Result Value Ref Range    WBC 5.1 4.4 - 11.3 x10*3/uL    nRBC 0.0 0.0 - 0.0 /100 WBCs    RBC 4.15 (L) 4.50 - 5.90 x10*6/uL    Hemoglobin 13.0 (L) 13.5 - 17.5 g/dL    Hematocrit 35.7 (L) 41.0 - 52.0 %    MCV 86 80 - 100 fL    MCH 31.3 26.0 - 34.0 pg    MCHC 36.4 (H) 32.0 - 36.0 g/dL    RDW 12.3 11.5 - 14.5 %    Platelets 141 (L) 150 - 450 x10*3/uL   Basic metabolic panel   Result Value Ref Range    Glucose 136 (H) 74 - 99 mg/dL    Sodium 139 136 - 145 mmol/L    Potassium 3.6 3.5 - 5.3 mmol/L    Chloride 105 98 - 107 mmol/L    Bicarbonate 29 21 - 32 mmol/L    Anion Gap " 9 (L) 10 - 20 mmol/L    Urea Nitrogen 14 6 - 23 mg/dL    Creatinine 0.83 0.50 - 1.30 mg/dL    eGFR >90 >60 mL/min/1.73m*2    Calcium 9.5 8.6 - 10.3 mg/dL       XR chest 1 view  Result Date: 4/19/2025  STUDY: Chest Radiograph;  4/18/2025 11:21PM INDICATION: Altered level of consciousness. COMPARISON: 8/13/2024 XR chest ACCESSION NUMBER(S): [id number] ORDERING CLINICIAN: ALEENA BROWNLEE TECHNIQUE:  Frontal chest was obtained at 23:19 hours. FINDINGS: CARDIOMEDIASTINAL SILHOUETTE: Cardiomediastinal silhouette is normal in size and configuration.  LUNGS: Lungs are clear.  ABDOMEN: No remarkable upper abdominal findings.  BONES: No acute osseous changes.    No acute cardiopulmonary disease. Signed by Harsha Ortiz    CT head wo IV contrast  Result Date: 4/19/2025  Interpreted By:  Dontrell Schulz, STUDY: CT HEAD WO IV CONTRAST;  4/18/2025 11:29 pm   INDICATION: Signs/Symptoms:altered loc.     COMPARISON: Core [id number] CT head without contrast   ACCESSION NUMBER(S): [id number]   ORDERING CLINICIAN: ALEENA BROWNLEE   TECHNIQUE: Noncontrast axial CT images of head were obtained with coronal and sagittal reconstructed images.   FINDINGS: BRAIN PARENCHYMA:  No acute intraparenchymal hemorrhage or parenchymal evidence of acute large territory ischemic infarct. Gray-white matter distinction is preserved. No mass-effect.   VENTRICLES and EXTRA-AXIAL SPACES:  No acute extra-axial or intraventricular hemorrhage. No effacement of cerebral sulci. Stable mild prominence of the temporal horns of lateral ventricles, overall without evidence of obstructive hydrocephalus.   PARANASAL SINUSES/MASTOIDS:  There is new air-fluid level in the left sphenoid sinus. There is mild mucosal thickening in the ethmoid sinuses. The mastoids are well aerated.   CALVARIUM/ORBITS:  No acute skull fracture.  The orbits and globes are intact to the extent visualized.   EXTRACRANIAL SOFT TISSUES: No discernible acute abnormality.       No acute  intracranial abnormality.   New air-fluid level in the left sphenoid sinus. Findings may relate to developing sinusitis.   MACRO: None.   Signed by: Dontrell Schulz 4/19/2025 12:32 AM Dictation workstation:   [id number]      Scheduled medications  Scheduled Medications[1]  Continuous medications  Continuous Medications[2]  PRN medications  PRN Medications[3]                          Assessment & Plan  Syncope and collapse      54-year-old male with  #Syncope possibly vasovagal  Supportive care  Hydrate  Avoid excessive sedatives  Fall, aspiration, seizure precautions  Cardiopulmonary monitoring  Neurochecks      #Acute low back pain, right hip pain possible nerve impingement  Seems to be musculoskeletal etiology  MRI L-spine, right hip  Pain control  PT  Patient refused steroids  Oxycodone for pain control  Lidocaine patch  Voltaren gel  Symptomatic management  To consider spine surgery opinion after MRI results depending on etiology  Could be degenerative disc disease  Recommend nutrition multivitamin, stretching ambulation and optimal exercise  Aquatics  Muscle relaxant, Tylenol for mild pain  Recommend vitamin D and calcium    #Hypertension  Continue antihypertensives on carvedilol, losartan  HCTZ, Imdur  #Hyperlipidemia, statins  #Coronary artery disease, aspirin and Brilinta  Cardiopulmonary monitoring, troponins unremarkable  #GI prophylaxis, PPI  #Diabetes mellitus type 2  Monitor blood sugars    Continue home meds as able   bowel regimen for constipation prophylaxis    SCDs for DVT prophylaxis            Derrek Thomas MD         [1] aspirin, 81 mg, oral, Daily  carvedilol, 25 mg, oral, BID  cetirizine, 10 mg, oral, Daily  hydroCHLOROthiazide, 25 mg, oral, Daily  isosorbide mononitrate ER, 30 mg, oral, q12h  lidocaine, 1 patch, transdermal, Daily  losartan, 50 mg, oral, Daily  pantoprazole, 40 mg, oral, Daily before breakfast  rosuvastatin, 10 mg, oral, Every other day  ticagrelor, 90 mg, oral, BID  [2]     [3] PRN medications: acetaminophen, ALPRAZolam, alum-mag hydroxide-simeth, benzocaine-menthol, benzonatate, bisacodyl, calcium carbonate, cyclobenzaprine, diclofenac sodium, guaiFENesin, ketorolac, melatonin, metoprolol, morphine, ondansetron, oxyCODONE, polyethylene glycol, prochlorperazine, traZODone

## 2025-04-20 NOTE — CARE PLAN
"The patient's goals for the shift include \"not black out again\"    The clinical goals for the shift include No dizziness      Problem: Fall/Injury  Goal: Not fall by end of shift  Outcome: Progressing  Goal: Be free from injury by end of the shift  Outcome: Progressing  Goal: Verbalize understanding of personal risk factors for fall in the hospital  Outcome: Progressing  Goal: Verbalize understanding of risk factor reduction measures to prevent injury from fall in the home  Outcome: Progressing  Goal: Use assistive devices by end of the shift  Outcome: Progressing  Goal: Pace activities to prevent fatigue by end of the shift  Outcome: Progressing     Problem: Diabetes  Goal: Achieve decreasing blood glucose levels by end of shift  Outcome: Progressing  Goal: Increase stability of blood glucose readings by end of shift  Outcome: Progressing  Goal: Decrease in ketones present in urine by end of shift  Outcome: Progressing  Goal: Maintain electrolyte levels within acceptable range throughout shift  Outcome: Progressing  Goal: Maintain glucose levels >70mg/dl to <250mg/dl throughout shift  Outcome: Progressing  Goal: No changes in neurological exam by end of shift  Outcome: Progressing  Goal: Learn about and adhere to nutrition recommendations by end of shift  Outcome: Progressing  Goal: Vital signs within normal range for age by end of shift  Outcome: Progressing  Goal: Increase self care and/or family involovement by end of shift  Outcome: Progressing  Goal: Receive DSME education by end of shift  Outcome: Progressing     Problem: Pain  Goal: Takes deep breaths with improved pain control throughout the shift  Outcome: Progressing  Goal: Turns in bed with improved pain control throughout the shift  Outcome: Progressing  Goal: Walks with improved pain control throughout the shift  Outcome: Progressing  Goal: Performs ADL's with improved pain control throughout shift  Outcome: Progressing  Goal: Participates in PT with " improved pain control throughout the shift  Outcome: Progressing  Goal: Free from opioid side effects throughout the shift  Outcome: Progressing  Goal: Free from acute confusion related to pain meds throughout the shift  Outcome: Progressing     Problem: Discharge Planning  Goal: Discharge to home or other facility with appropriate resources  Outcome: Progressing     Problem: Chronic Conditions and Co-morbidities  Goal: Patient's chronic conditions and co-morbidity symptoms are monitored and maintained or improved  Outcome: Progressing     Problem: Nutrition  Goal: Nutrient intake appropriate for maintaining nutritional needs  Outcome: Progressing

## 2025-04-21 ENCOUNTER — APPOINTMENT (OUTPATIENT)
Dept: RADIOLOGY | Facility: HOSPITAL | Age: 55
End: 2025-04-21
Payer: COMMERCIAL

## 2025-04-21 PROBLEM — R55 SYNCOPE, UNSPECIFIED SYNCOPE TYPE: Status: ACTIVE | Noted: 2025-04-21

## 2025-04-21 LAB
ANION GAP SERPL CALC-SCNC: 10 MMOL/L (ref 10–20)
ATRIAL RATE: 56 BPM
BUN SERPL-MCNC: 18 MG/DL (ref 6–23)
CALCIUM SERPL-MCNC: 9.7 MG/DL (ref 8.6–10.3)
CHLORIDE SERPL-SCNC: 104 MMOL/L (ref 98–107)
CO2 SERPL-SCNC: 29 MMOL/L (ref 21–32)
CREAT SERPL-MCNC: 0.87 MG/DL (ref 0.5–1.3)
EGFRCR SERPLBLD CKD-EPI 2021: >90 ML/MIN/1.73M*2
ERYTHROCYTE [DISTWIDTH] IN BLOOD BY AUTOMATED COUNT: 12.3 % (ref 11.5–14.5)
GLUCOSE BLD MANUAL STRIP-MCNC: 121 MG/DL (ref 74–99)
GLUCOSE BLD MANUAL STRIP-MCNC: 161 MG/DL (ref 74–99)
GLUCOSE BLD MANUAL STRIP-MCNC: 182 MG/DL (ref 74–99)
GLUCOSE BLD MANUAL STRIP-MCNC: 208 MG/DL (ref 74–99)
GLUCOSE SERPL-MCNC: 128 MG/DL (ref 74–99)
HCT VFR BLD AUTO: 37.7 % (ref 41–52)
HGB BLD-MCNC: 14.1 G/DL (ref 13.5–17.5)
MCH RBC QN AUTO: 32.3 PG (ref 26–34)
MCHC RBC AUTO-ENTMCNC: 37.4 G/DL (ref 32–36)
MCV RBC AUTO: 86 FL (ref 80–100)
NRBC BLD-RTO: 0 /100 WBCS (ref 0–0)
P AXIS: 19 DEGREES
P OFFSET: 174 MS
P ONSET: 123 MS
PLATELET # BLD AUTO: 142 X10*3/UL (ref 150–450)
POTASSIUM SERPL-SCNC: 3.9 MMOL/L (ref 3.5–5.3)
PR INTERVAL: 182 MS
Q ONSET: 214 MS
QRS COUNT: 9 BEATS
QRS DURATION: 96 MS
QT INTERVAL: 448 MS
QTC CALCULATION(BAZETT): 432 MS
QTC FREDERICIA: 438 MS
R AXIS: 69 DEGREES
RBC # BLD AUTO: 4.37 X10*6/UL (ref 4.5–5.9)
SODIUM SERPL-SCNC: 139 MMOL/L (ref 136–145)
T AXIS: 61 DEGREES
T OFFSET: 438 MS
VENTRICULAR RATE: 56 BPM
WBC # BLD AUTO: 5.4 X10*3/UL (ref 4.4–11.3)

## 2025-04-21 PROCEDURE — 99233 SBSQ HOSP IP/OBS HIGH 50: CPT | Performed by: HOSPITALIST

## 2025-04-21 PROCEDURE — 2500000002 HC RX 250 W HCPCS SELF ADMINISTERED DRUGS (ALT 637 FOR MEDICARE OP, ALT 636 FOR OP/ED): Performed by: STUDENT IN AN ORGANIZED HEALTH CARE EDUCATION/TRAINING PROGRAM

## 2025-04-21 PROCEDURE — 80048 BASIC METABOLIC PNL TOTAL CA: CPT | Performed by: HOSPITALIST

## 2025-04-21 PROCEDURE — 2500000001 HC RX 250 WO HCPCS SELF ADMINISTERED DRUGS (ALT 637 FOR MEDICARE OP): Performed by: HOSPITALIST

## 2025-04-21 PROCEDURE — 72148 MRI LUMBAR SPINE W/O DYE: CPT

## 2025-04-21 PROCEDURE — 82947 ASSAY GLUCOSE BLOOD QUANT: CPT

## 2025-04-21 PROCEDURE — 2500000001 HC RX 250 WO HCPCS SELF ADMINISTERED DRUGS (ALT 637 FOR MEDICARE OP): Performed by: STUDENT IN AN ORGANIZED HEALTH CARE EDUCATION/TRAINING PROGRAM

## 2025-04-21 PROCEDURE — 72148 MRI LUMBAR SPINE W/O DYE: CPT | Performed by: RADIOLOGY

## 2025-04-21 PROCEDURE — 36415 COLL VENOUS BLD VENIPUNCTURE: CPT | Performed by: HOSPITALIST

## 2025-04-21 PROCEDURE — 1200000002 HC GENERAL ROOM WITH TELEMETRY DAILY

## 2025-04-21 PROCEDURE — 85027 COMPLETE CBC AUTOMATED: CPT | Performed by: HOSPITALIST

## 2025-04-21 PROCEDURE — 73721 MRI JNT OF LWR EXTRE W/O DYE: CPT | Mod: RT

## 2025-04-21 PROCEDURE — 2500000004 HC RX 250 GENERAL PHARMACY W/ HCPCS (ALT 636 FOR OP/ED): Mod: JZ | Performed by: HOSPITALIST

## 2025-04-21 RX ORDER — MORPHINE SULFATE 2 MG/ML
1 INJECTION, SOLUTION INTRAMUSCULAR; INTRAVENOUS EVERY 4 HOURS PRN
Status: DISCONTINUED | OUTPATIENT
Start: 2025-04-21 | End: 2025-04-23 | Stop reason: HOSPADM

## 2025-04-21 RX ADMIN — OXYCODONE HYDROCHLORIDE 5 MG: 5 TABLET ORAL at 23:13

## 2025-04-21 RX ADMIN — OXYCODONE HYDROCHLORIDE 5 MG: 5 TABLET ORAL at 10:22

## 2025-04-21 RX ADMIN — DOCUSATE SODIUM 100 MG: 100 CAPSULE, LIQUID FILLED ORAL at 21:31

## 2025-04-21 RX ADMIN — OXYCODONE HYDROCHLORIDE 5 MG: 5 TABLET ORAL at 16:58

## 2025-04-21 RX ADMIN — ISOSORBIDE MONONITRATE 30 MG: 30 TABLET, EXTENDED RELEASE ORAL at 10:24

## 2025-04-21 RX ADMIN — PANTOPRAZOLE SODIUM 40 MG: 40 TABLET, DELAYED RELEASE ORAL at 10:22

## 2025-04-21 RX ADMIN — TICAGRELOR 90 MG: 90 TABLET ORAL at 10:38

## 2025-04-21 RX ADMIN — MORPHINE SULFATE 1 MG: 2 INJECTION, SOLUTION INTRAMUSCULAR; INTRAVENOUS at 01:40

## 2025-04-21 RX ADMIN — DOCUSATE SODIUM 100 MG: 100 CAPSULE, LIQUID FILLED ORAL at 10:22

## 2025-04-21 RX ADMIN — MORPHINE SULFATE 1 MG: 2 INJECTION, SOLUTION INTRAMUSCULAR; INTRAVENOUS at 08:04

## 2025-04-21 RX ADMIN — CARVEDILOL 25 MG: 25 TABLET, FILM COATED ORAL at 16:58

## 2025-04-21 RX ADMIN — ISOSORBIDE MONONITRATE 30 MG: 30 TABLET, EXTENDED RELEASE ORAL at 21:31

## 2025-04-21 RX ADMIN — LOSARTAN POTASSIUM 75 MG: 50 TABLET, FILM COATED ORAL at 10:23

## 2025-04-21 RX ADMIN — TICAGRELOR 90 MG: 90 TABLET ORAL at 21:31

## 2025-04-21 RX ADMIN — ASPIRIN 81 MG: 81 TABLET, COATED ORAL at 10:24

## 2025-04-21 RX ADMIN — CARVEDILOL 25 MG: 25 TABLET, FILM COATED ORAL at 10:22

## 2025-04-21 ASSESSMENT — PAIN SCALES - GENERAL
PAINLEVEL_OUTOF10: 0 - NO PAIN
PAINLEVEL_OUTOF10: 2
PAINLEVEL_OUTOF10: 7
PAINLEVEL_OUTOF10: 0 - NO PAIN
PAINLEVEL_OUTOF10: 7

## 2025-04-21 ASSESSMENT — PAIN DESCRIPTION - LOCATION: LOCATION: LEG

## 2025-04-21 ASSESSMENT — PAIN - FUNCTIONAL ASSESSMENT
PAIN_FUNCTIONAL_ASSESSMENT: 0-10

## 2025-04-21 ASSESSMENT — COGNITIVE AND FUNCTIONAL STATUS - GENERAL
TURNING FROM BACK TO SIDE WHILE IN FLAT BAD: A LITTLE
MOBILITY SCORE: 16
MOVING TO AND FROM BED TO CHAIR: A LITTLE
DAILY ACTIVITIY SCORE: 24
WALKING IN HOSPITAL ROOM: A LOT
CLIMB 3 TO 5 STEPS WITH RAILING: A LOT
STANDING UP FROM CHAIR USING ARMS: A LOT

## 2025-04-21 ASSESSMENT — PAIN DESCRIPTION - ORIENTATION: ORIENTATION: RIGHT

## 2025-04-21 NOTE — PROGRESS NOTES
"Jhony Myles \"Migel" is a 54 y.o. male on day 0 of admission presenting with Syncope and collapse.    Subjective   Right hip pain, low back pain, right lower extremity pain         Objective     Physical Exam  General Appearance: AAO x 3, in slight distress  Skin: skin color pink, warm, and dry; no suspicious rashes or lesions  Eyes : PERRL, EOM's intact  ENT: mucous membranes pink and moist  Neck: normocephalic  Respiratory: lungs clear to auscultation anteriorly; no wheezing, rhonchi, or crackles.   Heart: regular rate and rhythm.   Abdomen: Nondistended, positive bowel sounds x4, soft,  nontender  Extremities: no edema   Peripheral pulses: normal x4 extremities  Neuro: alert, coherent and conversant, no focal motor deficits  Right hip and low back tenderness, 3/5 strength right lower extremity.  Sensation intact.  No bowel bladder incontinence or saddle anesthesia.  Last Recorded Vitals  Blood pressure 158/83, pulse 65, temperature 36.4 °C (97.5 °F), resp. rate 18, height 1.981 m (6' 5.99\"), weight 118 kg (259 lb 4.2 oz), SpO2 97%.  Intake/Output last 3 Shifts:  I/O last 3 completed shifts:  In: 1290 (11 mL/kg) [P.O.:1290]  Out: 2750 (23.4 mL/kg) [Urine:2750 (0.6 mL/kg/hr)]  Weight: 117.6 kg     Relevant Results    Results for orders placed or performed during the hospital encounter of 04/18/25 (from the past 24 hours)   POCT GLUCOSE   Result Value Ref Range    POCT Glucose 168 (H) 74 - 99 mg/dL   Basic metabolic panel   Result Value Ref Range    Glucose 128 (H) 74 - 99 mg/dL    Sodium 139 136 - 145 mmol/L    Potassium 3.9 3.5 - 5.3 mmol/L    Chloride 104 98 - 107 mmol/L    Bicarbonate 29 21 - 32 mmol/L    Anion Gap 10 10 - 20 mmol/L    Urea Nitrogen 18 6 - 23 mg/dL    Creatinine 0.87 0.50 - 1.30 mg/dL    eGFR >90 >60 mL/min/1.73m*2    Calcium 9.7 8.6 - 10.3 mg/dL   CBC   Result Value Ref Range    WBC 5.4 4.4 - 11.3 x10*3/uL    nRBC 0.0 0.0 - 0.0 /100 WBCs    RBC 4.37 (L) 4.50 - 5.90 x10*6/uL    Hemoglobin " 14.1 13.5 - 17.5 g/dL    Hematocrit 37.7 (L) 41.0 - 52.0 %    MCV 86 80 - 100 fL    MCH 32.3 26.0 - 34.0 pg    MCHC 37.4 (H) 32.0 - 36.0 g/dL    RDW 12.3 11.5 - 14.5 %    Platelets 142 (L) 150 - 450 x10*3/uL   POCT GLUCOSE   Result Value Ref Range    POCT Glucose 121 (H) 74 - 99 mg/dL   POCT GLUCOSE   Result Value Ref Range    POCT Glucose 208 (H) 74 - 99 mg/dL   POCT GLUCOSE   Result Value Ref Range    POCT Glucose 161 (H) 74 - 99 mg/dL       MR hip right wo IV contrast  Result Date: 4/21/2025  Interpreted By:  Yo Rowe and Abuhamdeh Imran STUDY: MRI of the pelvis and  right hip without IV contrast;  4/21/2025 9:50 am   INDICATION: Signs/Symptoms:pain.     COMPARISON: Radiograph of the right hip 04/26/2024   ACCESSION NUMBER(S): [id number]   ORDERING CLINICIAN: ENDA BASILIO   TECHNIQUE: MR imaging of the pelvis and  right hip was obtained  without administration of intravenous contrast medium.   FINDINGS: TENDONS: The insertions of the gluteus medius and gluteus minimus tendons are intact bilaterally.   The insertions of the iliopsoas tendons are intact bilaterally.   The adductor and hamstring tendon origins are intact bilaterally.   JOINTS: Dedicated imaging of the  right hip demonstrates moderate diffuse articular cartilage loss with subchondral cystic changes along the acetabulum. There is circumferential blunting with irregular signal of the acetabular labrum compatible with chronic degenerative tearing.   Limited large field-of-view evaluation of the contralateral hip demonstrates mild diffuse articular cartilage loss.   There is no significant hip joint effusion bilaterally.   There is no evidence of avascular necrosis.   The sacroiliac joints appear unremarkable without evidence of erosion or significant degenerative change.   The pubic symphysis is unremarkable.   There is lower lumbar degenerative changes more completely characterized on same-day lumbar MRI.   OSSEOUS STRUCTURES: No focal marrow  replacing lesions are identified. There is no fracture.   SOFT TISSUES: No muscle atrophy or tear is seen.   The sciatic nerves are intact and unremarkable.   INTERNAL ORGANS: Evaluation of the internal organs of the pelvis is limited on this study tailored for evaluation of the musculoskeletal system. No gross abnormality is seen in the pelvic organs.       Moderate right hip and mild left hip osteoarthrosis.   Lower lumbar spondylosis, better characterized on same-day MRI of the lumbar spine.   I personally reviewed the images/study and I agree with the findings as stated. This study was interpreted at [place], [place].   MACRO: None   Signed by: Yo Rowe 4/21/2025 2:01 PM Dictation workstation:   [id number]    MR lumbar spine wo IV contrast  Result Date: 4/21/2025  Interpreted By:  Edgard Gonzalez, STUDY: MR LUMBAR SPINE WO IV CONTRAST;  4/21/2025 9:50 am   INDICATION: Signs/Symptoms:pain.   COMPARISON: None.   ACCESSION NUMBER(S): [id number]   ORDERING CLINICIAN: EDNA BASILIO   TECHNIQUE: The lumbar spine was studied in the sagital, axial and coronal planes utiliing T1 and T2 weighted images.   FINDINGS: The marrow signal and vertebral body height are normal. The conus and sacrum are normal. Images at each interspace reveal the following: T12/L1 There is normal alignment and vertebral body height. The disc space is normal. There is no evidence of canal or foraminal narrowing. There is no evidence of bulging or herniated disc. L1/L2 Loss of height at the intervertebral disc space. Circumferential bulging intervertebral disc asymmetrical to the left. Superimposed right paracentral disc herniation measuring a proximally 3 mm in size with slight flattening of the thecal sac but without measurable canal stenosis. L2/L3 Circumferential bulging intervertebral disc. Bilateral facet hypertrophy. No measurable canal stenosis. Mild/moderate bilateral foraminal  narrowing. L3/L4 Circumferential bulging intervertebral disc and facet hypertrophy. Marked narrowing of the thecal sac which measures less than 5 mm in diameter at this location. Severe narrowing of the lateral recesses and neural foramina. L4/L5 Circumferential bulging intervertebral disc with midline annular tear or fissure. Bilateral facet hypertrophy. Bilateral ligamentum flavum hypertrophy. Flattening of the thecal sac which measures a proximally 6 mm in diameter. Severe bilateral foraminal stenosis. L5/S1 Bilateral facet hypertrophy. Right paracentral disc herniation measuring a proximally 3 mm in size best appreciated on axial series 10 image 20/27.         * Severe canal stenosis and bilateral foraminal narrowing at L4/L5 *Focal disc herniation at L5/S1 to the right.   MACRO: none   Signed by: Edgard Gonzalez 4/21/2025 10:12 AM Dictation workstation:   [id number]      Scheduled medications  Scheduled Medications[1]  Continuous medications  Continuous Medications[2]  PRN medications  PRN Medications[3]                        Assessment & Plan  Syncope and collapse    Syncope, unspecified syncope type        54-year-old male with  #Syncope possibly vasovagal, could be pain related  Supportive care  Hydrate  Avoid excessive sedatives  Fall, aspiration, seizure precautions  Cardiopulmonary monitoring  Neurochecks  Follow vitals      #Acute low back pain, right hip pain  MRI showing moderate right hip and mild left hip osteoarthrosis  Severe canal stenosis and bilateral foraminal narrowing at L4-5.  Focal disc herniation at L5/S1 to right  Discussed with spine surgery, recommended physical therapy rehab pain control, injections outpatient and referral to spine surgery outpatient  Oxycodone for pain control  Lidocaine patch, Voltaren gel  Symptomatic management  Nutrition multivitamin, stretching, ambulation and optimal activity  [employer]ehovah witness and does not want blood transfusion in case  Okay with iron  supplement, bone marrow stimulating medicine  But no blood or blood products transfusion  Continue muscle relaxant, Tylenol for mild pain  Recommend vitamin D calcium  Follow PT and case management  Morphine IV for severe pain  #Hypertension,  Antihypertensives on carvedilol, losartan, HCTZ, Imdur and to adjust for optimal pressure control  Also pain management will help  #Coronary artery disease, on aspirin and Brilinta  #Hyperlipidemia, statins  #GI prophylaxis, PPI  #Diabetes mellitus type 2  Monitor blood sugars  Carb controlled diet  Nutrition  Weight loss  Continue home meds as able  Bowel regimen for constipation prophylaxis  SCDs for DVT prophylaxis  Possible DC tomorrow if stable with optimal pain control            Derrek Thomas MD         [1] aspirin, 81 mg, oral, Daily  carvedilol, 25 mg, oral, BID  cetirizine, 10 mg, oral, Daily  docusate sodium, 100 mg, oral, BID  hydroCHLOROthiazide, 25 mg, oral, Daily  isosorbide mononitrate ER, 30 mg, oral, q12h  lidocaine, 1 patch, transdermal, Daily  losartan, 75 mg, oral, Daily  pantoprazole, 40 mg, oral, Daily before breakfast  rosuvastatin, 10 mg, oral, Every other day  ticagrelor, 90 mg, oral, BID  [2]    [3] PRN medications: acetaminophen, ALPRAZolam, alum-mag hydroxide-simeth, benzocaine-menthol, benzonatate, bisacodyl, calcium carbonate, cyclobenzaprine, diclofenac sodium, guaiFENesin, ketorolac, melatonin, metoprolol, morphine, ondansetron, oxyCODONE, polyethylene glycol, prochlorperazine, traZODone

## 2025-04-21 NOTE — CARE PLAN
"The patient's goals for the shift include \"not black out again\"    The clinical goals for the shift include pain controlled with medication    Over the shift, the patient did make progress toward the following goals. Problem: Pain  Goal: Walks with improved pain control throughout the shift  Outcome: Not Progressing  Note: Unable to walk at this time     Problem: Fall/Injury  Goal: Not fall by end of shift  Outcome: Progressing  Goal: Be free from injury by end of the shift  Outcome: Progressing  Goal: Verbalize understanding of personal risk factors for fall in the hospital  Outcome: Progressing  Goal: Verbalize understanding of risk factor reduction measures to prevent injury from fall in the home  Outcome: Progressing  Goal: Use assistive devices by end of the shift  Outcome: Progressing  Goal: Pace activities to prevent fatigue by end of the shift  Outcome: Progressing     Problem: Diabetes  Goal: Achieve decreasing blood glucose levels by end of shift  Outcome: Progressing  Goal: No changes in neurological exam by end of shift  Outcome: Progressing  Flowsheets (Taken 4/21/2025 1815)  No changes in neurological exam by end of shift: Complete frequent neurological assessments  Goal: Increase self care and/or family involovement by end of shift  Outcome: Progressing     Problem: Pain  Goal: Takes deep breaths with improved pain control throughout the shift  Outcome: Progressing  Goal: Turns in bed with improved pain control throughout the shift  Outcome: Progressing  Goal: Performs ADL's with improved pain control throughout shift  Outcome: Progressing  Goal: Free from opioid side effects throughout the shift  Outcome: Progressing     Problem: Chronic Conditions and Co-morbidities  Goal: Patient's chronic conditions and co-morbidity symptoms are monitored and maintained or improved  Outcome: Progressing  Flowsheets (Taken 4/21/2025 1815)  Care Plan - Patient's Chronic Conditions and Co-Morbidity Symptoms are " Monitored and Maintained or Improved: Monitor and assess patient's chronic conditions and comorbid symptoms for stability, deterioration, or improvement     Problem: Nutrition  Goal: Nutrient intake appropriate for maintaining nutritional needs  Outcome: Progressing

## 2025-04-21 NOTE — NURSING NOTE
Pt has a home MDI in med list, I spoke with Pt he only uses it about 4 times a month & right now he doesn't feel he needs it & doesn't want us bothering him. If he needs a treatment he will let his Nurse know to call RT.

## 2025-04-21 NOTE — CARE PLAN
The clinical goals for the shift include pain control through shift      Problem: Fall/Injury  Goal: Not fall by end of shift  Outcome: Progressing  Goal: Be free from injury by end of the shift  Outcome: Progressing  Goal: Verbalize understanding of personal risk factors for fall in the hospital  Outcome: Progressing     Problem: Diabetes  Goal: No changes in neurological exam by end of shift  Outcome: Progressing  Goal: Vital signs within normal range for age by end of shift  Outcome: Progressing     Problem: Pain  Goal: Takes deep breaths with improved pain control throughout the shift  Outcome: Progressing  Goal: Turns in bed with improved pain control throughout the shift  Outcome: Progressing  Goal: Free from opioid side effects throughout the shift  Outcome: Progressing  Goal: Free from acute confusion related to pain meds throughout the shift  Outcome: Progressing

## 2025-04-21 NOTE — PROGRESS NOTES
Care Transitions: Patient reviewed in care round meeting this AM, ADOD 24 hours. Patient plans to discharge to home when medically ready. No needs anticipated. Care team to follow. Tasha Sandhu RN/TCC

## 2025-04-22 PROBLEM — R55 SYNCOPE, UNSPECIFIED SYNCOPE TYPE: Status: RESOLVED | Noted: 2025-04-21 | Resolved: 2025-04-22

## 2025-04-22 LAB
ANION GAP SERPL CALC-SCNC: 10 MMOL/L (ref 10–20)
BUN SERPL-MCNC: 21 MG/DL (ref 6–23)
CALCIUM SERPL-MCNC: 9.7 MG/DL (ref 8.6–10.3)
CHLORIDE SERPL-SCNC: 103 MMOL/L (ref 98–107)
CO2 SERPL-SCNC: 31 MMOL/L (ref 21–32)
CREAT SERPL-MCNC: 0.88 MG/DL (ref 0.5–1.3)
EGFRCR SERPLBLD CKD-EPI 2021: >90 ML/MIN/1.73M*2
ERYTHROCYTE [DISTWIDTH] IN BLOOD BY AUTOMATED COUNT: 12.7 % (ref 11.5–14.5)
GLUCOSE BLD MANUAL STRIP-MCNC: 131 MG/DL (ref 74–99)
GLUCOSE BLD MANUAL STRIP-MCNC: 188 MG/DL (ref 74–99)
GLUCOSE BLD MANUAL STRIP-MCNC: 198 MG/DL (ref 74–99)
GLUCOSE BLD MANUAL STRIP-MCNC: 227 MG/DL (ref 74–99)
GLUCOSE SERPL-MCNC: 130 MG/DL (ref 74–99)
HCT VFR BLD AUTO: 38.8 % (ref 41–52)
HGB BLD-MCNC: 14.2 G/DL (ref 13.5–17.5)
MCH RBC QN AUTO: 31.9 PG (ref 26–34)
MCHC RBC AUTO-ENTMCNC: 36.6 G/DL (ref 32–36)
MCV RBC AUTO: 87 FL (ref 80–100)
NRBC BLD-RTO: 0 /100 WBCS (ref 0–0)
PLATELET # BLD AUTO: 149 X10*3/UL (ref 150–450)
POTASSIUM SERPL-SCNC: 3.7 MMOL/L (ref 3.5–5.3)
RBC # BLD AUTO: 4.45 X10*6/UL (ref 4.5–5.9)
SODIUM SERPL-SCNC: 140 MMOL/L (ref 136–145)
WBC # BLD AUTO: 6.2 X10*3/UL (ref 4.4–11.3)

## 2025-04-22 PROCEDURE — 36415 COLL VENOUS BLD VENIPUNCTURE: CPT | Performed by: HOSPITALIST

## 2025-04-22 PROCEDURE — 2500000001 HC RX 250 WO HCPCS SELF ADMINISTERED DRUGS (ALT 637 FOR MEDICARE OP): Performed by: STUDENT IN AN ORGANIZED HEALTH CARE EDUCATION/TRAINING PROGRAM

## 2025-04-22 PROCEDURE — 2500000002 HC RX 250 W HCPCS SELF ADMINISTERED DRUGS (ALT 637 FOR MEDICARE OP, ALT 636 FOR OP/ED): Performed by: STUDENT IN AN ORGANIZED HEALTH CARE EDUCATION/TRAINING PROGRAM

## 2025-04-22 PROCEDURE — 85027 COMPLETE CBC AUTOMATED: CPT | Performed by: HOSPITALIST

## 2025-04-22 PROCEDURE — 82947 ASSAY GLUCOSE BLOOD QUANT: CPT

## 2025-04-22 PROCEDURE — 2500000001 HC RX 250 WO HCPCS SELF ADMINISTERED DRUGS (ALT 637 FOR MEDICARE OP): Performed by: HOSPITALIST

## 2025-04-22 PROCEDURE — 2500000004 HC RX 250 GENERAL PHARMACY W/ HCPCS (ALT 636 FOR OP/ED): Mod: JZ | Performed by: PHARMACIST

## 2025-04-22 PROCEDURE — 1100000001 HC PRIVATE ROOM DAILY

## 2025-04-22 PROCEDURE — 2500000004 HC RX 250 GENERAL PHARMACY W/ HCPCS (ALT 636 FOR OP/ED): Mod: JZ | Performed by: STUDENT IN AN ORGANIZED HEALTH CARE EDUCATION/TRAINING PROGRAM

## 2025-04-22 PROCEDURE — 99233 SBSQ HOSP IP/OBS HIGH 50: CPT | Performed by: STUDENT IN AN ORGANIZED HEALTH CARE EDUCATION/TRAINING PROGRAM

## 2025-04-22 PROCEDURE — 80048 BASIC METABOLIC PNL TOTAL CA: CPT | Performed by: HOSPITALIST

## 2025-04-22 PROCEDURE — 2500000004 HC RX 250 GENERAL PHARMACY W/ HCPCS (ALT 636 FOR OP/ED): Performed by: STUDENT IN AN ORGANIZED HEALTH CARE EDUCATION/TRAINING PROGRAM

## 2025-04-22 RX ORDER — GABAPENTIN 300 MG/1
300 CAPSULE ORAL EVERY 8 HOURS SCHEDULED
Status: DISCONTINUED | OUTPATIENT
Start: 2025-04-22 | End: 2025-04-23 | Stop reason: HOSPADM

## 2025-04-22 RX ORDER — KETOROLAC TROMETHAMINE 30 MG/ML
30 INJECTION, SOLUTION INTRAMUSCULAR; INTRAVENOUS EVERY 6 HOURS SCHEDULED
Status: DISCONTINUED | OUTPATIENT
Start: 2025-04-22 | End: 2025-04-23 | Stop reason: HOSPADM

## 2025-04-22 RX ORDER — ACETAMINOPHEN 325 MG/1
975 TABLET ORAL 3 TIMES DAILY
Status: DISCONTINUED | OUTPATIENT
Start: 2025-04-22 | End: 2025-04-23 | Stop reason: HOSPADM

## 2025-04-22 RX ADMIN — CARVEDILOL 25 MG: 25 TABLET, FILM COATED ORAL at 07:58

## 2025-04-22 RX ADMIN — CETIRIZINE HYDROCHLORIDE 10 MG: 10 TABLET, FILM COATED ORAL at 08:01

## 2025-04-22 RX ADMIN — KETOROLAC TROMETHAMINE 30 MG: 30 INJECTION, SOLUTION INTRAMUSCULAR at 18:19

## 2025-04-22 RX ADMIN — HYDROCHLOROTHIAZIDE 25 MG: 25 TABLET ORAL at 08:01

## 2025-04-22 RX ADMIN — KETOROLAC TROMETHAMINE 30 MG: 30 INJECTION, SOLUTION INTRAMUSCULAR at 12:47

## 2025-04-22 RX ADMIN — PANTOPRAZOLE SODIUM 40 MG: 40 TABLET, DELAYED RELEASE ORAL at 07:59

## 2025-04-22 RX ADMIN — DOCUSATE SODIUM 100 MG: 100 CAPSULE, LIQUID FILLED ORAL at 08:01

## 2025-04-22 RX ADMIN — MORPHINE SULFATE 1 MG: 2 INJECTION, SOLUTION INTRAMUSCULAR; INTRAVENOUS at 01:31

## 2025-04-22 RX ADMIN — POLYETHYLENE GLYCOL 3350 17 G: 17 POWDER, FOR SOLUTION ORAL at 16:58

## 2025-04-22 RX ADMIN — ISOSORBIDE MONONITRATE 30 MG: 30 TABLET, EXTENDED RELEASE ORAL at 08:01

## 2025-04-22 RX ADMIN — OXYCODONE HYDROCHLORIDE 5 MG: 5 TABLET ORAL at 06:09

## 2025-04-22 RX ADMIN — ROSUVASTATIN CALCIUM 10 MG: 5 TABLET, FILM COATED ORAL at 08:01

## 2025-04-22 RX ADMIN — ISOSORBIDE MONONITRATE 30 MG: 30 TABLET, EXTENDED RELEASE ORAL at 20:26

## 2025-04-22 RX ADMIN — TICAGRELOR 90 MG: 90 TABLET ORAL at 20:26

## 2025-04-22 RX ADMIN — DOCUSATE SODIUM 100 MG: 100 CAPSULE, LIQUID FILLED ORAL at 20:26

## 2025-04-22 RX ADMIN — TICAGRELOR 90 MG: 90 TABLET ORAL at 08:16

## 2025-04-22 RX ADMIN — GABAPENTIN 300 MG: 300 CAPSULE ORAL at 14:06

## 2025-04-22 RX ADMIN — LOSARTAN POTASSIUM 75 MG: 50 TABLET, FILM COATED ORAL at 08:01

## 2025-04-22 RX ADMIN — MORPHINE SULFATE 1 MG: 2 INJECTION, SOLUTION INTRAMUSCULAR; INTRAVENOUS at 09:30

## 2025-04-22 RX ADMIN — ACETAMINOPHEN 975 MG: 325 TABLET ORAL at 14:05

## 2025-04-22 RX ADMIN — GABAPENTIN 300 MG: 300 CAPSULE ORAL at 21:24

## 2025-04-22 RX ADMIN — CYCLOBENZAPRINE 10 MG: 10 TABLET, FILM COATED ORAL at 15:43

## 2025-04-22 RX ADMIN — OXYCODONE HYDROCHLORIDE 5 MG: 5 TABLET ORAL at 21:56

## 2025-04-22 RX ADMIN — CARVEDILOL 25 MG: 25 TABLET, FILM COATED ORAL at 16:55

## 2025-04-22 RX ADMIN — ACETAMINOPHEN 975 MG: 325 TABLET ORAL at 20:26

## 2025-04-22 RX ADMIN — OXYCODONE HYDROCHLORIDE 5 MG: 5 TABLET ORAL at 15:35

## 2025-04-22 RX ADMIN — ASPIRIN 81 MG: 81 TABLET, COATED ORAL at 08:00

## 2025-04-22 RX ADMIN — KETOROLAC TROMETHAMINE 30 MG: 30 INJECTION, SOLUTION INTRAMUSCULAR at 23:26

## 2025-04-22 ASSESSMENT — PAIN - FUNCTIONAL ASSESSMENT
PAIN_FUNCTIONAL_ASSESSMENT: 0-10

## 2025-04-22 ASSESSMENT — PAIN DESCRIPTION - LOCATION
LOCATION: LEG
LOCATION: LEG

## 2025-04-22 ASSESSMENT — PAIN SCALES - GENERAL
PAINLEVEL_OUTOF10: 5 - MODERATE PAIN
PAINLEVEL_OUTOF10: 8
PAINLEVEL_OUTOF10: 2
PAINLEVEL_OUTOF10: 5 - MODERATE PAIN
PAINLEVEL_OUTOF10: 7
PAINLEVEL_OUTOF10: 6
PAINLEVEL_OUTOF10: 10 - WORST POSSIBLE PAIN
PAINLEVEL_OUTOF10: 6
PAINLEVEL_OUTOF10: 3
PAINLEVEL_OUTOF10: 4
PAINLEVEL_OUTOF10: 5 - MODERATE PAIN
PAINLEVEL_OUTOF10: 0 - NO PAIN
PAINLEVEL_OUTOF10: 8
PAINLEVEL_OUTOF10: 8

## 2025-04-22 ASSESSMENT — COGNITIVE AND FUNCTIONAL STATUS - GENERAL
CLIMB 3 TO 5 STEPS WITH RAILING: A LOT
TURNING FROM BACK TO SIDE WHILE IN FLAT BAD: A LITTLE
HELP NEEDED FOR BATHING: A LITTLE
MOBILITY SCORE: 16
STANDING UP FROM CHAIR USING ARMS: A LITTLE
TOILETING: A LITTLE
WALKING IN HOSPITAL ROOM: A LOT
MOVING TO AND FROM BED TO CHAIR: A LOT
DAILY ACTIVITIY SCORE: 22

## 2025-04-22 ASSESSMENT — PAIN DESCRIPTION - ORIENTATION
ORIENTATION: RIGHT
ORIENTATION: RIGHT

## 2025-04-22 NOTE — CARE PLAN
The clinical goals for the shift include no falls through shift      Problem: Fall/Injury  Goal: Not fall by end of shift  Outcome: Progressing  Goal: Be free from injury by end of the shift  Outcome: Progressing  Goal: Verbalize understanding of personal risk factors for fall in the hospital  Outcome: Progressing     Problem: Diabetes  Goal: Maintain electrolyte levels within acceptable range throughout shift  Outcome: Progressing  Goal: No changes in neurological exam by end of shift  Outcome: Progressing     Problem: Pain  Goal: Turns in bed with improved pain control throughout the shift  Outcome: Progressing     Problem: Skin  Goal: Participates in plan/prevention/treatment measures  Outcome: Progressing  Flowsheets (Taken 4/22/2025 0502)  Participates in plan/prevention/treatment measures: Elevate heels  Goal: Prevent/manage excess moisture  Outcome: Progressing  Flowsheets (Taken 4/22/2025 0502)  Prevent/manage excess moisture: Moisturize dry skin  Goal: Prevent/minimize sheer/friction injuries  Outcome: Progressing  Flowsheets (Taken 4/22/2025 0502)  Prevent/minimize sheer/friction injuries: HOB 30 degrees or less  Goal: Promote/optimize nutrition  Outcome: Progressing  Flowsheets (Taken 4/22/2025 0502)  Promote/optimize nutrition: Offer water/supplements/favorite foods

## 2025-04-22 NOTE — PROGRESS NOTES
"Jhony Myles \"Chester\" is a 54 y.o. male on day 1 of admission presenting with Syncope and collapse.      Subjective   Patient seen and examined at bedside.  This is the first day that I am seeing the patient.  He has been managed in the hospital for 4 days for back pain related to severe canal stenosis and herniated disc.    I have personally discussed the results of his MRI and our plan moving forward.  Patient and wife had expressed to me that they did not believe the patient would be able to go through rehab in his current condition.    I have personally called the patient's spinal surgeon, Dr. Topete who works through [employer].  He stated that he could try to see the patient earlier in follow-up later this week to help schedule a surgery outpatient.  With no alarming signs of cauda equina at this time, emergent surgery is not necessary especially with him taking blood thinners for his coronary disease.  He also recommended potential injection to help with pain if medicines do not help this.       Objective     Last Recorded Vitals  /79 (BP Location: Left arm, Patient Position: Lying) Comment: RN notified  Pulse 59   Temp 36.3 °C (97.3 °F) (Oral)   Resp 17   Wt 118 kg (259 lb 4.2 oz)   SpO2 97%   Intake/Output last 3 Shifts:    Intake/Output Summary (Last 24 hours) at 4/22/2025 1631  Last data filed at 4/22/2025 0800  Gross per 24 hour   Intake 280 ml   Output 500 ml   Net -220 ml       Admission Weight  Weight: 122 kg (270 lb) (04/18/25 2312)    Daily Weight  04/19/25 : 118 kg (259 lb 4.2 oz)    Image Results  MR hip right wo IV contrast  Narrative: Interpreted By:  Yo Rowe  and Benjamín Garcia   STUDY:  MRI of the pelvis and  right hip without IV contrast;  4/21/2025 9:50  am      INDICATION:  Signs/Symptoms:pain.          COMPARISON:  Radiograph of the right hip 04/26/2024      ACCESSION NUMBER(S):  [id number]      ORDERING CLINICIAN:  EDNA BASILIO      TECHNIQUE:  MR imaging of the pelvis " and  right hip was obtained  without  administration of intravenous contrast medium.      FINDINGS:  TENDONS:  The insertions of the gluteus medius and gluteus minimus tendons are  intact bilaterally.      The insertions of the iliopsoas tendons are intact bilaterally.      The adductor and hamstring tendon origins are intact bilaterally.      JOINTS:  Dedicated imaging of the  right hip demonstrates moderate diffuse  articular cartilage loss with subchondral cystic changes along the  acetabulum. There is circumferential blunting with irregular signal  of the acetabular labrum compatible with chronic degenerative tearing.      Limited large field-of-view evaluation of the contralateral hip  demonstrates mild diffuse articular cartilage loss.      There is no significant hip joint effusion bilaterally.      There is no evidence of avascular necrosis.      The sacroiliac joints appear unremarkable without evidence of erosion  or significant degenerative change.      The pubic symphysis is unremarkable.      There is lower lumbar degenerative changes more completely  characterized on same-day lumbar MRI.      OSSEOUS STRUCTURES:  No focal marrow replacing lesions are identified. There is no  fracture.      SOFT TISSUES:  No muscle atrophy or tear is seen.      The sciatic nerves are intact and unremarkable.      INTERNAL ORGANS:  Evaluation of the internal organs of the pelvis is limited on this  study tailored for evaluation of the musculoskeletal system. No gross  abnormality is seen in the pelvic organs.      Impression: Moderate right hip and mild left hip osteoarthrosis.      Lower lumbar spondylosis, better characterized on same-day MRI of the  lumbar spine.      I personally reviewed the images/study and I agree with the findings  as stated. This study was interpreted at [facility], [place], [place].      MACRO:  None      Signed by: Yo Rowe 4/21/2025 2:01 PM  Dictation  workstation:   [id number]  MR lumbar spine wo IV contrast  Narrative: Interpreted By:  Edgard Gonzalez,   STUDY:  MR LUMBAR SPINE WO IV CONTRAST;  4/21/2025 9:50 am      INDICATION:  Signs/Symptoms:pain.      COMPARISON:  None.      ACCESSION NUMBER(S):  [id number]      ORDERING CLINICIAN:  EDNA BASILIO      TECHNIQUE:  The lumbar spine was studied in the sagital, axial and coronal planes  utiliing T1 and T2 weighted images.      FINDINGS:  The marrow signal and vertebral body height are normal. The conus and  sacrum are normal. Images at each interspace reveal the following:  T12/L1  There is normal alignment and vertebral body height. The disc space  is normal. There is no evidence of canal or foraminal narrowing.  There is no evidence of bulging or herniated disc. L1/L2  Loss of height at the intervertebral disc space. Circumferential  bulging intervertebral disc asymmetrical to the left. Superimposed  right paracentral disc herniation measuring a proximally 3 mm in size  with slight flattening of the thecal sac but without measurable canal  stenosis. L2/L3  Circumferential bulging intervertebral disc. Bilateral facet  hypertrophy. No measurable canal stenosis. Mild/moderate bilateral  foraminal narrowing. L3/L4  Circumferential bulging intervertebral disc and facet hypertrophy.  Marked narrowing of the thecal sac which measures less than 5 mm in  diameter at this location. Severe narrowing of the lateral recesses  and neural foramina. L4/L5  Circumferential bulging intervertebral disc with midline annular tear  or fissure. Bilateral facet hypertrophy. Bilateral ligamentum flavum  hypertrophy. Flattening of the thecal sac which measures a proximally  6 mm in diameter. Severe bilateral foraminal stenosis. L5/S1  Bilateral facet hypertrophy. Right paracentral disc herniation  measuring a proximally 3 mm in size best appreciated on axial series  10 image 20/27.          Impression: * Severe canal stenosis and  bilateral foraminal narrowing at L4/L5  *Focal disc herniation at L5/S1 to the right.      MACRO:  none      Signed by: Edgard Gonzalez 4/21/2025 10:12 AM  Dictation workstation:   [id number]  ECG 12 lead  Sinus bradycardia  Otherwise normal ECG  When compared with ECG of 11-APR-2025 03:55,  Significant changes have occurred  See ED provider note for full interpretation and clinical correlation  Confirmed by Mady Mcneill ([id number]) on 4/21/2025 10:48:18 AM        Physical exam:  General:     Well appearing  HENT:      Head: Normocephalic and atraumatic.      Mouth: Mucous membranes are moist.   Eyes:      Pupils are equal, round, and reactive to light.   Cardiovascular:      Normal rate and regular rhythm. Normal S1, S2.  No murmurs, clicks, gallops.   Pulmonary:      Clear to auscultation bilaterally.  No wheezing, rhonchi, or crackles heard.   Abdominal:       Bowel sounds are normal.      Abdomen is soft, nontender, nondistended  Skin:      No lesions seen  Musculoskeletal:         Pain in right buttocks with right leg raise.     Neck supple.   Neurological:      Mental Status: Patient is alert and oriented X3     CN II-XII intact.  Decreased sensation in right lower extremity.  4 out of 5 motor strength in right lower extremity.  Remainder of examination unremarkable.    Relevant Results               Assessment/Plan                       Assessment & Plan  Syncope and collapse  Likely related to his pain.  Less likely to be related to cardiac issues.  2.  Intractable back pain: Related to severe canal stenosis.  We will add scheduled gabapentin, Toradol, Tylenol at this time to avoid continue to increase dosages of narcotics.  He will need close follow-up with his spinal surgeon outpatient.  Consider transfer for spinal injection if his pain cannot be controlled on this regimen.    3.  Severe canal stenosis    4.  Herniated disc    5.  Diabetes: Blood sugars are relatively controlled.  He has nothing listed for  his diabetes and his home medication list.    6.  CAD: Continue Brilinta, aspirin, statin, beta-blocker.    7.  Hyperlipidemia: Continue statin    8.  Hypertension    9.  Obese: BMI 30                  DVT ppx: SCDs  Diet: Regular, cardiac    Disposition: Add scheduled Tylenol, gabapentin, Toradol.  Continue pain control.  Discharge disposition likely to rehab once medically stable.  Consider transfer for injection if pain does not get controlled on this regimen.    Saul Charles, DO

## 2025-04-22 NOTE — PROGRESS NOTES
Care Transitions: Patient reviewed in care round meeting this AM; PENNY TBD. Met with patient, Mom, and sister at bedside. Permission to speak with family present. Current discharge plan is return home, lives with Mom and sister. They stated their health is not great and they will not be able to assist him getting around at home. Patient and family voiced concerns with treatment plan. States he will not be able to participate in any rehab therapy or C therapy with all the pain he is having. Patients family states this is not like him and he usually has a high tolerance for pain. Patient and family have questions /concerns about a plan going forward. Spoke to bedside nurse and Hospital provider to relay patient concerns. Care team to follow Tasha Sandhu RN/TCC

## 2025-04-23 ENCOUNTER — DOCUMENTATION (OUTPATIENT)
Dept: HOME HEALTH SERVICES | Facility: HOME HEALTH | Age: 55
End: 2025-04-23
Payer: COMMERCIAL

## 2025-04-23 ENCOUNTER — PHARMACY VISIT (OUTPATIENT)
Dept: PHARMACY | Facility: CLINIC | Age: 55
End: 2025-04-23
Payer: MEDICAID

## 2025-04-23 ENCOUNTER — HOME HEALTH ADMISSION (OUTPATIENT)
Dept: HOME HEALTH SERVICES | Facility: HOME HEALTH | Age: 55
End: 2025-04-23
Payer: COMMERCIAL

## 2025-04-23 VITALS
HEIGHT: 78 IN | OXYGEN SATURATION: 98 % | BODY MASS INDEX: 30 KG/M2 | RESPIRATION RATE: 18 BRPM | SYSTOLIC BLOOD PRESSURE: 147 MMHG | TEMPERATURE: 97.3 F | HEART RATE: 65 BPM | WEIGHT: 259.26 LBS | DIASTOLIC BLOOD PRESSURE: 85 MMHG

## 2025-04-23 PROBLEM — R55 SYNCOPE AND COLLAPSE: Status: RESOLVED | Noted: 2025-04-19 | Resolved: 2025-04-23

## 2025-04-23 LAB — GLUCOSE BLD MANUAL STRIP-MCNC: 140 MG/DL (ref 74–99)

## 2025-04-23 PROCEDURE — RXMED WILLOW AMBULATORY MEDICATION CHARGE

## 2025-04-23 PROCEDURE — 99239 HOSP IP/OBS DSCHRG MGMT >30: CPT | Performed by: STUDENT IN AN ORGANIZED HEALTH CARE EDUCATION/TRAINING PROGRAM

## 2025-04-23 PROCEDURE — 2500000004 HC RX 250 GENERAL PHARMACY W/ HCPCS (ALT 636 FOR OP/ED): Performed by: STUDENT IN AN ORGANIZED HEALTH CARE EDUCATION/TRAINING PROGRAM

## 2025-04-23 PROCEDURE — 2500000004 HC RX 250 GENERAL PHARMACY W/ HCPCS (ALT 636 FOR OP/ED): Mod: JZ | Performed by: STUDENT IN AN ORGANIZED HEALTH CARE EDUCATION/TRAINING PROGRAM

## 2025-04-23 PROCEDURE — 2500000002 HC RX 250 W HCPCS SELF ADMINISTERED DRUGS (ALT 637 FOR MEDICARE OP, ALT 636 FOR OP/ED): Performed by: STUDENT IN AN ORGANIZED HEALTH CARE EDUCATION/TRAINING PROGRAM

## 2025-04-23 PROCEDURE — 2500000001 HC RX 250 WO HCPCS SELF ADMINISTERED DRUGS (ALT 637 FOR MEDICARE OP): Performed by: HOSPITALIST

## 2025-04-23 PROCEDURE — 2500000001 HC RX 250 WO HCPCS SELF ADMINISTERED DRUGS (ALT 637 FOR MEDICARE OP): Performed by: STUDENT IN AN ORGANIZED HEALTH CARE EDUCATION/TRAINING PROGRAM

## 2025-04-23 PROCEDURE — 82947 ASSAY GLUCOSE BLOOD QUANT: CPT

## 2025-04-23 RX ORDER — ACETAMINOPHEN 500 MG
1000 TABLET ORAL 3 TIMES DAILY
Qty: 30 TABLET | Refills: 0 | Status: SHIPPED | OUTPATIENT
Start: 2025-04-23

## 2025-04-23 RX ORDER — LOSARTAN POTASSIUM 25 MG/1
75 TABLET ORAL DAILY
Qty: 90 TABLET | Refills: 1 | Status: SHIPPED | OUTPATIENT
Start: 2025-04-24 | End: 2025-06-23

## 2025-04-23 RX ORDER — GABAPENTIN 300 MG/1
300 CAPSULE ORAL EVERY 8 HOURS SCHEDULED
Qty: 21 CAPSULE | Refills: 0 | Status: SHIPPED | OUTPATIENT
Start: 2025-04-23 | End: 2025-04-30

## 2025-04-23 RX ORDER — OXYCODONE HYDROCHLORIDE 5 MG/1
5 TABLET ORAL EVERY 6 HOURS PRN
Qty: 20 TABLET | Refills: 0 | Status: SHIPPED | OUTPATIENT
Start: 2025-04-23

## 2025-04-23 RX ADMIN — ACETAMINOPHEN 975 MG: 325 TABLET ORAL at 14:41

## 2025-04-23 RX ADMIN — KETOROLAC TROMETHAMINE 30 MG: 30 INJECTION, SOLUTION INTRAMUSCULAR at 05:17

## 2025-04-23 RX ADMIN — HYDROCHLOROTHIAZIDE 25 MG: 25 TABLET ORAL at 09:07

## 2025-04-23 RX ADMIN — ISOSORBIDE MONONITRATE 30 MG: 30 TABLET, EXTENDED RELEASE ORAL at 09:07

## 2025-04-23 RX ADMIN — TICAGRELOR 90 MG: 90 TABLET ORAL at 09:07

## 2025-04-23 RX ADMIN — DOCUSATE SODIUM 100 MG: 100 CAPSULE, LIQUID FILLED ORAL at 09:08

## 2025-04-23 RX ADMIN — GABAPENTIN 300 MG: 300 CAPSULE ORAL at 14:41

## 2025-04-23 RX ADMIN — ASPIRIN 81 MG: 81 TABLET, COATED ORAL at 09:07

## 2025-04-23 RX ADMIN — KETOROLAC TROMETHAMINE 30 MG: 30 INJECTION, SOLUTION INTRAMUSCULAR at 12:43

## 2025-04-23 RX ADMIN — LOSARTAN POTASSIUM 75 MG: 50 TABLET, FILM COATED ORAL at 09:07

## 2025-04-23 RX ADMIN — ACETAMINOPHEN 975 MG: 325 TABLET ORAL at 09:06

## 2025-04-23 RX ADMIN — CARVEDILOL 25 MG: 25 TABLET, FILM COATED ORAL at 09:33

## 2025-04-23 RX ADMIN — GABAPENTIN 300 MG: 300 CAPSULE ORAL at 05:17

## 2025-04-23 RX ADMIN — CETIRIZINE HYDROCHLORIDE 10 MG: 10 TABLET, FILM COATED ORAL at 09:08

## 2025-04-23 RX ADMIN — PANTOPRAZOLE SODIUM 40 MG: 40 TABLET, DELAYED RELEASE ORAL at 05:17

## 2025-04-23 RX ADMIN — POLYETHYLENE GLYCOL 3350 17 G: 17 POWDER, FOR SOLUTION ORAL at 14:41

## 2025-04-23 ASSESSMENT — PAIN - FUNCTIONAL ASSESSMENT
PAIN_FUNCTIONAL_ASSESSMENT: 0-10

## 2025-04-23 ASSESSMENT — PAIN SCALES - GENERAL
PAINLEVEL_OUTOF10: 0 - NO PAIN
PAINLEVEL_OUTOF10: 2
PAINLEVEL_OUTOF10: 0 - NO PAIN
PAINLEVEL_OUTOF10: 3
PAINLEVEL_OUTOF10: 0 - NO PAIN

## 2025-04-23 NOTE — PROGRESS NOTES
Per medical team, patient is medically appropriate for discharge home with family today, to follow up as an outpatient with patient's surgeon tomorrow.  met with patient and family at bedside to review discharge plan. Patient has [employer], so Medicare IM does not apply. Patient/family confirmed that patient wishes first to see patient's surgeon--appointment tomorrow--before deciding whether or not patient wishes to receive HHC. Plan is for patient to return home today to follow up with patient's surgeon as an outpatient tomorrow, and with no Care Transitions needs foreseen. Care Transitions available upon request. COLETTE Mendes

## 2025-04-23 NOTE — HH CARE COORDINATION
Home Care received a Referral for Physical Therapy and Occupational Therapy. We have processed the referral for a Start of Care on 24-48 HOURS .     If you have any questions or concerns, please feel free to contact us at [phone]. Follow the prompts, enter your five digit zip code, and you will be directed to your care team on WEST 1.

## 2025-04-23 NOTE — NURSING NOTE
Discharge Note: 4/23/2025 1547 Discharged via wheelchair to private car accompanied by PCT and family, personal belongings taken with pt, no distress noted, no complaints voiced. Ella SPEARS

## 2025-04-23 NOTE — DISCHARGE SUMMARY
Discharge Diagnosis  Syncope and collapse    Issues Requiring Follow-Up  Back pain, spinal stenosis    Discharge Meds     Medication List      START taking these medications     acetaminophen 500 mg tablet; Commonly known as: Tylenol; Take 2 tablets   (1,000 mg) by mouth 3 times a day.   gabapentin 300 mg capsule; Commonly known as: Neurontin; Take 1 capsule   (300 mg) by mouth every 8 hours for 7 days.   oxyCODONE 5 mg immediate release tablet; Commonly known as: Roxicodone;   Take 1 tablet (5 mg) by mouth every 6 hours if needed for severe pain (7 -   10).     CHANGE how you take these medications     losartan 25 mg tablet; Commonly known as: Cozaar; Take 3 tablets (75 mg)   by mouth once daily.; Start taking on: April 24, 2025; What changed:   medication strength, how much to take     CONTINUE taking these medications     albuterol 90 mcg/actuation inhaler; Inhale 2 puffs every 4 hours if   needed for shortness of breath.   ALPRAZolam 1 mg tablet; Commonly known as: Xanax   aspirin 81 mg EC tablet; Take 1 tablet (81 mg) by mouth once daily.   azelastine 137 mcg (0.1 %) nasal spray; Commonly known as: Astelin;   Administer 1 spray into each nostril 2 times a day. Use in each nostril as   directed   Brilinta 90 mg tablet; Generic drug: ticagrelor; Take 1 tablet (90 mg)   by mouth 2 times a day.   carvedilol 25 mg tablet; Commonly known as: Coreg; Take 1 tablet (25 mg)   by mouth 2 times daily (morning and late afternoon).   cyanocobalamin 1,000 mcg tablet; Commonly known as: Vitamin B-12   cyclobenzaprine 10 mg tablet; Commonly known as: Flexeril   FreeStyle Nancy 2 [place] misc; Generic drug: flash glucose scanning   reader; Use as instructed   FreeStyle Nancy 2 Sensor kit; Generic drug: flash glucose sensor kit;   Use as instructed   hydroCHLOROthiazide 25 mg tablet; Commonly known as: HYDRODiuril; Take 1   tablet (25 mg) by mouth once daily.   isosorbide mononitrate ER 30 mg 24 hr tablet; Commonly known as:  Imdur;   Take 1 tablet (30 mg) by mouth every 12 hours.   loratadine 10 mg tablet; Commonly known as: Claritin; Take 1 tablet (10   mg) by mouth once daily.   meclizine 25 mg tablet; Commonly known as: Antivert; Take 1 tablet (25   mg) by mouth 3 times a day as needed for dizziness.   minoxidil 2.5 mg tablet; Commonly known as: Loniten; TAKE 1 TABLET BY   MOUTH TWICE DAILY   olopatadine 0.1 % ophthalmic solution; Commonly known as: Patanol   omeprazole 20 mg DR capsule; Commonly known as: PriLOSEC; Take 1 capsule   (20 mg) by mouth once daily in the morning. Take before meals. Do not   crush or chew.   potassium chloride CR 20 mEq ER tablet; Commonly known as: Klor-Con M20;   Take 1 tablet (20 mEq) by mouth once daily. Do not crush or chew.   rosuvastatin 10 mg tablet; Commonly known as: Crestor; Take 1 tablet (10   mg) by mouth every other day. 10 mg every other day   simethicone 180 mg capsule; Commonly known as: Mylicon,Gas-X; Take 1   capsule (180 mg) by mouth every 6 hours if needed for flatulence.       Test Results Pending At Discharge  Pending Labs       No current pending labs.            Disposition:  Home with [facility]    Hospital Course   54 y.o. male presenting with syncopal event. He has a known history of chronic back pain. He was actually lying on the floor, which he typically will do for comfort, and upon standing he had increasing pain and felt faint. Had his mother called the paramedics. He walked down the stairs of the house and was sitting at the bottom of the stairs and when the paramedics arrived apparently the patient was syncopal. Paramedics state that he had involuntary generalized movements lasting only several seconds and then came to immediately. There was no postictal state, he did not lose continence of his bowel or bladder, and did not bite his tongue. His work appears unremarkable. He has a significant cardiac history.     During hospital stay, adjustments would be made to his pain  medications including adding scheduled toradol, scheduled tylenol, and gabapentin with improvement of his symptoms.  His spinal surgeon, Dr. Sotelo, would be contacted and a follow up appointment would be made for 4/24/25 to evaluate for possible surgery in the near future.  Patient would be prescribed tylenol, oxycodone, and gabapentin at discharge.  He would be instructed to be conservative with his usage of NSAIDs considering his cardiac history.  Patient would be discharged home with [facility].   Follow up with PCP in one week.  Vital signs were stable at discharge.  Patient agreed to discharge plan.    35 minutes total was spent on discharge planning including chart review, medication ordering, and discussion with family and hospital staff.            Pertinent Physical Exam At Time of Discharge  General:     Well appearing  HENT:      Head: Normocephalic and atraumatic.      Mouth: Mucous membranes are moist.   Eyes:      Pupils are equal, round, and reactive to light.   Cardiovascular:      Normal rate and regular rhythm. Normal S1, S2.  No murmurs, clicks, gallops.   Pulmonary:      Clear to auscultation bilaterally.  No wheezing, rhonchi, or crackles heard.   Abdominal:       Bowel sounds are normal.      Abdomen is soft, nontender, nondistended  Skin:      No lesions seen  Musculoskeletal:         Pain in right buttocks with right leg raise.     Neck supple.   Neurological:      Mental Status: Patient is alert and oriented X3     CN II-XII intact.  Decreased sensation in right lower extremity.  4 out of 5 motor strength in right lower extremity.  Remainder of examination unremarkable.    Outpatient Follow-Up  Future Appointments   Date Time Provider Department [place]   6/3/2025  1:00 PM Anoop Lockett MD PhD [id number] [place]   6/16/2025  1:20 PM CANDELARIO Reed-CNP [id number] [place]   7/9/2025  1:00 PM CANDELAIRO Thomas-CNP [id number] [place]   7/28/2025  9:40 AM Nehemiah Montiel MD [id number] [facility]    2/23/2026  1:00 PM Susan L Mayne, CANDELARIO-CNP [id number] [facility]         Saul Charles, DO

## 2025-04-23 NOTE — NURSING NOTE
Discharge Note: 4/23/2025 7709 AVS and pt responsibilities reviewed with pt, pt family, and copy given. Syncope, back pain, education reviewed with pt, pt family, and information sheets given. Pt verbalizes understanding of instructions received, verbalizes understanding of when to seek medical attention, denies any home going or personal care needs. Denies further questions or concerns. Reviewed follow up appts with pt and verbalizes understanding. Pt  has appt with Dr. Sotelo for his back tomorrow morning. States feels much better today in regards to pain. Ella SPEARS

## 2025-04-25 ENCOUNTER — HOME CARE VISIT (OUTPATIENT)
Dept: HOME HEALTH SERVICES | Facility: HOME HEALTH | Age: 55
End: 2025-04-25
Payer: COMMERCIAL

## 2025-04-25 PROCEDURE — G0151 HHCP-SERV OF PT,EA 15 MIN: HCPCS

## 2025-04-25 SDOH — HEALTH STABILITY: PHYSICAL HEALTH: EXERCISE TYPE: SKILLED THERAPEUTIC EXERCISES

## 2025-04-25 ASSESSMENT — ENCOUNTER SYMPTOMS
PERSON REPORTING PAIN: PATIENT
SUBJECTIVE PAIN PROGRESSION: UNCHANGED
LOWEST PAIN SEVERITY IN PAST 24 HOURS: 5/10
PAIN LOCATION - EXACERBATING FACTORS: CHRONIC
PAIN LOCATION - RELIEVING FACTORS: PAIN MEDS
PAIN LOCATION - PAIN SEVERITY: 8/10
PAIN SEVERITY GOAL: 2/10
PAIN: 1
HYPERTENSION: 1
HIGHEST PAIN SEVERITY IN PAST 24 HOURS: 8/10
PAIN LOCATION: BACK

## 2025-04-25 ASSESSMENT — ACTIVITIES OF DAILY LIVING (ADL)
ENTERING_EXITING_HOME: MAXIMUM ASSIST
CURRENT_FUNCTION: STAND BY ASSIST
OASIS_M1830: 03
AMBULATION ASSISTANCE: STAND BY ASSIST
AMBULATION ASSISTANCE ON FLAT SURFACES: 1

## 2025-04-26 ASSESSMENT — GAIT ASSESSMENTS
WALKING STANCE: 0 - HEELS APART
INITIATION OF GAIT IMMEDIATELY AFTER GO: 0 - ANY HESITANCY OR MULTIPLE ATTEMPTS TO START
BALANCE AND GAIT SCORE: 8
PATH SCORE: 1
TRUNK SCORE: 1
PATH: 1 - MILD/MODERATE DEVIATION OR USES WALKING AID
TRUNK: 1 - NO SWAY BUT FLEXION OF KNEES OR BACK OR SPREADS ARMS WHILE WALKING
STEP CONTINUITY: 0 - STOPPING OR DISCONTINUITY BETWEEN STEPS
GAIT SCORE: 2
STEP SYMMETRY: 0 - RIGHT AND LEFT STEP LENGTH NOT EQUAL

## 2025-04-26 ASSESSMENT — BALANCE ASSESSMENTS
ARISING SCORE: 1
ARISES: 1 - ABLE, USES ARMS TO HELP
EYES CLOSED AT MAXIMUM POSITION NUDGED: 0 - UNSTEADY
BALANCE SCORE: 6
TURNING 360 DEGREES STEPS: 0 - DISCONTINUOUS STEPS
STANDING BALANCE: 1 - STEADY BUT WIDE STANCE AND USES CANE OR OTHER SUPPORT
NUDGED SCORE: 0
IMMEDIATE STANDING BALANCE FIRST 5 SECONDS: 1 - STEADY BUT USES WALKER OR OTHER SUPPORT
NUDGED: 0 - BEGINS TO FALL
ATTEMPTS TO ARISE: 1 - ABLE, REQUIRES MORE THAN ONE ATTEMPT
SITTING BALANCE: 1 - STEADY, SAFE
SITTING DOWN: 1 - USES ARMS OR NOT SMOOTH MOTION

## 2025-04-27 NOTE — HOME HEALTH
GOALS: PATIENT WILL DEMONSTRATE IMPROVED PAIN PERCEPTION, ENDURANCE, STRENGTH, MOBILITY, AND BALANCE.  SUBJECTIVE: THE PATIENT REPORTED 8/10 LOW BACK PAIN. PATIENT REPORTED DIFFICULTY WITH AMBULATION.  PRIOR LEVEL OF FUNCTION: MAX ASSIST NEEDED FOR TRANSFERS AND AMBULATION.  LIVING CONDITION: PATIENT IS CURRENTLY LIVING IN A ONE-STORY BUILDING WITH STAIRS. PATIENT IS CURRENTLY LIVING WITH HIS FAMILY.  OBJECTIVE: MANUAL MUSCLE TESTING WAS PERFORMED TO DETERMINE BOTH CORE AND LE STRENGTH. PATIENT DEMONSTRATED 2-/5 MUSCLE STRENGTH TO CORE AND ALANA LE'S. BALANCE TESTING WAS PERFORMED WHICH SHOWED PATIENT TO BE A HIGH FALL RISK. DEMONSTRATED DIFFICULTY WITH TRANSFERS FROM STS AND GAIT ABNORMALITIES ALONG WITH BALANCE IMPAIRMENT.  THERAPEUTIC ACTIVITIES: MMT, MOBILITY, AND GAIT ASSESSMENT COMPLETED  ASSESSMENT: DEMONSTRATED GROSSLY REDUCED STRENGTH AND ENDURANCE SECONDARY TO ASSOCIATED COMORBID CONDITIONS. PATIENT, PRESENTLY, IS A FALL RISK, CURRENTLY AMBULATING WITH A WALKER. FALL PRECAUTIONS DISCUSSED.  PLAN: CONSIDER COMORBID FACTORS WHEN IMPLEMENTING POC. CONTINUE WITH GENERAL ENDURANCE AND STRENGTH TRAINING UE'S AND LE'S, GAIT TRAINING ACTIVITIES, BALANCE AND PROPRIOCEPTIVE TRAINING, AND FALL PREVENTION STRATEGIES AS PER PT POC. PROGRESS AS TOLERATED.  PLAN FOR NEXT VISIT: SKILLED EXERCISES, BALANCE TRAINING, TRANSFER TRAINING.  REHAB POTENTIALS FOR STATED GOALS: GOOD  PATIENT STATED GOAL: ABLE TO WALK WITHOUT DIFFICULTY.

## 2025-04-29 ENCOUNTER — HOME CARE VISIT (OUTPATIENT)
Dept: HOME HEALTH SERVICES | Facility: HOME HEALTH | Age: 55
End: 2025-04-29
Payer: COMMERCIAL

## 2025-04-29 PROCEDURE — G0152 HHCP-SERV OF OT,EA 15 MIN: HCPCS

## 2025-04-29 ASSESSMENT — ENCOUNTER SYMPTOMS
PAIN: 1
HIGHEST PAIN SEVERITY IN PAST 24 HOURS: 8/10
PAIN LOCATION - RELIEVING FACTORS: MEDS REST
PAIN LOCATION: BACK
PAIN LOCATION - PAIN QUALITY: DEEP ACHE
LOWEST PAIN SEVERITY IN PAST 24 HOURS: 4/10
SUBJECTIVE PAIN PROGRESSION: GRADUALLY IMPROVING
PERSON REPORTING PAIN: PATIENT
PAIN LOCATION - PAIN SEVERITY: 4/10
PAIN SEVERITY GOAL: 0/10

## 2025-04-29 ASSESSMENT — ACTIVITIES OF DAILY LIVING (ADL)
DRESSING_LB_CURRENT_FUNCTION: SUPERVISION
BATHING_CURRENT_FUNCTION: STAND BY ASSIST
BATHING ASSESSED: 1
DRESSING_LB_CURRENT_FUNCTION: STAND BY ASSIST
BATHING_CURRENT_FUNCTION: SUPERVISION

## 2025-05-01 ENCOUNTER — HOME CARE VISIT (OUTPATIENT)
Dept: HOME HEALTH SERVICES | Facility: HOME HEALTH | Age: 55
End: 2025-05-01
Payer: COMMERCIAL

## 2025-05-02 ENCOUNTER — HOME CARE VISIT (OUTPATIENT)
Dept: HOME HEALTH SERVICES | Facility: HOME HEALTH | Age: 55
End: 2025-05-02
Payer: COMMERCIAL

## 2025-05-04 ENCOUNTER — HOME CARE VISIT (OUTPATIENT)
Dept: HOME HEALTH SERVICES | Facility: HOME HEALTH | Age: 55
End: 2025-05-04
Payer: COMMERCIAL

## 2025-05-06 ENCOUNTER — HOME CARE VISIT (OUTPATIENT)
Dept: HOME HEALTH SERVICES | Facility: HOME HEALTH | Age: 55
End: 2025-05-06
Payer: COMMERCIAL

## 2025-05-06 PROCEDURE — G0151 HHCP-SERV OF PT,EA 15 MIN: HCPCS

## 2025-05-06 ASSESSMENT — ACTIVITIES OF DAILY LIVING (ADL)
OASIS_M1830: 03
HOME_HEALTH_OASIS: 01

## 2025-05-06 ASSESSMENT — ENCOUNTER SYMPTOMS
PAIN LOCATION: BACK
PERSON REPORTING PAIN: PATIENT
PAIN LOCATION - PAIN SEVERITY: 8/10
PAIN: 1

## 2025-05-08 ENCOUNTER — OFFICE VISIT (OUTPATIENT)
Dept: CARDIOLOGY | Facility: HOSPITAL | Age: 55
End: 2025-05-08
Payer: COMMERCIAL

## 2025-05-08 VITALS
HEIGHT: 78 IN | WEIGHT: 268 LBS | SYSTOLIC BLOOD PRESSURE: 120 MMHG | HEART RATE: 62 BPM | BODY MASS INDEX: 31.01 KG/M2 | DIASTOLIC BLOOD PRESSURE: 74 MMHG

## 2025-05-08 DIAGNOSIS — R55 SYNCOPE, UNSPECIFIED SYNCOPE TYPE: Primary | ICD-10-CM

## 2025-05-08 DIAGNOSIS — I25.119 CORONARY ARTERY DISEASE INVOLVING NATIVE CORONARY ARTERY OF NATIVE HEART WITH ANGINA PECTORIS: ICD-10-CM

## 2025-05-08 PROCEDURE — 99213 OFFICE O/P EST LOW 20 MIN: CPT | Performed by: INTERNAL MEDICINE

## 2025-05-08 PROCEDURE — 3074F SYST BP LT 130 MM HG: CPT | Performed by: INTERNAL MEDICINE

## 2025-05-08 PROCEDURE — 1036F TOBACCO NON-USER: CPT | Performed by: INTERNAL MEDICINE

## 2025-05-08 PROCEDURE — 3078F DIAST BP <80 MM HG: CPT | Performed by: INTERNAL MEDICINE

## 2025-05-08 PROCEDURE — 3044F HG A1C LEVEL LT 7.0%: CPT | Performed by: INTERNAL MEDICINE

## 2025-05-08 PROCEDURE — 3048F LDL-C <100 MG/DL: CPT | Performed by: INTERNAL MEDICINE

## 2025-05-08 PROCEDURE — 4010F ACE/ARB THERAPY RXD/TAKEN: CPT | Performed by: INTERNAL MEDICINE

## 2025-05-08 PROCEDURE — 3008F BODY MASS INDEX DOCD: CPT | Performed by: INTERNAL MEDICINE

## 2025-05-08 ASSESSMENT — ENCOUNTER SYMPTOMS
DEPRESSION: 0
OCCASIONAL FEELINGS OF UNSTEADINESS: 0
LOSS OF SENSATION IN FEET: 0

## 2025-05-08 NOTE — PROGRESS NOTES
Counseling:  The patient was counseled regarding diagnostic results, instructions for management, risk factor reductions, prognosis, patient and family education, impressions, risks and benefits of treatment options and importance of compliance with treatment.      Chief Complaint:   The patient presents today for 4-month followup of CAD and HTN, and for evaluation of syncope s/p echocardiogram.       History Of Present Illness:    Jhony Myles is a 54 year old male patient who presents today for 4-month followup of CAD and HTN, and for evaluation of syncope s/p echocardiogram. His PMH is significant for CAD with  RCA s/p multiple PCI with most recent being balloon angioplasty of several ostial stenosis in Dg1 07/2022, HTN, anxiety, agoraphobia, PVC's, fibromyalgia, GERD and hyperlipidemia. The patient was admitted to hospital from 04/18/2025 to 04/23/2025 after having a syncopal episode. The patient's syncope event was deemed to likely be in the setting of sudden onset acute back pain (h/o chronic back pain). He was discharged home with an optimized pain medication regimen and followup with his spinal surgeon. Prior to hospital admission, the patient had a Holter monitor done (03/21/2025 to 03/27/2025), which revealed 3 runs of SVT. Echocardiogram performed 04/09/2025 demonstrated an EF of 60-65%, Grade I impaired relaxation pattern of LV diastolic filling with normal left atrial filling pressure, normal RV systolic function and mildly elevated RVSP. Today, the patient states that he is feeling improved since his recent hospital admission with syncope. He states that EMS noted him to have seizure-like activity at the time of his syncopal event. He denies any prior history of seizures. He denies any CP, chest discomfort or SOB. BP has been stable. The patient is compliant with his prescribed medications.     Last Recorded Vitals:  Vitals:    05/08/25 1318   BP: 120/74   BP Location: Right arm   Pulse: 62  "  Weight: 122 kg (268 lb)   Height: 1.981 m (6' 6\")       Past Surgical History:  He has a past surgical history that includes Coronary angioplasty (02/27/2018); CT angio neck (12/2/2022); CT angio head w and wo IV contrast (12/2/2022); MR angio head wo IV contrast (4/3/2023); MR angio neck wo IV contrast (4/3/2023); and Cardiac catheterization (N/A, 8/13/2024).      Social History:  He reports that he has never smoked. He has never used smokeless tobacco. He reports that he does not drink alcohol and does not use drugs.    Family History:  Family History   Problem Relation Name Age of Onset    Heart disease Mother Taylor     Hypertension Mother Taylor     Heart disease Father Jhony     Hypertension Father Jhony         Allergies:  Amlodipine, Calcium channel blocking agent diltiazem analogues, Ciprofloxacin, Hydralazine, Iodinated contrast media, Lisinopril, Nitroglycerin, Penicillins, Statins-hmg-coa reductase inhibitors, Tramadol, Meperidine, Propoxyphene-acetaminophen, and Temazepam    Outpatient Medications:  Current Outpatient Medications   Medication Instructions    albuterol 90 mcg/actuation inhaler 2 puffs, inhalation, Every 4 hours PRN    ALPRAZolam (Xanax) 1 mg tablet 1 tablet, 2 times daily PRN    aspirin 81 mg, oral, Daily    azelastine (Astelin) 137 mcg (0.1 %) nasal spray 1 spray, Each Nostril, 2 times daily, Use in each nostril as directed    Brilinta 90 mg, oral, 2 times daily    carvedilol (COREG) 25 mg, oral, 2 times daily (morning and late afternoon)    cyanocobalamin (Vitamin B-12) 1,000 mcg tablet 1 tablet, Daily    cyclobenzaprine (Flexeril) 10 mg tablet 1 tablet, 3 times daily PRN    flash glucose sensor kit (FreeStyle Nancy 2 Sensor) kit Use as instructed    FreeStyle Nancy reader (FreeStyle Nancy 2 Pelican) misc Use as instructed    gabapentin (NEURONTIN) 300 mg, oral, Every 8 hours scheduled    hydroCHLOROthiazide (HYDRODIURIL) 25 mg, oral, Daily    isosorbide mononitrate ER (IMDUR) 30 " mg, oral, Every 12 hours    loratadine (CLARITIN) 10 mg, oral, Daily    losartan (COZAAR) 75 mg, oral, Daily    meclizine (ANTIVERT) 25 mg, oral, 3 times daily PRN    minoxidil (LONITEN) 2.5 mg, oral, 2 times daily    olopatadine (Patanol) 0.1 % ophthalmic solution     omeprazole (PRILOSEC) 20 mg, oral, Daily before breakfast, Do not crush or chew.    oxyCODONE (ROXICODONE) 5 mg, oral, Every 6 hours PRN    Pain Relief (acetaminophen) 1,000 mg, oral, 3 times daily    potassium chloride CR (Klor-Con M20) 20 mEq ER tablet 20 mEq, oral, Daily, Do not crush or chew.    rosuvastatin (CRESTOR) 10 mg, oral, Every other day, 10 mg every other day    simethicone (MYLICON,GAS-X) 180 mg, oral, Every 6 hours PRN     Review of Systems   All other systems reviewed and are negative.     Physical Exam:  Constitutional:       Appearance: Healthy appearance. Not in distress.   Neck:      Vascular: No JVR. JVD normal.   Pulmonary:      Effort: Pulmonary effort is normal.      Breath sounds: Normal breath sounds. No wheezing. No rhonchi. No rales.   Chest:      Chest wall: Not tender to palpatation.   Cardiovascular:      PMI at left midclavicular line. Normal rate. Regular rhythm. Normal S1. Normal S2.       Murmurs: There is no murmur.      No gallop.  No click. No rub.   Pulses:     Intact distal pulses.   Edema:     Peripheral edema absent.   Abdominal:      General: Bowel sounds are normal.      Palpations: Abdomen is soft.      Tenderness: There is no abdominal tenderness.   Musculoskeletal: Normal range of motion.         General: No tenderness. Skin:     General: Skin is warm and dry.   Neurological:      General: No focal deficit present.      Mental Status: Alert and oriented to person, place and time.          Last Labs:  CBC -  Lab Results   Component Value Date    WBC 6.2 04/22/2025    HGB 14.2 04/22/2025    HCT 38.8 (L) 04/22/2025    MCV 87 04/22/2025     (L) 04/22/2025       CMP -  Lab Results   Component Value  Date    CALCIUM 9.7 04/22/2025    PROT 6.5 01/17/2025    ALBUMIN 4.8 01/17/2025    AST 18 01/17/2025    ALT 16 01/17/2025    ALKPHOS 47 01/17/2025    BILITOT 1.2 01/17/2025       LIPID PANEL -   Lab Results   Component Value Date    CHOL 104 01/17/2025    TRIG 138 01/17/2025    HDL 21.0 01/17/2025    CHHDL 5.0 01/17/2025    LDLF 71 04/01/2023    VLDL 28 01/17/2025    NHDL 83 01/17/2025       RENAL FUNCTION PANEL -   Lab Results   Component Value Date    GLUCOSE 130 (H) 04/22/2025     04/22/2025    K 3.7 04/22/2025     04/22/2025    CO2 31 04/22/2025    ANIONGAP 10 04/22/2025    BUN 21 04/22/2025    CREATININE 0.88 04/22/2025    GFRMALE 73 09/19/2023    CALCIUM 9.7 04/22/2025    ALBUMIN 4.8 01/17/2025        Lab Results   Component Value Date    BNP 26 09/19/2023    HGBA1C 4.9 01/17/2025       Last Cardiology Tests:  03/21/2025 to 03/27/2025  - Holter Monitor  1. Predominant underlying rhythm was sinus rhythm; min HR 47 bpm, max  bpm, avg HR 66 bpm.  2. 3 supraventricular tachycardia runs occurred; fastest interval lasting 5 beats with max rate 146 bpm, longest interval was also the fastest.   3. Isolated SVEs were rare, SVE couplets were rare, and SVE triplets were rare.  4. Isolated VEs were rare, VE couplets were rare, and VE triplets were rare.     08/13/2024 - Cardiac Catheterization (LH)  1. Single vessel disease.  2. Left Ventricular end-diastolic pressure = 13.  3. Normal LV filling pressures.    08/13/2024 - TTE  1. The left ventricular systolic function is normal, with a Conte's biplane calculated ejection fraction of 62%.  2. Spectral Doppler shows an impaired relaxation pattern of left ventricular diastolic filling.  3. There is normal right ventricular global systolic function.    05/23/2023 - Cardiac Catheterization (LH)  1. Single vessel coronary artery disease without proximal left anterior descending involvement.  2. Left Ventricular end-diastolic pressure = 9.  3. Normal LV  filling pressures.     04/24/2023 - TTE  Left ventricular systolic function is normal with a 60-65% estimated ejection fraction.     04/17/2023 - Vascular Lab Carotid Artery Duplex U/S   1. Right Carotid: Findings are consistent with less than 50% stenosis of the right proximal ICA. Laminar flow seen by color Doppler. Right external carotid artery appears patent with no evidence of stenosis. The right vertebral artery is patent with antegrade flow. No evidence of hemodynamically significant stenosis in the right subclavian artery.  2. Left Carotid: Findings are consistent with less than 50% stenosis of the left proximal ICA. Laminar flow seen by color Doppler. Left external carotid artery appears patent with no evidence of stenosis. The left vertebral artery is patent with antegrade flow. No evidence of hemodynamically significant stenosis in the left subclavian artery.     04/03/2023 - MRI Brain/Head/Neck  1. There is no MRI evidence of acute infarction on the diffusion weighted images.  2. There is mild-to-moderate brain parenchymal volume loss.  3. There are small scattered nonspecific white matter changes within the cerebral hemispheres bilaterally which while nonspecific, given the patient's age, likely represent sequelae of more remote small vessel ischemic change.  4. The MRA of the neck demonstrates a short segmental area of diminished MRA signal suggestive of slab artifact along the distal right common carotid artery and origin of the left internal carotid artery. There is mild non hemodynamically significant narrowing noted along the proximal internal carotid arteries bilaterally. There is diminished MRA signal noted along the horizontal segments of the distal cervical vertebral arteries bilaterally felt to likely be technical/artifactual in origin. Otherwise, no significant focal stenosis noted along the cervical segments of the vertebral arteries. There is a left dominant vertebral artery.  5. The  intracranial MRA demonstrates a small caliber distal right vertebral artery which terminates in a right posterior inferior cerebellar artery. No significant stenosis noted along the distal left dominant vertebral artery, basilar artery, or distal internal carotid arteries. Otherwise, no other significant intracranial stenosis or intracranial aneurysm is noted.     07/29/2022 - Cardiac Catheterization (LH)  1. Severe ostial stenosis in the Diagonal-1 vessel, status post balloon angioplasty.  2. Patent stent in the LAD and OM system.  3. LVEDP of 20 mmHg.     06/24/2022 - TTE  The left ventricular systolic function is normal with a 70-75% estimated ejection fraction.     06/23/2022 - Cardiac Catheterization (LH)  1. Double vessel coronary artery disease without proximal left anterior descending involvement.  2. Culprit vessel(s): left anterior descending.  3. Successful PCI of LAD.     11/18/2021 - Cardiac Catheterization (LH)  Single vessel coronary artery disease without proximal left anterior descending involvement.     10/11/2021 - NM Cardiac Stress Test  1. Normal myocardial perfusion study without evidence of ischemia or prior infarction. The left ventricle is normal in size. Normal LV wall motion with an LV EF estimated at approximately 55%.  2. Baseline EKG shows normal sinus rhythm with no significant ST-T segment changes. With regadenoson infusion no ST-T segment changes of ischemia, or sustained ventricular arrhythmias are seen. Regadenoson stress EKG is negative for ischemia.      09/17/2021 - Cardiac Catheterization   1. The 1st obtuse marginal branch showed mild tortuosity, two previous stents and mild in-stent restenosis.  2. 2-vessel severe coronary artery disease.  3. Successful GILDA PCI to proximal D2 with 3.0 x 18 mm Resolut [place] post-dilated with 3.0 x 15 mm NC balloon.  4. Previous LAD stent has mild malapposition in a short segment within the mid stent and in the ostial stent edge due to an  aneurysmal segment.     09/08/2021- NM Cardiac Stress Test  1. There is a small to moderate sized fixed perfusion defect in the anteroseptal to apical wall consistent with prior infarct. There is a low probability of ischemia. Calculated ejection fraction of 52% with mild hypokinesis of the septal wall.  2. No electrocardiographic evidence for ischemia at maximal infusion. Normal Stress Test.      07/23/2021 - Cardiac Catheterization (LH)  1. Double vessel coronary artery disease without proximal left anterior descending involvement.  2. Successful PCI of LAD.     03/19/2021 - TTE  The left ventricular systolic function is normal with a 55-60% estimated ejection fraction.     03/18/2021 - Cardiac Catheterization (LH)  1. Single vessel coronary artery disease with proximal left anterior descending involvement.  2. Successful PCI of LAD.     02/13/2019 - Cardiac Catheterization ()  1. Patent stents in the circumflex territory.  2. Chronic total occlusion of the right coronary artery with collaterals from left system.  3. Mild LV dysfunction, EF 50%.  4. Medical therapy advised.     Lab review: I have personally reviewed the laboratory result(s).   Diagnostic review: I have personally reviewed the result(s) of the Echocardiogram and Holter Monitor.     Assessment/Plan   1) Blurred Vision/?TIA  Admitted to hospital 03/31/2023 to 04/03/2023 with blurred vision left eye   CTA negative for acute cortical infarct or intracranial hemorrhage  MRI of brain/neck/head with mild narrowing of the carotid arteries  Scheduled to see opthalmology next week  TTE 08/13/2024 with LVEF 62%     2) Scattered Petechiae/Thrombocytopenia   Previously referred to hematology  CBC 08/13/2024 with platelet count of 155     4) HTN  Stable  On carvedilol 25 mg BID, HCTZ 25 mg daily, Imdur 30 mg BID (60 mg daily caused headaches), minoxidil 2.5 mg BID, losartan 50 mg daily.  Intolerant of amlodipine in the past d/t BLE edema  Trialed d/c minoxidil  with increase in BP above 150 systolic   Patient restarted minoxidil with stabilization of BP   Three weeks prior to hospitalization 03/31/2023, patient reported low BP readings at 70s/30s, especially with changes in position.   No RENETTA per renal artery duplex in 2018  Continue current medical Rx   F/U 6 months    5) CAD s/p Multiple PCI - Repeat PCI of LAD July 2021 and June 2022 and 2nd diagonal Sept 2021, PTA D1 July 2022 - Hyperlipidemia   On DAPT - ASA 81 mg daily, Brilinta 90 mg BID  On rosuvastatin 10 mg every other day, carvedilol 25 mg BID, Imdur 30 mg BID, HCTZ 25 mg daily, minoxidil 2.5 mg BID, losartan 50 mg daily.  Intolerant of Imdur 60 mg s/t headaches  Known  RCA with collaterals  Lipoprotein profile 01/03/2024 with LDL-P of 1382, LDL-C of 107, small LDL-P of 1026, LDL size of 19.8, LP-IR score of 61 - non-fasting.  C 08/13/2024 with single vessel disease  Lipid panel 01/17/2025 with total cholesterol, LDL and triglycerides of 104, 55 and 138 respectively  TTE 04/09/2025 with LVEF 60-65%, Grade I impaired relaxation pattern of LV diastolic filling with normal left atrial filling pressure, normal RV systolic function, mildly elevated RVSP.  Denies CP, chest discomfort or SOB  BP stable  Continue current medical Rx   F/U 6 months    6) Carotid Bruit  MRI of brain/neck/head 04/03/2033 with mild narrowing of the carotid arteries  Carotid duplex 04/17/2023 with <50% stenosis of bilateral proximal ICAs    7) Palpitations - SVT  On carvedilol 25 mg BID  Holter 03/21/2025 to 03/27/2025 with 3 runs of SVT   Stable  Continue current medical Rx   F/U 6 months    8) Syncope  Hospital admission 04/18/2025 to 04/23/2025 after having a syncopal episode   Syncope deemed to likely be in the setting of sudden onset acute back pain - h/o chronic back pain   Recommended followup with spinal surgeon  Adjustments made to pain medication regimen   Holter 03/21/2025 to 03/27/2025 (prior to hospital admission) with 3  runs of SVT  Outpatient TTE 04/09/2025 with LVEF 60-65%, Grade I impaired relaxation pattern of LV diastolic filling with normal left atrial filling pressure, normal RV systolic function, mildly elevated RVSP.  Denies recurrent syncope  Per the patient, EMS noted him to have seizure-like activity at time of syncopal event  Denies prior h/o seizures  F/U 6 months    9) GERD  On omeprazole 40 mg BID  Management per PCP        Scribe Attestation  By signing my name below, I, Enrico Carr, attest that this documentation has been prepared under the direction and in the presence of Juan Ortiz MD.

## 2025-05-15 ENCOUNTER — HOSPITAL ENCOUNTER (EMERGENCY)
Age: 55
Discharge: HOME | End: 2025-05-15
Payer: MEDICAID

## 2025-05-15 VITALS
DIASTOLIC BLOOD PRESSURE: 72 MMHG | HEART RATE: 65 BPM | OXYGEN SATURATION: 99 % | RESPIRATION RATE: 18 BRPM | SYSTOLIC BLOOD PRESSURE: 135 MMHG

## 2025-05-15 VITALS
DIASTOLIC BLOOD PRESSURE: 80 MMHG | HEART RATE: 65 BPM | SYSTOLIC BLOOD PRESSURE: 149 MMHG | RESPIRATION RATE: 18 BRPM | OXYGEN SATURATION: 99 %

## 2025-05-15 VITALS
TEMPERATURE: 98.2 F | SYSTOLIC BLOOD PRESSURE: 153 MMHG | HEART RATE: 67 BPM | DIASTOLIC BLOOD PRESSURE: 78 MMHG | RESPIRATION RATE: 17 BRPM | OXYGEN SATURATION: 99 %

## 2025-05-15 VITALS
DIASTOLIC BLOOD PRESSURE: 102 MMHG | SYSTOLIC BLOOD PRESSURE: 175 MMHG | RESPIRATION RATE: 18 BRPM | TEMPERATURE: 98.4 F | OXYGEN SATURATION: 96 % | HEART RATE: 68 BPM

## 2025-05-15 VITALS
DIASTOLIC BLOOD PRESSURE: 96 MMHG | OXYGEN SATURATION: 97 % | HEART RATE: 59 BPM | RESPIRATION RATE: 21 BRPM | SYSTOLIC BLOOD PRESSURE: 176 MMHG

## 2025-05-15 DIAGNOSIS — R11.0: ICD-10-CM

## 2025-05-15 DIAGNOSIS — E78.00: ICD-10-CM

## 2025-05-15 DIAGNOSIS — F41.9: ICD-10-CM

## 2025-05-15 DIAGNOSIS — Z79.82: ICD-10-CM

## 2025-05-15 DIAGNOSIS — E11.9: ICD-10-CM

## 2025-05-15 DIAGNOSIS — Z95.5: ICD-10-CM

## 2025-05-15 DIAGNOSIS — R07.9: Primary | ICD-10-CM

## 2025-05-15 DIAGNOSIS — K21.9: ICD-10-CM

## 2025-05-15 DIAGNOSIS — I11.0: ICD-10-CM

## 2025-05-15 DIAGNOSIS — I50.9: ICD-10-CM

## 2025-05-15 DIAGNOSIS — I25.2: ICD-10-CM

## 2025-05-15 DIAGNOSIS — I25.10: ICD-10-CM

## 2025-05-15 DIAGNOSIS — Z79.899: ICD-10-CM

## 2025-05-15 LAB
ALBUMIN SERPL-MCNC: 4.5 G/DL (ref 3.5–5)
ALP SERPL-CCNC: 54 U/L (ref 40–129)
ALT SERPL-CCNC: 29 U/L (ref ?–46)
ANION GAP SERPL CALC-SCNC: 10 MMOL/L (ref 5–15)
AST(SGOT): 28 U/L (ref ?–37)
BILIRUB DIRECT SERPL-MCNC: 0.31 MG/DL (ref 0–0.3)
BUN SERPL-MCNC: 22 MG/DL (ref 4–19)
BUN/CREAT SERPL: 27.1 RATIO (ref 10–20)
CALCIUM SERPL-MCNC: 9.6 MG/DL (ref 7.6–11)
CHLORIDE: 106 MMOL/L (ref 98–108)
DEPRECATED RDW RBC: 40.2 FL (ref 35.1–43.9)
ERYTHROCYTE [DISTWIDTH] IN BLOOD: 12.9 % (ref 11.6–14.6)
GLOBULIN: 2.2 G/DL (ref 2.2–4.2)
GLUCOSE SERPL-MCNC: 181 MG/DL (ref 70–99)
HCT VFR BLD AUTO: 35.1 % (ref 40–54)
LIPASE: 22 U/L (ref 13–75)
MCV RBC: 87.8 FL (ref 80–94)
MEAN PLATELET VOL.: 8.9 FL (ref 6.2–12)
NRBC FLAGGED BY ANALYZER: 0 % (ref 0–5)
PLATELET # BLD: 126 K/MM3 (ref 150–450)
POTASSIUM SPEC-MCNC: 4.3 MMOL/L (ref 3.3–5.1)
PROTHROMBIN TIME (PROTIME)PT.: 13.6 SECONDS (ref 11.7–14.9)
TROPONIN T HIGH SENSITIVITY: 23 NG/L (ref ?–22)
TROPONIN T SERPL HS-MCNC: 21 NG/L (ref ?–22)
WBC # BLD AUTO: 3.9 K/MM3 (ref 4.4–11)

## 2025-05-15 PROCEDURE — 85610 PROTHROMBIN TIME: CPT

## 2025-05-15 PROCEDURE — 99284 EMERGENCY DEPT VISIT MOD MDM: CPT

## 2025-05-15 PROCEDURE — 71045 X-RAY EXAM CHEST 1 VIEW: CPT

## 2025-05-15 PROCEDURE — 84484 ASSAY OF TROPONIN QUANT: CPT

## 2025-05-15 PROCEDURE — 80076 HEPATIC FUNCTION PANEL: CPT

## 2025-05-15 PROCEDURE — A4216 STERILE WATER/SALINE, 10 ML: HCPCS

## 2025-05-15 PROCEDURE — 83690 ASSAY OF LIPASE: CPT

## 2025-05-15 PROCEDURE — 80048 BASIC METABOLIC PNL TOTAL CA: CPT

## 2025-05-15 PROCEDURE — 85025 COMPLETE CBC W/AUTO DIFF WBC: CPT

## 2025-05-15 PROCEDURE — 93005 ELECTROCARDIOGRAM TRACING: CPT

## 2025-05-16 ENCOUNTER — PATIENT OUTREACH (OUTPATIENT)
Dept: CARE COORDINATION | Facility: CLINIC | Age: 55
End: 2025-05-16
Payer: COMMERCIAL

## 2025-05-16 SDOH — ECONOMIC STABILITY: FOOD INSECURITY
ARE ANY OF YOUR NEEDS URGENT? FOR EXAMPLE, UNCERTAINTY OF WHERE YOU WILL GET YOUR NEXT MEAL OR NOT HAVING THE MEDICATIONS YOU NEED TO TAKE TOMORROW.: NO

## 2025-05-16 SDOH — ECONOMIC STABILITY: GENERAL: WOULD YOU LIKE HELP WITH ANY OF THE FOLLOWING NEEDS?: I DONT NEED HELP WITH ANY OF THESE

## 2025-05-16 NOTE — PROGRESS NOTES
Outreach call to patient to support a smooth transition of care from recent ED admission.  Spoke with patient, reviewed discharge medications, discharge instructions, assessed social needs, care gaps reviewed and provided education on importance of follow-up appointment with provider.      Hospital Encounter and Summary: Linked     See Discharge assessment below for further details.    Wrap Up  Is the patient/caregiver familiar with Advance Care Planning?: Yes (5/16/2025 12:02 PM)  Would the patient like more information on Advance Care Planning?: No (5/16/2025 12:02 PM)  Call End Time: 1204 (5/16/2025 12:02 PM)    Engagement  Call Start Time: 1127 (5/16/2025 12:02 PM)    Medications  Medications reviewed with patient/caregiver?: Yes (5/16/2025 12:02 PM)  Is the patient having any side effects they believe may be caused by any medication additions or changes?: No (5/16/2025 12:02 PM)  Does the patient have all medications ordered at discharge?: Yes (5/16/2025 12:02 PM)  Care Management Interventions: No intervention needed (5/16/2025 12:02 PM)  Is the patient taking all medications as directed (includes completed medication regime)?: Yes (5/16/2025 12:02 PM)    Appointments  Does the patient have a primary care provider?: Yes (5/16/2025 12:02 PM)  Care Management Interventions: Educated patient on importance of making appointment; Advised patient to make appointment (patient informed me that he prefers to call his pcp and schedule follow up appt on his own, he will call today.) (5/16/2025 12:02 PM)  Has the patient kept scheduled appointments due by today?: Yes (5/16/2025 12:02 PM)  Care Management Interventions: Advised patient to keep appointment; Educated on importance of keeping appointment (5/16/2025 12:02 PM)    Patient Teaching  Does the patient have access to their discharge instructions?: Yes (5/16/2025 12:02 PM)  Care Management Interventions: Reviewed instructions with patient (5/16/2025 12:02 PM)  What  is the patient's perception of their health status since discharge?: Improving (5/16/2025 12:02 PM)  Is the patient/caregiver able to teach back the hierarchy of who to call/visit for symptoms/problems? PCP, Specialist, Home Health nurse, Urgent Care, ED, 911: Yes (5/16/2025 12:02 PM)    Zakia Pelletier RN, [facility] [place]  Phone [phone]

## 2025-05-28 ENCOUNTER — DOCUMENTATION (OUTPATIENT)
Dept: CARDIOLOGY | Facility: HOSPITAL | Age: 55
End: 2025-05-28
Payer: COMMERCIAL

## 2025-05-28 NOTE — PROGRESS NOTES
Request from [facility]  for Posterior Lumbar 3-5 decompression fusion    Diagnosis/what are we seeing for:  CAD and HTN     Last ischemic work up left heart cath  Date: 2024  Last echocardiogram 2025  Anticoagulation: None  Antiplatelet: Ticagrelor (Brilinta)  Has patient had a recent cardioversion or ablation?  no     Last seen by  2025    Next appointment:  2025                          [address] [place] [address]                   Phone# [phone]              Fax# [phone]      Date: 25    RE: Jhony Myles            : 1970       Surgical/Procedural Clearance for:  Lumbar decompression fusion  Patient is at: LOW cardiovascular risk for this INTERMEDIATE risk procedure.           Can hold Antiplatelet Ticagrelor (Brilinta) for 5 days prior . Do not hold ASA     N/A hold Anticoagulant                     Is further cardiac workup is needed prior to the procedure?  No    Patient should continue Beta Blocker in the perioperative period.  Yes     Patient should resume antiplatelet/anticoagulation as soon as cleared by surgeon/procedure physician.  Yes       Thank You,    25 at 12:11 PM - Juan Ortiz MD

## 2025-06-02 ENCOUNTER — HOSPITAL ENCOUNTER (OUTPATIENT)
Dept: HOSPITAL 100 - MTRAD | Age: 55
Discharge: HOME | End: 2025-06-02
Payer: MEDICAID

## 2025-06-02 DIAGNOSIS — M54.9: Primary | ICD-10-CM

## 2025-06-02 PROCEDURE — 72072 X-RAY EXAM THORAC SPINE 3VWS: CPT

## 2025-06-03 ENCOUNTER — APPOINTMENT (OUTPATIENT)
Dept: NEUROSURGERY | Facility: CLINIC | Age: 55
End: 2025-06-03
Payer: COMMERCIAL

## 2025-06-08 DIAGNOSIS — I10 PRIMARY HYPERTENSION: ICD-10-CM

## 2025-06-09 RX ORDER — HYDROCHLOROTHIAZIDE 25 MG/1
25 TABLET ORAL DAILY
Qty: 90 TABLET | Refills: 1 | Status: SHIPPED | OUTPATIENT
Start: 2025-06-09 | End: 2025-12-06

## 2025-06-13 ENCOUNTER — TELEPHONE (OUTPATIENT)
Dept: HEMATOLOGY/ONCOLOGY | Facility: CLINIC | Age: 55
End: 2025-06-13
Payer: COMMERCIAL

## 2025-06-13 NOTE — TELEPHONE ENCOUNTER
Reached out to patient regarding reason for scheduling. Pt stated labs drawn at [facility] came back with lower blood counts and pt is having upper stomach pain.  Fax number provided to patient. He is reaching out to [facility] to have results faxed prior to appt.

## 2025-06-16 ENCOUNTER — OFFICE VISIT (OUTPATIENT)
Dept: PULMONOLOGY | Facility: HOSPITAL | Age: 55
End: 2025-06-16
Payer: COMMERCIAL

## 2025-06-16 ENCOUNTER — TELEPHONE (OUTPATIENT)
Dept: CARDIOLOGY | Facility: HOSPITAL | Age: 55
End: 2025-06-16

## 2025-06-16 VITALS
OXYGEN SATURATION: 97 % | RESPIRATION RATE: 16 BRPM | BODY MASS INDEX: 34.99 KG/M2 | HEART RATE: 65 BPM | TEMPERATURE: 96.2 F | SYSTOLIC BLOOD PRESSURE: 127 MMHG | WEIGHT: 264 LBS | HEIGHT: 73 IN | DIASTOLIC BLOOD PRESSURE: 77 MMHG

## 2025-06-16 DIAGNOSIS — J30.9 ALLERGIC RHINITIS, UNSPECIFIED SEASONALITY, UNSPECIFIED TRIGGER: ICD-10-CM

## 2025-06-16 DIAGNOSIS — J45.909 ASTHMA, UNSPECIFIED ASTHMA SEVERITY, UNSPECIFIED WHETHER COMPLICATED, UNSPECIFIED WHETHER PERSISTENT (HHS-HCC): Primary | ICD-10-CM

## 2025-06-16 PROCEDURE — 3044F HG A1C LEVEL LT 7.0%: CPT | Performed by: NURSE PRACTITIONER

## 2025-06-16 PROCEDURE — 1036F TOBACCO NON-USER: CPT | Performed by: NURSE PRACTITIONER

## 2025-06-16 PROCEDURE — 99212 OFFICE O/P EST SF 10 MIN: CPT

## 2025-06-16 PROCEDURE — 4010F ACE/ARB THERAPY RXD/TAKEN: CPT | Performed by: NURSE PRACTITIONER

## 2025-06-16 PROCEDURE — 3008F BODY MASS INDEX DOCD: CPT | Performed by: NURSE PRACTITIONER

## 2025-06-16 PROCEDURE — 3074F SYST BP LT 130 MM HG: CPT | Performed by: NURSE PRACTITIONER

## 2025-06-16 PROCEDURE — 93244 EXT ECG>48HR<7D REV&INTERPJ: CPT | Performed by: INTERNAL MEDICINE

## 2025-06-16 PROCEDURE — 3078F DIAST BP <80 MM HG: CPT | Performed by: NURSE PRACTITIONER

## 2025-06-16 PROCEDURE — 3048F LDL-C <100 MG/DL: CPT | Performed by: NURSE PRACTITIONER

## 2025-06-16 PROCEDURE — 99213 OFFICE O/P EST LOW 20 MIN: CPT | Performed by: NURSE PRACTITIONER

## 2025-06-16 ASSESSMENT — ENCOUNTER SYMPTOMS
COUGH: 1
UNEXPECTED WEIGHT CHANGE: 0
FATIGUE: 0
WHEEZING: 1
SHORTNESS OF BREATH: 1
CHILLS: 0
FEVER: 0
RHINORRHEA: 0

## 2025-06-16 NOTE — PATIENT INSTRUCTIONS
Continue albuterol 1-2 puffs as needed.  Continue Loratadine one tablet once a day.  Continue Azelastine nasal spray 1-2 times per day.  Call with any questions or concerns.   Follow up with me in one year.

## 2025-06-16 NOTE — TELEPHONE ENCOUNTER
Patient was seen by Dr. Ortiz on 5/8 in clinic and  was cleared for surgery per patient. Patient called to confirm that Dr. Ortiz had seen his monitor result from April. Monitor result was mentioned in Dr. Ortiz's clinic note. Patient notified.

## 2025-06-16 NOTE — PROGRESS NOTES
"Subjective   Patient ID: Jhony Myles \"Migel" is a 54 y.o. male who presents for follow up for asthma.      HPI: Patient has PMH of CAD s/p PCI, HTN, anxiety, fibromyalgia and GERD. Patient is here for shortness of breath. He states this happens a few times per week.  He reports coughing up phlegm, that is worse in the winter and happens about 3-4 times per week also. He is currently on cetirizine for sinus drainage/congestion. He has had allergies lifelong and used to get pneumonia frequently growing up. He is not currently on any inhalers. He states that he will get triggered by certain perfumes also. He will hear himself wheezing at times. He also has issues with GERD. He will get occasional chest tightness and he does follow closely with cardiology. His mother and his sisters have asthma. He worked in a steel mill. He has a history of SHRAVAN but then repeat testing he states he no longer has it and no longer needs CPAP. He has no other concerns.     Today he is here for follow up. He states that the albuterol is helping and he uses it on average about 3-4 times per week. He states symptoms have been a little worse lately due to the air quality. He has no other concerns.     Review of Systems   Constitutional:  Negative for chills, fatigue, fever and unexpected weight change.   HENT:  Negative for congestion, postnasal drip and rhinorrhea.    Respiratory:  Positive for cough (denies hemoptysis.), shortness of breath and wheezing.    Cardiovascular:  Negative for chest pain and leg swelling.   All other systems reviewed and are negative.      Objective   Physical Exam  Vitals reviewed.   Constitutional:       Appearance: Normal appearance.   HENT:      Head: Normocephalic.   Cardiovascular:      Rate and Rhythm: Normal rate and regular rhythm.   Pulmonary:      Effort: Pulmonary effort is normal.      Breath sounds: Normal breath sounds.   Skin:     General: Skin is warm and dry.   Neurological:      Mental " Status: He is alert.         Assessment/Plan   Suspect bronchial asthma  Allergic rhinitis   CAD s/p PCI  GERD    Plan:    -PFTs done with moderate obstruction suggestive of asthma. FEV1/FVC 64, FEV1 78, RV elevated. Discussed results.   -Continue albuterol 1-2 puffs prn, educated on use.   -Will consider maintenance therapy for him in the future.   -IGE/RAST + grasses and ragweed.   -Continue on Loratadine daily.   -Continue on Azelastine BID.   -He is on GERD management.     Overall I will continue on albuterol prn, he will notify me if his symptoms worsen and would like to consider maintenance therapy. I will see him back in one year. I instructed patient to call sooner if needed.

## 2025-06-25 ENCOUNTER — APPOINTMENT (OUTPATIENT)
Dept: HEMATOLOGY/ONCOLOGY | Facility: CLINIC | Age: 55
End: 2025-06-25
Payer: COMMERCIAL

## 2025-06-27 DIAGNOSIS — E11.40 TYPE 2 DIABETES MELLITUS WITH DIABETIC NEUROPATHY, WITHOUT LONG-TERM CURRENT USE OF INSULIN: ICD-10-CM

## 2025-07-01 ENCOUNTER — DOCUMENTATION (OUTPATIENT)
Dept: CARDIOLOGY | Facility: HOSPITAL | Age: 55
End: 2025-07-01
Payer: COMMERCIAL

## 2025-07-01 NOTE — PROGRESS NOTES
Request from [facility] for EGD with [facility] Sedation    Diagnosis/what are we seeing for:   CAD and HTN     Last ischemic work up left heart cath  Date: 2024  Last echocardiogram 2025  Anticoagulation: None  Antiplatelet: ASA and Ticagrelor (Brilinta)  Has patient had a recent cardioversion or ablation?  no     Last seen by  2025    Next appointment:                            [address] [place] [address]                   Phone# [phone]              Fax# [phone]      Date: 25    RE: Jhony Myles            : 1970       Surgical/Procedural Clearance for:  EGD  Patient is at: LOW cardiovascular risk for this LOW risk procedure.           Can hold Antiplatelet Ticagrelor (Brilinta) for 5 days prior      N/A hold Anticoagulant                      Is further cardiac workup is needed prior to the procedure?  No     Patient should continue Beta Blocker in the perioperative period.  Yes     Patient should resume antiplatelet/anticoagulation as soon as cleared by surgeon/procedure physician.  Yes       Thank You,    25 at 10:43 AM - Juan Ortiz MD

## 2025-07-09 ENCOUNTER — APPOINTMENT (OUTPATIENT)
Dept: CARDIOLOGY | Facility: HOSPITAL | Age: 55
End: 2025-07-09
Payer: COMMERCIAL

## 2025-07-14 ENCOUNTER — HOSPITAL ENCOUNTER (OUTPATIENT)
Dept: HOSPITAL 100 - EN | Age: 55
Discharge: HOME | End: 2025-07-14
Payer: MEDICAID

## 2025-07-14 VITALS
OXYGEN SATURATION: 97 % | RESPIRATION RATE: 16 BRPM | TEMPERATURE: 97 F | SYSTOLIC BLOOD PRESSURE: 131 MMHG | HEART RATE: 70 BPM | DIASTOLIC BLOOD PRESSURE: 77 MMHG

## 2025-07-14 VITALS
RESPIRATION RATE: 16 BRPM | DIASTOLIC BLOOD PRESSURE: 79 MMHG | OXYGEN SATURATION: 97 % | SYSTOLIC BLOOD PRESSURE: 133 MMHG | HEART RATE: 65 BPM

## 2025-07-14 VITALS
HEART RATE: 67 BPM | OXYGEN SATURATION: 98 % | SYSTOLIC BLOOD PRESSURE: 133 MMHG | RESPIRATION RATE: 16 BRPM | DIASTOLIC BLOOD PRESSURE: 87 MMHG

## 2025-07-14 VITALS
SYSTOLIC BLOOD PRESSURE: 133 MMHG | DIASTOLIC BLOOD PRESSURE: 79 MMHG | TEMPERATURE: 97.1 F | OXYGEN SATURATION: 96 % | HEART RATE: 64 BPM | RESPIRATION RATE: 16 BRPM

## 2025-07-14 VITALS
RESPIRATION RATE: 16 BRPM | TEMPERATURE: 96.8 F | DIASTOLIC BLOOD PRESSURE: 77 MMHG | SYSTOLIC BLOOD PRESSURE: 133 MMHG | HEART RATE: 68 BPM | OXYGEN SATURATION: 97 %

## 2025-07-14 VITALS
RESPIRATION RATE: 16 BRPM | HEART RATE: 64 BPM | SYSTOLIC BLOOD PRESSURE: 133 MMHG | DIASTOLIC BLOOD PRESSURE: 79 MMHG | OXYGEN SATURATION: 98 % | TEMPERATURE: 96.62 F

## 2025-07-14 VITALS
SYSTOLIC BLOOD PRESSURE: 133 MMHG | TEMPERATURE: 96.7 F | OXYGEN SATURATION: 98 % | HEART RATE: 64 BPM | RESPIRATION RATE: 16 BRPM | DIASTOLIC BLOOD PRESSURE: 79 MMHG

## 2025-07-14 VITALS — BODY MASS INDEX: 31.3 KG/M2

## 2025-07-14 VITALS — SYSTOLIC BLOOD PRESSURE: 133 MMHG | DIASTOLIC BLOOD PRESSURE: 79 MMHG

## 2025-07-14 DIAGNOSIS — Z95.5: ICD-10-CM

## 2025-07-14 DIAGNOSIS — Z86.73: ICD-10-CM

## 2025-07-14 DIAGNOSIS — Z79.899: ICD-10-CM

## 2025-07-14 DIAGNOSIS — E11.40: ICD-10-CM

## 2025-07-14 DIAGNOSIS — K29.50: Primary | ICD-10-CM

## 2025-07-14 DIAGNOSIS — K29.80: ICD-10-CM

## 2025-07-14 DIAGNOSIS — R11.0: ICD-10-CM

## 2025-07-14 DIAGNOSIS — I25.2: ICD-10-CM

## 2025-07-14 DIAGNOSIS — K21.9: ICD-10-CM

## 2025-07-14 DIAGNOSIS — E78.00: ICD-10-CM

## 2025-07-14 DIAGNOSIS — I10: ICD-10-CM

## 2025-07-14 DIAGNOSIS — R10.13: ICD-10-CM

## 2025-07-14 DIAGNOSIS — F41.9: ICD-10-CM

## 2025-07-14 DIAGNOSIS — Z79.82: ICD-10-CM

## 2025-07-14 LAB — GLUCOSE BLDC GLUCOMTR-MCNC: 143 MG/DL (ref 74–106)

## 2025-07-14 PROCEDURE — 43239 EGD BIOPSY SINGLE/MULTIPLE: CPT

## 2025-07-14 PROCEDURE — 82962 GLUCOSE BLOOD TEST: CPT

## 2025-07-14 PROCEDURE — 88305 TISSUE EXAM BY PATHOLOGIST: CPT

## 2025-07-14 PROCEDURE — 0DJ08ZZ INSPECTION OF UPPER INTESTINAL TRACT, VIA NATURAL OR ARTIFICIAL OPENING ENDOSCOPIC: ICD-10-PCS | Performed by: INTERNAL MEDICINE

## 2025-07-14 RX ADMIN — SODIUM CHLORIDE, POTASSIUM CHLORIDE, SODIUM LACTATE AND CALCIUM CHLORIDE 15 ML: 600; 310; 30; 20 INJECTION, SOLUTION INTRAVENOUS at 08:23

## 2025-07-23 ENCOUNTER — HOSPITAL ENCOUNTER (OUTPATIENT)
Dept: HOSPITAL 100 - CT | Age: 55
Discharge: HOME | End: 2025-07-23
Payer: MEDICAID

## 2025-07-23 DIAGNOSIS — K29.70: Primary | ICD-10-CM

## 2025-07-23 DIAGNOSIS — R10.13: ICD-10-CM

## 2025-07-23 DIAGNOSIS — R11.0: ICD-10-CM

## 2025-07-23 LAB
CREATININE FINGERSTICK: < 1 MG/DL (ref 0.7–1.3)
EGFR FINGERSTICK: > 60 ML/MIN (ref 60–?)

## 2025-07-23 PROCEDURE — 74174 CTA ABD&PLVS W/CONTRAST: CPT

## 2025-07-28 ENCOUNTER — APPOINTMENT (OUTPATIENT)
Dept: PRIMARY CARE | Facility: CLINIC | Age: 55
End: 2025-07-28
Payer: COMMERCIAL

## 2025-08-28 ENCOUNTER — APPOINTMENT (OUTPATIENT)
Dept: CARDIOLOGY | Facility: HOSPITAL | Age: 55
End: 2025-08-28
Payer: COMMERCIAL

## 2025-08-28 ENCOUNTER — HOSPITAL ENCOUNTER (EMERGENCY)
Facility: HOSPITAL | Age: 55
Discharge: HOME | End: 2025-08-28
Payer: COMMERCIAL

## 2025-08-28 ENCOUNTER — APPOINTMENT (OUTPATIENT)
Dept: RADIOLOGY | Facility: HOSPITAL | Age: 55
End: 2025-08-28
Payer: COMMERCIAL

## 2025-08-28 ASSESSMENT — HEART SCORE
RISK FACTORS: >2 RISK FACTORS OR HX OF ATHEROSCLEROTIC DISEASE
AGE: 45-64
HISTORY: SLIGHTLY SUSPICIOUS
ECG: NORMAL
TROPONIN: LESS THAN OR EQUAL TO NORMAL LIMIT
HEART SCORE: 3

## 2025-08-28 ASSESSMENT — PAIN SCALES - GENERAL: PAINLEVEL_OUTOF10: 3

## 2025-08-28 ASSESSMENT — PAIN DESCRIPTION - LOCATION: LOCATION: CHEST

## 2025-08-28 ASSESSMENT — PAIN - FUNCTIONAL ASSESSMENT: PAIN_FUNCTIONAL_ASSESSMENT: 0-10

## 2025-11-14 ENCOUNTER — APPOINTMENT (OUTPATIENT)
Dept: CARDIOLOGY | Facility: HOSPITAL | Age: 55
End: 2025-11-14
Payer: COMMERCIAL

## 2026-02-23 ENCOUNTER — APPOINTMENT (OUTPATIENT)
Dept: DERMATOLOGY | Facility: CLINIC | Age: 56
End: 2026-02-23
Payer: COMMERCIAL

## (undated) DEVICE — SUTURE, CHROMIC GUT, 2-0, SH 27IN

## (undated) DEVICE — SHEATH, GLIDESHEATH, SLENDER, 6FR 10CM

## (undated) DEVICE — GUIDEWIRE, INQUIRE, J TIP, .035 X 210CM, FIXED CORE, DIAGNOSTIC

## (undated) DEVICE — SOLUTION, IRRIGATION, SODIUM CHLORIDE 0.9%, 1000 ML, POUR BOTTLE

## (undated) DEVICE — LINE, GAS SAMPLING, .05IN 1D 10FT, M/M CONNECTORS

## (undated) DEVICE — NEEDLE, ECLIPSE, 25GA X 1-1/2 IN

## (undated) DEVICE — GLOVE, PROTEXIS PI CLASSIC, SZ-8.0, PF, PF, LF

## (undated) DEVICE — DRESSING, GAUZE, SPONGE, KERLIX, SUPER, 6 X 6.75 IN, STERILE 10PK

## (undated) DEVICE — STRAP, KNEE AND BODY, SINGLE USE

## (undated) DEVICE — SOLUTION, SCRUB, PVP IODINE, 4OZ

## (undated) DEVICE — STRAP, ARMBOARD, 3IN, BLUE/WHITE, SECURE HOOK

## (undated) DEVICE — CATHETER, OPTITORQUE, 6FR, JACKY, BL3.5/2H/100CM

## (undated) DEVICE — DRAPE, SURGICAL, 100 X 124 X 76

## (undated) DEVICE — DRESSING, GAUZE, SPONGE, 12 PLY, CURITY, 4 X 4 IN, RIGID TRAY, STERILE

## (undated) DEVICE — MASK, FACE, ANESTHESIA, ADULT, LARGE, P6, RED

## (undated) DEVICE — Device

## (undated) DEVICE — SYRINGE, 10 ML, 21 G X 1 0.5 IN, LUER LOK

## (undated) DEVICE — BRIEF, PANTY, MESH, XX-LG, PURPLE, BULK

## (undated) DEVICE — TOWEL, OR, XRAY DECTECTABLE, 17 X 27, BLUE, STERILE

## (undated) DEVICE — CIRCUIT, BREATHING, ADULT, B/V FILTER, HMEF, 3 L BAG, 108 IN

## (undated) DEVICE — SUTURE, ETHILON, 3-0, 30 IN, FS-1, BLACK

## (undated) DEVICE — SOLUTION, PREP, PVP IODINE, FLIP TOP, 4OZ

## (undated) DEVICE — TR BAND, RADIAL COMPRESSION, LONG, 29CM

## (undated) DEVICE — ADHESIVE, SKIN, LIQUIBAND EXCEED

## (undated) DEVICE — DRAIN, PENROSE, 0.25 X 12 IN, LF

## (undated) DEVICE — SUTURE, CHROMIC GUT, 3-0, SH 27"

## (undated) DEVICE — TRAY, SKIN SCRUB, CUSTOM (SAMARITAN)